# Patient Record
Sex: MALE | Race: WHITE | Employment: OTHER
[De-identification: names, ages, dates, MRNs, and addresses within clinical notes are randomized per-mention and may not be internally consistent; named-entity substitution may affect disease eponyms.]

---

## 2017-03-02 ENCOUNTER — OFFICE VISIT (OUTPATIENT)
Dept: WOUND CARE | Facility: CLINIC | Age: 72
End: 2017-03-02

## 2017-03-02 VITALS
HEIGHT: 66 IN | RESPIRATION RATE: 20 BRPM | BODY MASS INDEX: 50.62 KG/M2 | HEART RATE: 92 BPM | WEIGHT: 315 LBS | DIASTOLIC BLOOD PRESSURE: 79 MMHG | SYSTOLIC BLOOD PRESSURE: 143 MMHG | TEMPERATURE: 98.5 F

## 2017-03-02 DIAGNOSIS — I89.0 LYMPHEDEMA OF BOTH LOWER EXTREMITIES: ICD-10-CM

## 2017-03-02 DIAGNOSIS — L02.419 CELLULITIS AND ABSCESS OF LOWER EXTREMITY: Primary | ICD-10-CM

## 2017-03-02 DIAGNOSIS — L03.119 CELLULITIS AND ABSCESS OF LOWER EXTREMITY: Primary | ICD-10-CM

## 2017-03-02 PROCEDURE — 99203 OFFICE O/P NEW LOW 30 MIN: CPT | Performed by: PODIATRIST

## 2017-03-02 RX ORDER — DOXYCYCLINE HYCLATE 100 MG
100 TABLET ORAL 2 TIMES DAILY
Qty: 60 TABLET | Refills: 0 | Status: SHIPPED | OUTPATIENT
Start: 2017-03-02 | End: 2021-12-27

## 2017-03-09 ENCOUNTER — OFFICE VISIT (OUTPATIENT)
Dept: PODIATRY | Facility: CLINIC | Age: 72
End: 2017-03-09

## 2017-03-09 DIAGNOSIS — I89.0 LYMPHEDEMA OF BOTH LOWER EXTREMITIES: Primary | ICD-10-CM

## 2017-03-09 PROCEDURE — 99213 OFFICE O/P EST LOW 20 MIN: CPT | Performed by: PODIATRIST

## 2017-03-09 PROCEDURE — 4040F PNEUMOC VAC/ADMIN/RCVD: CPT | Performed by: PODIATRIST

## 2017-03-09 PROCEDURE — 3017F COLORECTAL CA SCREEN DOC REV: CPT | Performed by: PODIATRIST

## 2017-03-09 PROCEDURE — G8417 CALC BMI ABV UP PARAM F/U: HCPCS | Performed by: PODIATRIST

## 2017-03-09 PROCEDURE — G8427 DOCREV CUR MEDS BY ELIG CLIN: HCPCS | Performed by: PODIATRIST

## 2017-03-09 PROCEDURE — 1036F TOBACCO NON-USER: CPT | Performed by: PODIATRIST

## 2017-03-09 PROCEDURE — G8484 FLU IMMUNIZE NO ADMIN: HCPCS | Performed by: PODIATRIST

## 2017-03-09 PROCEDURE — 1123F ACP DISCUSS/DSCN MKR DOCD: CPT | Performed by: PODIATRIST

## 2017-03-14 ENCOUNTER — OFFICE VISIT (OUTPATIENT)
Dept: PODIATRY | Age: 72
End: 2017-03-14
Payer: MEDICARE

## 2017-03-14 VITALS
DIASTOLIC BLOOD PRESSURE: 77 MMHG | RESPIRATION RATE: 20 BRPM | HEART RATE: 84 BPM | SYSTOLIC BLOOD PRESSURE: 126 MMHG | TEMPERATURE: 98 F

## 2017-03-14 DIAGNOSIS — I89.0 LYMPHEDEMA OF BOTH LOWER EXTREMITIES: Primary | ICD-10-CM

## 2017-03-14 PROCEDURE — 4040F PNEUMOC VAC/ADMIN/RCVD: CPT | Performed by: PODIATRIST

## 2017-03-14 PROCEDURE — 99212 OFFICE O/P EST SF 10 MIN: CPT | Performed by: PODIATRIST

## 2017-03-14 PROCEDURE — 1036F TOBACCO NON-USER: CPT | Performed by: PODIATRIST

## 2017-03-14 PROCEDURE — 1123F ACP DISCUSS/DSCN MKR DOCD: CPT | Performed by: PODIATRIST

## 2017-03-14 PROCEDURE — 3017F COLORECTAL CA SCREEN DOC REV: CPT | Performed by: PODIATRIST

## 2017-03-14 PROCEDURE — G8484 FLU IMMUNIZE NO ADMIN: HCPCS | Performed by: PODIATRIST

## 2017-03-14 PROCEDURE — G8417 CALC BMI ABV UP PARAM F/U: HCPCS | Performed by: PODIATRIST

## 2017-03-14 PROCEDURE — G8427 DOCREV CUR MEDS BY ELIG CLIN: HCPCS | Performed by: PODIATRIST

## 2017-03-16 ENCOUNTER — OFFICE VISIT (OUTPATIENT)
Dept: PODIATRY | Age: 72
End: 2017-03-16
Payer: MEDICARE

## 2017-03-16 VITALS
TEMPERATURE: 97.9 F | SYSTOLIC BLOOD PRESSURE: 148 MMHG | DIASTOLIC BLOOD PRESSURE: 86 MMHG | RESPIRATION RATE: 20 BRPM | HEART RATE: 84 BPM

## 2017-03-16 DIAGNOSIS — I89.0 LYMPHEDEMA OF BOTH LOWER EXTREMITIES: Primary | ICD-10-CM

## 2017-03-16 PROCEDURE — 4040F PNEUMOC VAC/ADMIN/RCVD: CPT | Performed by: PODIATRIST

## 2017-03-16 PROCEDURE — 3017F COLORECTAL CA SCREEN DOC REV: CPT | Performed by: PODIATRIST

## 2017-03-16 PROCEDURE — 1036F TOBACCO NON-USER: CPT | Performed by: PODIATRIST

## 2017-03-16 PROCEDURE — 99212 OFFICE O/P EST SF 10 MIN: CPT | Performed by: PODIATRIST

## 2017-03-16 PROCEDURE — G8417 CALC BMI ABV UP PARAM F/U: HCPCS | Performed by: PODIATRIST

## 2017-03-16 PROCEDURE — G8484 FLU IMMUNIZE NO ADMIN: HCPCS | Performed by: PODIATRIST

## 2017-03-16 PROCEDURE — G8427 DOCREV CUR MEDS BY ELIG CLIN: HCPCS | Performed by: PODIATRIST

## 2017-03-16 PROCEDURE — 1123F ACP DISCUSS/DSCN MKR DOCD: CPT | Performed by: PODIATRIST

## 2017-03-21 ENCOUNTER — OFFICE VISIT (OUTPATIENT)
Dept: PODIATRY | Age: 72
End: 2017-03-21
Payer: MEDICARE

## 2017-03-21 VITALS
TEMPERATURE: 98.2 F | HEART RATE: 84 BPM | SYSTOLIC BLOOD PRESSURE: 131 MMHG | RESPIRATION RATE: 20 BRPM | DIASTOLIC BLOOD PRESSURE: 76 MMHG

## 2017-03-21 DIAGNOSIS — I89.0 LYMPHEDEMA OF BOTH LOWER EXTREMITIES: Primary | ICD-10-CM

## 2017-03-21 PROCEDURE — G8484 FLU IMMUNIZE NO ADMIN: HCPCS | Performed by: PODIATRIST

## 2017-03-21 PROCEDURE — G8427 DOCREV CUR MEDS BY ELIG CLIN: HCPCS | Performed by: PODIATRIST

## 2017-03-21 PROCEDURE — 1123F ACP DISCUSS/DSCN MKR DOCD: CPT | Performed by: PODIATRIST

## 2017-03-21 PROCEDURE — 4040F PNEUMOC VAC/ADMIN/RCVD: CPT | Performed by: PODIATRIST

## 2017-03-21 PROCEDURE — 1036F TOBACCO NON-USER: CPT | Performed by: PODIATRIST

## 2017-03-21 PROCEDURE — 3017F COLORECTAL CA SCREEN DOC REV: CPT | Performed by: PODIATRIST

## 2017-03-21 PROCEDURE — 99212 OFFICE O/P EST SF 10 MIN: CPT | Performed by: PODIATRIST

## 2017-03-21 PROCEDURE — G8417 CALC BMI ABV UP PARAM F/U: HCPCS | Performed by: PODIATRIST

## 2017-03-23 ENCOUNTER — OFFICE VISIT (OUTPATIENT)
Dept: PODIATRY | Age: 72
End: 2017-03-23
Payer: MEDICARE

## 2017-03-23 DIAGNOSIS — I89.0 LYMPHEDEMA OF BOTH LOWER EXTREMITIES: Primary | ICD-10-CM

## 2017-03-23 PROCEDURE — 1036F TOBACCO NON-USER: CPT | Performed by: PODIATRIST

## 2017-03-23 PROCEDURE — 1123F ACP DISCUSS/DSCN MKR DOCD: CPT | Performed by: PODIATRIST

## 2017-03-23 PROCEDURE — 3017F COLORECTAL CA SCREEN DOC REV: CPT | Performed by: PODIATRIST

## 2017-03-23 PROCEDURE — G8484 FLU IMMUNIZE NO ADMIN: HCPCS | Performed by: PODIATRIST

## 2017-03-23 PROCEDURE — G8427 DOCREV CUR MEDS BY ELIG CLIN: HCPCS | Performed by: PODIATRIST

## 2017-03-23 PROCEDURE — 99212 OFFICE O/P EST SF 10 MIN: CPT | Performed by: PODIATRIST

## 2017-03-23 PROCEDURE — G8417 CALC BMI ABV UP PARAM F/U: HCPCS | Performed by: PODIATRIST

## 2017-03-23 PROCEDURE — 4040F PNEUMOC VAC/ADMIN/RCVD: CPT | Performed by: PODIATRIST

## 2017-03-28 ENCOUNTER — OFFICE VISIT (OUTPATIENT)
Dept: PODIATRY | Age: 72
End: 2017-03-28
Payer: MEDICARE

## 2017-03-28 VITALS
DIASTOLIC BLOOD PRESSURE: 80 MMHG | RESPIRATION RATE: 20 BRPM | HEART RATE: 82 BPM | SYSTOLIC BLOOD PRESSURE: 139 MMHG | TEMPERATURE: 97.1 F

## 2017-03-28 DIAGNOSIS — I89.0 LYMPHEDEMA OF BOTH LOWER EXTREMITIES: Primary | ICD-10-CM

## 2017-03-28 PROCEDURE — 99212 OFFICE O/P EST SF 10 MIN: CPT | Performed by: PODIATRIST

## 2017-03-28 PROCEDURE — 3017F COLORECTAL CA SCREEN DOC REV: CPT | Performed by: PODIATRIST

## 2017-03-28 PROCEDURE — G8427 DOCREV CUR MEDS BY ELIG CLIN: HCPCS | Performed by: PODIATRIST

## 2017-03-28 PROCEDURE — G8484 FLU IMMUNIZE NO ADMIN: HCPCS | Performed by: PODIATRIST

## 2017-03-28 PROCEDURE — 4040F PNEUMOC VAC/ADMIN/RCVD: CPT | Performed by: PODIATRIST

## 2017-03-28 PROCEDURE — 1036F TOBACCO NON-USER: CPT | Performed by: PODIATRIST

## 2017-03-28 PROCEDURE — 1123F ACP DISCUSS/DSCN MKR DOCD: CPT | Performed by: PODIATRIST

## 2017-03-28 PROCEDURE — G8417 CALC BMI ABV UP PARAM F/U: HCPCS | Performed by: PODIATRIST

## 2017-04-05 ENCOUNTER — OFFICE VISIT (OUTPATIENT)
Dept: PODIATRY | Age: 72
End: 2017-04-05
Payer: MEDICARE

## 2017-04-05 VITALS
DIASTOLIC BLOOD PRESSURE: 72 MMHG | HEART RATE: 87 BPM | RESPIRATION RATE: 20 BRPM | TEMPERATURE: 97.8 F | SYSTOLIC BLOOD PRESSURE: 122 MMHG

## 2017-04-05 DIAGNOSIS — I89.0 LYMPHEDEMA OF BOTH LOWER EXTREMITIES: Primary | ICD-10-CM

## 2017-04-05 PROCEDURE — 4040F PNEUMOC VAC/ADMIN/RCVD: CPT | Performed by: PODIATRIST

## 2017-04-05 PROCEDURE — 1036F TOBACCO NON-USER: CPT | Performed by: PODIATRIST

## 2017-04-05 PROCEDURE — G8427 DOCREV CUR MEDS BY ELIG CLIN: HCPCS | Performed by: PODIATRIST

## 2017-04-05 PROCEDURE — 3017F COLORECTAL CA SCREEN DOC REV: CPT | Performed by: PODIATRIST

## 2017-04-05 PROCEDURE — G8417 CALC BMI ABV UP PARAM F/U: HCPCS | Performed by: PODIATRIST

## 2017-04-05 PROCEDURE — 99213 OFFICE O/P EST LOW 20 MIN: CPT | Performed by: PODIATRIST

## 2017-04-05 PROCEDURE — 1123F ACP DISCUSS/DSCN MKR DOCD: CPT | Performed by: PODIATRIST

## 2017-04-06 ENCOUNTER — OFFICE VISIT (OUTPATIENT)
Dept: PODIATRY | Age: 72
End: 2017-04-06
Payer: MEDICARE

## 2017-04-06 DIAGNOSIS — I89.0 LYMPHEDEMA OF BOTH LOWER EXTREMITIES: Primary | ICD-10-CM

## 2017-04-06 PROCEDURE — 99213 OFFICE O/P EST LOW 20 MIN: CPT | Performed by: PODIATRIST

## 2017-04-06 PROCEDURE — G8427 DOCREV CUR MEDS BY ELIG CLIN: HCPCS | Performed by: PODIATRIST

## 2017-04-06 PROCEDURE — 4040F PNEUMOC VAC/ADMIN/RCVD: CPT | Performed by: PODIATRIST

## 2017-04-06 PROCEDURE — 1123F ACP DISCUSS/DSCN MKR DOCD: CPT | Performed by: PODIATRIST

## 2017-04-06 PROCEDURE — G8417 CALC BMI ABV UP PARAM F/U: HCPCS | Performed by: PODIATRIST

## 2017-04-06 PROCEDURE — 1036F TOBACCO NON-USER: CPT | Performed by: PODIATRIST

## 2017-04-06 PROCEDURE — 3017F COLORECTAL CA SCREEN DOC REV: CPT | Performed by: PODIATRIST

## 2017-04-11 ENCOUNTER — OFFICE VISIT (OUTPATIENT)
Dept: PODIATRY | Age: 72
End: 2017-04-11
Payer: MEDICARE

## 2017-04-11 VITALS
SYSTOLIC BLOOD PRESSURE: 120 MMHG | RESPIRATION RATE: 20 BRPM | HEART RATE: 84 BPM | TEMPERATURE: 98.6 F | DIASTOLIC BLOOD PRESSURE: 67 MMHG

## 2017-04-11 DIAGNOSIS — I89.0 LYMPHEDEMA OF BOTH LOWER EXTREMITIES: Primary | ICD-10-CM

## 2017-04-11 PROCEDURE — 1036F TOBACCO NON-USER: CPT | Performed by: PODIATRIST

## 2017-04-11 PROCEDURE — G8417 CALC BMI ABV UP PARAM F/U: HCPCS | Performed by: PODIATRIST

## 2017-04-11 PROCEDURE — 4040F PNEUMOC VAC/ADMIN/RCVD: CPT | Performed by: PODIATRIST

## 2017-04-11 PROCEDURE — 3017F COLORECTAL CA SCREEN DOC REV: CPT | Performed by: PODIATRIST

## 2017-04-11 PROCEDURE — G8427 DOCREV CUR MEDS BY ELIG CLIN: HCPCS | Performed by: PODIATRIST

## 2017-04-11 PROCEDURE — 99212 OFFICE O/P EST SF 10 MIN: CPT | Performed by: PODIATRIST

## 2017-04-11 PROCEDURE — 1123F ACP DISCUSS/DSCN MKR DOCD: CPT | Performed by: PODIATRIST

## 2017-04-13 ENCOUNTER — OFFICE VISIT (OUTPATIENT)
Dept: PODIATRY | Age: 72
End: 2017-04-13
Payer: MEDICARE

## 2017-04-13 VITALS
SYSTOLIC BLOOD PRESSURE: 135 MMHG | TEMPERATURE: 98.1 F | DIASTOLIC BLOOD PRESSURE: 66 MMHG | RESPIRATION RATE: 16 BRPM | HEART RATE: 87 BPM

## 2017-04-13 DIAGNOSIS — I89.0 LYMPHEDEMA OF BOTH LOWER EXTREMITIES: Primary | ICD-10-CM

## 2017-04-13 PROCEDURE — 99212 OFFICE O/P EST SF 10 MIN: CPT | Performed by: PODIATRIST

## 2017-04-13 PROCEDURE — G8427 DOCREV CUR MEDS BY ELIG CLIN: HCPCS | Performed by: PODIATRIST

## 2017-04-13 PROCEDURE — 1036F TOBACCO NON-USER: CPT | Performed by: PODIATRIST

## 2017-04-13 PROCEDURE — 3017F COLORECTAL CA SCREEN DOC REV: CPT | Performed by: PODIATRIST

## 2017-04-13 PROCEDURE — 4040F PNEUMOC VAC/ADMIN/RCVD: CPT | Performed by: PODIATRIST

## 2017-04-13 PROCEDURE — G8417 CALC BMI ABV UP PARAM F/U: HCPCS | Performed by: PODIATRIST

## 2017-04-13 PROCEDURE — 1123F ACP DISCUSS/DSCN MKR DOCD: CPT | Performed by: PODIATRIST

## 2017-04-18 ENCOUNTER — OFFICE VISIT (OUTPATIENT)
Dept: PODIATRY | Age: 72
End: 2017-04-18
Payer: MEDICARE

## 2017-04-18 VITALS
RESPIRATION RATE: 20 BRPM | HEART RATE: 93 BPM | SYSTOLIC BLOOD PRESSURE: 128 MMHG | DIASTOLIC BLOOD PRESSURE: 81 MMHG | TEMPERATURE: 98.7 F

## 2017-04-18 DIAGNOSIS — I89.0 LYMPHEDEMA OF BOTH LOWER EXTREMITIES: Primary | ICD-10-CM

## 2017-04-18 PROCEDURE — G8417 CALC BMI ABV UP PARAM F/U: HCPCS | Performed by: PODIATRIST

## 2017-04-18 PROCEDURE — 4040F PNEUMOC VAC/ADMIN/RCVD: CPT | Performed by: PODIATRIST

## 2017-04-18 PROCEDURE — 1036F TOBACCO NON-USER: CPT | Performed by: PODIATRIST

## 2017-04-18 PROCEDURE — 3017F COLORECTAL CA SCREEN DOC REV: CPT | Performed by: PODIATRIST

## 2017-04-18 PROCEDURE — 99212 OFFICE O/P EST SF 10 MIN: CPT | Performed by: PODIATRIST

## 2017-04-18 PROCEDURE — G8427 DOCREV CUR MEDS BY ELIG CLIN: HCPCS | Performed by: PODIATRIST

## 2017-04-18 PROCEDURE — 1123F ACP DISCUSS/DSCN MKR DOCD: CPT | Performed by: PODIATRIST

## 2017-04-20 ENCOUNTER — OFFICE VISIT (OUTPATIENT)
Dept: PODIATRY | Age: 72
End: 2017-04-20
Payer: MEDICARE

## 2017-04-20 VITALS
DIASTOLIC BLOOD PRESSURE: 67 MMHG | HEART RATE: 96 BPM | RESPIRATION RATE: 20 BRPM | SYSTOLIC BLOOD PRESSURE: 139 MMHG | TEMPERATURE: 98.5 F

## 2017-04-20 DIAGNOSIS — I89.0 LYMPHEDEMA OF BOTH LOWER EXTREMITIES: Primary | ICD-10-CM

## 2017-04-20 PROCEDURE — 99212 OFFICE O/P EST SF 10 MIN: CPT | Performed by: PODIATRIST

## 2017-04-20 PROCEDURE — 1036F TOBACCO NON-USER: CPT | Performed by: PODIATRIST

## 2017-04-20 PROCEDURE — 1123F ACP DISCUSS/DSCN MKR DOCD: CPT | Performed by: PODIATRIST

## 2017-04-20 PROCEDURE — G8427 DOCREV CUR MEDS BY ELIG CLIN: HCPCS | Performed by: PODIATRIST

## 2017-04-20 PROCEDURE — 3017F COLORECTAL CA SCREEN DOC REV: CPT | Performed by: PODIATRIST

## 2017-04-20 PROCEDURE — 4040F PNEUMOC VAC/ADMIN/RCVD: CPT | Performed by: PODIATRIST

## 2017-04-20 PROCEDURE — G8417 CALC BMI ABV UP PARAM F/U: HCPCS | Performed by: PODIATRIST

## 2017-05-10 ENCOUNTER — OFFICE VISIT (OUTPATIENT)
Dept: PODIATRY | Age: 72
End: 2017-05-10
Payer: MEDICARE

## 2017-05-10 VITALS
TEMPERATURE: 98.9 F | HEART RATE: 94 BPM | RESPIRATION RATE: 16 BRPM | DIASTOLIC BLOOD PRESSURE: 72 MMHG | SYSTOLIC BLOOD PRESSURE: 125 MMHG

## 2017-05-10 DIAGNOSIS — I89.0 LYMPHEDEMA OF BOTH LOWER EXTREMITIES: Primary | ICD-10-CM

## 2017-05-10 PROCEDURE — 4040F PNEUMOC VAC/ADMIN/RCVD: CPT | Performed by: PODIATRIST

## 2017-05-10 PROCEDURE — 99212 OFFICE O/P EST SF 10 MIN: CPT | Performed by: PODIATRIST

## 2017-05-10 PROCEDURE — 1036F TOBACCO NON-USER: CPT | Performed by: PODIATRIST

## 2017-05-10 PROCEDURE — G8417 CALC BMI ABV UP PARAM F/U: HCPCS | Performed by: PODIATRIST

## 2017-05-10 PROCEDURE — 3017F COLORECTAL CA SCREEN DOC REV: CPT | Performed by: PODIATRIST

## 2017-05-10 PROCEDURE — 1123F ACP DISCUSS/DSCN MKR DOCD: CPT | Performed by: PODIATRIST

## 2017-05-10 PROCEDURE — G8427 DOCREV CUR MEDS BY ELIG CLIN: HCPCS | Performed by: PODIATRIST

## 2017-06-13 PROBLEM — M17.0 OSTEOARTHRITIS OF BOTH KNEES: Status: ACTIVE | Noted: 2017-06-13

## 2019-01-08 PROBLEM — G56.02 CARPAL TUNNEL SYNDROME ON LEFT: Status: ACTIVE | Noted: 2019-01-08

## 2019-01-11 ENCOUNTER — HOSPITAL ENCOUNTER (OUTPATIENT)
Age: 74
Discharge: HOME | End: 2019-01-11
Payer: MEDICARE

## 2019-01-11 DIAGNOSIS — Z12.5 SCREENING FOR PROSTATE CANCER: ICD-10-CM

## 2019-01-11 DIAGNOSIS — E78.2 MIXED HYPERLIPIDEMIA: ICD-10-CM

## 2019-01-11 DIAGNOSIS — I10 ESSENTIAL HYPERTENSION: ICD-10-CM

## 2019-01-11 LAB
ANION GAP SERPL CALCULATED.3IONS-SCNC: 12 MMOL/L (ref 9–17)
BUN BLDV-MCNC: 16 MG/DL (ref 8–23)
BUN/CREAT BLD: 23 (ref 9–20)
CALCIUM SERPL-MCNC: 8.8 MG/DL (ref 8.6–10.4)
CHLORIDE BLD-SCNC: 98 MMOL/L (ref 98–107)
CHOLESTEROL, FASTING: 132 MG/DL
CHOLESTEROL/HDL RATIO: 4.4
CO2: 27 MMOL/L (ref 20–31)
CREAT SERPL-MCNC: 0.7 MG/DL (ref 0.7–1.2)
GFR AFRICAN AMERICAN: >60 ML/MIN
GFR NON-AFRICAN AMERICAN: >60 ML/MIN
GFR SERPL CREATININE-BSD FRML MDRD: ABNORMAL ML/MIN/{1.73_M2}
GFR SERPL CREATININE-BSD FRML MDRD: ABNORMAL ML/MIN/{1.73_M2}
GLUCOSE FASTING: 103 MG/DL (ref 70–99)
HDLC SERPL-MCNC: 30 MG/DL
LDL CHOLESTEROL: 65 MG/DL (ref 0–130)
POTASSIUM SERPL-SCNC: 4.3 MMOL/L (ref 3.7–5.3)
PROSTATE SPECIFIC ANTIGEN: 1.49 UG/L
SODIUM BLD-SCNC: 137 MMOL/L (ref 135–144)
TRIGLYCERIDE, FASTING: 186 MG/DL
VLDLC SERPL CALC-MCNC: ABNORMAL MG/DL (ref 1–30)

## 2019-01-11 PROCEDURE — 36415 COLL VENOUS BLD VENIPUNCTURE: CPT

## 2019-01-11 PROCEDURE — G0103 PSA SCREENING: HCPCS

## 2019-01-11 PROCEDURE — 80061 LIPID PANEL: CPT

## 2019-01-11 PROCEDURE — 80048 BASIC METABOLIC PNL TOTAL CA: CPT

## 2019-11-18 ENCOUNTER — HOSPITAL ENCOUNTER (OUTPATIENT)
Dept: LAB | Age: 74
Setting detail: SPECIMEN
Discharge: HOME | End: 2019-11-18
Payer: MEDICARE

## 2019-11-18 DIAGNOSIS — R35.0 URINARY FREQUENCY: ICD-10-CM

## 2019-11-18 LAB
-: ABNORMAL
AMORPHOUS: ABNORMAL
BACTERIA: ABNORMAL
BILIRUBIN URINE: NEGATIVE
CASTS UA: ABNORMAL /LPF
COLOR: YELLOW
COMMENT UA: ABNORMAL
CRYSTALS, UA: ABNORMAL /HPF
EPITHELIAL CELLS UA: ABNORMAL /HPF (ref 0–5)
GLUCOSE URINE: NEGATIVE
KETONES, URINE: NEGATIVE
LEUKOCYTE ESTERASE, URINE: ABNORMAL
MUCUS: ABNORMAL
NITRITE, URINE: NEGATIVE
OTHER OBSERVATIONS UA: ABNORMAL
PH UA: 7 (ref 5–9)
PROTEIN UA: ABNORMAL
RBC UA: ABNORMAL /HPF (ref 0–2)
RENAL EPITHELIAL, UA: ABNORMAL /HPF
SPECIFIC GRAVITY UA: 1.01 (ref 1.01–1.02)
TRICHOMONAS: ABNORMAL
TURBIDITY: CLEAR
URINE HGB: ABNORMAL
UROBILINOGEN, URINE: NORMAL
WBC UA: ABNORMAL /HPF (ref 0–5)
YEAST: ABNORMAL

## 2019-11-18 PROCEDURE — 87077 CULTURE AEROBIC IDENTIFY: CPT

## 2019-11-18 PROCEDURE — 87086 URINE CULTURE/COLONY COUNT: CPT

## 2019-11-18 PROCEDURE — 81001 URINALYSIS AUTO W/SCOPE: CPT

## 2019-11-18 PROCEDURE — 87186 SC STD MICRODIL/AGAR DIL: CPT

## 2019-11-20 LAB
CULTURE: ABNORMAL
Lab: ABNORMAL
SPECIMEN DESCRIPTION: ABNORMAL

## 2020-01-08 ENCOUNTER — HOSPITAL ENCOUNTER (OUTPATIENT)
Dept: LAB | Age: 75
Discharge: HOME | End: 2020-01-08
Payer: MEDICARE

## 2020-01-08 LAB
-: NORMAL
REASON FOR REJECTION: NORMAL
ZZ NTE CLEAN UP: ORDERED TEST: NORMAL
ZZ NTE WITH NAME CLEAN UP: SPECIMEN SOURCE: NORMAL

## 2020-01-08 PROCEDURE — 87086 URINE CULTURE/COLONY COUNT: CPT

## 2020-01-08 PROCEDURE — 81001 URINALYSIS AUTO W/SCOPE: CPT

## 2020-01-09 ENCOUNTER — HOSPITAL ENCOUNTER (OUTPATIENT)
Dept: LAB | Age: 75
Discharge: HOME | End: 2020-01-09
Payer: MEDICARE

## 2020-01-09 LAB
-: ABNORMAL
AMORPHOUS: ABNORMAL
BACTERIA: ABNORMAL
BILIRUBIN URINE: ABNORMAL
CASTS UA: ABNORMAL /LPF
COLOR: YELLOW
COMMENT UA: ABNORMAL
CRYSTALS, UA: ABNORMAL /HPF
EPITHELIAL CELLS UA: ABNORMAL /HPF (ref 0–5)
GLUCOSE URINE: NEGATIVE
KETONES, URINE: NEGATIVE
LEUKOCYTE ESTERASE, URINE: ABNORMAL
MUCUS: ABNORMAL
NITRITE, URINE: NEGATIVE
OTHER OBSERVATIONS UA: ABNORMAL
PH UA: 6 (ref 5–9)
PROTEIN UA: ABNORMAL
RBC UA: ABNORMAL /HPF (ref 0–2)
RENAL EPITHELIAL, UA: ABNORMAL /HPF
SPECIFIC GRAVITY UA: 1.02 (ref 1.01–1.02)
TRICHOMONAS: ABNORMAL
TURBIDITY: CLEAR
URINE HGB: ABNORMAL
UROBILINOGEN, URINE: NORMAL
WBC UA: ABNORMAL /HPF (ref 0–5)
YEAST: ABNORMAL

## 2020-01-09 PROCEDURE — 87186 SC STD MICRODIL/AGAR DIL: CPT

## 2020-01-09 PROCEDURE — 87077 CULTURE AEROBIC IDENTIFY: CPT

## 2020-01-11 LAB
CULTURE: ABNORMAL
Lab: ABNORMAL
SPECIMEN DESCRIPTION: ABNORMAL

## 2020-03-04 ENCOUNTER — HOSPITAL ENCOUNTER (INPATIENT)
Age: 75
LOS: 5 days | Discharge: HOME | DRG: 291 | End: 2020-03-09
Attending: EMERGENCY MEDICINE | Admitting: INTERNAL MEDICINE
Payer: MEDICARE

## 2020-03-04 ENCOUNTER — APPOINTMENT (OUTPATIENT)
Dept: NON INVASIVE DIAGNOSTICS | Age: 75
DRG: 291 | End: 2020-03-04
Payer: MEDICARE

## 2020-03-04 ENCOUNTER — APPOINTMENT (OUTPATIENT)
Dept: GENERAL RADIOLOGY | Age: 75
DRG: 291 | End: 2020-03-04
Payer: MEDICARE

## 2020-03-04 PROBLEM — E66.01 MORBID OBESITY (HCC): Chronic | Status: ACTIVE | Noted: 2017-12-15

## 2020-03-04 PROBLEM — I50.31 ACUTE DIASTOLIC CHF (CONGESTIVE HEART FAILURE), NYHA CLASS 3 (HCC): Status: ACTIVE | Noted: 2020-03-04

## 2020-03-04 LAB
ABSOLUTE EOS #: 0.29 K/UL (ref 0–0.44)
ABSOLUTE IMMATURE GRANULOCYTE: 0.17 K/UL (ref 0–0.3)
ABSOLUTE LYMPH #: 2.56 K/UL (ref 1.1–3.7)
ABSOLUTE MONO #: 0.87 K/UL (ref 0.1–1.2)
ALBUMIN SERPL-MCNC: 3.9 G/DL (ref 3.5–5.2)
ALBUMIN/GLOBULIN RATIO: 0.9 (ref 1–2.5)
ALP BLD-CCNC: 58 U/L (ref 40–129)
ALT SERPL-CCNC: 27 U/L (ref 5–41)
ANION GAP SERPL CALCULATED.3IONS-SCNC: 14 MMOL/L (ref 9–17)
AST SERPL-CCNC: 32 U/L
BASOPHILS # BLD: 1 % (ref 0–2)
BASOPHILS ABSOLUTE: 0.13 K/UL (ref 0–0.2)
BILIRUB SERPL-MCNC: 0.42 MG/DL (ref 0.3–1.2)
BNP INTERPRETATION: ABNORMAL
BUN BLDV-MCNC: 19 MG/DL (ref 8–23)
BUN/CREAT BLD: 22 (ref 9–20)
CALCIUM SERPL-MCNC: 9.8 MG/DL (ref 8.6–10.4)
CHLORIDE BLD-SCNC: 97 MMOL/L (ref 98–107)
CO2: 24 MMOL/L (ref 20–31)
CREAT SERPL-MCNC: 0.87 MG/DL (ref 0.7–1.2)
DIFFERENTIAL TYPE: ABNORMAL
EKG ATRIAL RATE: 106 BPM
EKG ATRIAL RATE: 138 BPM
EKG P-R INTERVAL: 152 MS
EKG Q-T INTERVAL: 362 MS
EKG Q-T INTERVAL: 416 MS
EKG QRS DURATION: 164 MS
EKG QRS DURATION: 170 MS
EKG QTC CALCULATION (BAZETT): 548 MS
EKG QTC CALCULATION (BAZETT): 552 MS
EKG R AXIS: -50 DEGREES
EKG R AXIS: 45 DEGREES
EKG T AXIS: 47 DEGREES
EKG T AXIS: 61 DEGREES
EKG VENTRICULAR RATE: 106 BPM
EKG VENTRICULAR RATE: 138 BPM
EOSINOPHILS RELATIVE PERCENT: 2 % (ref 1–4)
GFR AFRICAN AMERICAN: >60 ML/MIN
GFR NON-AFRICAN AMERICAN: >60 ML/MIN
GFR SERPL CREATININE-BSD FRML MDRD: ABNORMAL ML/MIN/{1.73_M2}
GFR SERPL CREATININE-BSD FRML MDRD: ABNORMAL ML/MIN/{1.73_M2}
GLUCOSE BLD-MCNC: 208 MG/DL (ref 70–99)
HCT VFR BLD CALC: 43.2 % (ref 40.7–50.3)
HEMOGLOBIN: 13.2 G/DL (ref 13–17)
IMMATURE GRANULOCYTES: 1 %
INR BLD: 1.2 (ref 0.9–1.2)
LACTIC ACID, WHOLE BLOOD: NORMAL MMOL/L (ref 0.7–2.1)
LACTIC ACID: 2.1 MMOL/L (ref 0.5–2.2)
LV EF: 35 %
LVEF MODALITY: NORMAL
LYMPHOCYTES # BLD: 17 % (ref 24–43)
MAGNESIUM: 1.9 MG/DL (ref 1.6–2.6)
MCH RBC QN AUTO: 28.9 PG (ref 25.2–33.5)
MCHC RBC AUTO-ENTMCNC: 30.6 G/DL (ref 28.4–34.8)
MCV RBC AUTO: 94.7 FL (ref 82.6–102.9)
MONOCYTES # BLD: 6 % (ref 3–12)
NRBC AUTOMATED: 0 PER 100 WBC
PARTIAL THROMBOPLASTIN TIME: 24.6 SEC (ref 23.2–34.4)
PDW BLD-RTO: 15.3 % (ref 11.8–14.4)
PLATELET # BLD: 362 K/UL (ref 138–453)
PLATELET ESTIMATE: ABNORMAL
PMV BLD AUTO: 11.2 FL (ref 8.1–13.5)
POTASSIUM SERPL-SCNC: 4.4 MMOL/L (ref 3.7–5.3)
PRO-BNP: 2447 PG/ML
PROTHROMBIN TIME: 11.8 SEC (ref 9.7–12.2)
RBC # BLD: 4.56 M/UL (ref 4.21–5.77)
RBC # BLD: ABNORMAL 10*6/UL
SEG NEUTROPHILS: 73 % (ref 36–65)
SEGMENTED NEUTROPHILS ABSOLUTE COUNT: 10.82 K/UL (ref 1.5–8.1)
SODIUM BLD-SCNC: 135 MMOL/L (ref 135–144)
THYROXINE, FREE: 1.28 NG/DL (ref 0.93–1.7)
TOTAL PROTEIN: 8.1 G/DL (ref 6.4–8.3)
TROPONIN INTERP: ABNORMAL
TROPONIN INTERP: ABNORMAL
TROPONIN T: ABNORMAL NG/ML
TROPONIN T: ABNORMAL NG/ML
TROPONIN, HIGH SENSITIVITY: 48 NG/L (ref 0–22)
TROPONIN, HIGH SENSITIVITY: 49 NG/L (ref 0–22)
TSH SERPL DL<=0.05 MIU/L-ACNC: 3.43 MIU/L (ref 0.3–5)
WBC # BLD: 14.8 K/UL (ref 3.5–11.3)
WBC # BLD: ABNORMAL 10*3/UL

## 2020-03-04 PROCEDURE — 99222 1ST HOSP IP/OBS MODERATE 55: CPT | Performed by: INTERNAL MEDICINE

## 2020-03-04 PROCEDURE — 83880 ASSAY OF NATRIURETIC PEPTIDE: CPT

## 2020-03-04 PROCEDURE — 94660 CPAP INITIATION&MGMT: CPT

## 2020-03-04 PROCEDURE — 80053 COMPREHEN METABOLIC PANEL: CPT

## 2020-03-04 PROCEDURE — 81003 URINALYSIS AUTO W/O SCOPE: CPT

## 2020-03-04 PROCEDURE — 93005 ELECTROCARDIOGRAM TRACING: CPT | Performed by: EMERGENCY MEDICINE

## 2020-03-04 PROCEDURE — 6370000000 HC RX 637 (ALT 250 FOR IP): Performed by: INTERNAL MEDICINE

## 2020-03-04 PROCEDURE — 2580000003 HC RX 258: Performed by: INTERNAL MEDICINE

## 2020-03-04 PROCEDURE — 99285 EMERGENCY DEPT VISIT HI MDM: CPT

## 2020-03-04 PROCEDURE — 36415 COLL VENOUS BLD VENIPUNCTURE: CPT

## 2020-03-04 PROCEDURE — 6360000002 HC RX W HCPCS: Performed by: INTERNAL MEDICINE

## 2020-03-04 PROCEDURE — 71045 X-RAY EXAM CHEST 1 VIEW: CPT

## 2020-03-04 PROCEDURE — 85025 COMPLETE CBC W/AUTO DIFF WBC: CPT

## 2020-03-04 PROCEDURE — 6360000002 HC RX W HCPCS: Performed by: EMERGENCY MEDICINE

## 2020-03-04 PROCEDURE — 84443 ASSAY THYROID STIM HORMONE: CPT

## 2020-03-04 PROCEDURE — 1200000000 HC SEMI PRIVATE

## 2020-03-04 PROCEDURE — 84439 ASSAY OF FREE THYROXINE: CPT

## 2020-03-04 PROCEDURE — 85610 PROTHROMBIN TIME: CPT

## 2020-03-04 PROCEDURE — 83605 ASSAY OF LACTIC ACID: CPT

## 2020-03-04 PROCEDURE — 84484 ASSAY OF TROPONIN QUANT: CPT

## 2020-03-04 PROCEDURE — 93306 TTE W/DOPPLER COMPLETE: CPT

## 2020-03-04 PROCEDURE — 93010 ELECTROCARDIOGRAM REPORT: CPT | Performed by: INTERNAL MEDICINE

## 2020-03-04 PROCEDURE — 85730 THROMBOPLASTIN TIME PARTIAL: CPT

## 2020-03-04 PROCEDURE — 2580000003 HC RX 258: Performed by: EMERGENCY MEDICINE

## 2020-03-04 PROCEDURE — 83735 ASSAY OF MAGNESIUM: CPT

## 2020-03-04 RX ORDER — AMIODARONE HYDROCHLORIDE 50 MG/ML
INJECTION, SOLUTION INTRAVENOUS
Status: DISPENSED
Start: 2020-03-04 | End: 2020-03-04

## 2020-03-04 RX ORDER — SODIUM CHLORIDE 0.9 % (FLUSH) 0.9 %
10 SYRINGE (ML) INJECTION EVERY 12 HOURS SCHEDULED
Status: DISCONTINUED | OUTPATIENT
Start: 2020-03-04 | End: 2020-03-09 | Stop reason: HOSPADM

## 2020-03-04 RX ORDER — LOSARTAN POTASSIUM 25 MG/1
25 TABLET ORAL DAILY
Status: DISCONTINUED | OUTPATIENT
Start: 2020-03-04 | End: 2020-03-09 | Stop reason: HOSPADM

## 2020-03-04 RX ORDER — ACETAMINOPHEN 325 MG/1
650 TABLET ORAL EVERY 6 HOURS PRN
Status: DISCONTINUED | OUTPATIENT
Start: 2020-03-04 | End: 2020-03-09 | Stop reason: HOSPADM

## 2020-03-04 RX ORDER — FAMOTIDINE 20 MG/1
20 TABLET, FILM COATED ORAL 2 TIMES DAILY
Status: DISCONTINUED | OUTPATIENT
Start: 2020-03-04 | End: 2020-03-09 | Stop reason: HOSPADM

## 2020-03-04 RX ORDER — ONDANSETRON 2 MG/ML
4 INJECTION INTRAMUSCULAR; INTRAVENOUS EVERY 6 HOURS PRN
Status: DISCONTINUED | OUTPATIENT
Start: 2020-03-04 | End: 2020-03-04

## 2020-03-04 RX ORDER — ATORVASTATIN CALCIUM 40 MG/1
40 TABLET, FILM COATED ORAL DAILY
Status: DISCONTINUED | OUTPATIENT
Start: 2020-03-04 | End: 2020-03-09 | Stop reason: HOSPADM

## 2020-03-04 RX ORDER — POLYETHYLENE GLYCOL 3350 17 G/17G
17 POWDER, FOR SOLUTION ORAL DAILY PRN
Status: DISCONTINUED | OUTPATIENT
Start: 2020-03-04 | End: 2020-03-09 | Stop reason: HOSPADM

## 2020-03-04 RX ORDER — SODIUM CHLORIDE 0.9 % (FLUSH) 0.9 %
10 SYRINGE (ML) INJECTION PRN
Status: DISCONTINUED | OUTPATIENT
Start: 2020-03-04 | End: 2020-03-09 | Stop reason: HOSPADM

## 2020-03-04 RX ORDER — ACETAMINOPHEN 650 MG/1
650 SUPPOSITORY RECTAL EVERY 6 HOURS PRN
Status: DISCONTINUED | OUTPATIENT
Start: 2020-03-04 | End: 2020-03-09 | Stop reason: HOSPADM

## 2020-03-04 RX ORDER — FUROSEMIDE 10 MG/ML
40 INJECTION INTRAMUSCULAR; INTRAVENOUS 2 TIMES DAILY
Status: DISCONTINUED | OUTPATIENT
Start: 2020-03-04 | End: 2020-03-09 | Stop reason: HOSPADM

## 2020-03-04 RX ORDER — CARVEDILOL 6.25 MG/1
6.25 TABLET ORAL 2 TIMES DAILY WITH MEALS
Status: DISCONTINUED | OUTPATIENT
Start: 2020-03-05 | End: 2020-03-07

## 2020-03-04 RX ORDER — ASPIRIN 81 MG/1
81 TABLET ORAL DAILY
Status: DISCONTINUED | OUTPATIENT
Start: 2020-03-05 | End: 2020-03-09 | Stop reason: HOSPADM

## 2020-03-04 RX ORDER — PROMETHAZINE HYDROCHLORIDE 25 MG/1
12.5 TABLET ORAL EVERY 6 HOURS PRN
Status: DISCONTINUED | OUTPATIENT
Start: 2020-03-04 | End: 2020-03-09 | Stop reason: HOSPADM

## 2020-03-04 RX ORDER — TAMSULOSIN HYDROCHLORIDE 0.4 MG/1
0.4 CAPSULE ORAL DAILY
Status: DISCONTINUED | OUTPATIENT
Start: 2020-03-04 | End: 2020-03-09 | Stop reason: HOSPADM

## 2020-03-04 RX ADMIN — LOSARTAN POTASSIUM 25 MG: 25 TABLET, FILM COATED ORAL at 08:34

## 2020-03-04 RX ADMIN — ENOXAPARIN SODIUM 40 MG: 40 INJECTION, SOLUTION INTRAVENOUS; SUBCUTANEOUS at 08:34

## 2020-03-04 RX ADMIN — FAMOTIDINE 20 MG: 10 TABLET ORAL at 20:23

## 2020-03-04 RX ADMIN — FAMOTIDINE 20 MG: 10 TABLET ORAL at 08:34

## 2020-03-04 RX ADMIN — Medication 10 ML: at 20:23

## 2020-03-04 RX ADMIN — ATORVASTATIN CALCIUM 40 MG: 40 TABLET, FILM COATED ORAL at 08:34

## 2020-03-04 RX ADMIN — FUROSEMIDE 40 MG: 10 INJECTION, SOLUTION INTRAMUSCULAR; INTRAVENOUS at 08:33

## 2020-03-04 RX ADMIN — FUROSEMIDE 40 MG: 10 INJECTION, SOLUTION INTRAMUSCULAR; INTRAVENOUS at 17:01

## 2020-03-04 RX ADMIN — Medication 10 ML: at 08:36

## 2020-03-04 RX ADMIN — PAROXETINE 30 MG: 10 TABLET, FILM COATED ORAL at 08:34

## 2020-03-04 RX ADMIN — AMIODARONE HYDROCHLORIDE 150 MG: 50 INJECTION, SOLUTION INTRAVENOUS at 03:21

## 2020-03-04 RX ADMIN — TAMSULOSIN HYDROCHLORIDE 0.4 MG: 0.4 CAPSULE ORAL at 08:34

## 2020-03-04 ASSESSMENT — PAIN SCALES - GENERAL
PAINLEVEL_OUTOF10: 0

## 2020-03-04 NOTE — PROGRESS NOTES
Social Work intial Assessment/Discharge Plan      Diagnosis:  Acute diastolic CHF    Met with:  Patient      PCP:  Megan Santillan MD    Payment Source:  Medicare and [facility] [address] Directives:  None    Code Status:  Full    Mental Status:  Alert and oriented    Living Arrangement:  Home in [address]:  Wife Shelbi Angeles and son Stephanie Cleveland who is helpful and lives in the local area. Patient able to perform ADL's:  Independant    Current Services  None    Current DME Equipment:  Cha Sergey and a power wheelchair    Able to get medication fill & pharmacy:  Patient is able to obtain and pay for his medications. Transportation provider:  [facility]    Collaborative List of SNF/HH were provided:  Patient was given a home health list.    Any concerns or Barriers to discharge:  None        Anticipated Needs/Discharge Plan:  Patient may need Home Health and he was given a current list.  As a CHF patient, he is in need of a heavyweight scale that is capable weighing over 500 pounds.

## 2020-03-04 NOTE — PROGRESS NOTES
[id number]  · Estimated Daily Protein (g): 67-80(1.1-1.3)  · Estimated Daily Total Fluid (ml/day): 1800 ml+    Nutrition Diagnosis:   · Problem: Altered nutrition-related lab values  · Etiology: related to Endocrine dysfunction     Signs and symptoms:  as evidenced by (Triglycerides 174, blood sugar 208)    Objective Information:  · Nutrition-Focused Physical Findings: obese, edema: BUE +1, BLE: +4  · Wound Type: None  · Current Nutrition Therapies:  · Oral Diet Orders: 2gm Sodium, Carb Control 4 Carbs/Meal   · Oral Diet intake: Unable to assess  · Oral Nutrition Supplement (ONS) Orders:    · ONS intake:    · Anthropometric Measures:  · Ht: 5' 5\" (165.1 cm)   · Current Body Wt: 387 lb 9.1 oz (175.8 kg)  · Admission Body Wt: 390 lb (176.9 kg)  · Usual Body Wt: 400 lb (181.4 kg)(on 8/5/19)  · % Weight Change:  ,  3.2% wt loss x 6 months  · Ideal Body Wt: 136 lb (61.7 kg), % Ideal Body 285%  · BMI Classification: BMI > or equal to 40.0 Obese Class III    Nutrition Interventions:   Continue current diet  Continued Inpatient Monitoring, Education Completed, Coordination of Care    Nutrition Evaluation:   · Evaluation: Goals set   · Goals: PO intake > 75% with improved blood sugar    · Monitoring: Meal Intake, Pertinent Labs, Patient/Family Education, Constipation, Diet Tolerance      Electronically signed by Larry Mcpherson RD, LD on 3/4/20 at 1:20 PM    Contact Number: [phone]

## 2020-03-04 NOTE — ED PROVIDER NOTES
[facility]  EMER[facility]ENCY DEPARTMENT ENCOUNTER   CHIEF COMPLAINT   Chief Complaint   Patient presents with    Shortness of Breath     increased SOB 2 hours PTA, arrives by squad with NRB, no O2 at home      LANCE Larkin is a 66 y.o. male who presents with shortness of breath. The onset was about two hours before arrival.. The duration has been constant since the onset. The shortness of breath worsens with exertion. The quality of shortness of breath as a dyspnea. He has history of htn and high cholesterol, but not a fib or chf.       REVIEW OF SYSTEMS   Cardiac: Denies palpitations, Denies syncope  Respiratory: +SOB and cough as above  Neurologic: Denies syncope or LOC  GI: Denies Vomiting, Denies Diarrhea  General: Denies measured Fever  All other review of systems otherwise negative.      PAST MEDICAL & SURGICAL HISTORY   Past Medical History:   Diagnosis Date    Anxiety     BPH (benign prostatic hyperplasia)     Hyperlipidemia     Hypertension     Osteoarthritis      Past Surgical History:   Procedure Laterality Date    VASECTOMY  1971      CURRENT MEDICATIONS   Current Outpatient Rx   Medication Sig Dispense Refill    simvastatin (ZOCOR) 40 MG tablet Take 1 tablet by mouth nightly 90 tablet 3    lisinopril-hydrochlorothiazide (PRINZIDE;ZESTORETIC) 20-25 MG per tablet Take 1 tablet by mouth daily 90 tablet 1    tamsulosin (FLOMAX) 0.4 MG capsule Take 1 capsule by mouth daily 30 capsule 1    bisacodyl (BISACODYL) 5 MG EC tablet Take 2 tablets by mouth daily as needed for Constipation 10 tablet 0    ibuprofen (ADVIL;MOTRIN) 800 MG tablet Take 1 tablet by mouth 3 times daily 270 tablet 0    PARoxetine (PAXIL) 30 MG tablet Take 1 tablet by mouth every morning 90 tablet 3    furosemide (LASIX) 40 MG tablet Take 1 tablet by mouth daily as needed (EDEMA LEGS) 30 tablet 1      ALLERGIES   No Known Allergies   SOCIAL & FAMILY HISTORY   Social History     Socioeconomic History    Marital status:  appear anxious     EKG (Interpreted by me)  Initial questionable wide tachycardia, regular rate of about 140    MDM: I treated this questionable rhythm initially with amiodarone until it resolved into sinus tach and then sinus. I stopped the amio and admitted the patient for chf. The bipap seemed to help. RADIOLOGY/PROCEDURES   XR CHEST PORTABLE   Final Result   Diffuse bilateral heterogeneous opacities are most suggestive of pulmonary   edema. Superimposed infection is difficult to entirely exclude. Cardiomegaly. Small bilateral pleural effusions. Overall, findings may relate to congestive heart failure. ED COURSE & MEDICAL DECISION MAKING   Pertinent Labs & Imaging studies reviewed and interpreted. (See chart for details)     Vitals:    03/04/20 0521   BP: 103/80   Pulse: 87   Resp: 20   Temp:    SpO2: 91%     Consultation: Dr. Melissa Bellamy  was consulted for admission (paged at 430am  Differential Diagnosis: COPD exacerbation, foreign body aspiration, Congestive Heart Failure, Myocardial Infarction, Pulmonary Embolus, Pneumonia, Pneumothorax, other     CRITICAL CARE NOTE:  There was a high probability of clinically significant life-threatening deterioration of the patient's condition requiring my urgent intervention. Total critical care time is 30 minutes. This includes vital sign monitoring, pulse oximetry monitoring, telemetry monitoring, clinical response to the IV medications, interpretation of labs, reviewing the nursing notes, consultation time, dictation/documetation time. FINAL IMPRESSION   1.  Congestive heart failure, unspecified HF chronicity, unspecified heart failure type [facility])      PLAN  Admit  Electronically signed by: Iesha Frank MD, 3/4/2020 5:28 AM  (This note was completed [facility] a voice recognition program)            Iesha Frakn MD  03/04/20 0151

## 2020-03-04 NOTE — PROGRESS NOTES
PALLIATIVE CARE     Attempted to speak with patient. Currently sleeping in bed. Visitor at bedside. Will attempt again this afternoon.      [address] Nurse Coordinator  3/4/2020 11:30 AM

## 2020-03-04 NOTE — CARE COORDINATION
Explained patients right to have family, representative or physician notified of their admission. Patient has Declined for physician to be notified. Patient has Declined for family/representative to be notified.     Son at bedside

## 2020-03-04 NOTE — CONSULTS
conduction delay. Chest x-ray suggestive of acute pulmonary edema. Past Medical History:   Diagnosis Date    Anxiety     BPH (benign prostatic hyperplasia)     Hyperlipidemia     Hypertension     Osteoarthritis        CURRENT ALLERGIES: Patient has no known allergies. REVIEW OF SYSTEMS: 14 systems were reviewed. Pertinent positives and negatives as above, all else negative.      Past Surgical History:   Procedure Laterality Date    VASECTOMY  24    Social History:  Social History     Tobacco Use    Smoking status: Never Smoker    Smokeless tobacco: Never Used   Substance Use Topics    Alcohol use: No    Drug use: No        CURRENT MEDICATIONS:  Outpatient Medications Marked as Taking for the 3/4/20 encounter [facility] Encounter)   Medication Sig Dispense Refill    simvastatin (ZOCOR) 40 MG tablet Take 1 tablet by mouth nightly 90 tablet 3    lisinopril-hydrochlorothiazide (PRINZIDE;ZESTORETIC) 20-25 MG per tablet Take 1 tablet by mouth daily 90 tablet 1    tamsulosin (FLOMAX) 0.4 MG capsule Take 1 capsule by mouth daily 30 capsule 1    ibuprofen (ADVIL;MOTRIN) 800 MG tablet Take 1 tablet by mouth 3 times daily 270 tablet 0    PARoxetine (PAXIL) 30 MG tablet Take 1 tablet by mouth every morning 90 tablet 3       PARoxetine (PAXIL) tablet 30 mg, QAM  atorvastatin (LIPITOR) tablet 40 mg, Daily  tamsulosin (FLOMAX) capsule 0.4 mg, Daily  bisacodyl (DULCOLAX) EC tablet 10 mg, Daily PRN  sodium chloride flush 0.9 % injection 10 mL, 2 times per day  sodium chloride flush 0.9 % injection 10 mL, PRN  acetaminophen (TYLENOL) tablet 650 mg, Q6H PRN    Or  acetaminophen (TYLENOL) suppository 650 mg, Q6H PRN  polyethylene glycol (GLYCOLAX) packet 17 g, Daily PRN  promethazine (PHENERGAN) tablet 12.5 mg, Q6H PRN  famotidine (PEPCID) tablet 20 mg, BID  enoxaparin (LOVENOX) injection 40 mg, Daily  losartan (COZAAR) tablet 25 mg, Daily  furosemide (LASIX) injection 40 mg, BID         FAMILY HISTORY: family K 4.4 03/04/2020    CL 97 (L) 03/04/2020    CREATININE 0.87 03/04/2020    BUN 19 03/04/2020    CO2 24 03/04/2020    TSH 3.43 03/04/2020    PSA 1.49 01/11/2019    INR 1.2 03/04/2020    GLUF 103 (H) 01/11/2019    LABA1C 5.9 03/02/2017        ASSESSMENT:  Patient Active Problem List    Diagnosis Date Noted    Acute diastolic CHF (congestive heart failure), NYHA class 3 ([facility] [address] 03/04/2020    Carpal tunnel syndrome on left 01/08/2019    BMI 60.0-69.9, adult ([facility] [address] 12/15/2017    Osteoarthritis of both knees 06/13/2017    Essential hypertension 12/13/2016    Mixed hyperlipidemia 12/13/2016        Acute on chronic combined heart failure: New York Heart Association Class: IV (Severe limitations, symptoms even at rest)   I had a very long discussion with the patient regarding management of his heart failure.  He has severely reduced left ventricular systolic function and moderate to severe aortic stenosis.  Start Coreg 6.25 mg twice daily.  ACE Inibitor/ARB: Continue losartan (Cozaar) 25 mg daily.  Diuretics: Continue furosemide (Lasix) 40 mg 2 times daily.  Heart failure counseling: I advised them to try and keep their legs up whenever possible and to limit salt in their diet.  Will monitor kidney function and electrolytes.  Daily weight, fluid restriction and low-salt diet.  Patient will need cardiac catheterization and invasive evaluation of his aortic valve stenosis. Very likely he will end up with coronary intervention plus or minus aortic valve replacement.  His biggest problem will be his morbid obesity.  We will continue diuresis, optimizing his heart failure medications and we will do cardiac catheterization as an outpatient. Once again, thank you for allowing me to participate in this patients care. Please do not hesitate to contact me could I be of further assistance. Sincerely,  Caty Nichols MD, F.A.C.C.   [address] [facility] [address] Special[address]     [address]  [place], [place] [address]  Phone: [phone], Fax: [phone]     I believe that the risk of significant morbidity and mortality related to the patient's current medical conditions are: intermediate-high. The documentation recorded by the scribe, accurately and completely reflects the services I personally performed and the decisions made by me. Dickson Espino MD, F.A.C.C.  March 4, 2020

## 2020-03-04 NOTE — PROGRESS NOTES
emergency department with SOB. He has a history of anxiety and HTN. Coleen Forrester lives at home with his wife. 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 He is independent with his ADL's, manages his own medications, and has been using [facility] for transport. His wife requires assistance with all of her ADL's. Coleen Forrester states that she has passport services 5 days per week but he is very concerned about her being home alone in the evenings while he is in the hospital. Support provided. CHF education folder provided. States that he is familiar with CHF as he has taken care of his wife for years and she has CHF. Discussed CHF zones. Sates that he does not have a scale at home and inquires about where to find one that would accommodate his weight. As we are talking one of Marti's friends was looking online to see what they could find. Sates that he would be open to [address] for CHF education at discharge.  made aware. We discuss advanced directives. States that his wife has them in place but he does not. Provided with a blank copy so that he can review the document at his rony. Instructed to notify staff should he wish to complete them while he is admitted. Declines further needs or concerns at this time. Will continue to follow and support.      Palliative Care Plan:  Education/support to patient  Caregiver support/education  Palliative Care Goals:  live longer, improve or maintain function/quality of life, remain at home, strengthening relationships and preserve independence/autonomy/control  Visit focus:  Routine meeting  Discuss goals of care  Listen to patient/family concerns  Assess family understanding, concerns, and coping  Discuss chronic critical illness  Build trust  Elicit patient's goals and values, and use these to establish or modify goals of care     [address] Lizama Nurse Coordinator  3/4/2020 2:12 PM

## 2020-03-04 NOTE — H&P
Patient:  Melinda Ojeda  MRN: [id number]    Chief Complaint:    Chief Complaint   Patient presents with    Shortness of Breath     increased SOB 2 hours PTA, arrives by squad with NRB, no O2 at home       History Obtained From:  patient, electronic medical record    PCP: Retha Bernheim, MD    History of Present Illness: The patient is a 66 y.o. male who presents with shortness of breath. The onset was about two hours before arrival.. The duration has been constant since the onset. The shortness of breath worsens with exertion. The quality of shortness of breath as a dyspnea. He has history of htn and high cholesterol, but not a fib or chf.     Past Medical History:        Diagnosis Date    Anxiety     BPH (benign prostatic hyperplasia)     Hyperlipidemia     Hypertension     Osteoarthritis        Past Surgical History:        Procedure Laterality Date    VASECTOMY  24       Family History:       Problem Relation Age of Onset    Arthritis Mother     Heart Disease Mother     High Cholesterol Mother     High Blood Pressure Mother     Stroke Mother     High Blood Pressure Brother        Social History:   TOBACCO:   reports that he has never smoked. He has never used smokeless tobacco.  ETOH:   reports no history of alcohol use. Allergies:  Patient has no known allergies. Medications Prior to Admission:    Prior to Admission medications    Medication Sig Start Date End Date Taking?  Authorizing Provider   simvastatin (ZOCOR) 40 MG tablet Take 1 tablet by mouth nightly 2/5/20  Yes Retha Bernheim, MD   lisinopril-hydrochlorothiazide [facility]) 20-25 MG per tablet Take 1 tablet by mouth daily 12/9/19  Yes Retha Bernheim, MD   tamsulosin [facility]) 0.4 MG capsule Take 1 capsule by mouth daily 12/6/19  Yes Retha Bernheim, MD   ibuprofen (ADVIL;MOTRIN) 800 MG tablet Take 1 tablet by mouth 3 times daily 7/31/19  Yes Retha Bernheim, MD   PARoxetine (PAXIL) 30 MG tablet Take 1 tablet by mouth every morning 6/24/19  Yes Brenna Mi MD   furosemide (LASIX) 40 MG tablet Take 1 tablet by mouth daily as needed (EDEMA LEGS) 2/5/20   Brenna Mi MD   bisacodyl (BISACODYL) 5 MG EC tablet Take 2 tablets by mouth daily as needed for Constipation 11/27/19   Brenna Mi MD       Review of Systems:  Constitutional:negative  for fevers, and negative for chills. Eyes: negative for visual disturbance   ENT: negative for sore throat, negative nasal congestion, and negative for earache  Respiratory: positive for shortness of breath, negative for cough, and negative for wheezing  Cardiovascular: negative for chest pain, negative for palpitations, and negative for syncope  Gastrointestinal: negative for abdominal pain, negative for nausea,negative for vomiting, negative for diarrhea, negative for constipation, and negative for hematochezia or melena  Genitourinary: negative for dysuria, negative for urinary urgency, negative for urinary frequency, and negative for hematuria  Skin: negative for skin rash, and negative for skin lesions  Neurological: negative for unilateral weakness, numbness or tingling.     Physical Exam:    Vitals:   Temp: 97.7 °F (36.5 °C)  BP: (!) 137/99  Resp: 20  Pulse: 96  SpO2: 98 %  24HR INTAKE/OUTPUT:  No intake or output data in the 24 hours ending 03/04/20 0746    Exam:  GEN:  alert and oriented to person, place and time  EYES: PERRL and Sclera nonicteric  NECK: supple, no lymphadenopathy,    PULM: decreased breath sounds noted- bilateral   COR: regular rate & rhythm  ABD:  soft, non-tender and non-distended  EXT:   Edema 2 plus BLE  NEURO: follows commands, BEST, no deficits  SKIN:  No rash  -----------------------------------------------------------------  Diagnostic Data:   Lab Results   Component Value Date    WBC 14.8 (H) 03/04/2020    HGB 13.2 03/04/2020     03/04/2020       Lab Results   Component Value Date    BUN 19 03/04/2020    CREATININE 0.87 03/04/2020     03/04/2020    K 4.4 03/04/2020    CALCIUM 9.8 03/04/2020    CL 97 (L) 03/04/2020    CO2 24 03/04/2020    LABGLOM >60 03/04/2020       Lab Results   Component Value Date    WBCUA 50  01/08/2020    RBCUA 2 TO 5 01/08/2020    EPITHUA 2 TO 5 01/08/2020    LEUKOCYTESUR MODERATE (A) 01/08/2020    SPECGRAV 1.020 01/08/2020    GLUCOSEU NEGATIVE 01/08/2020    KETUA NEGATIVE 01/08/2020    PROTEINU TRACE (A) 01/08/2020    HGBUR 1+ (A) 01/08/2020    CASTUA NOT REPORTED 01/08/2020    CRYSTUA NOT REPORTED 01/08/2020    BACTERIA 1+ (A) 01/08/2020    YEAST NOT REPORTED 01/08/2020       Lab Results   Component Value Date    TROPONINT NOT REPORTED 03/04/2020    PROBNP 2,447 (H) 03/04/2020       Xr Chest Portable    Result Date: 3/4/2020  EXAMINATION: ONE XRAY VIEW OF THE CHEST 3/4/2020 2:14 am COMPARISON: None. HISTORY: ORDERING SYSTEM PROVIDED HISTORY: sob TECHNOLOGIST PROVIDED HISTORY: sob FINDINGS: Frontal portable view of the chest.  Normal lung volume. Diffuse bilateral heterogeneous opacities. Interstitial edema. Small bilateral pleural effusions. No pneumothorax. Cardiomegaly. Atherosclerotic and tortuous/ectatic thoracic aorta. No acute osseous abnormality. Diffuse bilateral heterogeneous opacities are most suggestive of pulmonary edema. Superimposed infection is difficult to entirely exclude. Cardiomegaly. Small bilateral pleural effusions. Overall, findings may relate to congestive heart failure. Assessment:    Principal Problem:    Acute diastolic CHF (congestive heart failure), NYHA class 3 ([facility])  Active Problems:    Essential hypertension  Resolved Problems:    * No resolved hospital problems.  *      Patient Active Problem List    Diagnosis Date Noted    Acute diastolic CHF (congestive heart failure), NYHA class 3 ([facility] [address] 03/04/2020    Carpal tunnel syndrome on left 01/08/2019    BMI 60.0-69.9, adult ([facility] [address] 12/15/2017    Osteoarthritis of both knees 06/13/2017    Essential

## 2020-03-05 PROBLEM — J96.01 ACUTE RESPIRATORY FAILURE WITH HYPOXIA (HCC): Status: ACTIVE | Noted: 2020-03-05

## 2020-03-05 LAB
ANION GAP SERPL CALCULATED.3IONS-SCNC: 10 MMOL/L (ref 9–17)
BILIRUBIN URINE: NEGATIVE
BUN BLDV-MCNC: 17 MG/DL (ref 8–23)
BUN/CREAT BLD: 22 (ref 9–20)
CALCIUM SERPL-MCNC: 8.8 MG/DL (ref 8.6–10.4)
CHLORIDE BLD-SCNC: 93 MMOL/L (ref 98–107)
CHOLESTEROL/HDL RATIO: 3.9
CHOLESTEROL: 101 MG/DL
CO2: 30 MMOL/L (ref 20–31)
COLOR: YELLOW
COMMENT UA: NORMAL
CREAT SERPL-MCNC: 0.77 MG/DL (ref 0.7–1.2)
GFR AFRICAN AMERICAN: >60 ML/MIN
GFR NON-AFRICAN AMERICAN: >60 ML/MIN
GFR SERPL CREATININE-BSD FRML MDRD: ABNORMAL ML/MIN/{1.73_M2}
GFR SERPL CREATININE-BSD FRML MDRD: ABNORMAL ML/MIN/{1.73_M2}
GLUCOSE BLD-MCNC: 98 MG/DL (ref 70–99)
GLUCOSE URINE: NEGATIVE
HDLC SERPL-MCNC: 26 MG/DL
KETONES, URINE: NEGATIVE
LDL CHOLESTEROL: 45 MG/DL (ref 0–130)
LEUKOCYTE ESTERASE, URINE: NEGATIVE
MAGNESIUM: 1.6 MG/DL (ref 1.6–2.6)
NITRITE, URINE: NEGATIVE
PH UA: 5.5 (ref 5–9)
POTASSIUM SERPL-SCNC: 4 MMOL/L (ref 3.7–5.3)
PROTEIN UA: NEGATIVE
SODIUM BLD-SCNC: 133 MMOL/L (ref 135–144)
SPECIFIC GRAVITY UA: 1.02 (ref 1.01–1.02)
TRIGL SERPL-MCNC: 148 MG/DL
TURBIDITY: CLEAR
URINE HGB: NEGATIVE
UROBILINOGEN, URINE: NORMAL
VLDLC SERPL CALC-MCNC: ABNORMAL MG/DL (ref 1–30)

## 2020-03-05 PROCEDURE — 6370000000 HC RX 637 (ALT 250 FOR IP): Performed by: INTERNAL MEDICINE

## 2020-03-05 PROCEDURE — 80061 LIPID PANEL: CPT

## 2020-03-05 PROCEDURE — 2580000003 HC RX 258: Performed by: INTERNAL MEDICINE

## 2020-03-05 PROCEDURE — 80048 BASIC METABOLIC PNL TOTAL CA: CPT

## 2020-03-05 PROCEDURE — 6360000002 HC RX W HCPCS: Performed by: INTERNAL MEDICINE

## 2020-03-05 PROCEDURE — 97162 PT EVAL MOD COMPLEX 30 MIN: CPT

## 2020-03-05 PROCEDURE — 93005 ELECTROCARDIOGRAM TRACING: CPT | Performed by: INTERNAL MEDICINE

## 2020-03-05 PROCEDURE — 36415 COLL VENOUS BLD VENIPUNCTURE: CPT

## 2020-03-05 PROCEDURE — 1200000000 HC SEMI PRIVATE

## 2020-03-05 PROCEDURE — 97166 OT EVAL MOD COMPLEX 45 MIN: CPT

## 2020-03-05 PROCEDURE — 83735 ASSAY OF MAGNESIUM: CPT

## 2020-03-05 RX ADMIN — ATORVASTATIN CALCIUM 40 MG: 40 TABLET, FILM COATED ORAL at 08:13

## 2020-03-05 RX ADMIN — Medication 10 ML: at 20:10

## 2020-03-05 RX ADMIN — FAMOTIDINE 20 MG: 10 TABLET ORAL at 20:09

## 2020-03-05 RX ADMIN — LOSARTAN POTASSIUM 25 MG: 25 TABLET, FILM COATED ORAL at 08:13

## 2020-03-05 RX ADMIN — PAROXETINE 30 MG: 10 TABLET, FILM COATED ORAL at 08:13

## 2020-03-05 RX ADMIN — CARVEDILOL 6.25 MG: 6.25 TABLET, FILM COATED ORAL at 17:17

## 2020-03-05 RX ADMIN — TAMSULOSIN HYDROCHLORIDE 0.4 MG: 0.4 CAPSULE ORAL at 08:13

## 2020-03-05 RX ADMIN — FAMOTIDINE 20 MG: 10 TABLET ORAL at 08:13

## 2020-03-05 RX ADMIN — ENOXAPARIN SODIUM 40 MG: 40 INJECTION, SOLUTION INTRAVENOUS; SUBCUTANEOUS at 08:13

## 2020-03-05 RX ADMIN — Medication 10 ML: at 08:15

## 2020-03-05 RX ADMIN — FUROSEMIDE 40 MG: 10 INJECTION, SOLUTION INTRAMUSCULAR; INTRAVENOUS at 08:15

## 2020-03-05 RX ADMIN — FUROSEMIDE 40 MG: 10 INJECTION, SOLUTION INTRAMUSCULAR; INTRAVENOUS at 17:18

## 2020-03-05 RX ADMIN — CARVEDILOL 6.25 MG: 6.25 TABLET, FILM COATED ORAL at 08:13

## 2020-03-05 RX ADMIN — ASPIRIN 81 MG: 81 TABLET, COATED ORAL at 08:13

## 2020-03-05 ASSESSMENT — PAIN DESCRIPTION - PAIN TYPE: TYPE: ACUTE PAIN

## 2020-03-05 ASSESSMENT — PAIN DESCRIPTION - LOCATION: LOCATION: RIB CAGE

## 2020-03-05 ASSESSMENT — PAIN SCALES - GENERAL
PAINLEVEL_OUTOF10: 0
PAINLEVEL_OUTOF10: 4
PAINLEVEL_OUTOF10: 0

## 2020-03-05 ASSESSMENT — PAIN DESCRIPTION - DESCRIPTORS: DESCRIPTORS: ACHING

## 2020-03-05 ASSESSMENT — PAIN DESCRIPTION - ORIENTATION: ORIENTATION: LEFT

## 2020-03-05 NOTE — PROGRESS NOTES
Progress Note    SUBJECTIVE:  F/U sob    OBJECTIVE:  Sitting up in bed eating breakfast. States his breathing is better. States he has not been out of bed. Denies CP or palpitations at this time. Denies dizziness or syncope. Continues to have oxygen on.   Weight is decreased  Vitals:   Vitals:    03/05/20 0400   BP:    Pulse:    Resp: 20   Temp:    SpO2: 92%     Weight: (!) 379 lb 8 oz (172.1 kg)   Height: 5' 5\" (165.1 cm)   -----------------------------------------------------------------  Exam:    CONSTITUTIONAL:  awake, alert, cooperative, no apparent distress, and appears stated age  EYES:  Lids and lashes normal, pupils equal, round and reactive to light, extra ocular muscles intact, sclera clear, conjunctiva normal  ENT:  normocepalic, without obvious abnormality  NECK:  supple, symmetrical, trachea midline, skin normal and no stridor  HEMATOLOGIC/LYMPHATICS:  no cervical lymphadenopathy and no supraclavicular lymphadenopathy  LUNGS:  no increased work of breathing, good air exchange and crackles right base and left base  CARDIOVASCULAR:  normal apical pulses, normal S1 and S2 and murmurs include systolic murmur II/VI located at  without radiation  ABDOMEN:  No scars, normal bowel sounds, soft, non-distended, non-tender, no masses palpated, no hepatosplenomegally  MUSCULOSKELETAL:  there is no redness, warmth, or swelling of the joints  full range of motion noted  tone is normal  NEUROLOGIC:  Mental Status Exam:  Level of Alertness:   awake  Orientation:   person, place, time  Memory:   normal  SKIN:  no bruising or bleeding, normal skin color, texture, turgor, no redness, warmth, or swelling, no rashes and Left great toe black dry area, bilateral shins red with blisters intact  EXT:     edema: 2+ affecting bilateral foot, bilateral ankle, bilateral calf  -----------------------------------------------------------------  Diagnostic Data: Reviewed    ECHOCARDIOGRAM 3/4/2020        ASSESSMENT:   Principal Problem:    Acute diastolic CHF (congestive heart failure), NYHA class 3 ([facility])  Active Problems:    Severe aortic stenosis    Dyslipidemia    Essential hypertension    Morbid obesity ([address]  Resolved Problems:    * No resolved hospital problems.  *        PLAN:  · Out of Bed today  · Ambulate  · Continue with lasix  · Continue with Coreg       SYLVIA Smith, NP-C

## 2020-03-05 NOTE — PROGRESS NOTES
Yes  Patient assessed for rehabilitation services?: Yes  Family / Caregiver Present: Yes  Referring Practitioner: Chanel Lagunas MD  Referral Date : 03/05/20  Diagnosis: Acute diastolic CHF, NYHA class 3, I50.31  Follows Commands: Within Functional Limits  Subjective  Subjective: Pt denies pain, but reports he is short of breath. Pain Screening  Patient Currently in Pain: Denies          Orientation  Orientation  Overall Orientation Status: Within Functional Limits  Social/Functional History  Social/Functional History  Lives With: Spouse  Type of Home: Apartment  Home Layout: One level  Home Access: Level entry  Bathroom Shower/Tub: Walk-in shower  Bathroom Toilet: Handicap height  Bathroom Equipment: Grab bars in shower, Built-in shower seat, Grab bars around toilet  Bathroom Accessibility: Wheelchair accessible  Home Equipment: Grab bars, Reacher, Sock aid, Lift chair, Wheelchair-electric  Receives Help From: Family  ADL Assistance: Independent  Homemaking Assistance: Independent  Homemaking Responsibilities: Yes  Ambulation Assistance: Independent  Transfer Assistance: Independent  Active : Yes  Mode of Transportation: [employer]  Occupation: Retired  Additional Comments: Patient is responsible for taking care of wife.   Cognition   Cognition  Overall Cognitive Status: WFL    Objective          AROM RLE (degrees)  RLE AROM: WFL  AROM LLE (degrees)  LLE AROM : WFL  Strength RLE  Comment: Grossly 4/5  Strength LLE  Comment: Grossly 4/5     Sensation  Overall Sensation Status: WFL  Bed mobility  Supine to Sit: Maximum assistance  Scooting: Maximal assistance  Transfers  Sit to Stand: Contact guard assistance  Stand to sit: Contact guard assistance  Ambulation  Ambulation?: Yes  WB Status: FWB  Ambulation 1  Surface: level tile  Device: Rolling Walker  Assistance: Contact guard assistance  Quality of Gait: Pt amb with forward flexed posture, decreased step length  Distance: 2 steps forward/back  Stairs/Curb  Stairs?: No     Balance  Posture: Fair  Sitting - Static: Good  Sitting - Dynamic: Good  Standing - Static: Fair  Standing - Dynamic: [address]  Times per week: 7  Times per day: Twice a day  Plan Comment: 1x/day on weekends  Safety Devices  Type of devices: All fall risk precautions in place    AM-PAC Score     AM-PAC Inpatient Mobility without Stair Climbing Raw Score : 12 (03/05/20 1402)  AM-PAC Inpatient without Stair Climbing T-Scale Score : 37.26 (03/05/20 1402)  Mobility Inpatient CMS 0-100% Score: 63.03 (03/05/20 1402)  Mobility Inpatient without Stair CMS G-Code Modifier : CL (03/05/20 1402)    Goals  Short term goals  Time Frame for Short term goals: 10 days  Short term goal 1: Patient will perform bed mobility with Min A.   Short term goal 2: Patient will perform transfers with MI  Short term goal 3: Patient will tolerate 20-30 minutes of therex/act to improve endurance for ADLs  Patient Goals   Patient goals : Be able to return home       Therapy Time   Individual Concurrent Group Co-treatment   Time In 1320         Time Out 1340         Minutes 20         Timed Code Treatment Minutes: 20 Minutes       Vlad Peña, PT, DPT

## 2020-03-05 NOTE — PROGRESS NOTES
Patient called out and states that his oxygen is not on. Patient was at 0.5 L/nc, bumped back up to 1l/nc and patient feels better at this setting. Will continue to monitor patient.  He denies pain or any further needs

## 2020-03-05 NOTE — PLAN OF CARE
Problem: Falls - Risk of:  Goal: Will remain free from falls  Description  Will remain free from falls  3/4/2020 2200 by Elmo Uribe RN  Outcome: Ongoing  Note:   Patient fall free, bed alarm active, side rails up x2, patient using call light to call out, and assistance up x 1 to 2   3/4/2020 0948 by Mary Peerz RN  Outcome: Ongoing  Goal: Absence of physical injury  Description  Absence of physical injury  3/4/2020 2200 by Elmo Uribe RN  Outcome: Ongoing  Note:   No physical injury   3/4/2020 0948 by Mary Perez RN  Outcome: Ongoing     Problem: Risk for Impaired Skin Integrity  Goal: Tissue integrity - skin and mucous membranes  Description  Structural intactness and normal physiological function of skin and  mucous membranes.   3/4/2020 2200 by Elmo Uribe RN  Outcome: Ongoing  Note:   Nurses monitoring skin  3/4/2020 0948 by Mary Perez RN  Outcome: Ongoing     Problem: Infection:  Goal: Will remain free from infection  Description  Will remain free from infection  3/4/2020 2200 by Elmo Uribe RN  Outcome: Ongoing  Note:   Monitoring for signs of infection   3/4/2020 0948 by Mary Perez RN  Outcome: Ongoing     Problem: Safety:  Goal: Free from accidental physical injury  Description  Free from accidental physical injury  3/4/2020 2200 by Elmo Uribe RN  Outcome: Ongoing  3/4/2020 0948 by Mary Perez RN  Outcome: Ongoing     Problem: Daily Care:  Goal: Daily care needs are met  Description  Daily care needs are met  3/4/2020 2200 by Elmo Uribe RN  Outcome: Ongoing  3/4/2020 0948 by Mary Perez RN  Outcome: Ongoing     Problem: Discharge Planning:  Goal: Patients continuum of care needs are met  Description  Patients continuum of care needs are met  3/4/2020 2200 by Elmo Uribe RN  Outcome: Ongoing  3/4/2020 0948 by Mary Perez RN  Outcome: Ongoing     Problem: Fluid Volume - Imbalance:  Goal: Absence of imbalanced fluid volume signs and

## 2020-03-05 NOTE — PROGRESS NOTES
Comment  Comments: Patient laying in bed upon OT arrival, agreeable to OT evaluation       Social/Functional History  Social/Functional History  Lives With: Spouse  Type of Home: Apartment  Home Layout: One level  Home Access: Level entry  Bathroom Shower/Tub: Walk-in shower  Bathroom Toilet: Handicap height  Bathroom Equipment: Grab bars in shower, Built-in shower seat  Bathroom Accessibility: Wheelchair accessible  Home Equipment: Grab bars, Wheelchair-manual, Reacher, Sock aid, Lift chair  Receives Help From: Family  ADL Assistance: Independent  Homemaking Assistance: Independent  Homemaking Responsibilities: Yes  Ambulation Assistance: Independent  Transfer Assistance: Independent  Active : Yes  Mode of Transportation: [employer]  Occupation: Retired  Additional Comments: Patient is responsible for taking care of wife.        Objective   Vision: Impaired  Vision Exceptions: Wears glasses for reading  Hearing: Within functional limits    Orientation  Overall Orientation Status: Within Functional Limits     Balance  Sitting Balance: Supervision  Standing Balance: DNT (Patient with bed rest orders with bedside commode privileges only)  ADL  Feeding: Independent  Grooming: Setup (while laying in bed)  UE Dressing: Stand by assistance (to juany/doff gown)  Toileting: Stand by assistance (using urinal)  Tone RUE  RUE Tone: Normotonic  Tone LUE  LUE Tone: Normotonic  Coordination  Movements Are Fluid And Coordinated: Yes     Bed mobility  Scooting: Maximal assistance;2 Person assistance  Transfers  Sit to stand: Unable to assess  Stand to sit: Unable to assess  Transfer Comments: Patient with bed rest orders with bedside commode privileges only     Cognition  Overall Cognitive Status: WFL  Perception  Overall Perceptual Status: WFL     Sensation  Overall Sensation Status: WFL  LUE AROM (degrees)  LUE AROM : WFL  RUE AROM (degrees)  RUE AROM : WFL  LUE Strength  Gross LUE Strength: WFL  RUE Strength  Gross RUE Strength:

## 2020-03-06 ENCOUNTER — APPOINTMENT (OUTPATIENT)
Dept: GENERAL RADIOLOGY | Age: 75
DRG: 291 | End: 2020-03-06
Payer: MEDICARE

## 2020-03-06 LAB
ABSOLUTE EOS #: 0.23 K/UL (ref 0–0.44)
ABSOLUTE IMMATURE GRANULOCYTE: 0.06 K/UL (ref 0–0.3)
ABSOLUTE LYMPH #: 1.97 K/UL (ref 1.1–3.7)
ABSOLUTE MONO #: 0.96 K/UL (ref 0.1–1.2)
ANION GAP SERPL CALCULATED.3IONS-SCNC: 13 MMOL/L (ref 9–17)
BASOPHILS # BLD: 1 % (ref 0–2)
BASOPHILS ABSOLUTE: 0.07 K/UL (ref 0–0.2)
BUN BLDV-MCNC: 24 MG/DL (ref 8–23)
BUN/CREAT BLD: 25 (ref 9–20)
CALCIUM SERPL-MCNC: 8.5 MG/DL (ref 8.6–10.4)
CHLORIDE BLD-SCNC: 96 MMOL/L (ref 98–107)
CO2: 28 MMOL/L (ref 20–31)
CREAT SERPL-MCNC: 0.96 MG/DL (ref 0.7–1.2)
DIFFERENTIAL TYPE: ABNORMAL
DIRECT EXAM: NORMAL
EKG ATRIAL RATE: 119 BPM
EKG Q-T INTERVAL: 400 MS
EKG QRS DURATION: 162 MS
EKG QTC CALCULATION (BAZETT): 526 MS
EKG R AXIS: -21 DEGREES
EKG T AXIS: 154 DEGREES
EKG VENTRICULAR RATE: 104 BPM
EOSINOPHILS RELATIVE PERCENT: 2 % (ref 1–4)
GFR AFRICAN AMERICAN: >60 ML/MIN
GFR NON-AFRICAN AMERICAN: >60 ML/MIN
GFR SERPL CREATININE-BSD FRML MDRD: ABNORMAL ML/MIN/{1.73_M2}
GFR SERPL CREATININE-BSD FRML MDRD: ABNORMAL ML/MIN/{1.73_M2}
GLUCOSE BLD-MCNC: 109 MG/DL (ref 70–99)
GLUCOSE BLD-MCNC: 138 MG/DL (ref 74–100)
HCT VFR BLD CALC: 38 % (ref 40.7–50.3)
HEMOGLOBIN: 11.9 G/DL (ref 13–17)
IMMATURE GRANULOCYTES: 1 %
LACTIC ACID, WHOLE BLOOD: NORMAL MMOL/L (ref 0.7–2.1)
LACTIC ACID: 1.3 MMOL/L (ref 0.5–2.2)
LYMPHOCYTES # BLD: 20 % (ref 24–43)
Lab: NORMAL
MAGNESIUM: 1.6 MG/DL (ref 1.6–2.6)
MCH RBC QN AUTO: 29.2 PG (ref 25.2–33.5)
MCHC RBC AUTO-ENTMCNC: 31.3 G/DL (ref 28.4–34.8)
MCV RBC AUTO: 93.4 FL (ref 82.6–102.9)
MONOCYTES # BLD: 10 % (ref 3–12)
NRBC AUTOMATED: 0 PER 100 WBC
PDW BLD-RTO: 15.2 % (ref 11.8–14.4)
PLATELET # BLD: 270 K/UL (ref 138–453)
PLATELET ESTIMATE: ABNORMAL
PMV BLD AUTO: 12.1 FL (ref 8.1–13.5)
POTASSIUM SERPL-SCNC: 3.8 MMOL/L (ref 3.7–5.3)
PROCALCITONIN: 0.12 NG/ML
RBC # BLD: 4.07 M/UL (ref 4.21–5.77)
RBC # BLD: ABNORMAL 10*6/UL
SEG NEUTROPHILS: 66 % (ref 36–65)
SEGMENTED NEUTROPHILS ABSOLUTE COUNT: 6.45 K/UL (ref 1.5–8.1)
SODIUM BLD-SCNC: 137 MMOL/L (ref 135–144)
SPECIMEN DESCRIPTION: NORMAL
WBC # BLD: 9.7 K/UL (ref 3.5–11.3)
WBC # BLD: ABNORMAL 10*3/UL

## 2020-03-06 PROCEDURE — 99233 SBSQ HOSP IP/OBS HIGH 50: CPT | Performed by: INTERNAL MEDICINE

## 2020-03-06 PROCEDURE — 80048 BASIC METABOLIC PNL TOTAL CA: CPT

## 2020-03-06 PROCEDURE — 84145 PROCALCITONIN (PCT): CPT

## 2020-03-06 PROCEDURE — 71045 X-RAY EXAM CHEST 1 VIEW: CPT

## 2020-03-06 PROCEDURE — 93005 ELECTROCARDIOGRAM TRACING: CPT | Performed by: INTERNAL MEDICINE

## 2020-03-06 PROCEDURE — 2580000003 HC RX 258: Performed by: INTERNAL MEDICINE

## 2020-03-06 PROCEDURE — 6360000002 HC RX W HCPCS: Performed by: INTERNAL MEDICINE

## 2020-03-06 PROCEDURE — 97168 OT RE-EVAL EST PLAN CARE: CPT

## 2020-03-06 PROCEDURE — 83605 ASSAY OF LACTIC ACID: CPT

## 2020-03-06 PROCEDURE — 36415 COLL VENOUS BLD VENIPUNCTURE: CPT

## 2020-03-06 PROCEDURE — 87804 INFLUENZA ASSAY W/OPTIC: CPT

## 2020-03-06 PROCEDURE — 83735 ASSAY OF MAGNESIUM: CPT

## 2020-03-06 PROCEDURE — 82947 ASSAY GLUCOSE BLOOD QUANT: CPT

## 2020-03-06 PROCEDURE — 6370000000 HC RX 637 (ALT 250 FOR IP): Performed by: INTERNAL MEDICINE

## 2020-03-06 PROCEDURE — 93010 ELECTROCARDIOGRAM REPORT: CPT | Performed by: INTERNAL MEDICINE

## 2020-03-06 PROCEDURE — 85025 COMPLETE CBC W/AUTO DIFF WBC: CPT

## 2020-03-06 PROCEDURE — 2000000000 HC ICU R&B

## 2020-03-06 PROCEDURE — 97535 SELF CARE MNGMENT TRAINING: CPT

## 2020-03-06 PROCEDURE — 97530 THERAPEUTIC ACTIVITIES: CPT

## 2020-03-06 RX ADMIN — Medication 10 ML: at 20:52

## 2020-03-06 RX ADMIN — APIXABAN 5 MG: 5 TABLET, FILM COATED ORAL at 20:51

## 2020-03-06 RX ADMIN — LOSARTAN POTASSIUM 25 MG: 25 TABLET, FILM COATED ORAL at 08:53

## 2020-03-06 RX ADMIN — ATORVASTATIN CALCIUM 40 MG: 40 TABLET, FILM COATED ORAL at 08:52

## 2020-03-06 RX ADMIN — FUROSEMIDE 40 MG: 10 INJECTION, SOLUTION INTRAMUSCULAR; INTRAVENOUS at 08:53

## 2020-03-06 RX ADMIN — AMIODARONE HYDROCHLORIDE 1 MG/MIN: 50 INJECTION, SOLUTION INTRAVENOUS at 08:14

## 2020-03-06 RX ADMIN — AMIODARONE HYDROCHLORIDE 1 MG/MIN: 50 INJECTION, SOLUTION INTRAVENOUS at 00:05

## 2020-03-06 RX ADMIN — ASPIRIN 81 MG: 81 TABLET, COATED ORAL at 08:53

## 2020-03-06 RX ADMIN — TAMSULOSIN HYDROCHLORIDE 0.4 MG: 0.4 CAPSULE ORAL at 08:52

## 2020-03-06 RX ADMIN — APIXABAN 5 MG: 5 TABLET, FILM COATED ORAL at 08:53

## 2020-03-06 RX ADMIN — FAMOTIDINE 20 MG: 10 TABLET ORAL at 20:51

## 2020-03-06 RX ADMIN — FAMOTIDINE 20 MG: 10 TABLET ORAL at 08:52

## 2020-03-06 RX ADMIN — APIXABAN 5 MG: 5 TABLET, FILM COATED ORAL at 00:07

## 2020-03-06 RX ADMIN — PAROXETINE 30 MG: 10 TABLET, FILM COATED ORAL at 08:53

## 2020-03-06 RX ADMIN — CARVEDILOL 6.25 MG: 6.25 TABLET, FILM COATED ORAL at 08:52

## 2020-03-06 RX ADMIN — Medication 10 ML: at 08:53

## 2020-03-06 ASSESSMENT — PAIN SCALES - GENERAL
PAINLEVEL_OUTOF10: 0

## 2020-03-06 NOTE — PROGRESS NOTES
[facility]  Inpatient/Observation/Outpatient Rehabilitation    Date: 3/6/2020  Patient Name: Marky Dover       [x] Inpatient Acute/Observation       []  Outpatient  : 1945       [] Pt no showed for scheduled appointment    [] Pt refused/declined therapy at this time due to:           [x] Pt cancelled due to:  [] No Reason Given   [] Sick/ill   [x] Other:  Pt transferred to ICU and will need new orders/reassessment once medically appropriate for physical therapy.        [place] Date: 3/6/2020

## 2020-03-06 NOTE — PROGRESS NOTES
Writer attempted to call patients son per his request. Son didn't answer, ICU staff notified and patient made aware. Patient stated it was ok to try and call again later in the morning.

## 2020-03-06 NOTE — PROGRESS NOTES
Occupational Therapy   Occupational Therapy Re-Assessment  Date: 3/6/2020   Patient Name: Ying Arnold  MRN: [id number]     : 1945    Date of Service: 3/6/2020    Discharge Recommendations:  Continue to assess pending progress, Patient would benefit from continued therapy after discharge, Home with Home health OT       Assessment   Performance deficits / Impairments: Decreased functional mobility ; Decreased ADL status; Decreased high-level IADLs;Decreased endurance;Decreased strength;Decreased balance  Assessment: Pt is a 66year old male being seen for CHF, demonstrating decreased independence with ADL, strength and endurance, as well as overall weakness. Pt would benefit from continue therapy services to address these deficits. Prognosis: Good  Decision Making: Medium Complexity  OT Education: OT Role;Plan of Care  REQUIRES OT FOLLOW UP: Yes  Activity Tolerance  Activity Tolerance: Patient Tolerated treatment well  Safety Devices  Type of devices: Left in chair;Call light within reach(left in power chair, nurse (Trinh Rojo) present)           Patient Diagnosis(es): The encounter diagnosis was Congestive heart failure, unspecified HF chronicity, unspecified heart failure type ([address] has a past medical history of Anxiety, Atrial fibrillation, new onset ([address] BPH (benign prostatic hyperplasia), CHF (congestive heart failure) ([address] Hyperlipidemia, Hypertension, and Osteoarthritis. has a past surgical history that includes Vasectomy (). Restrictions  Restrictions/Precautions  Restrictions/Precautions: Contact Precautions, General Precautions, Fall Risk    Subjective   General  Chart Reviewed: Yes  Patient assessed for rehabilitation services?: Yes  Family / Caregiver Present: Yes  Referring Practitioner: DIMA Smith  Diagnosis: Acute diastolic CHF  Subjective  Subjective: pt denies pain at time of re-evaluation.   General Comment  Comments: pt laying in bed on therpaist arrival, Grossly 4-/5  RUE Strength  Gross RUE Strength: WFL  RUE Strength Comment: Grossly 4-/5                   Plan   Plan  Times per week: 7x/week  Times per day: Daily  Current Treatment Recommendations: Strengthening, Safety Education & Training, Balance Training, Self-Care / ADL, Functional Mobility Training, Endurance Training  Plan Comment: ther ex, ther act, self care    G-Code     OutComes Score                                                  AM-PAC Score             Goals  Short term goals  Time Frame for Short term goals: 10 days  Short term goal 1: Pt will complete total body ADL CGA with use of AE PRN to increase safety and independence with ADLs. Short term goal 2: Pt will engage in 15 minutes BUE therex/theract with minimal rest breaks to increase strength and endurance for increased ease and independence with ADL and functional transfers. Short term goal 3: Pt will tolerate greater than 1 minute of standing without LOB while engage in task to increase safety and independence with ADL tasks and functional mobility/transfers.    Short term goal 4: Patient will perform 5 min of standing sinkside ADLs w/o LOB or safety concerns in order to increase participation in ADLs  Patient Goals   Patient goals : to go home       Therapy Time   Individual Concurrent Group Co-treatment   Time In 1420         Time Out 1455         Minutes [address]

## 2020-03-06 NOTE — PROGRESS NOTES
reviewed, sinus tachycardia with first-degree AV block and nonspecific intraventricular conduction delay. Chest x-ray suggestive of acute pulmonary edema. Patient treated for acute on chronic combined CHF and aortic stenosis, later developed atrial fibrillation with rapid ventricular response. Moved to the ICU and started on amiodarone drip. He converted to show normal sinus rhythm yesterday night. He is down 11 pounds so far. We have to hold his carvedilol last night because of blood pressure less than 019 mmHg systolic. Past Medical History:   Diagnosis Date    Anxiety     Atrial fibrillation, new onset ([address] 03/05/2020    BPH (benign prostatic hyperplasia)     CHF (congestive heart failure) (HCC)     Hyperlipidemia     Hypertension     Osteoarthritis        CURRENT ALLERGIES: Patient has no known allergies. REVIEW OF SYSTEMS: 14 systems were reviewed. Pertinent positives and negatives as above, all else negative.      Past Surgical History:   Procedure Laterality Date    VASECTOMY  24    Social History:  Social History     Tobacco Use    Smoking status: Never Smoker    Smokeless tobacco: Never Used   Substance Use Topics    Alcohol use: No    Drug use: No        CURRENT MEDICATIONS:  Outpatient Medications Marked as Taking for the 3/4/20 encounter [facility] Encounter)   Medication Sig Dispense Refill    simvastatin (ZOCOR) 40 MG tablet Take 1 tablet by mouth nightly 90 tablet 3    lisinopril-hydrochlorothiazide (PRINZIDE;ZESTORETIC) 20-25 MG per tablet Take 1 tablet by mouth daily 90 tablet 1    tamsulosin (FLOMAX) 0.4 MG capsule Take 1 capsule by mouth daily 30 capsule 1    ibuprofen (ADVIL;MOTRIN) 800 MG tablet Take 1 tablet by mouth 3 times daily 270 tablet 0    PARoxetine (PAXIL) 30 MG tablet Take 1 tablet by mouth every morning 90 tablet 3       apixaban (ELIQUIS) tablet 5 mg, BID  amiodarone (CORDARONE) 450 mg in dextrose 5 % 250 mL infusion, Continuous  PARoxetine (PAXIL) ventricular systolic function. Ideally should, this patient should get a dobutamine stress echo to reassess the severity of aortic stenosis and to decide the need regarding aortic valve intervention. We decided to optimize his heart failure management for rest.  Get a cardiac catheterization was possible invasive assessment of aortic stenosis for rest and then we will take it from there. Patient understand that this new diagnosis of heart failure, severely reduced left ventricular systolic function and moderate aortic stenosis is complicated and require further work-up after improvement of his heart failure symptoms. Once again, thank you for allowing me to participate in this patients care. Please do not hesitate to contact me could I be of further assistance. Sincerely,  Charline Garza MD, F.A.C.C. [facility] Cardiology Specialist    [address] [place] [address]  Phone: [phone], Fax: [phone]     I believe that the risk of significant morbidity and mortality related to the patient's current medical conditions are: high. The documentation recorded by the scribe, accurately and completely reflects the services I personally performed and the decisions made by me. Charline Garza MD, F.A.C.C.  March 6, 2020

## 2020-03-06 NOTE — PROGRESS NOTES
Physical Therapy    Facility/Department: [facility] ICU  Re-Assessment    NAME: Dave Galvez  : 1945  MRN: [id number]    Date of Service: 3/6/2020    Discharge Recommendations:  Continue to assess pending progress, Home with assist PRN, Home with Home health PT        Assessment   Assessment: PT re-eval completed; patient able to complete transfers with CGA but bed mobility requires maxA. Uses a power chair at home. Would benefit from continued PT to improve mobility and decrease fall risk. Treatment Diagnosis: General weakness, decreased activity endurance  Prognosis: Fair  Decision Making: Medium Complexity  REQUIRES PT FOLLOW UP: Yes  Activity Tolerance  Activity Tolerance: Patient limited by fatigue;Patient Tolerated treatment well       Patient Diagnosis(es): The encounter diagnosis was Congestive heart failure, unspecified HF chronicity, unspecified heart failure type ([facility] [address] has a past medical history of Anxiety, Atrial fibrillation, new onset ([facility] [address] [facility] BPH (benign prostatic hyperplasia), CHF (congestive heart failure) ([facility] [address] Hyperlipidemia, Hypertension, and Osteoarthritis. has a past surgical history that includes Vasectomy ().     Restrictions  Restrictions/Precautions  Restrictions/Precautions: Contact Precautions, General Precautions, Fall Risk  Vision/Hearing        Subjective  General  Chart Reviewed: Yes  Patient assessed for rehabilitation services?: Yes  Family / Caregiver Present: No  Referring Practitioner: Kendal Bustos MD  Subjective  Subjective: Patient reports he is tired but agreeable to physical therapy eval.  Pain Screening  Patient Currently in Pain: Denies          Orientation  Orientation  Overall Orientation Status: Within Functional Limits  Social/Functional History  Social/Functional History  Lives With: Spouse  Type of Home: Apartment  Home Layout: One level  Home Access: Level entry  Bathroom Shower/Tub: Walk-in shower  Bathroom Toilet: Handicap height  Bathroom Equipment: Grab bars in shower, Built-in shower seat, Grab bars around toilet  Bathroom Accessibility: Wheelchair accessible  Home Equipment: Grab bars, Reacher, Sock aid, Lift chair, Wheelchair-electric  Receives Help From: Family  ADL Assistance: [address]: Independent  Homemaking Responsibilities: Yes  Ambulation Assistance: Independent  Transfer Assistance: Independent  Active : Yes  Mode of Transportation: [employer]  Occupation: Retired  Additional Comments: Pt reports that he does the best he can with bathing tasks, but he just cant always get everything that he needs to. Cognition        Objective             Strength RLE  Comment: Grossly 4/5  Strength LLE  Comment: Grossly 4/5        Bed mobility  Comment: Up in power chair upon arrival  Transfers  Sit to Stand: Contact guard assistance  Stand to sit: Contact guard assistance  Ambulation  Ambulation?: No     Balance  Sitting - Static: Good  Sitting - Dynamic: Good  Standing - Static: Fair  Standing - Dynamic: [address]  Times per week: 7  Times per day: Twice a day  Plan Comment: 1x/day on weekends  Safety Devices  Type of devices: All fall risk precautions in place, Left in chair, Call light within reach, Patient at risk for falls    AM-PAC Score     AM-PAC Inpatient Mobility without Stair Climbing Raw Score : 12 (03/06/20 1550)  AM-PAC Inpatient without Stair Climbing T-Scale Score : 37.26 (03/06/20 1550)  Mobility Inpatient CMS 0-100% Score: 63.03 (03/06/20 1550)  Mobility Inpatient without Stair CMS G-Code Modifier : CL (03/06/20 1550)       Goals  Short term goals  Time Frame for Short term goals: 10 days  Short term goal 1: Patient will perform bed mobility with Min A.   Short term goal 2: Patient will perform sit/stand and stand pivot transfers with MI  Short term goal 3: Patient will tolerate 20-30 minutes of therex/act to improve endurance for ADLs  Patient Goals   Patient goals : Be able to return home Therapy Time   Individual Concurrent Group Co-treatment   Time In 7769         Time Out 1526         Minutes 9         Timed Code Treatment Minutes: 8 Minutes       Clif Lizama, PT, DPT

## 2020-03-07 LAB
ANION GAP SERPL CALCULATED.3IONS-SCNC: 11 MMOL/L (ref 9–17)
BUN BLDV-MCNC: 30 MG/DL (ref 8–23)
BUN/CREAT BLD: 33 (ref 9–20)
CALCIUM SERPL-MCNC: 8.3 MG/DL (ref 8.6–10.4)
CHLORIDE BLD-SCNC: 93 MMOL/L (ref 98–107)
CO2: 30 MMOL/L (ref 20–31)
CREAT SERPL-MCNC: 0.91 MG/DL (ref 0.7–1.2)
EKG ATRIAL RATE: 74 BPM
EKG P AXIS: 63 DEGREES
EKG P-R INTERVAL: 242 MS
EKG Q-T INTERVAL: 470 MS
EKG QRS DURATION: 170 MS
EKG QTC CALCULATION (BAZETT): 521 MS
EKG R AXIS: -12 DEGREES
EKG T AXIS: 152 DEGREES
EKG VENTRICULAR RATE: 74 BPM
GFR AFRICAN AMERICAN: >60 ML/MIN
GFR NON-AFRICAN AMERICAN: >60 ML/MIN
GFR SERPL CREATININE-BSD FRML MDRD: ABNORMAL ML/MIN/{1.73_M2}
GFR SERPL CREATININE-BSD FRML MDRD: ABNORMAL ML/MIN/{1.73_M2}
GLUCOSE BLD-MCNC: 106 MG/DL (ref 70–99)
MAGNESIUM: 1.8 MG/DL (ref 1.6–2.6)
POTASSIUM SERPL-SCNC: 3.5 MMOL/L (ref 3.7–5.3)
SODIUM BLD-SCNC: 134 MMOL/L (ref 135–144)

## 2020-03-07 PROCEDURE — 97110 THERAPEUTIC EXERCISES: CPT

## 2020-03-07 PROCEDURE — 6370000000 HC RX 637 (ALT 250 FOR IP): Performed by: FAMILY MEDICINE

## 2020-03-07 PROCEDURE — 6370000000 HC RX 637 (ALT 250 FOR IP): Performed by: INTERNAL MEDICINE

## 2020-03-07 PROCEDURE — 2580000003 HC RX 258: Performed by: INTERNAL MEDICINE

## 2020-03-07 PROCEDURE — 1200000000 HC SEMI PRIVATE

## 2020-03-07 PROCEDURE — 80048 BASIC METABOLIC PNL TOTAL CA: CPT

## 2020-03-07 PROCEDURE — 83735 ASSAY OF MAGNESIUM: CPT

## 2020-03-07 PROCEDURE — 6360000002 HC RX W HCPCS: Performed by: INTERNAL MEDICINE

## 2020-03-07 PROCEDURE — 36415 COLL VENOUS BLD VENIPUNCTURE: CPT

## 2020-03-07 PROCEDURE — 93010 ELECTROCARDIOGRAM REPORT: CPT | Performed by: INTERNAL MEDICINE

## 2020-03-07 RX ORDER — POTASSIUM CHLORIDE 20 MEQ/1
40 TABLET, EXTENDED RELEASE ORAL PRN
Status: DISCONTINUED | OUTPATIENT
Start: 2020-03-07 | End: 2020-03-09 | Stop reason: HOSPADM

## 2020-03-07 RX ORDER — AMIODARONE HYDROCHLORIDE 200 MG/1
200 TABLET ORAL 2 TIMES DAILY
Status: DISCONTINUED | OUTPATIENT
Start: 2020-03-07 | End: 2020-03-09 | Stop reason: HOSPADM

## 2020-03-07 RX ORDER — POTASSIUM CHLORIDE 7.45 MG/ML
10 INJECTION INTRAVENOUS PRN
Status: DISCONTINUED | OUTPATIENT
Start: 2020-03-07 | End: 2020-03-09 | Stop reason: HOSPADM

## 2020-03-07 RX ORDER — CARVEDILOL 3.12 MG/1
3.12 TABLET ORAL 2 TIMES DAILY WITH MEALS
Status: DISCONTINUED | OUTPATIENT
Start: 2020-03-07 | End: 2020-03-09 | Stop reason: HOSPADM

## 2020-03-07 RX ORDER — AMIODARONE HYDROCHLORIDE 200 MG/1
200 TABLET ORAL DAILY
Status: DISCONTINUED | OUTPATIENT
Start: 2020-03-14 | End: 2020-03-09 | Stop reason: HOSPADM

## 2020-03-07 RX ADMIN — PAROXETINE 30 MG: 10 TABLET, FILM COATED ORAL at 08:29

## 2020-03-07 RX ADMIN — FAMOTIDINE 20 MG: 10 TABLET ORAL at 20:20

## 2020-03-07 RX ADMIN — AMIODARONE HYDROCHLORIDE 200 MG: 200 TABLET ORAL at 10:01

## 2020-03-07 RX ADMIN — Medication 10 ML: at 08:30

## 2020-03-07 RX ADMIN — Medication 10 ML: at 20:20

## 2020-03-07 RX ADMIN — AMIODARONE HYDROCHLORIDE 200 MG: 200 TABLET ORAL at 20:20

## 2020-03-07 RX ADMIN — Medication 10 ML: at 17:59

## 2020-03-07 RX ADMIN — APIXABAN 5 MG: 5 TABLET, FILM COATED ORAL at 08:29

## 2020-03-07 RX ADMIN — ASPIRIN 81 MG: 81 TABLET, COATED ORAL at 08:29

## 2020-03-07 RX ADMIN — LOSARTAN POTASSIUM 25 MG: 25 TABLET, FILM COATED ORAL at 08:29

## 2020-03-07 RX ADMIN — ATORVASTATIN CALCIUM 40 MG: 40 TABLET, FILM COATED ORAL at 08:29

## 2020-03-07 RX ADMIN — CARVEDILOL 6.25 MG: 6.25 TABLET, FILM COATED ORAL at 08:29

## 2020-03-07 RX ADMIN — FAMOTIDINE 20 MG: 10 TABLET ORAL at 08:29

## 2020-03-07 RX ADMIN — TAMSULOSIN HYDROCHLORIDE 0.4 MG: 0.4 CAPSULE ORAL at 08:29

## 2020-03-07 RX ADMIN — FUROSEMIDE 40 MG: 10 INJECTION, SOLUTION INTRAMUSCULAR; INTRAVENOUS at 08:28

## 2020-03-07 RX ADMIN — POTASSIUM CHLORIDE 40 MEQ: 20 TABLET, EXTENDED RELEASE ORAL at 08:29

## 2020-03-07 RX ADMIN — APIXABAN 5 MG: 5 TABLET, FILM COATED ORAL at 20:20

## 2020-03-07 RX ADMIN — FUROSEMIDE 40 MG: 10 INJECTION, SOLUTION INTRAMUSCULAR; INTRAVENOUS at 17:59

## 2020-03-07 ASSESSMENT — PAIN DESCRIPTION - LOCATION: LOCATION: HAND

## 2020-03-07 ASSESSMENT — PAIN DESCRIPTION - PAIN TYPE: TYPE: ACUTE PAIN

## 2020-03-07 ASSESSMENT — PAIN DESCRIPTION - PROGRESSION: CLINICAL_PROGRESSION: OTHER (COMMENT)

## 2020-03-07 ASSESSMENT — PAIN DESCRIPTION - DESCRIPTORS: DESCRIPTORS: ACHING

## 2020-03-07 ASSESSMENT — PAIN SCALES - GENERAL
PAINLEVEL_OUTOF10: 9
PAINLEVEL_OUTOF10: 0

## 2020-03-07 ASSESSMENT — PAIN DESCRIPTION - ORIENTATION: ORIENTATION: LEFT

## 2020-03-07 ASSESSMENT — PAIN DESCRIPTION - ONSET: ONSET: SUDDEN

## 2020-03-07 ASSESSMENT — PAIN DESCRIPTION - FREQUENCY: FREQUENCY: INTERMITTENT

## 2020-03-07 NOTE — PROGRESS NOTES
PT is alert and oriented. Skin color, turgor and temp are normal. Respirations are unlabored, except with exertion, PT then becomes SOB. PT heart rhythm has converted from A-Fib to SR 1st degree AVB with a right bundle branch block. Abdomin is obese, non tender. PT's lower legs are edematous and discolored. Dr. Greg Mitchell was contacted and up dated on PT's conversion to SR. Orders to discontinue Amiodarone received.  Also instructed to hold coreg and lasix if SBP is less than 100/mgh Statement Selected

## 2020-03-07 NOTE — PROGRESS NOTES
Motor is 5 out of 5 bilaterally. Cerebellar finger to nose, heel to shin intact. Sensory is intact. Babinski down going, Romberg negative, and gait is normal.  SKIN:  no bruising or bleeding  EXT:     no cyanosis, clubbing , has bilat edema   -----------------------------------------------------------------  Diagnostic Data: Reviewed    More Recent Labs:    Results for orders placed or performed during the hospital encounter of 03/04/20   Rapid influenza A/B antigens   Result Value Ref Range    Specimen Description . NASOPHARYNGEAL SWAB     Special Requests NOT REPORTED     Direct Exam       NEGATIVE for Influenza A + B antigens. PCR testing to confirm this result is available upon request.  Specimen will be saved in the laboratory for 7 days. Please call [phone] if PCR testing is indicated.    CBC Auto Differential   Result Value Ref Range    WBC 14.8 (H) 3.5 - 11.3 k/uL    RBC 4.56 4.21 - 5.77 m/uL    Hemoglobin 13.2 13.0 - 17.0 g/dL    Hematocrit 43.2 40.7 - 50.3 %    MCV 94.7 82.6 - 102.9 fL    MCH 28.9 25.2 - 33.5 pg    MCHC 30.6 28.4 - 34.8 g/dL    RDW 15.3 (H) 11.8 - 14.4 %    Platelets 385 855 - 321 k/uL    MPV 11.2 8.1 - 13.5 fL    NRBC Automated 0.0 0.0 per 100 WBC    Differential Type NOT REPORTED     Seg Neutrophils 73 (H) 36 - 65 %    Lymphocytes 17 (L) 24 - 43 %    Monocytes 6 3 - 12 %    Eosinophils % 2 1 - 4 %    Basophils 1 0 - 2 %    Immature Granulocytes 1 (H) 0 %    Segs Absolute 10.82 (H) 1.50 - 8.10 k/uL    Absolute Lymph # 2.56 1.10 - 3.70 k/uL    Absolute Mono # 0.87 0.10 - 1.20 k/uL    Absolute Eos # 0.29 0.00 - 0.44 k/uL    Basophils Absolute 0.13 0.00 - 0.20 k/uL    Absolute Immature Granulocyte 0.17 0.00 - 0.30 k/uL    WBC Morphology NOT REPORTED     RBC Morphology NOT REPORTED     Platelet Estimate NOT REPORTED    Comprehensive Metabolic Panel w/ Reflex to MG   Result Value Ref Range    Glucose 208 (H) 70 - 99 mg/dL    BUN 19 8 - 23 mg/dL CREATININE 0.87 0.70 - 1.20 mg/dL    Bun/Cre Ratio 22 (H) 9 - 20    Calcium 9.8 8.6 - 10.4 mg/dL    Sodium 135 135 - 144 mmol/L    Potassium 4.4 3.7 - 5.3 mmol/L    Chloride 97 (L) 98 - 107 mmol/L    CO2 24 20 - 31 mmol/L    Anion Gap 14 9 - 17 mmol/L    Alkaline Phosphatase 58 40 - 129 U/L    ALT 27 5 - 41 U/L    AST 32 <40 U/L    Total Bilirubin 0.42 0.3 - 1.2 mg/dL    Total Protein 8.1 6.4 - 8.3 g/dL    Alb 3.9 3.5 - 5.2 g/dL    Albumin/Globulin Ratio 0.9 (L) 1.0 - 2.5    GFR Non-African American >60 >60 mL/min    GFR African American >60 >60 mL/min    GFR Comment          GFR Staging         Troponin   Result Value Ref Range    Troponin, High Sensitivity 49 (H) 0 - 22 ng/L    Troponin T NOT REPORTED <0.03 ng/mL    Troponin Interp NOT REPORTED    Brain Natriuretic Peptide   Result Value Ref Range    Pro-BNP 2,447 (H) <300 pg/mL    BNP Interpretation Pro-BNP Reference Range:    Protime-INR   Result Value Ref Range    Protime 11.8 9.7 - 12.2 sec    INR 1.2 0.9 - 1.2   APTT   Result Value Ref Range    PTT 24.6 23.2 - 34.4 sec   Urinalysis, reflex to microscopic   Result Value Ref Range    Color, UA YELLOW YELLOW    Turbidity UA CLEAR CLEAR    Glucose, Ur NEGATIVE NEGATIVE    Bilirubin Urine NEGATIVE NEGATIVE    Ketones, Urine NEGATIVE NEGATIVE    Specific Gravity, UA 1.020 1.010 - 1.020    Urine Hgb NEGATIVE NEGATIVE    pH, UA 5.5 5.0 - 9.0    Protein, UA NEGATIVE NEGATIVE    Urobilinogen, Urine Normal Normal    Nitrite, Urine NEGATIVE NEGATIVE    Leukocyte Esterase, Urine NEGATIVE NEGATIVE    Urinalysis Comments NOT REPORTED    Magnesium   Result Value Ref Range    Magnesium 1.9 1.6 - 2.6 mg/dL   Lactic acid, plasma   Result Value Ref Range    Lactic Acid 2.1 0.5 - 2.2 mmol/L    Lactic Acid, Whole Blood NOT REPORTED 0.7 - 2.1 mmol/L   Troponin   Result Value Ref Range    Troponin, High Sensitivity 48 (H) 0 - 22 ng/L    Troponin T NOT REPORTED <0.03 ng/mL    Troponin Interp NOT REPORTED    TSH   Result Value Ref Range    TSH 3.43 0.30 - 5.00 mIU/L   T4, free   Result Value Ref Range    Thyroxine, Free 1.28 0.93 - 1.70 ng/dL   Basic Metabolic Panel   Result Value Ref Range    Glucose 98 70 - 99 mg/dL    BUN 17 8 - 23 mg/dL    CREATININE 0.77 0.70 - 1.20 mg/dL    Bun/Cre Ratio 22 (H) 9 - 20    Calcium 8.8 8.6 - 10.4 mg/dL    Sodium 133 (L) 135 - 144 mmol/L    Potassium 4.0 3.7 - 5.3 mmol/L    Chloride 93 (L) 98 - 107 mmol/L    CO2 30 20 - 31 mmol/L    Anion Gap 10 9 - 17 mmol/L    GFR Non-African American >60 >60 mL/min    GFR African American >60 >60 mL/min    GFR Comment          GFR Staging         Magnesium   Result Value Ref Range    Magnesium 1.6 1.6 - 2.6 mg/dL   Lipid panel - fasting   Result Value Ref Range    Cholesterol 101 <200 mg/dL    HDL 26 (L) >40 mg/dL    LDL Cholesterol 45 0 - 130 mg/dL    Chol/HDL Ratio 3.9 <5    Triglycerides 148 <150 mg/dL    VLDL NOT REPORTED 1 - 30 mg/dL   Basic Metabolic Panel   Result Value Ref Range    Glucose 109 (H) 70 - 99 mg/dL    BUN 24 (H) 8 - 23 mg/dL    CREATININE 0.96 0.70 - 1.20 mg/dL    Bun/Cre Ratio 25 (H) 9 - 20    Calcium 8.5 (L) 8.6 - 10.4 mg/dL    Sodium 137 135 - 144 mmol/L    Potassium 3.8 3.7 - 5.3 mmol/L    Chloride 96 (L) 98 - 107 mmol/L    CO2 28 20 - 31 mmol/L    Anion Gap 13 9 - 17 mmol/L    GFR Non-African American >60 >60 mL/min    GFR African American >60 >60 mL/min    GFR Comment          GFR Staging         Magnesium   Result Value Ref Range    Magnesium 1.6 1.6 - 2.6 mg/dL   CBC Auto Differential   Result Value Ref Range    WBC 9.7 3.5 - 11.3 k/uL    RBC 4.07 (L) 4.21 - 5.77 m/uL    Hemoglobin 11.9 (L) 13.0 - 17.0 g/dL    Hematocrit 38.0 (L) 40.7 - 50.3 %    MCV 93.4 82.6 - 102.9 fL    MCH 29.2 25.2 - 33.5 pg    MCHC 31.3 28.4 - 34.8 g/dL    RDW 15.2 (H) 11.8 - 14.4 %    Platelets 585 012 - 315 k/uL    MPV 12.1 8.1 - 13.5 fL    NRBC Automated 0.0 0.0 per 100 WBC    Differential Type NOT REPORTED     WBC Morphology NOT REPORTED     RBC Morphology NOT REPORTED     Platelet Estimate NOT REPORTED     Seg Neutrophils 66 (H) 36 - 65 %    Lymphocytes 20 (L) 24 - 43 %    Monocytes 10 3 - 12 %    Eosinophils % 2 1 - 4 %    Basophils 1 0 - 2 %    Immature Granulocytes 1 (H) 0 %    Segs Absolute 6.45 1.50 - 8.10 k/uL    Absolute Lymph # 1.97 1.10 - 3.70 k/uL    Absolute Mono # 0.96 0.10 - 1.20 k/uL    Absolute Eos # 0.23 0.00 - 0.44 k/uL    Basophils Absolute 0.07 0.00 - 0.20 k/uL    Absolute Immature Granulocyte 0.06 0.00 - 0.30 k/uL   Procalcitonin   Result Value Ref Range    Procalcitonin 0.12 (H) <0.09 ng/mL   Lactic acid, plasma   Result Value Ref Range    Lactic Acid 1.3 0.5 - 2.2 mmol/L    Lactic Acid, Whole Blood NOT REPORTED 0.7 - 2.1 mmol/L   Basic Metabolic Panel   Result Value Ref Range    Glucose 106 (H) 70 - 99 mg/dL    BUN 30 (H) 8 - 23 mg/dL    CREATININE 0.91 0.70 - 1.20 mg/dL    Bun/Cre Ratio 33 (H) 9 - 20    Calcium 8.3 (L) 8.6 - 10.4 mg/dL    Sodium 134 (L) 135 - 144 mmol/L    Potassium 3.5 (L) 3.7 - 5.3 mmol/L    Chloride 93 (L) 98 - 107 mmol/L    CO2 30 20 - 31 mmol/L    Anion Gap 11 9 - 17 mmol/L    GFR Non-African American >60 >60 mL/min    GFR African American >60 >60 mL/min    GFR Comment          GFR Staging         Magnesium   Result Value Ref Range    Magnesium 1.8 1.6 - 2.6 mg/dL   Glucose, Whole Blood   Result Value Ref Range    POC Glucose 138 (H) 74 - 100 mg/dL   EKG 12 Lead   Result Value Ref Range    Ventricular Rate 138 BPM    Atrial Rate 138 BPM    QRS Duration 164 ms    Q-T Interval 362 ms    QTc Calculation (Bazett) 548 ms    R Axis -50 degrees    T Axis 47 degrees   EKG 12 Lead   Result Value Ref Range    Ventricular Rate 106 BPM    Atrial Rate 106 BPM    P-R Interval 152 ms    QRS Duration 170 ms    Q-T Interval 416 ms    QTc Calculation (Bazett) 552 ms    R Axis 45 degrees    T Axis 61 degrees   EKG 12 Lead   Result Value Ref Range    Ventricular Rate 104 BPM    Atrial Rate 119 BPM    QRS Duration 162 ms    Q-T Interval 400 ms QTc Calculation (Bazett) 526 ms    R Axis -21 degrees    T Axis 154 degrees   EKG 12 Lead   Result Value Ref Range    Ventricular Rate 74 BPM    Atrial Rate 74 BPM    P-R Interval 242 ms    QRS Duration 170 ms    Q-T Interval 470 ms    QTc Calculation (Bazett) 521 ms    P Axis 63 degrees    R Axis -12 degrees    T Axis 152 degrees       ASSESSMENT:   Patient Active Problem List    Diagnosis Date Noted    Severe aortic stenosis      Priority: High    Dyslipidemia      Priority: High    Acute respiratory failure with hypoxia (HCC) Due to CHF & Obesity 03/05/2020    Acute diastolic CHF (congestive heart failure), NYHA class 3 (HCC) 03/04/2020    Carpal tunnel syndrome on left 01/08/2019    Obesity, morbid (more than 100 lbs over ideal weight or BMI > 40) ([facility] [address] 12/15/2017    Osteoarthritis of both knees 06/13/2017    Essential hypertension 12/13/2016    Mixed hyperlipidemia 12/13/2016         PLAN:  · [facility]  · Continue diuresis and monitor electrolytes      Mauri Huffman M.D.

## 2020-03-07 NOTE — PROGRESS NOTES
Systolic blood pressures 46'L to 80's. Lasix and Coreg held to continue monitoring blood pressures. Informed night shift.

## 2020-03-07 NOTE — PLAN OF CARE
Patient taking diet well. Problem: Falls - Risk of:  Goal: Absence of physical injury  Description  Absence of physical injury  Note:   Boudreaux fall score calculated on admission and daily at midnight and shows pt at 70 risk for fall. Bed in lowest position at all times with wheels locked. Bed alarm active. Falling star posted on door frame and yellow sticker visible on patient bracelet. Call light and bedside table with in reach. ID information verified as correct and visible. Will continue to monitor and intervene as necessary.  Gerardo Arana, RN

## 2020-03-07 NOTE — PROGRESS NOTES
Physical Therapy  Facility/Department: [facility] AT THE [facility] MED SURG  Daily Treatment Note  NAME: Marium Boggs  : 1945  MRN: [id number]    Date of Service: 3/7/2020    Discharge Recommendations:  Continue to assess pending progress, Home with assist PRN, Home with Home health PT        Assessment      Activity Tolerance  Activity Tolerance: Patient limited by fatigue     Patient Diagnosis(es): The encounter diagnosis was Congestive heart failure, unspecified HF chronicity, unspecified heart failure type ([address] has a past medical history of Anxiety, Atrial fibrillation, new onset ([facility] [address] BPH (benign prostatic hyperplasia), CHF (congestive heart failure) ([facility] [address] Hyperlipidemia, Hypertension, and Osteoarthritis. has a past surgical history that includes Vasectomy (). Restrictions  Restrictions/Precautions  Restrictions/Precautions: Contact Precautions, General Precautions, Fall Risk  Subjective   General  Chart Reviewed: Yes  Family / Caregiver Present: No  Referring Practitioner: Dawit Corona MD  Subjective  Subjective: Patient agreeable to physical therapy at this time. Is very tired. Orientation  Orientation  Overall Orientation Status: Within Functional Limits  Cognition      Objective   Bed mobility  Comment: Pt refuses bed mobility at this time due to fatigue from moving to a different room. Transfers  Comment: Patient refuses transfers at this time due to fatigue.    Ambulation  Ambulation?: No     Balance  Sitting - Static: Good  Exercises  Straight Leg Raise: x20  Quad Sets: x20  Heelslides: x15  Gluteal Sets: x15  Hip Flexion: x20  Hip Abduction: x15  Knee Short Arc Quad: x20  Ankle Pumps: x20  Comments: B LE ther ex completed supine/reclined in bed with vc's for technique                       AM-PAC Score     AM-PAC Inpatient Mobility without Stair Climbing Raw Score : 12 (20 1550)  AM-PAC Inpatient without Stair Climbing T-Scale Score : 37.26 (20 1550)  Mobility Inpatient CMS 0-100% Score: 63.03 (03/06/20 1550)  Mobility Inpatient without Stair CMS G-Code Modifier : CL (03/06/20 1550)       Goals  Short term goals  Time Frame for Short term goals: 10 days  Short term goal 1: Patient will perform bed mobility with Min A. Short term goal 2: Patient will perform sit/stand and stand pivot transfers with MI  Short term goal 3: Patient will tolerate 20-30 minutes of therex/act to improve endurance for ADLs  Patient Goals   Patient goals : Be able to return home    Plan    Plan  Times per week: 7  Times per day: Twice a day  Plan Comment: 1x/day on weekends  Safety Devices  Type of devices:  All fall risk precautions in place, Bed alarm in place, Patient at risk for falls, Left in bed, Call light within reach     Therapy Time   Individual Concurrent Group Co-treatment   Time In 1040         Time Out 1052         Minutes 12         Timed Code Treatment Minutes: 73 Carina Barakat, PT, DPT

## 2020-03-08 LAB
ANION GAP SERPL CALCULATED.3IONS-SCNC: 9 MMOL/L (ref 9–17)
BUN BLDV-MCNC: 31 MG/DL (ref 8–23)
BUN/CREAT BLD: 32 (ref 9–20)
CALCIUM SERPL-MCNC: 8.3 MG/DL (ref 8.6–10.4)
CHLORIDE BLD-SCNC: 97 MMOL/L (ref 98–107)
CO2: 30 MMOL/L (ref 20–31)
CREAT SERPL-MCNC: 0.98 MG/DL (ref 0.7–1.2)
GFR AFRICAN AMERICAN: >60 ML/MIN
GFR NON-AFRICAN AMERICAN: >60 ML/MIN
GFR SERPL CREATININE-BSD FRML MDRD: ABNORMAL ML/MIN/{1.73_M2}
GFR SERPL CREATININE-BSD FRML MDRD: ABNORMAL ML/MIN/{1.73_M2}
GLUCOSE BLD-MCNC: 109 MG/DL (ref 70–99)
MAGNESIUM: 1.8 MG/DL (ref 1.6–2.6)
POTASSIUM SERPL-SCNC: 3.7 MMOL/L (ref 3.7–5.3)
SODIUM BLD-SCNC: 136 MMOL/L (ref 135–144)

## 2020-03-08 PROCEDURE — 97110 THERAPEUTIC EXERCISES: CPT

## 2020-03-08 PROCEDURE — 2580000003 HC RX 258: Performed by: INTERNAL MEDICINE

## 2020-03-08 PROCEDURE — 83735 ASSAY OF MAGNESIUM: CPT

## 2020-03-08 PROCEDURE — 6370000000 HC RX 637 (ALT 250 FOR IP): Performed by: INTERNAL MEDICINE

## 2020-03-08 PROCEDURE — 36415 COLL VENOUS BLD VENIPUNCTURE: CPT

## 2020-03-08 PROCEDURE — 6370000000 HC RX 637 (ALT 250 FOR IP): Performed by: FAMILY MEDICINE

## 2020-03-08 PROCEDURE — 1200000000 HC SEMI PRIVATE

## 2020-03-08 PROCEDURE — 97535 SELF CARE MNGMENT TRAINING: CPT

## 2020-03-08 PROCEDURE — 80048 BASIC METABOLIC PNL TOTAL CA: CPT

## 2020-03-08 PROCEDURE — 6360000002 HC RX W HCPCS: Performed by: INTERNAL MEDICINE

## 2020-03-08 RX ADMIN — Medication 10 ML: at 22:05

## 2020-03-08 RX ADMIN — AMIODARONE HYDROCHLORIDE 200 MG: 200 TABLET ORAL at 08:34

## 2020-03-08 RX ADMIN — LOSARTAN POTASSIUM 25 MG: 25 TABLET, FILM COATED ORAL at 08:33

## 2020-03-08 RX ADMIN — TAMSULOSIN HYDROCHLORIDE 0.4 MG: 0.4 CAPSULE ORAL at 08:33

## 2020-03-08 RX ADMIN — FUROSEMIDE 40 MG: 10 INJECTION, SOLUTION INTRAMUSCULAR; INTRAVENOUS at 08:34

## 2020-03-08 RX ADMIN — APIXABAN 5 MG: 5 TABLET, FILM COATED ORAL at 08:34

## 2020-03-08 RX ADMIN — Medication 10 ML: at 08:34

## 2020-03-08 RX ADMIN — FAMOTIDINE 20 MG: 10 TABLET ORAL at 22:05

## 2020-03-08 RX ADMIN — FAMOTIDINE 20 MG: 10 TABLET ORAL at 08:33

## 2020-03-08 RX ADMIN — FUROSEMIDE 40 MG: 10 INJECTION, SOLUTION INTRAMUSCULAR; INTRAVENOUS at 17:41

## 2020-03-08 RX ADMIN — PAROXETINE 30 MG: 10 TABLET, FILM COATED ORAL at 08:33

## 2020-03-08 RX ADMIN — ASPIRIN 81 MG: 81 TABLET, COATED ORAL at 08:34

## 2020-03-08 RX ADMIN — Medication 10 ML: at 17:41

## 2020-03-08 RX ADMIN — APIXABAN 5 MG: 5 TABLET, FILM COATED ORAL at 22:05

## 2020-03-08 RX ADMIN — CARVEDILOL 3.12 MG: 3.12 TABLET, FILM COATED ORAL at 08:33

## 2020-03-08 RX ADMIN — AMIODARONE HYDROCHLORIDE 200 MG: 200 TABLET ORAL at 22:05

## 2020-03-08 RX ADMIN — ATORVASTATIN CALCIUM 40 MG: 40 TABLET, FILM COATED ORAL at 08:33

## 2020-03-08 ASSESSMENT — PAIN DESCRIPTION - ONSET: ONSET: SUDDEN

## 2020-03-08 ASSESSMENT — PAIN DESCRIPTION - PROGRESSION
CLINICAL_PROGRESSION: GRADUALLY WORSENING
CLINICAL_PROGRESSION: OTHER (COMMENT)

## 2020-03-08 ASSESSMENT — PAIN DESCRIPTION - ORIENTATION
ORIENTATION: LEFT

## 2020-03-08 ASSESSMENT — PAIN DESCRIPTION - DESCRIPTORS
DESCRIPTORS: ACHING

## 2020-03-08 ASSESSMENT — PAIN SCALES - GENERAL
PAINLEVEL_OUTOF10: 6
PAINLEVEL_OUTOF10: 8
PAINLEVEL_OUTOF10: 8
PAINLEVEL_OUTOF10: 9

## 2020-03-08 ASSESSMENT — PAIN DESCRIPTION - LOCATION
LOCATION: HAND

## 2020-03-08 ASSESSMENT — PAIN - FUNCTIONAL ASSESSMENT
PAIN_FUNCTIONAL_ASSESSMENT: PREVENTS OR INTERFERES SOME ACTIVE ACTIVITIES AND ADLS

## 2020-03-08 ASSESSMENT — PAIN DESCRIPTION - PAIN TYPE
TYPE: ACUTE PAIN

## 2020-03-08 ASSESSMENT — PAIN DESCRIPTION - FREQUENCY
FREQUENCY: INTERMITTENT

## 2020-03-08 NOTE — PROGRESS NOTES
days  Short term goal 1: Patient will perform bed mobility with Min A. Short term goal 2: Patient will perform sit/stand and stand pivot transfers with MI  Short term goal 3: Patient will tolerate 20-30 minutes of therex/act to improve endurance for ADLs  Patient Goals   Patient goals : Be able to return home    Plan    Plan  Times per week: 7  Times per day: Twice a day  Plan Comment: 1x/day on weekends  Safety Devices  Type of devices:  All fall risk precautions in place, Bed alarm in place, Patient at risk for falls, Left in bed, Call light within reach, Nurse notified     Therapy Time   Individual Concurrent Group Co-treatment   Time In 1108         Time Out 1445 [place], [place]

## 2020-03-08 NOTE — PROGRESS NOTES
Currently in Pain: Yes   Orientation  Orientation  Overall Orientation Status: Within Functional Limits  Objective    ADL  Additional Comments: Patient agreed to washing face only. Pt completed MI after set up. Type of ROM/Therapeutic Exercise  Comment: Pt completed BUE ther ex with 2# dumbbell x 5 planes x 15 reps x 1 with R arm only due to increased pain & edema in L hand. BUE will increase strenth & endurance needed for I/ADL tasks. 2 RB need secondary to fatigue. Plan   Plan  Times per week: 7x/week  Times per day: Daily  Current Treatment Recommendations: Strengthening, Safety Education & Training, Balance Training, Self-Care / ADL, Functional Mobility Training, Endurance Training  Plan Comment: ther ex, ther act, self care  G-Code     OutComes Score                                                  AM-PAC Score             Goals  Short term goals  Time Frame for Short term goals: 10 days  Short term goal 1: Pt will complete total body ADL CGA with use of AE PRN to increase safety and independence with ADLs. Short term goal 2: Pt will engage in 15 minutes BUE therex/theract with minimal rest breaks to increase strength and endurance for increased ease and independence with ADL and functional transfers. Short term goal 3: Pt will tolerate greater than 1 minute of standing without LOB while engage in task to increase safety and independence with ADL tasks and functional mobility/transfers.    Short term goal 4: Patient will perform 5 min of standing sinkside ADLs w/o LOB or safety concerns in order to increase participation in ADLs  Patient Goals   Patient goals : to go home       Therapy Time   Individual Concurrent Group Co-treatment   Time In  955         Time Out  1025         Minutes  [address], RATLIFF/L

## 2020-03-08 NOTE — PLAN OF CARE
Problem: Falls - Risk of:  Goal: Will remain free from falls  Description: Will remain free from falls  Outcome: Met This Shift  Note: Patient remains free from falls this shift. Call light within reach at all times. Goal: Absence of physical injury  Description: Absence of physical injury  Outcome: Met This Shift  Note: Patient remains without physical injury. Bed in low and locked position at all times. Problem: Safety:  Goal: Free from accidental physical injury  Description: Free from accidental physical injury  Outcome: Met This Shift  Note: Remains free from accidental physical injury. Patient demonstrates proper use of call light. Problem: Nutrition  Goal: Optimal nutrition therapy  Description: Nutrition Problem: Altered nutrition-related lab values  Intervention: Food and/or Nutrient Delivery: Continue current diet  Nutritional Goals: PO intake > 75% with improved blood sugar     Outcome: Met This Shift  Note: Patient eating >75% of meals. Continues with fluid restriction. Problem: Pain:  Description: Pain management should include both nonpharmacologic and pharmacologic interventions. Goal: Control of acute pain  Description: Control of acute pain  Outcome: Met This Shift  Goal: Control of chronic pain  Description: Control of chronic pain  Outcome: Met This Shift     Problem: Cardiac:  Goal: Ability to maintain an adequate cardiac output will improve  Description: Ability to maintain an adequate cardiac output will improve  Outcome: Met This Shift  Note: Patient remains in normal sinus rhythm. Goal: Hemodynamic stability will improve  Description: Hemodynamic stability will improve  Outcome: Met This Shift  Note: Vitals stable. Heart tones strong and regular. Problem: Risk for Impaired Skin Integrity  Goal: Tissue integrity - skin and mucous membranes  Description: Structural intactness and normal physiological function of skin and  mucous membranes.   Outcome: Ongoing  Note: Respiratory status will improve  Description: Respiratory status will improve  Outcome: Ongoing  Note: Respirations unlabored at rest.  Patient becomes dyspneic on exertion.      Problem: OXYGENATION/RESPIRATORY FUNCTION  Goal: Patient will maintain patent airway  Outcome: Completed

## 2020-03-08 NOTE — PROGRESS NOTES
Monocytes 10 3 - 12 %    Eosinophils % 2 1 - 4 %    Basophils 1 0 - 2 %    Immature Granulocytes 1 (H) 0 %    Segs Absolute 6.45 1.50 - 8.10 k/uL    Absolute Lymph # 1.97 1.10 - 3.70 k/uL    Absolute Mono # 0.96 0.10 - 1.20 k/uL    Absolute Eos # 0.23 0.00 - 0.44 k/uL    Basophils Absolute 0.07 0.00 - 0.20 k/uL    Absolute Immature Granulocyte 0.06 0.00 - 0.30 k/uL   Procalcitonin   Result Value Ref Range    Procalcitonin 0.12 (H) <0.09 ng/mL   Lactic acid, plasma   Result Value Ref Range    Lactic Acid 1.3 0.5 - 2.2 mmol/L    Lactic Acid, Whole Blood NOT REPORTED 0.7 - 2.1 mmol/L   Basic Metabolic Panel   Result Value Ref Range    Glucose 106 (H) 70 - 99 mg/dL    BUN 30 (H) 8 - 23 mg/dL    CREATININE 0.91 0.70 - 1.20 mg/dL    Bun/Cre Ratio 33 (H) 9 - 20    Calcium 8.3 (L) 8.6 - 10.4 mg/dL    Sodium 134 (L) 135 - 144 mmol/L    Potassium 3.5 (L) 3.7 - 5.3 mmol/L    Chloride 93 (L) 98 - 107 mmol/L    CO2 30 20 - 31 mmol/L    Anion Gap 11 9 - 17 mmol/L    GFR Non-African American >60 >60 mL/min    GFR African American >60 >60 mL/min    GFR Comment          GFR Staging         Magnesium   Result Value Ref Range    Magnesium 1.8 1.6 - 2.6 mg/dL   Glucose, Whole Blood   Result Value Ref Range    POC Glucose 138 (H) 74 - 100 mg/dL   Basic Metabolic Panel   Result Value Ref Range    Glucose 109 (H) 70 - 99 mg/dL    BUN 31 (H) 8 - 23 mg/dL    CREATININE 0.98 0.70 - 1.20 mg/dL    Bun/Cre Ratio 32 (H) 9 - 20    Calcium 8.3 (L) 8.6 - 10.4 mg/dL    Sodium 136 135 - 144 mmol/L    Potassium 3.7 3.7 - 5.3 mmol/L    Chloride 97 (L) 98 - 107 mmol/L    CO2 30 20 - 31 mmol/L    Anion Gap 9 9 - 17 mmol/L    GFR Non-African American >60 >60 mL/min    GFR African American >60 >60 mL/min    GFR Comment          GFR Staging         Magnesium   Result Value Ref Range    Magnesium 1.8 1.6 - 2.6 mg/dL   EKG 12 Lead   Result Value Ref Range    Ventricular Rate 138 BPM    Atrial Rate 138 BPM    QRS Duration 164 ms    Q-T Interval 362 ms    QTc

## 2020-03-08 NOTE — PLAN OF CARE
Problem: Falls - Risk of:  Goal: Will remain free from falls  Description: Will remain free from falls  3/7/2020 2306 by Oj Mei RN  Outcome: Ongoing  Note: Fall risk assessment done and patient is a high risk for falls. Alarms on as needed for patient safety. Patient being monitored on a regular basis. No falls noted at this time. Problem: Risk for Impaired Skin Integrity  Goal: Tissue integrity - skin and mucous membranes  Description: Structural intactness and normal physiological function of skin and  mucous membranes. 3/7/2020 2306 by Oj Mei RN  Outcome: Ongoing  Note: Patient is able to turn self. No new open areas or redness noted. Will continue to assess        Problem: Safety:  Goal: Free from accidental physical injury  Description: Free from accidental physical injury  3/7/2020 2306 by Oj Mei RN  Outcome: Ongoing  Note: Bed in lowest position, wheels are locked, call light within reach, ID band on. Fall risk is assessed. Will continue to monitor. Problem: Daily Care:  Goal: Daily care needs are met  Description: Daily care needs are met  3/7/2020 2306 by Oj Mei RN  Outcome: Ongoing  Note: Daily care needs are met with assistance with maximum encouragement being given        Problem: Fluid Volume - Imbalance:  Goal: Absence of imbalanced fluid volume signs and symptoms  Description: Absence of imbalanced fluid volume signs and symptoms  3/7/2020 2306 by Oj Mei RN  Outcome: Ongoing  Note: Pt is on a fluid restriction.      Problem: Nutrition  Goal: Optimal nutrition therapy  Description: Nutrition Problem: Altered nutrition-related lab values  Intervention: Food and/or Nutrient Delivery: Continue current diet  Nutritional Goals: PO intake > 75% with improved blood sugar     3/7/2020 2306 by Oj Mei RN  Outcome: Ongoing     Problem: Pain:  Goal: Pain level will decrease  Description: Pain level will decrease  3/7/2020 2306 by Jewell Vu BALA Stewart  Outcome: Ongoing  Note: Pain assessed routinely and as needed with a 0-10 scale. PRN pain medication given as appropriate per orders. Pain reassessed within an hour, will continue to monitor       Problem: OXYGENATION/RESPIRATORY FUNCTION  Goal: Patient will maintain patent airway  3/7/2020 2306 by Yoana Mckeon RN  Outcome: Ongoing  Note: Pt is on room air with diminished lung sounds. Problem: OXYGENATION/RESPIRATORY FUNCTION  Goal: Patient will achieve/maintain normal respiratory rate/effort  Description: Respiratory rate and effort will be within normal limits for the patient  3/7/2020 2306 by Yoana Mckeon RN  Outcome: Ongoing  Note: Pt respiratory rate within normal limits. Problem: HEMODYNAMIC STATUS  Goal: Patient has stable vital signs and fluid balance  3/7/2020 2306 by Yoana Mckeon RN  Outcome: Ongoing  Note: Pt vitals are stable. Problem: ACTIVITY INTOLERANCE/IMPAIRED MOBILITY  Goal: Mobility/activity is maintained at optimum level for patient  3/7/2020 2306 by Yoana Mckeon RN  Outcome: Ongoing     Problem: Cardiac:  Goal: Ability to maintain an adequate cardiac output will improve  Description: Ability to maintain an adequate cardiac output will improve  3/7/2020 2306 by Yoana Mckeon RN  Outcome: Ongoing  Note: Pt is normal sinus rhythm. Problem: Respiratory:  Goal: Respiratory status will improve  Description: Respiratory status will improve  3/7/2020 2306 by Yoana Mckeon RN  Outcome: Ongoing  Note: Pt SpO2 in mid 90s on room air.

## 2020-03-09 VITALS
DIASTOLIC BLOOD PRESSURE: 74 MMHG | TEMPERATURE: 97 F | WEIGHT: 315 LBS | HEART RATE: 68 BPM | RESPIRATION RATE: 16 BRPM | SYSTOLIC BLOOD PRESSURE: 125 MMHG | BODY MASS INDEX: 52.48 KG/M2 | HEIGHT: 65 IN | OXYGEN SATURATION: 96 %

## 2020-03-09 LAB
ANION GAP SERPL CALCULATED.3IONS-SCNC: 11 MMOL/L (ref 9–17)
BUN BLDV-MCNC: 28 MG/DL (ref 8–23)
BUN/CREAT BLD: 35 (ref 9–20)
CALCIUM SERPL-MCNC: 8.4 MG/DL (ref 8.6–10.4)
CHLORIDE BLD-SCNC: 99 MMOL/L (ref 98–107)
CO2: 31 MMOL/L (ref 20–31)
CREAT SERPL-MCNC: 0.79 MG/DL (ref 0.7–1.2)
GFR AFRICAN AMERICAN: >60 ML/MIN
GFR NON-AFRICAN AMERICAN: >60 ML/MIN
GFR SERPL CREATININE-BSD FRML MDRD: ABNORMAL ML/MIN/{1.73_M2}
GFR SERPL CREATININE-BSD FRML MDRD: ABNORMAL ML/MIN/{1.73_M2}
GLUCOSE BLD-MCNC: 102 MG/DL (ref 70–99)
HCT VFR BLD CALC: 35.9 % (ref 40.7–50.3)
HEMOGLOBIN: 11.2 G/DL (ref 13–17)
MAGNESIUM: 1.8 MG/DL (ref 1.6–2.6)
MCH RBC QN AUTO: 29.2 PG (ref 25.2–33.5)
MCHC RBC AUTO-ENTMCNC: 31.2 G/DL (ref 28.4–34.8)
MCV RBC AUTO: 93.7 FL (ref 82.6–102.9)
NRBC AUTOMATED: 0 PER 100 WBC
PDW BLD-RTO: 15.2 % (ref 11.8–14.4)
PLATELET # BLD: 229 K/UL (ref 138–453)
PMV BLD AUTO: 11.9 FL (ref 8.1–13.5)
POTASSIUM SERPL-SCNC: 3.6 MMOL/L (ref 3.7–5.3)
RBC # BLD: 3.83 M/UL (ref 4.21–5.77)
SODIUM BLD-SCNC: 141 MMOL/L (ref 135–144)
WBC # BLD: 9.9 K/UL (ref 3.5–11.3)

## 2020-03-09 PROCEDURE — 6370000000 HC RX 637 (ALT 250 FOR IP): Performed by: INTERNAL MEDICINE

## 2020-03-09 PROCEDURE — 97110 THERAPEUTIC EXERCISES: CPT

## 2020-03-09 PROCEDURE — 85027 COMPLETE CBC AUTOMATED: CPT

## 2020-03-09 PROCEDURE — 6360000002 HC RX W HCPCS: Performed by: INTERNAL MEDICINE

## 2020-03-09 PROCEDURE — 80048 BASIC METABOLIC PNL TOTAL CA: CPT

## 2020-03-09 PROCEDURE — 83735 ASSAY OF MAGNESIUM: CPT

## 2020-03-09 PROCEDURE — 94618 PULMONARY STRESS TESTING: CPT

## 2020-03-09 PROCEDURE — 99233 SBSQ HOSP IP/OBS HIGH 50: CPT | Performed by: INTERNAL MEDICINE

## 2020-03-09 PROCEDURE — 36415 COLL VENOUS BLD VENIPUNCTURE: CPT

## 2020-03-09 PROCEDURE — 2580000003 HC RX 258: Performed by: INTERNAL MEDICINE

## 2020-03-09 PROCEDURE — 6370000000 HC RX 637 (ALT 250 FOR IP): Performed by: FAMILY MEDICINE

## 2020-03-09 RX ORDER — AMIODARONE HYDROCHLORIDE 200 MG/1
TABLET ORAL
Qty: 45 TABLET | Refills: 3 | Status: ON HOLD | OUTPATIENT
Start: 2020-03-09 | End: 2020-05-18 | Stop reason: HOSPADM

## 2020-03-09 RX ORDER — ATORVASTATIN CALCIUM 40 MG/1
40 TABLET, FILM COATED ORAL DAILY
Qty: 30 TABLET | Refills: 3 | Status: SHIPPED | OUTPATIENT
Start: 2020-03-10 | End: 2020-07-31 | Stop reason: SDUPTHER

## 2020-03-09 RX ORDER — ASPIRIN 81 MG/1
81 TABLET ORAL DAILY
Qty: 30 TABLET | Refills: 3 | Status: SHIPPED | OUTPATIENT
Start: 2020-03-10 | End: 2020-04-13 | Stop reason: ALTCHOICE

## 2020-03-09 RX ORDER — FUROSEMIDE 20 MG/1
20 TABLET ORAL DAILY
Qty: 60 TABLET | Refills: 3 | Status: SHIPPED | OUTPATIENT
Start: 2020-03-09 | End: 2020-07-21 | Stop reason: SDUPTHER

## 2020-03-09 RX ORDER — CARVEDILOL 3.12 MG/1
3.12 TABLET ORAL 2 TIMES DAILY WITH MEALS
Qty: 60 TABLET | Refills: 3 | Status: SHIPPED | OUTPATIENT
Start: 2020-03-09 | End: 2020-03-13 | Stop reason: DRUGHIGH

## 2020-03-09 RX ORDER — LOSARTAN POTASSIUM 25 MG/1
25 TABLET ORAL DAILY
Qty: 30 TABLET | Refills: 3 | Status: ON HOLD | OUTPATIENT
Start: 2020-03-10 | End: 2020-05-18 | Stop reason: HOSPADM

## 2020-03-09 RX ADMIN — PAROXETINE 30 MG: 10 TABLET, FILM COATED ORAL at 08:43

## 2020-03-09 RX ADMIN — APIXABAN 5 MG: 5 TABLET, FILM COATED ORAL at 08:43

## 2020-03-09 RX ADMIN — FAMOTIDINE 20 MG: 10 TABLET ORAL at 08:43

## 2020-03-09 RX ADMIN — FUROSEMIDE 40 MG: 10 INJECTION, SOLUTION INTRAMUSCULAR; INTRAVENOUS at 08:43

## 2020-03-09 RX ADMIN — LOSARTAN POTASSIUM 25 MG: 25 TABLET, FILM COATED ORAL at 08:43

## 2020-03-09 RX ADMIN — ASPIRIN 81 MG: 81 TABLET, COATED ORAL at 08:43

## 2020-03-09 RX ADMIN — TAMSULOSIN HYDROCHLORIDE 0.4 MG: 0.4 CAPSULE ORAL at 08:43

## 2020-03-09 RX ADMIN — Medication 10 ML: at 08:53

## 2020-03-09 RX ADMIN — ATORVASTATIN CALCIUM 40 MG: 40 TABLET, FILM COATED ORAL at 08:43

## 2020-03-09 RX ADMIN — AMIODARONE HYDROCHLORIDE 200 MG: 200 TABLET ORAL at 08:43

## 2020-03-09 RX ADMIN — CARVEDILOL 3.12 MG: 3.12 TABLET, FILM COATED ORAL at 06:51

## 2020-03-09 ASSESSMENT — PAIN SCALES - GENERAL
PAINLEVEL_OUTOF10: 0

## 2020-03-09 NOTE — PROGRESS NOTES
Short term goals: 10 days  Short term goal 1: Patient will perform bed mobility with Min A. Short term goal 2: Patient will perform sit/stand and stand pivot transfers with MI  Short term goal 3: Patient will tolerate 20-30 minutes of therex/act to improve endurance for ADLs  Patient Goals   Patient goals : Be able to return home    Plan    Plan  Times per week: 7  Times per day: Twice a day  Plan Comment: 1x/day on weekends  Safety Devices  Type of devices:  All fall risk precautions in place, Bed alarm in place, Patient at risk for falls, Left in bed, Call light within reach, Nurse notified     Therapy Time   Individual Concurrent Group Co-treatment   Time In 1335         Time Out [address]

## 2020-03-09 NOTE — PROGRESS NOTES
Physical Therapy  Facility/Department: [facility] AT THE [facility] MED SURG  Daily Treatment Note  NAME: Mary Moulton  : 1945  MRN: [id number]    Date of Service: 3/9/2020    Discharge Recommendations:  Continue to assess pending progress, Home with assist PRN, Home with Home health PT        Assessment   Treatment Diagnosis: General weakness, decreased activity endurance  Prognosis: Fair  Activity Tolerance  Activity Tolerance: Patient limited by fatigue     Patient Diagnosis(es): The encounter diagnosis was Congestive heart failure, unspecified HF chronicity, unspecified heart failure type ([facility] has a past medical history of Anxiety, Atrial fibrillation, new onset ([facility] [address] BPH (benign prostatic hyperplasia), CHF (congestive heart failure) ([facility] [address] Hyperlipidemia, Hypertension, and Osteoarthritis. has a past surgical history that includes Vasectomy (). Restrictions  Restrictions/Precautions  Restrictions/Precautions: General Precautions, Contact Precautions, Fall Risk  Subjective   General  Chart Reviewed: Yes  Family / Caregiver Present: No  Referring Practitioner: Fer Carson MD  Subjective  Subjective: Pt reports possibly going home today. No c/o pain. General Comment  Comments: Issued and reviewed HEP folder with pt verablizing understanding. Orientation  Orientation  Overall Orientation Status: Within Functional Limits  Cognition      Objective   Bed mobility  Supine to Sit: Unable to assess  Scooting: Maximal assistance  Comment: Pt refused despite education. Transfers  Sit to Stand: Unable to assess  Stand to sit: Unable to assess  Comment: Pt refused d/t fatigue. Ambulation  Ambulation?: No(Refused d/t fatigue.)        Exercises  Straight Leg Raise: x15  Quad Sets: x15  Heelslides: x15  Gluteal Sets: x15  Hip Abduction: x15  Knee Short Arc Quad: x15  Ankle Pumps: 20x  Comments: Above exercises completed in supine. MIGUEL ANGEL STEPHEN SLRs.      Goals  Short term goals  Time Frame for Short term goals: 10 days  Short term goal 1: Patient will perform bed mobility with Min A. Short term goal 2: Patient will perform sit/stand and stand pivot transfers with MI  Short term goal 3: Patient will tolerate 20-30 minutes of therex/act to improve endurance for ADLs  Patient Goals   Patient goals : Be able to return home    Plan    Plan  Times per week: 7  Times per day: Twice a day  Plan Comment: 1x/day on weekends  Safety Devices  Type of devices:  All fall risk precautions in place, Bed alarm in place, Patient at risk for falls, Left in bed, Call light within reach, Nurse notified     Therapy Time   Individual Concurrent Group Co-treatment   Time In [address] [place] [address]

## 2020-03-09 NOTE — PROGRESS NOTES
Physical Therapy  Facility/Department: [facility] AT THE [facility] MED SURG  Daily Treatment Note  NAME: Angelito Sosa  : 1945  MRN: [id number]    Date of Service: 3/9/2020    Discharge Recommendations:  Continue to assess pending progress, Home with assist PRN, Home with Home health PT        Assessment   Treatment Diagnosis: General weakness, decreased activity endurance  Prognosis: Fair  Activity Tolerance  Activity Tolerance: Patient limited by fatigue     Patient Diagnosis(es): The encounter diagnosis was Congestive heart failure, unspecified HF chronicity, unspecified heart failure type ([facility] [address] has a past medical history of Anxiety, Atrial fibrillation, new onset ([facility] [address] BPH (benign prostatic hyperplasia), CHF (congestive heart failure) ([facility] [address] Hyperlipidemia, Hypertension, and Osteoarthritis. has a past surgical history that includes Vasectomy (). Restrictions  Restrictions/Precautions  Restrictions/Precautions: General Precautions, Contact Precautions, Fall Risk  Subjective   General  Chart Reviewed: Yes  Family / Caregiver Present: No  Referring Practitioner: Nalini Wheat MD  Subjective  Subjective: Pt reports possibly going home today. No c/o pain. General Comment  Comments: Issued and reviewed HEP folder with pt verablizing understanding. Orientation  Orientation  Overall Orientation Status: Within Functional Limits  Cognition      Objective   Bed mobility  Supine to Sit: Unable to assess  Scooting: Maximal assistance  Comment: Pt refused despite education. Transfers  Sit to Stand: Unable to assess  Stand to sit: Unable to assess  Comment: Pt refused d/t fatigue and recently returning to bed. Ambulation  Ambulation?: No(Refused d/t fatigue.)        Exercises  Straight Leg Raise: x15  Quad Sets: x15  Heelslides: x15  Gluteal Sets: x15  Hip Abduction: x15  Knee Short Arc Quad: x15  Ankle Pumps: 20x  Comments: Above exercises completed in supine. MIGUEL ANGEL PEARSONRs.       Goals  Short term goals  Time Frame for Short term goals: 10 days  Short term goal 1: Patient will perform bed mobility with Min A. Short term goal 2: Patient will perform sit/stand and stand pivot transfers with MI  Short term goal 3: Patient will tolerate 20-30 minutes of therex/act to improve endurance for ADLs  Patient Goals   Patient goals : Be able to return home    Plan    Plan  Times per week: 7  Times per day: Twice a day  Plan Comment: 1x/day on weekends  Safety Devices  Type of devices:  All fall risk precautions in place, Bed alarm in place, Patient at risk for falls, Left in bed, Call light within reach, Nurse notified     Therapy Time   Individual Concurrent Group Co-treatment   Time In [address] [place] [address]

## 2020-03-09 NOTE — PLAN OF CARE
also able to rate the pain on a scale of 0-10. Pain is assessed with hourly rounding while patient is awake. Patient hs not asked for any pain intervention as of now. Will continue to assess. Problem: OXYGENATION/RESPIRATORY FUNCTION  Goal: Patient will achieve/maintain normal respiratory rate/effort  Description: Respiratory rate and effort will be within normal limits for the patient  3/9/2020 0134 by Alexis Cai RN  Outcome: Ongoing  Note: Patient respirations are normal and  unlabored but gets dyspneic with exertion. Patient O2 saturation is within the normal limit on room air as of now. Will continue to monitor. Problem: HEMODYNAMIC STATUS  Goal: Patient has stable vital signs and fluid balance  3/9/2020 0134 by Alexis Cai RN  Outcome: Ongoing  Note: Patient's vitals have been within the normal limit as of now. Fluid intakes and output are closely monitored. Problem: ACTIVITY INTOLERANCE/IMPAIRED MOBILITY  Goal: Mobility/activity is maintained at optimum level for patient  3/9/2020 0134 by Alexis Cai RN  Outcome: Ongoing  Note: Patient has been using motorized wheelchair inside the room and is able to stand and pivot to the bed from the wheelchair. Patient is encouraged to move the body around while in the bed. Will continue to assess. Problem: Cardiac:  Goal: Ability to maintain an adequate cardiac output will improve  Description: Ability to maintain an adequate cardiac output will improve  3/9/2020 0134 by Alexis Cai RN  Outcome: Ongoing  Note: Patient has been maintaining a regular rhythm as of now. Will continue to monitor the telemetry.

## 2020-03-09 NOTE — PROGRESS NOTES
Patient is given a warm compress to his swollen hand that is painful per his request. Will continue to monitor.

## 2020-03-09 NOTE — PROGRESS NOTES
Patient discharged with discharge instructions given to patient with all belongings. All questions and concerns were answered at this time. Departed via wheelchair in private vehicle.

## 2020-03-09 NOTE — PROGRESS NOTES
Nutrition Assessment    Type and Reason for Visit: Reassess    Nutrition Recommendations:   1. Continue current diet. 2. Pt was provided diet education on low sodium, CC, and mediterranean diets. Nutrition Assessment: improving altered nutrition related labs aeb glucose 102. Pt receiving care at this time. Malnutrition Assessment:  · Malnutrition Status: At risk for malnutrition  · Context: Acute illness or injury  · Findings of the 6 clinical characteristics of malnutrition (Minimum of 2 out of 6 clinical characteristics is required to make the diagnosis of moderate or severe Protein Calorie Malnutrition based on AND/ASPEN Guidelines):  1. Energy Intake-Less than or equal to 50% of estimated energy requirement, Unable to assess    2. Weight Loss-2% loss or greater, in 6 months  3. Fat Loss-No significant subcutaneous fat loss,    4. Muscle Loss-No significant muscle mass loss,    5. Fluid Accumulation-Moderate to severe fluid accumulation, Extremities  6.  Strength-Not measured    Nutrition Risk Level:  Moderate    Nutrient Needs:  · Estimated Daily Total Kcal: 8255-4350  · Estimated Daily Protein (g): 67-80(1.1-1.3)  · Estimated Daily Total Fluid (ml/day): 1800 ml+    Nutrition Diagnosis:   · Problem: Altered nutrition-related lab values  · Etiology: related to Endocrine dysfunction     Signs and symptoms:  as evidenced by (Triglycerides 174, blood sugar 208)    Objective Information:  · Nutrition-Focused Physical Findings: + 2 non pitting R/L LE, + 1 non pitting hand, edema has improved  · Wound Type: None  · Current Nutrition Therapies:  · Oral Diet Orders: 2gm Sodium, Carb Control 4 Carbs/Meal   · Oral Diet intake: %  · Oral Nutrition Supplement (ONS) Orders: None  · Anthropometric Measures:  · Ht: 5' 5\" (165.1 cm)   · Current Body Wt: 365 lb 11.2 oz (165.9 kg)  · Admission Body Wt: 390 lb (176.9 kg)  · Usual Body Wt: 400 lb (181.4 kg)(on 8/5/19)  · % Weight Change:  ,  6.2% weight loss since admission with diureisis  · Ideal Body Wt: 136 lb (61.7 kg), % Ideal Body 269%  · BMI Classification: BMI > or equal to 40.0 Obese Class III  Recent Labs     03/07/20  0530 03/08/20  0655 03/09/20  0554   * 136 141   K 3.5* 3.7 3.6*   CL 93* 97* 99   CO2 30 30 31   BUN 30* 31* 28*   CREATININE 0.91 0.98 0.79   GLUCOSE 106* 109* 102*   GFR                                       Lab Results   Component Value Date    LABALBU 3.9 03/04/2020      Recent Labs     03/06/20  2041   POCGLU 138*     Nutrition Interventions:   Continue current diet  Continued Inpatient Monitoring, Education Completed, Coordination of Care    Nutrition Evaluation:   · Evaluation: Progressing toward goals   · Goals: PO intake > 75% with improved blood sugar    · Monitoring: Meal Intake, Pertinent Labs, Patient/Family Education, Constipation, Diet Tolerance      Electronically signed by David Thakur RD, LD on 3/9/20 at 11:33 AM EDT    Contact Number: [phone]

## 2020-03-09 NOTE — PROGRESS NOTES
A home oxygen evaluation has been completed. [x]Patient is an inpatient. It is expected that the patient will be discharged within the next 48 hours. Qualified provider to write order for home prescription if patient qualifies. If patient is active, arrange for Home Medical supplier to assess for Oxygen Conserving Device per pulse oximetry. []Patient is an outpatient. Results will be faxed to the ordering provider. Qualified provider to write order for home prescription if patient qualifies and arranges for home oxygen. Patient was placed on room air for 10 minutes. SpO2 was 90-93 % on room air at rest. Patients SpO2 was NOT below 88% and qualified for home oxygen. Patient cannot ambulate for exercise flow rate.     Electronically signed by Farhan Burks RCP on 3/9/2020 at 9:23 AM

## 2020-03-09 NOTE — DISCHARGE SUMMARY
time, well-developed and well-nourished, in no acute distress  EYES: No gross abnormalities. , PERRL and EOMI  NECK: normal, supple, no lymphadenopathy,  no carotid bruits  PULM: clear to auscultation bilaterally- no wheezes, rales or rhonchi, normal air movement, no respiratory distress  COR: regular rate & rhythm, no gallops, S1 normal, S2 normal and systolic murmur  ABD:  soft, non-tender, non-distended, normal bowel sounds, no masses or organomegaly  EXT:   no cyanosis, clubbing or edema present    NEURO: follows commands, BEST, no deficits  SKIN:  no rashes or significant lesions    Significant Diagnostic Studies:   Lab Results   Component Value Date    WBC 9.9 03/09/2020    HGB 11.2 (L) 03/09/2020     03/09/2020       Lab Results   Component Value Date    BUN 28 (H) 03/09/2020    CREATININE 0.79 03/09/2020     03/09/2020    K 3.6 (L) 03/09/2020    CALCIUM 8.4 (L) 03/09/2020    CL 99 03/09/2020    CO2 31 03/09/2020    LABGLOM >60 03/09/2020       Lab Results   Component Value Date    WBCUA 50  01/08/2020    RBCUA 2 TO 5 01/08/2020    EPITHUA 2 TO 5 01/08/2020    LEUKOCYTESUR NEGATIVE 03/04/2020    SPECGRAV 1.020 03/04/2020    GLUCOSEU NEGATIVE 03/04/2020    KETUA NEGATIVE 03/04/2020    PROTEINU NEGATIVE 03/04/2020    HGBUR NEGATIVE 03/04/2020    CASTUA NOT REPORTED 01/08/2020    CRYSTUA NOT REPORTED 01/08/2020    BACTERIA 1+ (A) 01/08/2020    YEAST NOT REPORTED 01/08/2020       Xr Chest Portable    Result Date: 3/4/2020  EXAMINATION: ONE XRAY VIEW OF THE CHEST 3/4/2020 2:14 am COMPARISON: None. HISTORY: ORDERING SYSTEM PROVIDED HISTORY: sob TECHNOLOGIST PROVIDED HISTORY: sob FINDINGS: Frontal portable view of the chest.  Normal lung volume. Diffuse bilateral heterogeneous opacities. Interstitial edema. Small bilateral pleural effusions. No pneumothorax. Cardiomegaly. Atherosclerotic and tortuous/ectatic thoracic aorta. No acute osseous abnormality.      Diffuse bilateral heterogeneous opacities are most suggestive of pulmonary edema. Superimposed infection is difficult to entirely exclude. Cardiomegaly. Small bilateral pleural effusions. Overall, findings may relate to congestive heart failure. Discharge Diagnoses:    Principal Problem:    Acute diastolic CHF (congestive heart failure), NYHA class 3 (HCC)  Active Problems:    Severe aortic stenosis    Dyslipidemia    Essential hypertension    Obesity, morbid (more than 100 lbs over ideal weight or BMI > 40) (HCC)    Acute respiratory failure with hypoxia (HCC) Due to CHF & Obesity  Resolved Problems:    * No resolved hospital problems. *      Active Hospital Problems    Diagnosis Date Noted    Severe aortic stenosis [I35.0]      Priority: High    Dyslipidemia [E78.5]      Priority: High    Acute respiratory failure with hypoxia (HCC) Due to CHF & Obesity [J96.01] 03/05/2020    Acute diastolic CHF (congestive heart failure), NYHA class 3 (HCC) [I50.31] 03/04/2020    Obesity, morbid (more than 100 lbs over ideal weight or BMI > 40) ([facility] [E66.01] 12/15/2017    Essential hypertension [I10] 12/13/2016       Discharge Medications:       Shania Van   Home Medication Instructions FWT:[id number]    Printed on:03/09/20 1513   Medication Information                      amiodarone (CORDARONE) 200 MG tablet  Take 200 mg twice a day thru 3/14/2020.   Starting 3/15/2020 take 200 mg dailly             apixaban (ELIQUIS) 5 MG TABS tablet  Take 1 tablet by mouth 2 times daily             aspirin 81 MG EC tablet  Take 1 tablet by mouth daily             atorvastatin (LIPITOR) 40 MG tablet  Take 1 tablet by mouth daily             bisacodyl (BISACODYL) 5 MG EC tablet  Take 2 tablets by mouth daily as needed for Constipation             carvedilol (COREG) 3.125 MG tablet  Take 1 tablet by mouth 2 times daily (with meals)             furosemide (LASIX) 20 MG tablet  Take 1 tablet by mouth daily             ibuprofen (ADVIL;MOTRIN) 800 MG

## 2020-03-09 NOTE — PROGRESS NOTES
[facility] SPECIALTY [facility] - [facility]  OCCUPATIONAL THERAPY  No Visit Note    [] ICU    [x] Acute   Patient: Eric Laser  Room: 1830/0814-83      Eric Laser not seen on 3/9/2020 at 3:21 PM due to patient initially refuses OT stating, \"I've already had therapy 3 times today. No more! Discharge order now in place.         Signature: RAINE Gotti

## 2020-03-10 ENCOUNTER — TELEPHONE (OUTPATIENT)
Dept: CARDIOLOGY | Age: 75
End: 2020-03-10

## 2020-03-10 ENCOUNTER — TELEPHONE (OUTPATIENT)
Dept: PHARMACY | Facility: CLINIC | Age: 75
End: 2020-03-10

## 2020-03-10 PROCEDURE — 1111F DSCHRG MED/CURRENT MED MERGE: CPT | Performed by: PHARMACIST

## 2020-03-10 RX ORDER — ACETAMINOPHEN 500 MG
500 TABLET ORAL EVERY 6 HOURS PRN
COMMUNITY

## 2020-03-10 NOTE — PROGRESS NOTES
Tesfaye Abarca am scribing for and in the presence of Sudheer Granado MD, F.A.C.C..    Patient: Mariam Vera  : 1945  Date of Admission: 3/4/2020  Primary Care Physician: Vicenta Pope  Today's Date: 3/9/2020    REASON FOR CONSULTATION: Shortness of Breath (increased SOB 2 hours PTA, arrives by squad with NRB, no O2 at home)      HPI: Mr. Darrius Arteaga is a 66 y.o. male who was admitted to the hospital with shortness of breath leading to this consultation. Mr. Darrius Arteaga was admitted due to heart failure and worsening breathing problems. He reports noticing worsening shortness of breath for several months just getting in [place]. He also states the last couple of weeks when getting out of chair and transferring to wheelchair has been getting more difficult for him. He also noticed mid chest discomfort after eating too much he thought it was his hernia or just eating too much. He tried and had no relief with Dion's. Last night he tried to get to kitchen and once he got in wheelchair he could not catch his breath,. He sleeps in recliner denies gasping for air and has been told he probaly has sleep apnea no testing. He takes cholesterol med's for years, high blood pressure for years and borderline diabetes. Unsure of any change in weight. He uses leg compression once or twice daily. He has chronic bilateral lower extremity cellulitis/edema and treated by Dr. Drea Soler  He is able to only take a few steps and that is with the use of walker. During his hospitalization, he developed atrial fibrillation with rapid ventricular response. Moved to the ICU and started on amiodarone drip later converted to normal sinus rhythm. He was given oral amiodarone 200 mg daily this morning. Because of his severely reduced left ventricular systolic function, patient started on low-dose carvedilol and losartan in addition to his diuretics. He had negative 11 L over his hospital admission.     Mr. Darrius Arteaga had an echo done which showed severely reduced left ventricular systolic function with ejection fraction of 35% and moderate to severe aortic stenosis. This can be an underestimation because of reduced left ventricular systolic function. Mild to moderate aortic regurgitation. Moderate mitral regurgitation and moderate diastolic dysfunction. Past Medical History:   Diagnosis Date    Anxiety     Atrial fibrillation, new onset ([address] 03/05/2020    BPH (benign prostatic hyperplasia)     CHF (congestive heart failure) (HCC)     Hyperlipidemia     Hypertension     Osteoarthritis        CURRENT ALLERGIES: Patient has no known allergies. REVIEW OF SYSTEMS: 14 systems were reviewed. Pertinent positives and negatives as above, all else negative. Past Surgical History:   Procedure Laterality Date    VASECTOMY  24    Social History:  Social History     Tobacco Use    Smoking status: Never Smoker    Smokeless tobacco: Never Used   Substance Use Topics    Alcohol use: No    Drug use: No        CURRENT MEDICATIONS:  Outpatient Medications Marked as Taking for the 3/4/20 encounter [facility] Encounter)   Medication Sig Dispense Refill    [START ON 3/10/2020] aspirin 81 MG EC tablet Take 1 tablet by mouth daily 30 tablet 3    amiodarone (CORDARONE) 200 MG tablet Take 200 mg twice a day thru 3/14/2020.   Starting 3/15/2020 take 200 mg dailly 45 tablet 3    apixaban (ELIQUIS) 5 MG TABS tablet Take 1 tablet by mouth 2 times daily 60 tablet 1    [START ON 3/10/2020] losartan (COZAAR) 25 MG tablet Take 1 tablet by mouth daily 30 tablet 3    carvedilol (COREG) 3.125 MG tablet Take 1 tablet by mouth 2 times daily (with meals) 60 tablet 3    furosemide (LASIX) 20 MG tablet Take 1 tablet by mouth daily 60 tablet 3    [START ON 3/10/2020] atorvastatin (LIPITOR) 40 MG tablet Take 1 tablet by mouth daily 30 tablet 3    tamsulosin (FLOMAX) 0.4 MG capsule Take 1 capsule by mouth daily 30 capsule 1    ibuprofen (ADVIL;MOTRIN) 800 MG tablet Take 1 tablet by mouth 3 times daily 270 tablet 0    PARoxetine (PAXIL) 30 MG tablet Take 1 tablet by mouth every morning 90 tablet 3       No current facility-administered medications for this encounter. FAMILY HISTORY: family history includes Arthritis in his mother; Heart Disease in his mother; High Blood Pressure in his brother and mother; High Cholesterol in his mother; Stroke in his mother. PHYSICAL EXAM:   /74   Pulse 68   Temp 97 °F (36.1 °C) (Temporal)   Resp 16   Ht 5' 5\" (1.651 m)   Wt (!) 365 lb 11.2 oz (165.9 kg)   SpO2 96%   BMI 60.86 kg/m²  Body mass index is 60.86 kg/m². Constitutional: He is oriented to person, place, and time. He appears well-developed and well-nourished. In no acute distress. HEENT: Normocephalic and atraumatic. No JVD present. Carotid bruit is not present. No mass and no thyromegaly present. No lymphadenopathy present. Cardiovascular: Normal rate, regular rhythm, normal heart sounds. Exam reveals no gallop and no friction rubs. A II/VI systolic murmur was heard at the base of the heart without significant radiation. Pulmonary/Chest: Effort normal and breath sounds normal. No respiratory distress. He has no wheezes, rhonchi or rales. Abdominal: Soft, non-tender. Bowel sounds and aorta are normal. He exhibits no organomegaly, mass or bruit. Extremities: 1+ lower extremity pitting pedal edema. 1/2 way up to the knees bilaterally No cyanosis and no clubbing. Pulses are 2+ radial and carotid pulses. trace dorsalis pedis and posterior tibial pulses bilaterally. Neurological: He is alert and oriented to person, place, and time. No evidence of gross cranial nerve deficit. Coordination appeared normal.   Skin: Skin is warm and dry. There is no rash or diaphoresis. Psychiatric: He has a normal mood and affect.  His speech is normal and behavior is normal.      MOST RECENT LABS ON RECORD:   Lab Results   Component Value Date    WBC 9.9 the cardiac catheterization. Paroxysmal Atrial Fibrillation: Rhythm Control  Beta Blocker: Decrease carvedilol to 3.15 mg twice daily. Patient converted to sinus rhythm with amiodarone infusion. Rhythm Control Agent: Start start oral amiodarone 200 mg twice daily for 1 week followed by 200 mg daily. Monitoring: Amiodarone Since he is being maintained on Amiodarone, I told him that we will need to closely monitor him for potential side effects. These include monitoring LFTs and TSH at least every 6 months as well as chest x-rays, pulmonary function tests, and eye exams at least on a yearly basis. Stroke Risk: His CHADS2-VASc score is 4/9 (4% stroke risk)  Anticoagulation: Continue Apixaban (Eliquis) 5 mg every 12 hours. I also reminded him to watch for signs of blood in his stool or black tarry stools and stop the medication immediately if this develops as this could be life threatening. · Moderate Aortic Stenosis: possibly symptomatic  · Beta Blocker: Continue carvedilol 3.125 mg twice daily. · ACE Inibitor/ARB: Continue losartan. · Diuretics: Continue current diuretic regimen. · I advised them to try and keep their legs up whenever possible and to limit salt in their diet. · Daily weight, fluid restriction and low-salt diet. · His aortic stenosis is moderate but this can be an underestimation giving severely reduced left ventricular systolic function. · We will start low-dose coronary angiography to rule out ischemic etiology of his severely reduced left ventricular systolic function. · If no significant coronary artery disease identified then we have to proceed with dobutamine stress echo to differentiate between pseudo and true aortic stenosis and to decide if patient will benefit from aortic valve surgery or not. Once again, thank you for allowing me to participate in this patients care. Please do not hesitate to contact me could I be of further assistance.     Sincerely,  Timoteo Rich MD,

## 2020-03-12 ENCOUNTER — HOSPITAL ENCOUNTER (OUTPATIENT)
Dept: CARDIAC CATH/INVASIVE PROCEDURES | Age: 75
Discharge: HOME | End: 2020-03-12
Attending: FAMILY MEDICINE | Admitting: FAMILY MEDICINE
Payer: MEDICARE

## 2020-03-12 VITALS
HEART RATE: 76 BPM | BODY MASS INDEX: 52.48 KG/M2 | WEIGHT: 315 LBS | OXYGEN SATURATION: 94 % | TEMPERATURE: 98.5 F | SYSTOLIC BLOOD PRESSURE: 112 MMHG | RESPIRATION RATE: 20 BRPM | HEIGHT: 65 IN | DIASTOLIC BLOOD PRESSURE: 68 MMHG

## 2020-03-12 PROBLEM — I50.41 ACUTE COMBINED SYSTOLIC AND DIASTOLIC HF (HEART FAILURE), NYHA CLASS 3 (HCC): Status: ACTIVE | Noted: 2020-03-04

## 2020-03-12 PROCEDURE — 93458 L HRT ARTERY/VENTRICLE ANGIO: CPT | Performed by: FAMILY MEDICINE

## 2020-03-12 PROCEDURE — 2580000003 HC RX 258: Performed by: FAMILY MEDICINE

## 2020-03-12 PROCEDURE — 2500000003 HC RX 250 WO HCPCS

## 2020-03-12 PROCEDURE — C1894 INTRO/SHEATH, NON-LASER: HCPCS

## 2020-03-12 PROCEDURE — C1769 GUIDE WIRE: HCPCS

## 2020-03-12 PROCEDURE — 6360000002 HC RX W HCPCS

## 2020-03-12 PROCEDURE — C1887 CATHETER, GUIDING: HCPCS

## 2020-03-12 PROCEDURE — 2709999900 HC NON-CHARGEABLE SUPPLY

## 2020-03-12 PROCEDURE — 6360000004 HC RX CONTRAST MEDICATION: Performed by: FAMILY MEDICINE

## 2020-03-12 RX ORDER — SODIUM CHLORIDE 0.9 % (FLUSH) 0.9 %
10 SYRINGE (ML) INJECTION EVERY 12 HOURS SCHEDULED
Status: DISCONTINUED | OUTPATIENT
Start: 2020-03-12 | End: 2020-03-12 | Stop reason: HOSPADM

## 2020-03-12 RX ORDER — ACETAMINOPHEN 325 MG/1
650 TABLET ORAL EVERY 4 HOURS PRN
Status: DISCONTINUED | OUTPATIENT
Start: 2020-03-12 | End: 2020-03-12 | Stop reason: HOSPADM

## 2020-03-12 RX ORDER — SODIUM CHLORIDE 9 MG/ML
INJECTION, SOLUTION INTRAVENOUS CONTINUOUS
Status: DISCONTINUED | OUTPATIENT
Start: 2020-03-12 | End: 2020-03-12 | Stop reason: HOSPADM

## 2020-03-12 RX ORDER — DIPHENHYDRAMINE HCL 25 MG
50 CAPSULE ORAL ONCE
Status: DISCONTINUED | OUTPATIENT
Start: 2020-03-12 | End: 2020-03-12 | Stop reason: HOSPADM

## 2020-03-12 RX ORDER — SODIUM CHLORIDE 0.9 % (FLUSH) 0.9 %
10 SYRINGE (ML) INJECTION PRN
Status: DISCONTINUED | OUTPATIENT
Start: 2020-03-12 | End: 2020-03-12 | Stop reason: HOSPADM

## 2020-03-12 RX ORDER — NITROGLYCERIN 0.4 MG/1
0.4 TABLET SUBLINGUAL EVERY 5 MIN PRN
Status: DISCONTINUED | OUTPATIENT
Start: 2020-03-12 | End: 2020-03-12 | Stop reason: HOSPADM

## 2020-03-12 RX ADMIN — IOPAMIDOL 50 ML: 755 INJECTION, SOLUTION INTRAVENOUS at 10:30

## 2020-03-12 RX ADMIN — SODIUM CHLORIDE: 9 INJECTION, SOLUTION INTRAVENOUS at 09:44

## 2020-03-12 ASSESSMENT — PAIN SCALES - GENERAL: PAINLEVEL_OUTOF10: 0

## 2020-03-12 NOTE — OP NOTE
-  Coronary Angiography Brief Post Operative Note:    Moderate single vessel coronary artery disease involving a ostial 50% stenosis in the OM1 branch of the circumflex coronary artery. Normal left ventricular end diastolic pressure. Crossing of the aortic valve was fairly difficult but ultimately was accomplished and showed a peak to peak gradient of 67 with a mean gradient of 48 mmHg on pullback across the valve. Proceed with additional testing and/or treatment for possible severe valvular heart disease as clinically indicated.

## 2020-03-12 NOTE — PROGRESS NOTES
Call placed to Cardiology office regarding 30 day coupon for Eliquis. Will deliver information to patient while in cath lab.

## 2020-03-16 NOTE — TELEPHONE ENCOUNTER
Patient to receive samples from cardiology office.     Caio Gray, PharmD, [address] [facility]  Phone: [phone], or toll free [phone], option 7

## 2020-03-20 ENCOUNTER — OFFICE VISIT (OUTPATIENT)
Dept: CARDIOLOGY | Age: 75
End: 2020-03-20
Payer: MEDICARE

## 2020-03-20 ENCOUNTER — HOSPITAL ENCOUNTER (OUTPATIENT)
Age: 75
Discharge: HOME | End: 2020-03-20
Payer: MEDICARE

## 2020-03-20 VITALS
OXYGEN SATURATION: 93 % | RESPIRATION RATE: 18 BRPM | WEIGHT: 315 LBS | HEART RATE: 81 BPM | BODY MASS INDEX: 52.48 KG/M2 | DIASTOLIC BLOOD PRESSURE: 72 MMHG | SYSTOLIC BLOOD PRESSURE: 114 MMHG | HEIGHT: 65 IN

## 2020-03-20 LAB
ANION GAP SERPL CALCULATED.3IONS-SCNC: 11 MMOL/L (ref 9–17)
BUN BLDV-MCNC: 15 MG/DL (ref 8–23)
BUN/CREAT BLD: 23 (ref 9–20)
CALCIUM SERPL-MCNC: 8.9 MG/DL (ref 8.6–10.4)
CHLORIDE BLD-SCNC: 96 MMOL/L (ref 98–107)
CO2: 26 MMOL/L (ref 20–31)
CREAT SERPL-MCNC: 0.65 MG/DL (ref 0.7–1.2)
GFR AFRICAN AMERICAN: >60 ML/MIN
GFR NON-AFRICAN AMERICAN: >60 ML/MIN
GFR SERPL CREATININE-BSD FRML MDRD: ABNORMAL ML/MIN/{1.73_M2}
GFR SERPL CREATININE-BSD FRML MDRD: ABNORMAL ML/MIN/{1.73_M2}
GLUCOSE BLD-MCNC: 99 MG/DL (ref 70–99)
HCT VFR BLD CALC: 40.8 % (ref 40.7–50.3)
HEMOGLOBIN: 12.6 G/DL (ref 13–17)
MCH RBC QN AUTO: 29.6 PG (ref 25.2–33.5)
MCHC RBC AUTO-ENTMCNC: 30.9 G/DL (ref 28.4–34.8)
MCV RBC AUTO: 95.8 FL (ref 82.6–102.9)
NRBC AUTOMATED: 0 PER 100 WBC
PDW BLD-RTO: 15.9 % (ref 11.8–14.4)
PLATELET # BLD: 209 K/UL (ref 138–453)
PMV BLD AUTO: 12.4 FL (ref 8.1–13.5)
POTASSIUM SERPL-SCNC: 4.2 MMOL/L (ref 3.7–5.3)
RBC # BLD: 4.26 M/UL (ref 4.21–5.77)
SODIUM BLD-SCNC: 133 MMOL/L (ref 135–144)
WBC # BLD: 8.8 K/UL (ref 3.5–11.3)

## 2020-03-20 PROCEDURE — 36415 COLL VENOUS BLD VENIPUNCTURE: CPT

## 2020-03-20 PROCEDURE — 99214 OFFICE O/P EST MOD 30 MIN: CPT | Performed by: INTERNAL MEDICINE

## 2020-03-20 PROCEDURE — 85027 COMPLETE CBC AUTOMATED: CPT

## 2020-03-20 PROCEDURE — 99495 TRANSJ CARE MGMT MOD F2F 14D: CPT | Performed by: INTERNAL MEDICINE

## 2020-03-20 PROCEDURE — 80048 BASIC METABOLIC PNL TOTAL CA: CPT

## 2020-03-20 NOTE — PATIENT INSTRUCTIONS
SURVEY:    You may be receiving a survey from [employer] regarding your visit today. Please complete the survey to enable us to provide the highest quality of care to you and your family. If you cannot score us a very good on any question, please call the office to discuss how we could have made your experience a very good one. Thank you.

## 2020-03-20 NOTE — PROGRESS NOTES
(CORDARONE) 200 MG tablet Take 200 mg twice a day thru 3/14/2020. Starting 3/15/2020 take 200 mg dailly 45 tablet 3    apixaban (ELIQUIS) 5 MG TABS tablet Take 1 tablet by mouth 2 times daily 60 tablet 1    losartan (COZAAR) 25 MG tablet Take 1 tablet by mouth daily 30 tablet 3    furosemide (LASIX) 20 MG tablet Take 1 tablet by mouth daily 60 tablet 3    atorvastatin (LIPITOR) 40 MG tablet Take 1 tablet by mouth daily 30 tablet 3    tamsulosin (FLOMAX) 0.4 MG capsule Take 1 capsule by mouth daily 30 capsule 1    PARoxetine (PAXIL) 30 MG tablet Take 1 tablet by mouth every morning 90 tablet 3       FAMILY HISTORY: family history includes Arthritis in his mother; Heart Disease in his mother; High Blood Pressure in his brother and mother; High Cholesterol in his mother; Stroke in his mother. PHYSICAL EXAMINATION:     /72 (Site: Left Upper Arm, Position: Sitting, Cuff Size: Large Adult)   Pulse 81   Resp 18   Ht 5' 5\" (1.651 m)   Wt (!) 374 lb (169.6 kg)   SpO2 93%   BMI 62.24 kg/m²  Body mass index is 62.24 kg/m². Constitutional: He is oriented to person, place, and time. He appears well-developed and well-nourished. In no acute distress. HEENT: Normocephalic and atraumatic. No JVD present. Carotid bruit is not present. No mass and no thyromegaly present. No lymphadenopathy present. Cardiovascular: Normal rate, regular rhythm, normal heart sounds. Exam reveals no gallop and no friction rubs. 3/6 systolic murmur, 2nd intercostal space on the LEFT just lateral to the sternum. Pulmonary/Chest: Effort normal and breath sounds normal. No respiratory distress. He has no wheezes, rhonchi or rales. Abdominal: Soft, non-tender. Bowel sounds and aorta are normal. He exhibits no organomegaly, mass or bruit. Extremities: 1+ 1/2 up to the knees bilaterally. No cyanosis or clubbing. 2+ radial and carotid pulses. Distal extremity pulses: 2+ bilaterally.  Lymphedema Present  Neurological: He is alert and oriented to person, place, and time. No evidence of gross cranial nerve deficit. Coordination appeared normal.   Skin: Skin is warm and dry. There is no rash or diaphoresis. Psychiatric: He has a normal mood and affect. His speech is normal and behavior is normal.      MOST RECENT LABS ON RECORD:   Lab Results   Component Value Date    WBC 9.9 03/09/2020    HGB 11.2 (L) 03/09/2020    HCT 35.9 (L) 03/09/2020     03/09/2020    CHOL 101 03/05/2020    TRIG 148 03/05/2020    HDL 26 (L) 03/05/2020    ALT 27 03/04/2020    AST 32 03/04/2020     03/09/2020    K 3.6 (L) 03/09/2020    CL 99 03/09/2020    CREATININE 0.79 03/09/2020    BUN 28 (H) 03/09/2020    CO2 31 03/09/2020    TSH 3.43 03/04/2020    PSA 1.49 01/11/2019    INR 1.2 03/04/2020    GLUF 103 (H) 01/11/2019    LABA1C 5.9 03/02/2017       ASSESSMENT:     Patient Active Problem List    Diagnosis Date Noted    Severe aortic stenosis      Priority: High    Dyslipidemia      Priority: High    Acute respiratory failure with hypoxia (HCC) Due to CHF & Obesity 03/05/2020    Acute combined systolic and diastolic HF (heart failure), NYHA class 3 ([address] 03/04/2020    Carpal tunnel syndrome on left 01/08/2019    Obesity, morbid (more than 100 lbs over ideal weight or BMI > 40) (HCC) 12/15/2017    Osteoarthritis of both knees 06/13/2017    Essential hypertension 12/13/2016    Mixed hyperlipidemia 12/13/2016      Diagnosis Orders   1. Acute on chronic combined systolic and diastolic CHF, NYHA class 3 (HCC)  Basic Metabolic Panel    CBC   2. Essential hypertension  Basic Metabolic Panel    CBC   3. Mixed hyperlipidemia  Basic Metabolic Panel    CBC   4. Dilated cardiomyopathy ([address]     5. Severe aortic stenosis     6. PAF (paroxysmal atrial fibrillation) ([address]     7.  Chronic anticoagulation           PLAN:         Acute on chronic combined heart failure: New York Heart Association Class: IIb (Marked symptoms during daily activities)   Beta Blocker: Controlled  · Beta Blocker: Continue Carvedilol (Coreg) 3.125 mg 0.5 tablet twice daily. · ACE Inibitor/ARB: Continue losartan (Cozaar) 25 mg daily. · Diuretics: Continue furosemide (Lasix) 20 mg every morning. · Calcium Channel Blocker: Not indicated at this time. · Hyperlipidemia: Mixed  · Cholesterol Reduction Therapy: Continue Atorvastatin (Lipitor) 40 mg daily. Finally, I recommended that he continue his other medications and follow up with you as previously scheduled. FOLLOW UP:   I told Mr. Manzo to call my office if he had any problems, but otherwise I asked him to Return in about 6 weeks (around 5/1/2020). However, I would be happy to see him sooner should the need arise. Sincerely,  Bree Jensen MD, F.A.C.C. [facility] Cardiology Specialist    [address]  Phone: [phone], Fax: [phone]     I believe that the risk of significant morbidity and mortality related to the patient's current medical conditions are: high    The documentation recorded by the scribe, accurately and completely reflects the services I personally performed and the decisions made by me. Bree Jensen MD, F.A.C.C.  March 20, 2020

## 2020-03-23 ENCOUNTER — TELEPHONE (OUTPATIENT)
Dept: CARDIOLOGY | Age: 75
End: 2020-03-23

## 2020-04-12 ENCOUNTER — HOSPITAL ENCOUNTER (EMERGENCY)
Age: 75
Discharge: HOME | End: 2020-04-12
Attending: EMERGENCY MEDICINE
Payer: MEDICARE

## 2020-04-12 VITALS
WEIGHT: 315 LBS | HEART RATE: 109 BPM | DIASTOLIC BLOOD PRESSURE: 72 MMHG | OXYGEN SATURATION: 94 % | RESPIRATION RATE: 18 BRPM | BODY MASS INDEX: 60.91 KG/M2 | TEMPERATURE: 98.4 F | SYSTOLIC BLOOD PRESSURE: 137 MMHG

## 2020-04-12 PROCEDURE — 94664 DEMO&/EVAL PT USE INHALER: CPT

## 2020-04-12 PROCEDURE — 99284 EMERGENCY DEPT VISIT MOD MDM: CPT

## 2020-04-12 PROCEDURE — 30901 CONTROL OF NOSEBLEED: CPT

## 2020-04-12 PROCEDURE — 2500000003 HC RX 250 WO HCPCS: Performed by: EMERGENCY MEDICINE

## 2020-04-12 RX ORDER — METOPROLOL TARTRATE 5 MG/5ML
5 INJECTION INTRAVENOUS ONCE
Status: DISCONTINUED | OUTPATIENT
Start: 2020-04-12 | End: 2020-04-12

## 2020-04-12 RX ORDER — TRANEXAMIC ACID 100 MG/ML
1000 INJECTION, SOLUTION INTRAVENOUS ONCE
Status: COMPLETED | OUTPATIENT
Start: 2020-04-12 | End: 2020-04-12

## 2020-04-12 RX ORDER — AMOXICILLIN AND CLAVULANATE POTASSIUM 875; 125 MG/1; MG/1
1 TABLET, FILM COATED ORAL 2 TIMES DAILY
Qty: 20 TABLET | Refills: 0 | Status: SHIPPED | OUTPATIENT
Start: 2020-04-12 | End: 2020-04-13 | Stop reason: SDUPTHER

## 2020-04-12 RX ADMIN — TRANEXAMIC ACID 1000 MG: 1 INJECTION, SOLUTION INTRAVENOUS at 16:36

## 2020-04-12 ASSESSMENT — ENCOUNTER SYMPTOMS
COLOR CHANGE: 0
EYE DISCHARGE: 0
WHEEZING: 0
SORE THROAT: 0
VOMITING: 0
BLOOD IN STOOL: 0
ABDOMINAL PAIN: 0
BACK PAIN: 0
COUGH: 0
SHORTNESS OF BREATH: 0
CONSTIPATION: 0
NAUSEA: 0
DIARRHEA: 0
CHEST TIGHTNESS: 0
EYE PAIN: 0
RHINORRHEA: 0

## 2020-04-12 NOTE — ED NOTES
Pt demanded to have his b/p rechecked because he does not want an IV. Bp is rechecked and Dr. Rayna Boast is aware of blood pressure.  Order for the IV is discontinued       Maia Shin RN  04/12/20 2447

## 2020-04-12 NOTE — ED PROVIDER NOTES
onset ([facility] 03/05/2020    BPH (benign prostatic hyperplasia)     CHF (congestive heart failure) ([facility]     Hyperlipidemia     Hypertension     Osteoarthritis          SURGICALHISTORY       Past Surgical History:   Procedure Laterality Date    VASECTOMY  1971         CURRENT MEDICATIONS       Previous Medications    ACETAMINOPHEN (TYLENOL) 500 MG TABLET    Take 500 mg by mouth every 6 hours as needed for Pain    AMIODARONE (CORDARONE) 200 MG TABLET    Take 200 mg twice a day thru 3/14/2020. Starting 3/15/2020 take 200 mg dailly    APIXABAN (ELIQUIS) 5 MG TABS TABLET    Take 1 tablet by mouth 2 times daily    ASPIRIN 81 MG EC TABLET    Take 1 tablet by mouth daily    ATORVASTATIN (LIPITOR) 40 MG TABLET    Take 1 tablet by mouth daily    CARVEDILOL (COREG) 3.125 MG TABLET    Take 0.5 tablets by mouth 2 times daily (with meals)    FUROSEMIDE (LASIX) 20 MG TABLET    Take 1 tablet by mouth daily    LOSARTAN (COZAAR) 25 MG TABLET    Take 1 tablet by mouth daily    PAROXETINE (PAXIL) 30 MG TABLET    Take 1 tablet by mouth every morning    TAMSULOSIN (FLOMAX) 0.4 MG CAPSULE    Take 1 capsule by mouth daily       ALLERGIES     Patient has no known allergies.     FAMILY HISTORY       Family History   Problem Relation Age of Onset    Arthritis Mother     Heart Disease Mother     High Cholesterol Mother     High Blood Pressure Mother     Stroke Mother     High Blood Pressure Brother           SOCIAL HISTORY       Social History     Socioeconomic History    Marital status:      Spouse name: None    Number of children: None    Years of education: None    Highest education level: None   Occupational History    None   Social Needs    Financial resource strain: Not hard at all   Bessie-Alejandrina insecurity     Worry: Never true     Inability: Never true    Transportation needs     Medical: No     Non-medical: No   Tobacco Use    Smoking status: Former Smoker     Packs/day: 1.00     Years: 10.00     Pack years: 10.00 hemostasis. There is no site visualized for cautery. Therefore anterior packing was placed in the left naris. Packing did yield bleeding to subside. Due to patient's comorbidities and foreign body, the packing, the patient was prescribed antibiotics and provide information to follow-up with ENT specialist.  At the time of discharge patient was not hypotensive or tachycardic was tolerating p.o., no active bleeding per nose maintains airway and otherwise clinically stable. CRITICAL CARE TIME   Total Critical Caretime was  minutes, excluding separately reportable procedures. There was a high probability of clinically significant/life threatening deterioration in the patient's condition which required my urgent intervention. CONSULTS:  None    PROCEDURES:  Unlessotherwise noted below, none      Epistaxis Mgmt  Date/Time: 4/12/2020 5:18 PM  Performed by: Mahendra Schilling MD  Authorized by: Mahendra Schilling MD     Consent:     Consent obtained:  Verbal    Consent given by:  Patient    Risks discussed:  Bleeding, infection, nasal injury and pain    Alternatives discussed:  No treatment  Anesthesia (see MAR for exact dosages): Anesthesia method:  None  Procedure details:     Treatment site:  L anterior    Treatment complexity:  Limited  Post-procedure details:     Assessment:  Bleeding stopped    Patient tolerance of procedure: Tolerated well, no immediate complications  Comments:      5.5 cm rocket inserted in left naris. Patient tolerated procedure well.   Status post procedure left naris hemostatic        FINAL IMPRESSION      1. Epistaxis          DISPOSITION/PLAN   DISPOSITION Decision To Discharge 04/12/2020 05:14:04 PM      PATIENT REFERRED TO:  Zully Jacome MD  [address] [id number]    Call in 1 day        DISCHARGE MEDICATIONS:  [unfilled]       Problem List:  Patient Active Problem List   Diagnosis Code    Essential hypertension I10    Mixed hyperlipidemia E78.2    Osteoarthritis of both knees M17.0    Obesity, morbid (more than 100 lbs over ideal weight or BMI > 40) ([facility] E66.01    Carpal tunnel syndrome on left G56.02    Acute combined systolic and diastolic HF (heart failure), NYHA class 3 ([facility]) I50.41    Severe aortic stenosis I35.0    Dyslipidemia E78.5    Acute respiratory failure with hypoxia ([facility]) Due to CHF & Obesity J96.01           Summation      Patient Course:      ED Medicationsadministered this visit:    Medications   tranexamic acid (CYKLOKAPRON) injection 1,000 mg (1,000 mg Nebulization Given by Other 4/12/20 1636)       New Prescriptions from this visit:    New Prescriptions    AMOXICILLIN-CLAVULANATE (AUGMENTIN) 875-125 MG PER TABLET    Take 1 tablet by mouth 2 times daily for 10 days       Follow-up:  Enio Gifford MD  [address] [address]    Call in 1 day          Final Impression:   1.  Epistaxis               (Please note that portions of this note were completed with a voice recognitionprogram.  Efforts were made to edit the dictations but occasionally words are mis-transcribed.)    Den Reddy MD (electronically signed)  Attending Emergency Physician           Den Reddy MD  04/12/20 1673

## 2020-04-13 ENCOUNTER — CARE COORDINATION (OUTPATIENT)
Dept: CARE COORDINATION | Age: 75
End: 2020-04-13

## 2020-04-13 PROBLEM — R04.0 EPISTAXIS: Status: ACTIVE | Noted: 2020-04-13

## 2020-04-15 ENCOUNTER — HOSPITAL ENCOUNTER (EMERGENCY)
Age: 75
Discharge: HOME | End: 2020-04-15
Attending: EMERGENCY MEDICINE
Payer: MEDICARE

## 2020-04-15 VITALS
HEART RATE: 84 BPM | OXYGEN SATURATION: 94 % | HEIGHT: 70 IN | BODY MASS INDEX: 45.1 KG/M2 | RESPIRATION RATE: 18 BRPM | DIASTOLIC BLOOD PRESSURE: 92 MMHG | TEMPERATURE: 98.1 F | SYSTOLIC BLOOD PRESSURE: 142 MMHG | WEIGHT: 315 LBS

## 2020-04-15 PROBLEM — Z48.00 ENCOUNTER FOR REMOVAL OF NASAL PACKING: Status: ACTIVE | Noted: 2020-04-15

## 2020-04-15 PROCEDURE — 99281 EMR DPT VST MAYX REQ PHY/QHP: CPT

## 2020-04-15 ASSESSMENT — ENCOUNTER SYMPTOMS
RHINORRHEA: 0
SINUS PRESSURE: 0
SHORTNESS OF BREATH: 0
COLOR CHANGE: 0
TROUBLE SWALLOWING: 0
SORE THROAT: 0

## 2020-04-16 ENCOUNTER — CARE COORDINATION (OUTPATIENT)
Dept: CARE COORDINATION | Age: 75
End: 2020-04-16

## 2020-04-16 NOTE — CARE COORDINATION
Patient contacted regarding recent discharge and COVID-19 risk   Care Transition Nurse/ Ambulatory Care Manager contacted the patient by telephone to perform post discharge assessment. Verified name and  with patient as identifiers. Patient has following risk factors of: CHF, atrial fib. CTN/ACM reviewed discharge instructions, medical action plan and red flags related to discharge diagnosis. Reviewed and educated them on any new and changed medications related to discharge diagnosis. Advised obtaining a 90-day supply of all daily and as-needed medications. Education provided regarding infection prevention, and signs and symptoms of COVID-19 and when to seek medical attention with patient who verbalized understanding. Discussed exposure protocols and quarantine from [address] you at higher risk for severe illness  and given an opportunity for questions and concerns. The patient agrees to contact the COVID-19 hotline [phone] or PCP office for questions related to their healthcare. CTN/ACM provided contact information for future reference. From CDC: Are you at higher risk for severe illness?  Wash your hands often.  Avoid close contact (6 feet, which is about two arm lengths) with people who are sick.  Put distance between yourself and other people if COVID-19 is spreading in your community.  Clean and disinfect frequently touched surfaces.  Avoid all cruise travel and non-essential air travel.  Call your healthcare professional if you have concerns about COVID-19 and your underlying condition or if you are sick. For more information on steps you can take to protect yourself, see CDC's How to Protect Yourself  Patient was sent back to the ED to have nasal packing removed which was from , ENT could not fit him in.  Dr. Ailyn Madrid office is supposed to be making an appointment with ENT to have patient seen, patient is waiting for a call

## 2020-04-27 ENCOUNTER — CARE COORDINATION (OUTPATIENT)
Dept: CARE COORDINATION | Age: 75
End: 2020-04-27

## 2020-04-27 NOTE — CARE COORDINATION
You Patient resolved from the Care Transitions episode on 4/27/2020  Patient/family has been provided the following resources and education related to COVID-19:                         Signs, symptoms and red flags related to COVID-19            CDC exposure and quarantine guidelines            [employer] exposure contact - [phone]            Contact for their local Department of Health                 Patient currently reports that the following symptoms have improved:  no new/worsening symptoms     No further outreach scheduled with this CTN/ACM. Episode of Care resolved. Patient has this CTN/ACM contact information if future needs arise.

## 2020-05-04 ENCOUNTER — OFFICE VISIT (OUTPATIENT)
Dept: CARDIOLOGY | Age: 75
End: 2020-05-04
Payer: MEDICARE

## 2020-05-04 ENCOUNTER — HOSPITAL ENCOUNTER (OUTPATIENT)
Age: 75
Discharge: HOME | End: 2020-05-04
Payer: MEDICARE

## 2020-05-04 VITALS
DIASTOLIC BLOOD PRESSURE: 86 MMHG | WEIGHT: 315 LBS | BODY MASS INDEX: 45.1 KG/M2 | OXYGEN SATURATION: 97 % | SYSTOLIC BLOOD PRESSURE: 136 MMHG | RESPIRATION RATE: 18 BRPM | HEIGHT: 70 IN | HEART RATE: 68 BPM

## 2020-05-04 LAB
ANION GAP SERPL CALCULATED.3IONS-SCNC: 12 MMOL/L (ref 9–17)
BNP INTERPRETATION: ABNORMAL
BUN BLDV-MCNC: 12 MG/DL (ref 8–23)
BUN/CREAT BLD: 20 (ref 9–20)
CALCIUM SERPL-MCNC: 9.1 MG/DL (ref 8.6–10.4)
CHLORIDE BLD-SCNC: 99 MMOL/L (ref 98–107)
CO2: 27 MMOL/L (ref 20–31)
CREAT SERPL-MCNC: 0.59 MG/DL (ref 0.7–1.2)
GFR AFRICAN AMERICAN: >60 ML/MIN
GFR NON-AFRICAN AMERICAN: >60 ML/MIN
GFR SERPL CREATININE-BSD FRML MDRD: ABNORMAL ML/MIN/{1.73_M2}
GFR SERPL CREATININE-BSD FRML MDRD: ABNORMAL ML/MIN/{1.73_M2}
GLUCOSE BLD-MCNC: 99 MG/DL (ref 70–99)
HCT VFR BLD CALC: 40.6 % (ref 40.7–50.3)
HEMOGLOBIN: 12.1 G/DL (ref 13–17)
MCH RBC QN AUTO: 28.4 PG (ref 25.2–33.5)
MCHC RBC AUTO-ENTMCNC: 29.8 G/DL (ref 28.4–34.8)
MCV RBC AUTO: 95.3 FL (ref 82.6–102.9)
NRBC AUTOMATED: 0 PER 100 WBC
PDW BLD-RTO: 15.9 % (ref 11.8–14.4)
PLATELET # BLD: 171 K/UL (ref 138–453)
PMV BLD AUTO: 12.3 FL (ref 8.1–13.5)
POTASSIUM SERPL-SCNC: 4.3 MMOL/L (ref 3.7–5.3)
PRO-BNP: 599 PG/ML
RBC # BLD: 4.26 M/UL (ref 4.21–5.77)
SODIUM BLD-SCNC: 138 MMOL/L (ref 135–144)
WBC # BLD: 6.1 K/UL (ref 3.5–11.3)

## 2020-05-04 PROCEDURE — 1123F ACP DISCUSS/DSCN MKR DOCD: CPT | Performed by: INTERNAL MEDICINE

## 2020-05-04 PROCEDURE — 4040F PNEUMOC VAC/ADMIN/RCVD: CPT | Performed by: INTERNAL MEDICINE

## 2020-05-04 PROCEDURE — 80048 BASIC METABOLIC PNL TOTAL CA: CPT

## 2020-05-04 PROCEDURE — 1036F TOBACCO NON-USER: CPT | Performed by: INTERNAL MEDICINE

## 2020-05-04 PROCEDURE — 36415 COLL VENOUS BLD VENIPUNCTURE: CPT

## 2020-05-04 PROCEDURE — 99211 OFF/OP EST MAY X REQ PHY/QHP: CPT | Performed by: INTERNAL MEDICINE

## 2020-05-04 PROCEDURE — 83880 ASSAY OF NATRIURETIC PEPTIDE: CPT

## 2020-05-04 PROCEDURE — G8427 DOCREV CUR MEDS BY ELIG CLIN: HCPCS | Performed by: INTERNAL MEDICINE

## 2020-05-04 PROCEDURE — G8417 CALC BMI ABV UP PARAM F/U: HCPCS | Performed by: INTERNAL MEDICINE

## 2020-05-04 PROCEDURE — 99215 OFFICE O/P EST HI 40 MIN: CPT | Performed by: INTERNAL MEDICINE

## 2020-05-04 PROCEDURE — 85027 COMPLETE CBC AUTOMATED: CPT

## 2020-05-04 NOTE — PATIENT INSTRUCTIONS
SURVEY:    You may be receiving a survey from [employer] regarding your visit today. Please complete the survey to enable us to provide the highest quality of care to you and your family. If you cannot score us a very good on any question, please call the office to discuss how we could have made your experience a very good one. Thank you.

## 2020-05-04 NOTE — PROGRESS NOTES
after 3 days. Mr. Xavier Brito is here today for a follow up. He says he is feeling well with no new problems since last visit. He was in the ED in April for a bloody nose that would not stop. He denied any lightheaded or dizziness. He also denied any current or recent chest pain, shortness of breath, abdominal pain, bleeding problems, problems with his medications or any other concerns at this time. Wt Readings from Last 3 Encounters:   20 (!) 364 lb (165.1 kg)   04/15/20 (!) 366 lb (166 kg)   20 (!) 366 lb (166 kg)       PAST MEDICAL HISTORY:        Past Medical History:   Diagnosis Date    Anxiety     Atrial fibrillation, new onset ([address] 2020    BPH (benign prostatic hyperplasia)     CHF (congestive heart failure) (HCC)     Hyperlipidemia     Hypertension     Osteoarthritis    Nonischemic cardiomyopathy. Severe aortic stenosis. CURRENT ALLERGIES: Patient has no known allergies. REVIEW OF SYSTEMS: 14 systems were reviewed. Pertinent positives and negatives as above, all else negative.      Past Surgical History:   Procedure Laterality Date    VASECTOMY      Social History:  Social History     Tobacco Use    Smoking status: Former Smoker     Packs/day: 1.00     Years: 10.00     Pack years: 10.00     Types: Cigarettes     Last attempt to quit: 1973     Years since quittin.3    Smokeless tobacco: Never Used   Substance Use Topics    Alcohol use: No    Drug use: No        CURRENT MEDICATIONS:        Outpatient Medications Marked as Taking for the 20 encounter (Office Visit) with Coretta Martinez MD   Medication Sig Dispense Refill    apixaban (ELIQUIS) 5 MG TABS tablet Take 1 tablet by mouth 2 times daily 60 tablet 3    carvedilol (COREG) 3.125 MG tablet Take 0.5 tablets by mouth 2 times daily (with meals) 90 tablet 3    tamsulosin (FLOMAX) 0.4 MG capsule Take 1 capsule by mouth daily 30 capsule 1    amiodarone (CORDARONE) 200 MG tablet Take 200 mg twice a day thru 3/14/2020. Starting 3/15/2020 take 200 mg dailly 45 tablet 3    losartan (COZAAR) 25 MG tablet Take 1 tablet by mouth daily 30 tablet 3    furosemide (LASIX) 20 MG tablet Take 1 tablet by mouth daily 60 tablet 3    atorvastatin (LIPITOR) 40 MG tablet Take 1 tablet by mouth daily 30 tablet 3    PARoxetine (PAXIL) 30 MG tablet Take 1 tablet by mouth every morning 90 tablet 3       FAMILY HISTORY: family history includes Arthritis in his mother; Heart Disease in his mother; High Blood Pressure in his brother and mother; High Cholesterol in his mother; Stroke in his mother. PHYSICAL EXAMINATION:     /86 (Site: Left Upper Arm, Position: Sitting, Cuff Size: Large Adult)   Pulse 68   Resp 18   Ht 5' 10\" (1.778 m)   Wt (!) 364 lb (165.1 kg)   SpO2 97%   BMI 52.23 kg/m²  Body mass index is 52.23 kg/m². Constitutional: He is oriented to person, place, and time. He appears well-developed and well-nourished. In no acute distress. HEENT: Normocephalic and atraumatic. No JVD present. Carotid bruit is not present. No mass and no thyromegaly present. No lymphadenopathy present. Cardiovascular: Normal rate, regular rhythm, normal heart sounds. Exam reveals no gallop and no friction rubs. 3/6 systolic murmur, 2nd intercostal space on the LEFT just lateral to the sternum. Pulmonary/Chest: Effort normal and breath sounds normal. No respiratory distress. He has no wheezes, rhonchi or rales. Abdominal: Soft, non-tender. Bowel sounds and aorta are normal. He exhibits no organomegaly, mass or bruit. Extremities: Chronic bilateral lower extremity edema with chronic bilateral erythema of the legs. Is actually better than prior. No cyanosis or clubbing. 2+ radial and carotid pulses. Cannot palpate distal lower extremity pulses   neurological: He is alert and oriented to person, place, and time. No evidence of gross cranial nerve deficit. Coordination appeared normal.   Skin: Skin is warm and dry.  There is no

## 2020-05-05 ENCOUNTER — TELEPHONE (OUTPATIENT)
Dept: CARDIOLOGY | Age: 75
End: 2020-05-05

## 2020-05-05 NOTE — TELEPHONE ENCOUNTER
Spoke with 's cath lab and and they stated that patient should call them at [phone] to schedule procedure when they are ready.

## 2020-05-06 ENCOUNTER — TELEPHONE (OUTPATIENT)
Dept: CARDIOLOGY | Age: 75
End: 2020-05-06

## 2020-05-12 ENCOUNTER — HOSPITAL ENCOUNTER (OUTPATIENT)
Dept: LAB | Age: 75
Discharge: HOME | DRG: 871 | End: 2020-05-12
Payer: MEDICARE

## 2020-05-12 LAB
-: ABNORMAL
AMORPHOUS: ABNORMAL
BACTERIA: ABNORMAL
BILIRUBIN URINE: NEGATIVE
CASTS UA: ABNORMAL /LPF
COLOR: YELLOW
COMMENT UA: ABNORMAL
CRYSTALS, UA: ABNORMAL /HPF
CRYSTALS, UA: ABNORMAL /HPF
EPITHELIAL CELLS UA: ABNORMAL /HPF (ref 0–5)
GLUCOSE URINE: NEGATIVE
KETONES, URINE: NEGATIVE
LEUKOCYTE ESTERASE, URINE: ABNORMAL
MUCUS: ABNORMAL
NITRITE, URINE: POSITIVE
OTHER OBSERVATIONS UA: ABNORMAL
PH UA: 6 (ref 5–9)
PROTEIN UA: NEGATIVE
RBC UA: ABNORMAL /HPF (ref 0–2)
RENAL EPITHELIAL, UA: ABNORMAL /HPF
SPECIFIC GRAVITY UA: 1.02 (ref 1.01–1.02)
TRICHOMONAS: ABNORMAL
TURBIDITY: CLEAR
URINE HGB: ABNORMAL
UROBILINOGEN, URINE: NORMAL
WBC UA: ABNORMAL /HPF (ref 0–5)
YEAST: ABNORMAL

## 2020-05-12 PROCEDURE — 87186 SC STD MICRODIL/AGAR DIL: CPT

## 2020-05-12 PROCEDURE — 81001 URINALYSIS AUTO W/SCOPE: CPT

## 2020-05-12 PROCEDURE — 87077 CULTURE AEROBIC IDENTIFY: CPT

## 2020-05-12 PROCEDURE — 87086 URINE CULTURE/COLONY COUNT: CPT

## 2020-05-13 ENCOUNTER — HOSPITAL ENCOUNTER (INPATIENT)
Age: 75
LOS: 5 days | Discharge: HOME | DRG: 871 | End: 2020-05-18
Attending: EMERGENCY MEDICINE | Admitting: INTERNAL MEDICINE
Payer: MEDICARE

## 2020-05-13 ENCOUNTER — APPOINTMENT (OUTPATIENT)
Dept: GENERAL RADIOLOGY | Age: 75
DRG: 871 | End: 2020-05-13
Payer: MEDICARE

## 2020-05-13 PROBLEM — I50.43 CHF (CONGESTIVE HEART FAILURE), NYHA CLASS I, ACUTE ON CHRONIC, COMBINED (HCC): Status: ACTIVE | Noted: 2020-05-13

## 2020-05-13 LAB
-: ABNORMAL
ABSOLUTE EOS #: 0.1 K/UL (ref 0–0.44)
ABSOLUTE IMMATURE GRANULOCYTE: 0.04 K/UL (ref 0–0.3)
ABSOLUTE LYMPH #: 0.76 K/UL (ref 1.1–3.7)
ABSOLUTE MONO #: 0.15 K/UL (ref 0.1–1.2)
ALBUMIN SERPL-MCNC: 3.9 G/DL (ref 3.5–5.2)
ALBUMIN/GLOBULIN RATIO: 0.9 (ref 1–2.5)
ALLEN TEST: ABNORMAL
ALP BLD-CCNC: 74 U/L (ref 40–129)
ALT SERPL-CCNC: 13 U/L (ref 5–41)
AMORPHOUS: ABNORMAL
ANION GAP SERPL CALCULATED.3IONS-SCNC: 15 MMOL/L (ref 9–17)
AST SERPL-CCNC: 18 U/L
BACTERIA: ABNORMAL
BASOPHILS # BLD: 1 % (ref 0–2)
BASOPHILS ABSOLUTE: 0.04 K/UL (ref 0–0.2)
BILIRUB SERPL-MCNC: 0.81 MG/DL (ref 0.3–1.2)
BILIRUBIN URINE: NEGATIVE
BNP INTERPRETATION: ABNORMAL
BUN BLDV-MCNC: 16 MG/DL (ref 8–23)
BUN/CREAT BLD: 17 (ref 9–20)
CALCIUM SERPL-MCNC: 9.2 MG/DL (ref 8.6–10.4)
CARBOXYHEMOGLOBIN: ABNORMAL % (ref 0–5)
CASTS UA: ABNORMAL /LPF
CHLORIDE BLD-SCNC: 95 MMOL/L (ref 98–107)
CO2: 27 MMOL/L (ref 20–31)
COLOR: YELLOW
COMMENT UA: ABNORMAL
CREAT SERPL-MCNC: 0.96 MG/DL (ref 0.7–1.2)
CRYSTALS, UA: ABNORMAL /HPF
DIFFERENTIAL TYPE: ABNORMAL
EKG ATRIAL RATE: 120 BPM
EKG P-R INTERVAL: 216 MS
EKG Q-T INTERVAL: 320 MS
EKG QRS DURATION: 168 MS
EKG QTC CALCULATION (BAZETT): 452 MS
EKG R AXIS: -24 DEGREES
EKG T AXIS: 101 DEGREES
EKG VENTRICULAR RATE: 120 BPM
EOSINOPHILS RELATIVE PERCENT: 1 % (ref 1–4)
EPITHELIAL CELLS UA: ABNORMAL /HPF (ref 0–5)
FIO2: ABNORMAL
GFR AFRICAN AMERICAN: >60 ML/MIN
GFR NON-AFRICAN AMERICAN: >60 ML/MIN
GFR SERPL CREATININE-BSD FRML MDRD: ABNORMAL ML/MIN/{1.73_M2}
GFR SERPL CREATININE-BSD FRML MDRD: ABNORMAL ML/MIN/{1.73_M2}
GLUCOSE BLD-MCNC: 197 MG/DL (ref 70–99)
GLUCOSE URINE: NEGATIVE
HCO3 ARTERIAL: 24.8 MMOL/L (ref 22–26)
HCT VFR BLD CALC: 46 % (ref 40.7–50.3)
HEMOGLOBIN: 14.1 G/DL (ref 13–17)
IMMATURE GRANULOCYTES: 1 %
KETONES, URINE: NEGATIVE
LACTIC ACID, SEPSIS WHOLE BLOOD: ABNORMAL MMOL/L (ref 0.5–1.9)
LACTIC ACID, SEPSIS WHOLE BLOOD: ABNORMAL MMOL/L (ref 0.5–1.9)
LACTIC ACID, SEPSIS: 3.2 MMOL/L (ref 0.5–1.9)
LACTIC ACID, SEPSIS: 3.4 MMOL/L (ref 0.5–1.9)
LACTIC ACID, WHOLE BLOOD: ABNORMAL MMOL/L (ref 0.7–2.1)
LACTIC ACID: 2.7 MMOL/L (ref 0.5–2.2)
LEUKOCYTE ESTERASE, URINE: NEGATIVE
LYMPHOCYTES # BLD: 9 % (ref 24–43)
MCH RBC QN AUTO: 29 PG (ref 25.2–33.5)
MCHC RBC AUTO-ENTMCNC: 30.7 G/DL (ref 28.4–34.8)
MCV RBC AUTO: 94.7 FL (ref 82.6–102.9)
METHEMOGLOBIN: ABNORMAL % (ref 0–1.9)
MODE: ABNORMAL
MONOCYTES # BLD: 2 % (ref 3–12)
MUCUS: ABNORMAL
NEGATIVE BASE EXCESS, ART: 2.4 MMOL/L (ref 0–2)
NITRITE, URINE: NEGATIVE
NOTIFICATION TIME: ABNORMAL
NOTIFICATION: ABNORMAL
NRBC AUTOMATED: 0 PER 100 WBC
O2 DEVICE/FLOW/%: ABNORMAL
O2 SAT, ARTERIAL: 99.3 % (ref 95–98)
OTHER OBSERVATIONS UA: ABNORMAL
OXYHEMOGLOBIN: ABNORMAL % (ref 95–98)
PATIENT TEMP: 37
PCO2 ARTERIAL: 52.3 MMHG (ref 35–45)
PCO2, ART, TEMP ADJ: ABNORMAL (ref 35–45)
PDW BLD-RTO: 15.6 % (ref 11.8–14.4)
PEEP/CPAP: ABNORMAL
PH ARTERIAL: 7.29 (ref 7.35–7.45)
PH UA: 6 (ref 5–9)
PH, ART, TEMP ADJ: ABNORMAL (ref 7.35–7.45)
PLATELET # BLD: 218 K/UL (ref 138–453)
PLATELET ESTIMATE: ABNORMAL
PMV BLD AUTO: 11.6 FL (ref 8.1–13.5)
PO2 ARTERIAL: 207.7 MMHG (ref 80–100)
PO2, ART, TEMP ADJ: ABNORMAL MMHG (ref 80–100)
POSITIVE BASE EXCESS, ART: ABNORMAL MMOL/L (ref 0–2)
POTASSIUM SERPL-SCNC: 4.4 MMOL/L (ref 3.7–5.3)
PRO-BNP: 829 PG/ML
PROTEIN UA: ABNORMAL
PSV: ABNORMAL
PT. POSITION: ABNORMAL
RBC # BLD: 4.86 M/UL (ref 4.21–5.77)
RBC # BLD: ABNORMAL 10*6/UL
RBC UA: ABNORMAL /HPF (ref 0–2)
RENAL EPITHELIAL, UA: ABNORMAL /HPF
RESPIRATORY RATE: ABNORMAL
SAMPLE SITE: ABNORMAL
SEG NEUTROPHILS: 86 % (ref 36–65)
SEGMENTED NEUTROPHILS ABSOLUTE COUNT: 7.79 K/UL (ref 1.5–8.1)
SET RATE: ABNORMAL
SODIUM BLD-SCNC: 137 MMOL/L (ref 135–144)
SPECIFIC GRAVITY UA: 1.02 (ref 1.01–1.02)
TEXT FOR RESPIRATORY: ABNORMAL
TOTAL HB: ABNORMAL G/DL (ref 12–16)
TOTAL PROTEIN: 8.1 G/DL (ref 6.4–8.3)
TOTAL RATE: ABNORMAL
TRICHOMONAS: ABNORMAL
TROPONIN INTERP: ABNORMAL
TROPONIN T: ABNORMAL NG/ML
TROPONIN, HIGH SENSITIVITY: 31 NG/L (ref 0–22)
TURBIDITY: CLEAR
URINE HGB: ABNORMAL
UROBILINOGEN, URINE: NORMAL
VT: ABNORMAL
WBC # BLD: 8.9 K/UL (ref 3.5–11.3)
WBC # BLD: ABNORMAL 10*3/UL
WBC UA: ABNORMAL /HPF (ref 0–5)
YEAST: ABNORMAL

## 2020-05-13 PROCEDURE — 99222 1ST HOSP IP/OBS MODERATE 55: CPT | Performed by: INTERNAL MEDICINE

## 2020-05-13 PROCEDURE — 94660 CPAP INITIATION&MGMT: CPT

## 2020-05-13 PROCEDURE — 93010 ELECTROCARDIOGRAM REPORT: CPT | Performed by: FAMILY MEDICINE

## 2020-05-13 PROCEDURE — 99285 EMERGENCY DEPT VISIT HI MDM: CPT

## 2020-05-13 PROCEDURE — 6370000000 HC RX 637 (ALT 250 FOR IP): Performed by: EMERGENCY MEDICINE

## 2020-05-13 PROCEDURE — 96374 THER/PROPH/DIAG INJ IV PUSH: CPT

## 2020-05-13 PROCEDURE — 36415 COLL VENOUS BLD VENIPUNCTURE: CPT

## 2020-05-13 PROCEDURE — 80053 COMPREHEN METABOLIC PANEL: CPT

## 2020-05-13 PROCEDURE — 84484 ASSAY OF TROPONIN QUANT: CPT

## 2020-05-13 PROCEDURE — 2580000003 HC RX 258: Performed by: INTERNAL MEDICINE

## 2020-05-13 PROCEDURE — 82805 BLOOD GASES W/O2 SATURATION: CPT

## 2020-05-13 PROCEDURE — 83880 ASSAY OF NATRIURETIC PEPTIDE: CPT

## 2020-05-13 PROCEDURE — 6360000002 HC RX W HCPCS: Performed by: EMERGENCY MEDICINE

## 2020-05-13 PROCEDURE — 87040 BLOOD CULTURE FOR BACTERIA: CPT

## 2020-05-13 PROCEDURE — 6370000000 HC RX 637 (ALT 250 FOR IP): Performed by: INTERNAL MEDICINE

## 2020-05-13 PROCEDURE — 93005 ELECTROCARDIOGRAM TRACING: CPT | Performed by: EMERGENCY MEDICINE

## 2020-05-13 PROCEDURE — U0003 INFECTIOUS AGENT DETECTION BY NUCLEIC ACID (DNA OR RNA); SEVERE ACUTE RESPIRATORY SYNDROME CORONAVIRUS 2 (SARS-COV-2) (CORONAVIRUS DISEASE [COVID-19]), AMPLIFIED PROBE TECHNIQUE, MAKING USE OF HIGH THROUGHPUT TECHNOLOGIES AS DESCRIBED BY CMS-2020-01-R: HCPCS

## 2020-05-13 PROCEDURE — 2000000000 HC ICU R&B

## 2020-05-13 PROCEDURE — 83605 ASSAY OF LACTIC ACID: CPT

## 2020-05-13 PROCEDURE — 71045 X-RAY EXAM CHEST 1 VIEW: CPT

## 2020-05-13 PROCEDURE — 85025 COMPLETE CBC W/AUTO DIFF WBC: CPT

## 2020-05-13 PROCEDURE — 2580000003 HC RX 258: Performed by: EMERGENCY MEDICINE

## 2020-05-13 PROCEDURE — 81001 URINALYSIS AUTO W/SCOPE: CPT

## 2020-05-13 PROCEDURE — 96375 TX/PRO/DX INJ NEW DRUG ADDON: CPT

## 2020-05-13 PROCEDURE — 2500000003 HC RX 250 WO HCPCS: Performed by: INTERNAL MEDICINE

## 2020-05-13 RX ORDER — LEVOFLOXACIN 5 MG/ML
750 INJECTION, SOLUTION INTRAVENOUS EVERY 24 HOURS
Status: DISCONTINUED | OUTPATIENT
Start: 2020-05-14 | End: 2020-05-15 | Stop reason: ALTCHOICE

## 2020-05-13 RX ORDER — ACETAMINOPHEN 500 MG
1000 TABLET ORAL ONCE
Status: COMPLETED | OUTPATIENT
Start: 2020-05-13 | End: 2020-05-13

## 2020-05-13 RX ORDER — ONDANSETRON 2 MG/ML
4 INJECTION INTRAMUSCULAR; INTRAVENOUS EVERY 6 HOURS PRN
Status: DISCONTINUED | OUTPATIENT
Start: 2020-05-13 | End: 2020-05-18 | Stop reason: HOSPADM

## 2020-05-13 RX ORDER — ACETAMINOPHEN 325 MG/1
650 TABLET ORAL EVERY 6 HOURS PRN
Status: DISCONTINUED | OUTPATIENT
Start: 2020-05-13 | End: 2020-05-18 | Stop reason: HOSPADM

## 2020-05-13 RX ORDER — SODIUM CHLORIDE 0.9 % (FLUSH) 0.9 %
10 SYRINGE (ML) INJECTION PRN
Status: DISCONTINUED | OUTPATIENT
Start: 2020-05-13 | End: 2020-05-18 | Stop reason: HOSPADM

## 2020-05-13 RX ORDER — FUROSEMIDE 10 MG/ML
20 INJECTION INTRAMUSCULAR; INTRAVENOUS DAILY
Status: DISCONTINUED | OUTPATIENT
Start: 2020-05-14 | End: 2020-05-15

## 2020-05-13 RX ORDER — LEVOFLOXACIN 5 MG/ML
750 INJECTION, SOLUTION INTRAVENOUS ONCE
Status: COMPLETED | OUTPATIENT
Start: 2020-05-13 | End: 2020-05-13

## 2020-05-13 RX ORDER — ATORVASTATIN CALCIUM 40 MG/1
40 TABLET, FILM COATED ORAL DAILY
Status: DISCONTINUED | OUTPATIENT
Start: 2020-05-13 | End: 2020-05-18 | Stop reason: HOSPADM

## 2020-05-13 RX ORDER — LOSARTAN POTASSIUM 25 MG/1
25 TABLET ORAL DAILY
Status: DISCONTINUED | OUTPATIENT
Start: 2020-05-13 | End: 2020-05-13

## 2020-05-13 RX ORDER — 0.9 % SODIUM CHLORIDE 0.9 %
500 INTRAVENOUS SOLUTION INTRAVENOUS ONCE
Status: COMPLETED | OUTPATIENT
Start: 2020-05-13 | End: 2020-05-13

## 2020-05-13 RX ORDER — PROMETHAZINE HYDROCHLORIDE 25 MG/1
12.5 TABLET ORAL EVERY 6 HOURS PRN
Status: DISCONTINUED | OUTPATIENT
Start: 2020-05-13 | End: 2020-05-18 | Stop reason: HOSPADM

## 2020-05-13 RX ORDER — FUROSEMIDE 20 MG/1
20 TABLET ORAL DAILY
Status: DISCONTINUED | OUTPATIENT
Start: 2020-05-13 | End: 2020-05-13

## 2020-05-13 RX ORDER — ACETAMINOPHEN 650 MG/1
650 SUPPOSITORY RECTAL EVERY 6 HOURS PRN
Status: DISCONTINUED | OUTPATIENT
Start: 2020-05-13 | End: 2020-05-18 | Stop reason: HOSPADM

## 2020-05-13 RX ORDER — SODIUM CHLORIDE 0.9 % (FLUSH) 0.9 %
10 SYRINGE (ML) INJECTION EVERY 12 HOURS SCHEDULED
Status: DISCONTINUED | OUTPATIENT
Start: 2020-05-13 | End: 2020-05-18 | Stop reason: HOSPADM

## 2020-05-13 RX ORDER — AMIODARONE HYDROCHLORIDE 200 MG/1
200 TABLET ORAL DAILY
Status: DISCONTINUED | OUTPATIENT
Start: 2020-05-13 | End: 2020-05-18 | Stop reason: HOSPADM

## 2020-05-13 RX ORDER — CARVEDILOL 3.12 MG/1
1.56 TABLET ORAL 2 TIMES DAILY WITH MEALS
Status: DISCONTINUED | OUTPATIENT
Start: 2020-05-13 | End: 2020-05-13

## 2020-05-13 RX ORDER — FUROSEMIDE 10 MG/ML
40 INJECTION INTRAMUSCULAR; INTRAVENOUS ONCE
Status: DISCONTINUED | OUTPATIENT
Start: 2020-05-13 | End: 2020-05-13

## 2020-05-13 RX ORDER — FUROSEMIDE 10 MG/ML
20 INJECTION INTRAMUSCULAR; INTRAVENOUS ONCE
Status: COMPLETED | OUTPATIENT
Start: 2020-05-13 | End: 2020-05-13

## 2020-05-13 RX ORDER — TAMSULOSIN HYDROCHLORIDE 0.4 MG/1
0.4 CAPSULE ORAL DAILY
Status: DISCONTINUED | OUTPATIENT
Start: 2020-05-13 | End: 2020-05-18 | Stop reason: HOSPADM

## 2020-05-13 RX ADMIN — APIXABAN 5 MG: 5 TABLET, FILM COATED ORAL at 20:46

## 2020-05-13 RX ADMIN — LEVOFLOXACIN 750 MG: 5 INJECTION, SOLUTION INTRAVENOUS at 15:25

## 2020-05-13 RX ADMIN — ACETAMINOPHEN 1000 MG: 500 TABLET, FILM COATED ORAL at 14:52

## 2020-05-13 RX ADMIN — FUROSEMIDE 20 MG: 10 INJECTION, SOLUTION INTRAMUSCULAR; INTRAVENOUS at 16:00

## 2020-05-13 RX ADMIN — NOREPINEPHRINE BITARTRATE 2 MCG/MIN: 1 INJECTION, SOLUTION, CONCENTRATE INTRAVENOUS at 20:46

## 2020-05-13 RX ADMIN — CARVEDILOL 1.56 MG: 3.12 TABLET, FILM COATED ORAL at 17:59

## 2020-05-13 RX ADMIN — SODIUM CHLORIDE 500 ML: 9 INJECTION, SOLUTION INTRAVENOUS at 19:23

## 2020-05-13 RX ADMIN — NITROGLYCERIN 1 INCH: 20 OINTMENT TOPICAL at 14:53

## 2020-05-13 RX ADMIN — CEFTRIAXONE 1 G: 1 INJECTION, POWDER, FOR SOLUTION INTRAMUSCULAR; INTRAVENOUS at 15:17

## 2020-05-13 ASSESSMENT — PAIN SCALES - GENERAL
PAINLEVEL_OUTOF10: 0

## 2020-05-13 NOTE — ED PROVIDER NOTES
[facility]   CHIEF COMPLAINT   Chief Complaint   Patient presents with    Shortness of Breath     onset 1 hour prior to arrival denies any illness prior to onset      HPI   Adilson Hooper is a 66 y.o. male who presents with shortness of breath. The onset was a half hour before he got here. The duration has been constant since the onset. He thinks he needs some lasix. He complained of some chills. He neglected to mention that he was recently being treated for UTI. No chest pain. He has stayed inside mostly. He has had this before. REVIEW OF SYSTEMS   Cardiac: Denies palpitations, Denies syncope  Respiratory: +SOB as above  Neurologic: Denies syncope or LOC  GI: Denies Vomiting, Denies Diarrhea  General: Denies measured Fever, but actually had one  All other review of systems otherwise negative. PAST MEDICAL & SURGICAL HISTORY   Past Medical History:   Diagnosis Date    Anxiety     Atrial fibrillation, [facility] onset ([facility] 03/05/2020    BPH (benign prostatic hyperplasia)     CHF (congestive heart failure) (HCC)     Hyperlipidemia     Hypertension     Osteoarthritis      Past Surgical History:   Procedure Laterality Date    VASECTOMY  1971      CURRENT MEDICATIONS   Current Outpatient Rx   Medication Sig Dispense Refill    nitrofurantoin, macrocrystal-monohydrate, (MACROBID) 100 MG capsule Take 1 capsule by mouth 2 times daily for 10 days 20 capsule 0    apixaban (ELIQUIS) 5 MG TABS tablet Take 1 tablet by mouth 2 times daily 60 tablet 3    carvedilol (COREG) 3.125 MG tablet Take 0.5 tablets by mouth 2 times daily (with meals) 90 tablet 3    tamsulosin (FLOMAX) 0.4 MG capsule Take 1 capsule by mouth daily 30 capsule 1    amiodarone (CORDARONE) 200 MG tablet Take 200 mg twice a day thru 3/14/2020.   Starting 3/15/2020 take 200 mg dailly 45 tablet 3    losartan (COZAAR) 25 MG tablet Take 1 tablet by mouth daily 30 tablet 3    furosemide (LASIX) 20 MG tablet Take 1

## 2020-05-13 NOTE — CONSULTS
removal of nasal packing 04/15/2020     Priority: Low    Epistaxis from Eliquis and Aspirin 04/13/2020     Priority: Low    Acute respiratory failure with hypoxia (HCC) Due to CHF & Obesity 03/05/2020     Priority: Low    Acute combined systolic and diastolic HF (heart failure), NYHA class 3 [facility] 03/04/2020     Priority: Low    Carpal tunnel syndrome on left 01/08/2019     Priority: Low    Obesity, morbid (more than 100 lbs over ideal weight or BMI > 40) ([facility] 12/15/2017     Priority: Low    Osteoarthritis of both knees 06/13/2017     Priority: Low    Essential hypertension 12/13/2016     Priority: Low    Mixed hyperlipidemia 12/13/2016     Priority: Low     Problem list:  Acute on chronic combined CHF. Septicemia secondary to urinary tract infection. Severe aortic stenosis. Morbid obesity. Obstructive sleep apnea syndrome. Hypotension probably secondary to sepsis. PLAN:        Very complicated medical history as outlined in HPI. Morbid obesity, obstructive sleep apnea, obesity hypoventilation syndrome, nonischemic cardiomyopathy with severe left ventricular systolic dysfunction and severe aortic stenosis. Patient currently treated for urinary tract infection, his lactate is elevated. He is up 5 pounds since last visit back in May 4, 2020. He was given only 20 mg of Lasix in the emergency room. No fluid resuscitation. ABG reviewed, respiratory acidosis with CO2 retention. Once he came to the ICU his blood pressure dropped to the 80s millimeters mercury systolic. I think his shortness of breath is multifactorial from CHF, obstructive sleep apnea, obesity hypoventilation syndrome and possible sepsis. His volume overload is mainly an extracerebral fluid but I think it is effective blood volume is low so I decided to challenge him with 500 cc of fluids over 1 hour and reassess his blood pressure. If his blood pressure remained low we may need to start norepinephrine.     Please hold antihypertensive therapy including and losartan. We will also hold off diuresis for now. Continue supplemental oxygen. His O2 sat is maintained. He has been tested for COVID-19, pending result. Stated in HPI, patient has severe aortic stenosis and no significant coronary artery disease. He was supposed to be seen by the structural heart disease team at [facility]. If his breathing got worse then we will use BiPAP to washout CO2 but he is currently doing very well on oxygen via nasal cannula. Continue monitoring blood pressure and heart rate. Protocol. Please call me with the blood pressure if systolic blood pressure dropped below 90 mmHg. Continue antibiotic therapy as per Dr. Amanda Saezn MD    I will see him first thing in the morning. Paroxysmal Atrial Fibrillation: Rhythm Control  Beta Blocker: Hold Carvedilol for now because of low blood pressure    Rhythm Control Agent: Continue Amiodarone (Cordarone) 200 mg once daily   Monitoring: Amiodarone Since he is being maintained on Amiodarone, I told him that we will need to closely monitor him for potential side effects. These include monitoring LFTs and TSH at least every 6 months as well as chest x-rays, pulmonary function tests, and eye exams at least on a yearly basis. Stroke Risk: His CHADS2-VASc score is greater than 2 (2.2% stroke risk)  Anticoagulation: Continue Apixaban (Eliquis) 5 mg every 12 hours. I also reminded him to watch for signs of blood in his stool or black tarry stools and stop the medication immediately if this develops as this could be life threatening.  Hypertension: Probably secondary to sepsis, other contributing factors are severely reduced left ventricular systolic function and severe aortic stenosis. · Hold off antihypertensive therapy as above including carvedilol and losartan. · Will reassess the need for diuresis tomorrow morning.   · We will challenge him with 500 cc of normal

## 2020-05-13 NOTE — ED NOTES
Per Dr. Valeri Mcduffie is to be held until he sees x-ray results     Abdi Hernandez RN  05/13/20 3810

## 2020-05-13 NOTE — ED NOTES
Per Dr Tavo Marie no fluid was given due to CHF/fluid overload. Repeat lactic to be completed before going upstairs.       Joseph Mehta RN  05/13/20 2185

## 2020-05-14 LAB
ABSOLUTE EOS #: <0.03 K/UL (ref 0–0.44)
ABSOLUTE IMMATURE GRANULOCYTE: 0.08 K/UL (ref 0–0.3)
ABSOLUTE LYMPH #: 1.06 K/UL (ref 1.1–3.7)
ABSOLUTE MONO #: 1.14 K/UL (ref 0.1–1.2)
ALBUMIN SERPL-MCNC: 3.4 G/DL (ref 3.5–5.2)
ALBUMIN/GLOBULIN RATIO: 1 (ref 1–2.5)
ALP BLD-CCNC: 53 U/L (ref 40–129)
ALT SERPL-CCNC: 10 U/L (ref 5–41)
ANION GAP SERPL CALCULATED.3IONS-SCNC: 12 MMOL/L (ref 9–17)
AST SERPL-CCNC: 12 U/L
BASOPHILS # BLD: 0 % (ref 0–2)
BASOPHILS ABSOLUTE: 0.06 K/UL (ref 0–0.2)
BILIRUB SERPL-MCNC: 0.88 MG/DL (ref 0.3–1.2)
BUN BLDV-MCNC: 17 MG/DL (ref 8–23)
BUN/CREAT BLD: 21 (ref 9–20)
CALCIUM SERPL-MCNC: 8.6 MG/DL (ref 8.6–10.4)
CHLORIDE BLD-SCNC: 97 MMOL/L (ref 98–107)
CO2: 24 MMOL/L (ref 20–31)
CREAT SERPL-MCNC: 0.8 MG/DL (ref 0.7–1.2)
CULTURE: ABNORMAL
DIFFERENTIAL TYPE: ABNORMAL
EKG ATRIAL RATE: 75 BPM
EKG P-R INTERVAL: 220 MS
EKG Q-T INTERVAL: 478 MS
EKG QRS DURATION: 174 MS
EKG QTC CALCULATION (BAZETT): 530 MS
EKG R AXIS: -151 DEGREES
EKG T AXIS: 57 DEGREES
EKG VENTRICULAR RATE: 74 BPM
EOSINOPHILS RELATIVE PERCENT: 0 % (ref 1–4)
GFR AFRICAN AMERICAN: >60 ML/MIN
GFR NON-AFRICAN AMERICAN: >60 ML/MIN
GFR SERPL CREATININE-BSD FRML MDRD: ABNORMAL ML/MIN/{1.73_M2}
GFR SERPL CREATININE-BSD FRML MDRD: ABNORMAL ML/MIN/{1.73_M2}
GLUCOSE BLD-MCNC: 116 MG/DL (ref 70–99)
HCT VFR BLD CALC: 36.9 % (ref 40.7–50.3)
HEMOGLOBIN: 11.5 G/DL (ref 13–17)
IMMATURE GRANULOCYTES: 0 %
LYMPHOCYTES # BLD: 6 % (ref 24–43)
Lab: ABNORMAL
MCH RBC QN AUTO: 28.7 PG (ref 25.2–33.5)
MCHC RBC AUTO-ENTMCNC: 31.2 G/DL (ref 28.4–34.8)
MCV RBC AUTO: 92 FL (ref 82.6–102.9)
MONOCYTES # BLD: 6 % (ref 3–12)
NRBC AUTOMATED: 0 PER 100 WBC
PDW BLD-RTO: 15.9 % (ref 11.8–14.4)
PLATELET # BLD: 184 K/UL (ref 138–453)
PLATELET ESTIMATE: ABNORMAL
PMV BLD AUTO: 11.8 FL (ref 8.1–13.5)
POTASSIUM SERPL-SCNC: 3.7 MMOL/L (ref 3.7–5.3)
RBC # BLD: 4.01 M/UL (ref 4.21–5.77)
RBC # BLD: ABNORMAL 10*6/UL
SARS-COV-2, PCR: NORMAL
SARS-COV-2, RAPID: NORMAL
SARS-COV-2: NOT DETECTED
SEG NEUTROPHILS: 87 % (ref 36–65)
SEGMENTED NEUTROPHILS ABSOLUTE COUNT: 15.55 K/UL (ref 1.5–8.1)
SODIUM BLD-SCNC: 133 MMOL/L (ref 135–144)
SOURCE: NORMAL
SPECIMEN DESCRIPTION: ABNORMAL
TOTAL PROTEIN: 6.8 G/DL (ref 6.4–8.3)
WBC # BLD: 17.8 K/UL (ref 3.5–11.3)
WBC # BLD: ABNORMAL 10*3/UL

## 2020-05-14 PROCEDURE — 99223 1ST HOSP IP/OBS HIGH 75: CPT | Performed by: INTERNAL MEDICINE

## 2020-05-14 PROCEDURE — 6360000002 HC RX W HCPCS: Performed by: INTERNAL MEDICINE

## 2020-05-14 PROCEDURE — 93005 ELECTROCARDIOGRAM TRACING: CPT | Performed by: INTERNAL MEDICINE

## 2020-05-14 PROCEDURE — 2000000000 HC ICU R&B

## 2020-05-14 PROCEDURE — 85027 COMPLETE CBC AUTOMATED: CPT

## 2020-05-14 PROCEDURE — 93010 ELECTROCARDIOGRAM REPORT: CPT | Performed by: FAMILY MEDICINE

## 2020-05-14 PROCEDURE — 6370000000 HC RX 637 (ALT 250 FOR IP): Performed by: INTERNAL MEDICINE

## 2020-05-14 PROCEDURE — 36415 COLL VENOUS BLD VENIPUNCTURE: CPT

## 2020-05-14 PROCEDURE — 85025 COMPLETE CBC W/AUTO DIFF WBC: CPT

## 2020-05-14 PROCEDURE — 2580000003 HC RX 258: Performed by: INTERNAL MEDICINE

## 2020-05-14 PROCEDURE — 80053 COMPREHEN METABOLIC PANEL: CPT

## 2020-05-14 RX ADMIN — FUROSEMIDE 20 MG: 10 INJECTION, SOLUTION INTRAMUSCULAR; INTRAVENOUS at 08:15

## 2020-05-14 RX ADMIN — PHENYLEPHRINE HYDROCHLORIDE 1 SPRAY: 1 SPRAY NASAL at 11:28

## 2020-05-14 RX ADMIN — CEFTRIAXONE 1 G: 1 INJECTION, POWDER, FOR SOLUTION INTRAMUSCULAR; INTRAVENOUS at 03:11

## 2020-05-14 RX ADMIN — Medication 1 SPRAY: at 16:31

## 2020-05-14 RX ADMIN — ATORVASTATIN CALCIUM 40 MG: 40 TABLET, FILM COATED ORAL at 08:15

## 2020-05-14 RX ADMIN — Medication 1 SPRAY: at 08:15

## 2020-05-14 RX ADMIN — PHENYLEPHRINE HYDROCHLORIDE 1 SPRAY: 1 SPRAY NASAL at 23:49

## 2020-05-14 RX ADMIN — LEVOFLOXACIN 750 MG: 5 INJECTION, SOLUTION INTRAVENOUS at 14:57

## 2020-05-14 RX ADMIN — TAMSULOSIN HYDROCHLORIDE 0.4 MG: 0.4 CAPSULE ORAL at 08:15

## 2020-05-14 RX ADMIN — SODIUM CHLORIDE, PRESERVATIVE FREE 10 ML: 5 INJECTION INTRAVENOUS at 21:57

## 2020-05-14 RX ADMIN — AMIODARONE HYDROCHLORIDE 200 MG: 200 TABLET ORAL at 08:15

## 2020-05-14 RX ADMIN — APIXABAN 5 MG: 5 TABLET, FILM COATED ORAL at 08:15

## 2020-05-14 RX ADMIN — ACETAMINOPHEN 650 MG: 325 TABLET, FILM COATED ORAL at 03:18

## 2020-05-14 RX ADMIN — PAROXETINE 30 MG: 10 TABLET, FILM COATED ORAL at 08:15

## 2020-05-14 RX ADMIN — APIXABAN 5 MG: 5 TABLET, FILM COATED ORAL at 20:38

## 2020-05-14 RX ADMIN — PHENYLEPHRINE HYDROCHLORIDE 1 SPRAY: 1 SPRAY NASAL at 17:20

## 2020-05-14 ASSESSMENT — PAIN SCALES - GENERAL: PAINLEVEL_OUTOF10: 0

## 2020-05-14 NOTE — PROGRESS NOTES
Patient's son Augustina Raphael updated on patient and plan of care via telephone. Also aware of negative Covid-19 results.

## 2020-05-14 NOTE — H&P
Patient:  Yas Olmstead  MRN: [id number]    Chief Complaint:    Chief Complaint   Patient presents with    Shortness of Breath     onset 1 hour prior to arrival denies any illness prior to onset       History Obtained From:  patient, electronic medical record    PCP: Jose Jensen MD    History of Present Illness: The patient is a 66 y.o. male who presents with shortness of breath. The onset was a half hour before he got here. The duration has been constant since the onset. He thinks he needs some lasix. He complained of some chills. He neglected to mention that he was recently being treated for UTI. No chest pain. He has stayed inside mostly. He has had this before.        REVIEW OF SYSTEMS   Cardiac: Denies palpitations, Denies syncope  Respiratory: +SOB as above  Neurologic: Denies syncope or LOC  GI: Denies Vomiting, Denies Diarrhea  General: Denies measured Fever, but actually had one  All other review of systems otherwise negative. Past Medical History:        Diagnosis Date    Anxiety     [facility]    BPH (benign prostatic hyperplasia)     CHF (congestive heart failure) (HCC)     Hyperlipidemia     Hypertension     Osteoarthritis        Past Surgical History:        Procedure Laterality Date    VASECTOMY  24       Family History:       Problem Relation Age of Onset    Arthritis Mother     Heart Disease Mother     High Cholesterol Mother     High Blood Pressure Mother     Stroke Mother     High Blood Pressure Brother        Social History:   TOBACCO:   reports that he quit smoking about 47 years ago. His smoking use included cigarettes. He has a 10.00 pack-year smoking history. He has never used smokeless tobacco.  ETOH:   reports no history of alcohol use. Allergies:  Patient has no known allergies. Medications Prior to Admission:    Prior to Admission medications    Medication Sig Start Date End Date Taking?  Authorizing Provider nitrofurantoin, macrocrystal-monohydrate, (MACROBID) 100 MG capsule Take 1 capsule by mouth 2 times daily for 10 days 5/11/20 5/21/20 Yes Farrukh Florez MD   apixaban [facility]) 5 MG TABS tablet Take 1 tablet by mouth 2 times daily 4/17/20  Yes Farrukh Florez MD   carvedilol (COREG) 3.125 MG tablet Take 0.5 tablets by mouth 2 times daily (with meals) 4/13/20  Yes Farrukh Florez MD   tamsulosin [facility]) 0.4 MG capsule Take 1 capsule by mouth daily 3/23/20  Yes Farrukh Florez MD   amiodarone (CORDARONE) 200 MG tablet Take 200 mg twice a day thru 3/14/2020. Starting 3/15/2020 take 200 mg dailly 3/9/20  Yes Gavino Purpura, APRN - CNP   losartan (COZAAR) 25 MG tablet Take 1 tablet by mouth daily 3/10/20  Yes Gavino Purpura, APRN - CNP   furosemide (LASIX) 20 MG tablet Take 1 tablet by mouth daily 3/9/20  Yes Gavino Purpura, APRN - CNP   atorvastatin (LIPITOR) 40 MG tablet Take 1 tablet by mouth daily 3/10/20  Yes Gavino Purpura, APRN - CNP   PARoxetine (PAXIL) 30 MG tablet Take 1 tablet by mouth every morning 6/24/19  Yes Farrukh Florez MD   acetaminophen (TYLENOL) 500 MG tablet Take 500 mg by mouth every 6 hours as needed for Pain    Historical Provider, MD       Review of Systems:  Constitutional:negative  for fevers, and negative for chills.   Eyes: negative for visual disturbance   ENT: negative for sore throat, negative nasal congestion, and negative for earache  Respiratory: positive for shortness of breath, negative for cough, and positive for wheezing  Cardiovascular: negative for chest pain, negative for palpitations, and negative for syncope  Gastrointestinal: negative for abdominal pain, negative for nausea,negative for vomiting, negative for diarrhea, negative for constipation, and negative for hematochezia or melena  Genitourinary: positive for dysuria, positive for urinary urgency, positive for urinary frequency, and negative for hematuria  Skin: negative for skin rash, and

## 2020-05-14 NOTE — PLAN OF CARE
Problem: Falls - Risk of:  Goal: Will remain free from falls  Description: Will remain free from falls  Outcome: Ongoing  Note: Bed in low position. Wheels locked. Bed alarm on. 2/4 side rails are up. Fall band on. Call light within reach. Problem: OXYGENATION/RESPIRATORY FUNCTION  Goal: Patient will maintain patent airway  Outcome: Ongoing  Note: Lungs are clear and diminished, SpO2 was 98% on 4L NC, will continue to monitor. Problem: HEMODYNAMIC STATUS  Goal: Patient has stable vital signs and fluid balance  Outcome: Ongoing  Note: Blood pressure remains low, pt started on a Levophed drip, will continue to monitor,      Problem: FLUID AND ELECTROLYTE IMBALANCE  Goal: Fluid and electrolyte balance are achieved/maintained  Outcome: Ongoing  Note: Monitoring I/O.

## 2020-05-14 NOTE — PLAN OF CARE
Problem: Falls - Risk of:  Goal: Will remain free from falls  Description: Will remain free from falls  5/14/2020 1013 by Mindi Sloan RN  Outcome: Ongoing  Note: Patient at high risk for falls. Fall precautions in place. Non skid slipper socks on, bed in lowest position with wheels locked and siderails up x2, bed alarm on. Patient is being monitored on a regular basis . Call light within reach    5/13/2020 2120 by Binh Hsieh RN  Outcome: Ongoing  Note: Bed in low position. Wheels locked. Bed alarm on. 2/4 side rails are up. Fall band on. Call light within reach. Problem: OXYGENATION/RESPIRATORY FUNCTION  Goal: Patient will maintain patent airway  5/14/2020 1013 by Mindi Sloan RN  Outcome: Ongoing  Note: Airway is patent. 5/13/2020 2120 by Binh Hsieh RN  Outcome: Ongoing  Note: Lungs are clear and diminished, SpO2 was 98% on 4L NC, will continue to monitor. Goal: Patient will achieve/maintain normal respiratory rate/effort  Description: Respiratory rate and effort will be within normal limits for the patient  Outcome: Ongoing  Note: Respirations are even and unlabored. Spo2 >92%, oxygen in place at 2L. Problem: HEMODYNAMIC STATUS  Goal: Patient has stable vital signs and fluid balance  5/14/2020 1013 by Mnidi Sloan RN  Outcome: Ongoing  Note: VS being monitored routinely. Levophed gtt continues. Lasix given this am as ordered. I&O monitored q shift. 5/13/2020 2120 by Binh Hsieh RN  Outcome: Ongoing  Note: Blood pressure remains low, pt started on a Levophed drip, will continue to monitor,      Problem: FLUID AND ELECTROLYTE IMBALANCE  Goal: Fluid and electrolyte balance are achieved/maintained  5/14/2020 1013 by Mindi Sloan RN  Outcome: Ongoing  Note: Labs, I&O monitored routinely. 5/13/2020 2120 by Binh Hsieh RN  Outcome: Ongoing  Note: Monitoring I/O.       Problem: ACTIVITY INTOLERANCE/IMPAIRED MOBILITY  Goal: Mobility/activity is maintained at

## 2020-05-14 NOTE — CONSULTS
Used   Substance Use Topics    Alcohol use: No    Drug use: No        CURRENT MEDICATIONS:        Outpatient Medications Marked as Taking for the 5/13/20 encounter [facility] Encounter)   Medication Sig Dispense Refill    nitrofurantoin, macrocrystal-monohydrate, (MACROBID) 100 MG capsule Take 1 capsule by mouth 2 times daily for 10 days 20 capsule 0    apixaban (ELIQUIS) 5 MG TABS tablet Take 1 tablet by mouth 2 times daily 60 tablet 3    carvedilol (COREG) 3.125 MG tablet Take 0.5 tablets by mouth 2 times daily (with meals) 90 tablet 3    tamsulosin (FLOMAX) 0.4 MG capsule Take 1 capsule by mouth daily 30 capsule 1    amiodarone (CORDARONE) 200 MG tablet Take 200 mg twice a day thru 3/14/2020. Starting 3/15/2020 take 200 mg dailly 45 tablet 3    losartan (COZAAR) 25 MG tablet Take 1 tablet by mouth daily 30 tablet 3    furosemide (LASIX) 20 MG tablet Take 1 tablet by mouth daily 60 tablet 3    atorvastatin (LIPITOR) 40 MG tablet Take 1 tablet by mouth daily 30 tablet 3    PARoxetine (PAXIL) 30 MG tablet Take 1 tablet by mouth every morning 90 tablet 3       FAMILY HISTORY: family history includes Arthritis in his mother; Heart Disease in his mother; High Blood Pressure in his brother and mother; High Cholesterol in his mother; Stroke in his mother. PHYSICAL EXAMINATION:     /65   Pulse 76   Temp 99.6 °F (37.6 °C) (Temporal)   Resp 17   Ht 5' 10\" (1.778 m)   Wt (!) 364 lb 13.8 oz (165.5 kg)   SpO2 98%   BMI 52.35 kg/m²  Body mass index is 52.35 kg/m². Constitutional: He is oriented to person, place, and time. He appears well-developed and well-nourished. In no acute distress. HEENT: Normocephalic and atraumatic. No JVD present. Carotid bruit is not present. No mass and no thyromegaly present. No lymphadenopathy present. Cardiovascular: Normal rate, regular rhythm, normal heart sounds. Exam reveals no gallop and no friction rubs.  3/6 systolic murmur, 2nd intercostal space on the LEFT

## 2020-05-14 NOTE — PROGRESS NOTES
SW called to pt in his room due to covid restrictions. Pt is alert and oriented and pleasant. Pt is a 66year old  male admitted for shortness of breath and acute combined systolic and diastolic HF. Pt reports that he lives with his spouse at home. Pt's son is local and is also supportive to him. Pt reports that he does not have services for himself but there is a nurse that comes to their home to care for his wife. Pt reports that he has a power wheelchair that he gets around in and he has a shower chair at home for use also. Pt reports that he needs to know early enough int he day if he gets to go home because his son will bring his chair over for him to get home. Pt reports that he uses [employer] or the [employer] to get where he needs to go. Pt is a full code and follows with Dr David Schrader as his PCP. Pt reports that he does not have advance directives and is not currently interested in these. Pt reports that his medications are affordable except for Eliquis and the  office is working on a prescription assistance application for him regarding this. Pt plans to return home at discharge. Pt identifies no discharge needs or concerns at this time but he is anxious about getting to go home. SW provided support and encouragement. SW will continue to follow and remain available.  Wendy Padilla 5/14/2020

## 2020-05-14 NOTE — ACP (ADVANCE CARE PLANNING)
event, like a heart attack, in someone who is otherwise healthy. Unfortunately, CPR does not typically restart the heart for people who have serious health conditions or who are very sick. In the event your heart stopped, would you want attempts to restart your heart (answer \"yes\") or would you prefer a natural death (answer \"no\")? yes    If your health were to worsen and it became clear that your chance of recovery was unlikely, would that change your answer? No    [] Yes  [x] No   Educated Patient / Remus Graver regarding differences between Advance Directives and portable DNR orders. Pt declined information about advanced directives and DNR.     Length of ACP Conversation in minutes:  10 minutes    Conversation Outcomes:  [x] ACP discussion completed  [] Existing advance directive reviewed with patient; no changes to patient's previously recorded wishes   [] New Advance Directive completed   [] Portable Do Not Rescitate prepared for Provider review and signature  [] POLST/POST/MOLST/MOST prepared for Provider review and signature      Follow-up plan:    [] Schedule follow-up conversation to continue planning  [] Referred individual to Provider for additional questions/concerns   [] Advised patient/agent/surrogate to review completed ACP document and update if needed with changes in condition, patient preferences or care setting     [x] This note routed to one or more involved healthcare providers

## 2020-05-15 ENCOUNTER — APPOINTMENT (OUTPATIENT)
Dept: GENERAL RADIOLOGY | Age: 75
DRG: 871 | End: 2020-05-15
Payer: MEDICARE

## 2020-05-15 PROBLEM — A41.9 SEPSIS SECONDARY TO UTI (HCC): Status: ACTIVE | Noted: 2020-05-15

## 2020-05-15 PROBLEM — N39.0 SEPSIS SECONDARY TO UTI (HCC): Status: ACTIVE | Noted: 2020-05-15

## 2020-05-15 LAB
ABSOLUTE EOS #: 0.11 K/UL (ref 0–0.44)
ABSOLUTE IMMATURE GRANULOCYTE: 0.05 K/UL (ref 0–0.3)
ABSOLUTE LYMPH #: 1.12 K/UL (ref 1.1–3.7)
ABSOLUTE MONO #: 0.93 K/UL (ref 0.1–1.2)
ALBUMIN SERPL-MCNC: 3.2 G/DL (ref 3.5–5.2)
ALBUMIN/GLOBULIN RATIO: 0.9 (ref 1–2.5)
ALP BLD-CCNC: 55 U/L (ref 40–129)
ALT SERPL-CCNC: 10 U/L (ref 5–41)
ANION GAP SERPL CALCULATED.3IONS-SCNC: 11 MMOL/L (ref 9–17)
AST SERPL-CCNC: 12 U/L
BASOPHILS # BLD: 0 % (ref 0–2)
BASOPHILS ABSOLUTE: 0.04 K/UL (ref 0–0.2)
BILIRUB SERPL-MCNC: 0.9 MG/DL (ref 0.3–1.2)
BILIRUBIN DIRECT: 0.24 MG/DL
BILIRUBIN, INDIRECT: 0.66 MG/DL (ref 0–1)
BUN BLDV-MCNC: 14 MG/DL (ref 8–23)
BUN/CREAT BLD: 22 (ref 9–20)
CALCIUM SERPL-MCNC: 8.8 MG/DL (ref 8.6–10.4)
CHLORIDE BLD-SCNC: 96 MMOL/L (ref 98–107)
CO2: 27 MMOL/L (ref 20–31)
CREAT SERPL-MCNC: 0.65 MG/DL (ref 0.7–1.2)
DIFFERENTIAL TYPE: ABNORMAL
EOSINOPHILS RELATIVE PERCENT: 1 % (ref 1–4)
GFR AFRICAN AMERICAN: >60 ML/MIN
GFR NON-AFRICAN AMERICAN: >60 ML/MIN
GFR SERPL CREATININE-BSD FRML MDRD: ABNORMAL ML/MIN/{1.73_M2}
GFR SERPL CREATININE-BSD FRML MDRD: ABNORMAL ML/MIN/{1.73_M2}
GLOBULIN: ABNORMAL G/DL (ref 1.5–3.8)
GLUCOSE BLD-MCNC: 116 MG/DL (ref 70–99)
HCT VFR BLD CALC: 35.3 % (ref 40.7–50.3)
HEMOGLOBIN: 11.2 G/DL (ref 13–17)
IMMATURE GRANULOCYTES: 1 %
LACTIC ACID, SEPSIS WHOLE BLOOD: NORMAL MMOL/L (ref 0.5–1.9)
LACTIC ACID, SEPSIS: 0.9 MMOL/L (ref 0.5–1.9)
LYMPHOCYTES # BLD: 11 % (ref 24–43)
MCH RBC QN AUTO: 29 PG (ref 25.2–33.5)
MCHC RBC AUTO-ENTMCNC: 31.7 G/DL (ref 28.4–34.8)
MCV RBC AUTO: 91.5 FL (ref 82.6–102.9)
MONOCYTES # BLD: 9 % (ref 3–12)
NRBC AUTOMATED: 0 PER 100 WBC
PDW BLD-RTO: 15.9 % (ref 11.8–14.4)
PLATELET # BLD: 170 K/UL (ref 138–453)
PLATELET ESTIMATE: ABNORMAL
PMV BLD AUTO: 11.7 FL (ref 8.1–13.5)
POTASSIUM SERPL-SCNC: 3.7 MMOL/L (ref 3.7–5.3)
RBC # BLD: 3.86 M/UL (ref 4.21–5.77)
RBC # BLD: ABNORMAL 10*6/UL
SEG NEUTROPHILS: 78 % (ref 36–65)
SEGMENTED NEUTROPHILS ABSOLUTE COUNT: 7.93 K/UL (ref 1.5–8.1)
SODIUM BLD-SCNC: 134 MMOL/L (ref 135–144)
TOTAL PROTEIN: 6.9 G/DL (ref 6.4–8.3)
TSH SERPL DL<=0.05 MIU/L-ACNC: 2.83 MIU/L (ref 0.3–5)
WBC # BLD: 10.2 K/UL (ref 3.5–11.3)
WBC # BLD: ABNORMAL 10*3/UL

## 2020-05-15 PROCEDURE — 85025 COMPLETE CBC W/AUTO DIFF WBC: CPT

## 2020-05-15 PROCEDURE — 83605 ASSAY OF LACTIC ACID: CPT

## 2020-05-15 PROCEDURE — 80076 HEPATIC FUNCTION PANEL: CPT

## 2020-05-15 PROCEDURE — 6360000002 HC RX W HCPCS: Performed by: INTERNAL MEDICINE

## 2020-05-15 PROCEDURE — 71046 X-RAY EXAM CHEST 2 VIEWS: CPT

## 2020-05-15 PROCEDURE — 36415 COLL VENOUS BLD VENIPUNCTURE: CPT

## 2020-05-15 PROCEDURE — 80048 BASIC METABOLIC PNL TOTAL CA: CPT

## 2020-05-15 PROCEDURE — 2580000003 HC RX 258: Performed by: INTERNAL MEDICINE

## 2020-05-15 PROCEDURE — 6370000000 HC RX 637 (ALT 250 FOR IP): Performed by: INTERNAL MEDICINE

## 2020-05-15 PROCEDURE — 99233 SBSQ HOSP IP/OBS HIGH 50: CPT | Performed by: INTERNAL MEDICINE

## 2020-05-15 PROCEDURE — 2580000003 HC RX 258: Performed by: EMERGENCY MEDICINE

## 2020-05-15 PROCEDURE — 84443 ASSAY THYROID STIM HORMONE: CPT

## 2020-05-15 PROCEDURE — 2500000003 HC RX 250 WO HCPCS: Performed by: INTERNAL MEDICINE

## 2020-05-15 PROCEDURE — 2000000000 HC ICU R&B

## 2020-05-15 RX ORDER — CARVEDILOL 3.12 MG/1
3.12 TABLET ORAL 2 TIMES DAILY WITH MEALS
Status: DISCONTINUED | OUTPATIENT
Start: 2020-05-15 | End: 2020-05-18 | Stop reason: HOSPADM

## 2020-05-15 RX ORDER — FUROSEMIDE 10 MG/ML
40 INJECTION INTRAMUSCULAR; INTRAVENOUS DAILY
Status: DISCONTINUED | OUTPATIENT
Start: 2020-05-15 | End: 2020-05-16

## 2020-05-15 RX ADMIN — CEFTRIAXONE 1 G: 1 INJECTION, POWDER, FOR SOLUTION INTRAMUSCULAR; INTRAVENOUS at 16:08

## 2020-05-15 RX ADMIN — Medication 10 ML: at 08:20

## 2020-05-15 RX ADMIN — PAROXETINE 30 MG: 10 TABLET, FILM COATED ORAL at 08:19

## 2020-05-15 RX ADMIN — SODIUM CHLORIDE, PRESERVATIVE FREE 10 ML: 5 INJECTION INTRAVENOUS at 08:20

## 2020-05-15 RX ADMIN — DOXYCYCLINE 100 MG: 100 INJECTION, POWDER, LYOPHILIZED, FOR SOLUTION INTRAVENOUS at 16:17

## 2020-05-15 RX ADMIN — TAMSULOSIN HYDROCHLORIDE 0.4 MG: 0.4 CAPSULE ORAL at 08:20

## 2020-05-15 RX ADMIN — SODIUM CHLORIDE, PRESERVATIVE FREE 10 ML: 5 INJECTION INTRAVENOUS at 21:09

## 2020-05-15 RX ADMIN — APIXABAN 5 MG: 5 TABLET, FILM COATED ORAL at 21:09

## 2020-05-15 RX ADMIN — PHENYLEPHRINE HYDROCHLORIDE 1 SPRAY: 1 SPRAY NASAL at 05:55

## 2020-05-15 RX ADMIN — ATORVASTATIN CALCIUM 40 MG: 40 TABLET, FILM COATED ORAL at 08:20

## 2020-05-15 RX ADMIN — FUROSEMIDE 40 MG: 10 INJECTION, SOLUTION INTRAMUSCULAR; INTRAVENOUS at 08:20

## 2020-05-15 RX ADMIN — APIXABAN 5 MG: 5 TABLET, FILM COATED ORAL at 08:20

## 2020-05-15 RX ADMIN — AMIODARONE HYDROCHLORIDE 200 MG: 200 TABLET ORAL at 08:19

## 2020-05-15 ASSESSMENT — PAIN SCALES - GENERAL
PAINLEVEL_OUTOF10: 0
PAINLEVEL_OUTOF10: 0

## 2020-05-15 NOTE — PROGRESS NOTES
PT is alert and oriented. PT states that he feels much less SOB since time of admission. Denies any chest pain. Skin color, turgor and temp are normal. Respirations are unlabored, breath sounds are clear. PT's rhythm is a 1st degree AV block with some PJC's. Denies any chest pain. ABD is soft, no guarding. Lower extremities below the knees have venous stasis discoloration with some scaling and pitting edema. PT states that the swelling in his legs is improved. PT denies any needs or wants at this time.

## 2020-05-15 NOTE — PROGRESS NOTES
mg dailly 45 tablet 3    losartan (COZAAR) 25 MG tablet Take 1 tablet by mouth daily 30 tablet 3    furosemide (LASIX) 20 MG tablet Take 1 tablet by mouth daily 60 tablet 3    atorvastatin (LIPITOR) 40 MG tablet Take 1 tablet by mouth daily 30 tablet 3    PARoxetine (PAXIL) 30 MG tablet Take 1 tablet by mouth every morning 90 tablet 3       FAMILY HISTORY: family history includes Arthritis in his mother; Heart Disease in his mother; High Blood Pressure in his brother and mother; High Cholesterol in his mother; Stroke in his mother. PHYSICAL EXAMINATION:     /75   Pulse 86   Temp 97.3 °F (36.3 °C) (Temporal)   Resp 22   Ht 5' 10\" (1.778 m)   Wt (!) 369 lb 7.9 oz (167.6 kg)   SpO2 93%   BMI 53.02 kg/m²  Body mass index is 53.02 kg/m². Constitutional: He is oriented to person, place, and time. He appears well-developed and well-nourished. In no acute distress. HEENT: Normocephalic and atraumatic. No JVD present. Carotid bruit is not present. No mass and no thyromegaly present. No lymphadenopathy present. Cardiovascular: Normal rate, regular rhythm, normal heart sounds. Exam reveals no gallop and no friction rubs. 3/6 systolic murmur, 2nd intercostal space on the LEFT just lateral to the sternum. Pulmonary/Chest: Effort normal and breath sounds normal. No respiratory distress. He has no wheezes, rhonchi or rales. Abdominal: Soft, non-tender. Bowel sounds and aorta are normal. He exhibits no organomegaly, mass or bruit. Extremities: Chronic bilateral lower extremity edema with chronic bilateral erythema of the legs. Is actually better than prior. No cyanosis or clubbing. 2+ radial and carotid pulses. Cannot palpate distal lower extremity pulses   neurological: He is alert and oriented to person, place, and time. No evidence of gross cranial nerve deficit. Coordination appeared normal.   Skin: Skin is warm and dry. There is no rash or diaphoresis.    Psychiatric: He has a normal mood and

## 2020-05-15 NOTE — PROGRESS NOTES
Patient transported down to xray department via personal motorized scooter accompanied by RN. Patient returned to room without any complaints.

## 2020-05-15 NOTE — PLAN OF CARE
Problem: Falls - Risk of:  Goal: Will remain free from falls  Description: Will remain free from falls  5/15/2020 1921 by Patricia Naik RN  Note: Has not fallen thus far this hospital stay. PT is alert and oriented and has good safety awareness. Problem: OXYGENATION/RESPIRATORY FUNCTION  Goal: Patient will maintain patent airway  5/15/2020 1921 by Patricia Naik RN  Note: O2 sat trending in the mid to high 90's while on o2 at 2lpnc. Problem: HEMODYNAMIC STATUS  Goal: Patient has stable vital signs and fluid balance  5/15/2020 1921 by Patricia Naik RN  Note: At this time B/P is marginally low 98/55. PT is sitting up, was diuresed today. HR 88. PT denies feeling dizzy. Denies chest pain. Problem: ACTIVITY INTOLERANCE/IMPAIRED MOBILITY  Goal: Mobility/activity is maintained at optimum level for patient  5/15/2020 1921 by Patricia Naik RN  Note: Feels well while at rest. Mildly SOB with exertion.

## 2020-05-16 LAB
ABSOLUTE EOS #: 0.25 K/UL (ref 0–0.44)
ABSOLUTE IMMATURE GRANULOCYTE: 0.05 K/UL (ref 0–0.3)
ABSOLUTE LYMPH #: 1.39 K/UL (ref 1.1–3.7)
ABSOLUTE MONO #: 0.7 K/UL (ref 0.1–1.2)
ANION GAP SERPL CALCULATED.3IONS-SCNC: 13 MMOL/L (ref 9–17)
BASOPHILS # BLD: 1 % (ref 0–2)
BASOPHILS ABSOLUTE: 0.06 K/UL (ref 0–0.2)
BUN BLDV-MCNC: 15 MG/DL (ref 8–23)
BUN/CREAT BLD: 24 (ref 9–20)
CALCIUM SERPL-MCNC: 9 MG/DL (ref 8.6–10.4)
CHLORIDE BLD-SCNC: 93 MMOL/L (ref 98–107)
CO2: 30 MMOL/L (ref 20–31)
CREAT SERPL-MCNC: 0.63 MG/DL (ref 0.7–1.2)
DIFFERENTIAL TYPE: ABNORMAL
EOSINOPHILS RELATIVE PERCENT: 4 % (ref 1–4)
GFR AFRICAN AMERICAN: >60 ML/MIN
GFR NON-AFRICAN AMERICAN: >60 ML/MIN
GFR SERPL CREATININE-BSD FRML MDRD: ABNORMAL ML/MIN/{1.73_M2}
GFR SERPL CREATININE-BSD FRML MDRD: ABNORMAL ML/MIN/{1.73_M2}
GLUCOSE BLD-MCNC: 122 MG/DL (ref 70–99)
HCT VFR BLD CALC: 39.6 % (ref 40.7–50.3)
HEMOGLOBIN: 12.6 G/DL (ref 13–17)
IMMATURE GRANULOCYTES: 1 %
LYMPHOCYTES # BLD: 19 % (ref 24–43)
MCH RBC QN AUTO: 29.2 PG (ref 25.2–33.5)
MCHC RBC AUTO-ENTMCNC: 31.8 G/DL (ref 28.4–34.8)
MCV RBC AUTO: 91.9 FL (ref 82.6–102.9)
MONOCYTES # BLD: 10 % (ref 3–12)
NRBC AUTOMATED: 0 PER 100 WBC
PDW BLD-RTO: 15.8 % (ref 11.8–14.4)
PLATELET # BLD: 188 K/UL (ref 138–453)
PLATELET ESTIMATE: ABNORMAL
PMV BLD AUTO: 11.2 FL (ref 8.1–13.5)
POTASSIUM SERPL-SCNC: 3.4 MMOL/L (ref 3.7–5.3)
RBC # BLD: 4.31 M/UL (ref 4.21–5.77)
RBC # BLD: ABNORMAL 10*6/UL
SEG NEUTROPHILS: 65 % (ref 36–65)
SEGMENTED NEUTROPHILS ABSOLUTE COUNT: 4.71 K/UL (ref 1.5–8.1)
SODIUM BLD-SCNC: 136 MMOL/L (ref 135–144)
WBC # BLD: 7.2 K/UL (ref 3.5–11.3)
WBC # BLD: ABNORMAL 10*3/UL

## 2020-05-16 PROCEDURE — 80048 BASIC METABOLIC PNL TOTAL CA: CPT

## 2020-05-16 PROCEDURE — 2580000003 HC RX 258: Performed by: INTERNAL MEDICINE

## 2020-05-16 PROCEDURE — 6370000000 HC RX 637 (ALT 250 FOR IP): Performed by: INTERNAL MEDICINE

## 2020-05-16 PROCEDURE — 36415 COLL VENOUS BLD VENIPUNCTURE: CPT

## 2020-05-16 PROCEDURE — 1200000000 HC SEMI PRIVATE

## 2020-05-16 PROCEDURE — 99232 SBSQ HOSP IP/OBS MODERATE 35: CPT | Performed by: INTERNAL MEDICINE

## 2020-05-16 PROCEDURE — 85025 COMPLETE CBC W/AUTO DIFF WBC: CPT

## 2020-05-16 PROCEDURE — 2500000003 HC RX 250 WO HCPCS: Performed by: INTERNAL MEDICINE

## 2020-05-16 PROCEDURE — 6360000002 HC RX W HCPCS: Performed by: INTERNAL MEDICINE

## 2020-05-16 RX ORDER — FUROSEMIDE 10 MG/ML
40 INJECTION INTRAMUSCULAR; INTRAVENOUS 2 TIMES DAILY
Status: DISCONTINUED | OUTPATIENT
Start: 2020-05-16 | End: 2020-05-18 | Stop reason: HOSPADM

## 2020-05-16 RX ORDER — DOXYCYCLINE HYCLATE 100 MG/1
100 CAPSULE ORAL EVERY 12 HOURS SCHEDULED
Status: DISCONTINUED | OUTPATIENT
Start: 2020-05-16 | End: 2020-05-18 | Stop reason: HOSPADM

## 2020-05-16 RX ORDER — LOSARTAN POTASSIUM 25 MG/1
12.5 TABLET ORAL DAILY
Status: DISCONTINUED | OUTPATIENT
Start: 2020-05-17 | End: 2020-05-18 | Stop reason: HOSPADM

## 2020-05-16 RX ADMIN — DOXYCYCLINE HYCLATE 100 MG: 100 CAPSULE ORAL at 21:05

## 2020-05-16 RX ADMIN — TAMSULOSIN HYDROCHLORIDE 0.4 MG: 0.4 CAPSULE ORAL at 08:18

## 2020-05-16 RX ADMIN — DOXYCYCLINE 100 MG: 100 INJECTION, POWDER, LYOPHILIZED, FOR SOLUTION INTRAVENOUS at 04:06

## 2020-05-16 RX ADMIN — FUROSEMIDE 40 MG: 10 INJECTION, SOLUTION INTRAMUSCULAR; INTRAVENOUS at 15:41

## 2020-05-16 RX ADMIN — ATORVASTATIN CALCIUM 40 MG: 40 TABLET, FILM COATED ORAL at 08:18

## 2020-05-16 RX ADMIN — APIXABAN 5 MG: 5 TABLET, FILM COATED ORAL at 08:18

## 2020-05-16 RX ADMIN — AMIODARONE HYDROCHLORIDE 200 MG: 200 TABLET ORAL at 08:18

## 2020-05-16 RX ADMIN — SODIUM CHLORIDE, PRESERVATIVE FREE 10 ML: 5 INJECTION INTRAVENOUS at 21:05

## 2020-05-16 RX ADMIN — Medication 10 ML: at 15:41

## 2020-05-16 RX ADMIN — APIXABAN 5 MG: 5 TABLET, FILM COATED ORAL at 21:05

## 2020-05-16 RX ADMIN — SODIUM CHLORIDE, PRESERVATIVE FREE 10 ML: 5 INJECTION INTRAVENOUS at 08:18

## 2020-05-16 RX ADMIN — CEFTRIAXONE 1 G: 1 INJECTION, POWDER, FOR SOLUTION INTRAMUSCULAR; INTRAVENOUS at 15:40

## 2020-05-16 RX ADMIN — PAROXETINE 30 MG: 10 TABLET, FILM COATED ORAL at 08:18

## 2020-05-16 RX ADMIN — FUROSEMIDE 40 MG: 10 INJECTION, SOLUTION INTRAMUSCULAR; INTRAVENOUS at 08:18

## 2020-05-16 ASSESSMENT — PAIN SCALES - GENERAL
PAINLEVEL_OUTOF10: 0
PAINLEVEL_OUTOF10: 0

## 2020-05-16 NOTE — PLAN OF CARE
Problem: Falls - Risk of:  Goal: Will remain free from falls  Description: Will remain free from falls  5/16/2020 0809 by Anthony Thomas RN  Outcome: Ongoing  Note: Call light in reach at all times, frequent checks, bed in lowest position, wheels of bed and chair locked, non skid footwear on, appropriate transfer techniques, personal items within reach, walkways free of obstructions, fall risk armband and sign displayed, Boudreaux fall risk score per protocol. No falls this shift, will continue to monitor. Problem: OXYGENATION/RESPIRATORY FUNCTION  Goal: Patient will maintain patent airway  5/16/2020 0809 by Anthony Thomas RN  Outcome: Ongoing  Note: Pt alert and oriented able to cough to clear secretions if present. SpO2 within normal limits while on 2 liters of oxygen per nasal cannula. Problem: HEMODYNAMIC STATUS  Goal: Patient has stable vital signs and fluid balance  5/16/2020 0809 by Anthony Thomas RN  Outcome: Ongoing  Note: Vital signs per routine. Pt started on 1800 ml fluid restriction due to lack of weight loss. Monitor intake and output. Problem: FLUID AND ELECTROLYTE IMBALANCE  Goal: Fluid and electrolyte balance are achieved/maintained  Outcome: Ongoing  Note: Monitor intake and output, monitor labs. Problem: ACTIVITY INTOLERANCE/IMPAIRED MOBILITY  Goal: Mobility/activity is maintained at optimum level for patient  5/16/2020 0809 by Anthony Thomas RN  Outcome: Ongoing  Note: Assist pt with ambulation.

## 2020-05-16 NOTE — PROGRESS NOTES
Patient informed of recent lab results and recommendations as per Dr. Poornima Davis. norepinephrine. Wt Readings from Last 3 Encounters:   20 (!) 368 lb 12.8 oz (167.3 kg)   20 (!) 364 lb (165.1 kg)   04/15/20 (!) 366 lb (166 kg)     He is doing much better today. He is off norepinephrine. His blood pressure is maintained. He is tolerating Lasix 40 mg BID and coreg 3.125 bid. His breathing is much better and he is 5 L negative fluid balance. PAST MEDICAL HISTORY:        Past Medical History:   Diagnosis Date    Anxiety     Atrial fibrillation, new onset ([address] 2020    BPH (benign prostatic hyperplasia)     CHF (congestive heart failure) (HCC)     Hyperlipidemia     Hypertension     Osteoarthritis    Nonischemic cardiomyopathy. Severe aortic stenosis. CURRENT ALLERGIES: Patient has no known allergies. REVIEW OF SYSTEMS: 14 systems were reviewed. Pertinent positives and negatives as above, all else negative. Past Surgical History:   Procedure Laterality Date    VASECTOMY  24    Social History:  Social History     Tobacco Use    Smoking status: Former Smoker     Packs/day: 1.00     Years: 10.00     Pack years: 10.00     Types: Cigarettes     Last attempt to quit: 1973     Years since quittin.4    Smokeless tobacco: Never Used   Substance Use Topics    Alcohol use: No    Drug use: No        CURRENT MEDICATIONS:        Outpatient Medications Marked as Taking for the 20 encounter [facility] Encounter)   Medication Sig Dispense Refill    nitrofurantoin, macrocrystal-monohydrate, (MACROBID) 100 MG capsule Take 1 capsule by mouth 2 times daily for 10 days 20 capsule 0    apixaban (ELIQUIS) 5 MG TABS tablet Take 1 tablet by mouth 2 times daily 60 tablet 3    carvedilol (COREG) 3.125 MG tablet Take 0.5 tablets by mouth 2 times daily (with meals) 90 tablet 3    tamsulosin (FLOMAX) 0.4 MG capsule Take 1 capsule by mouth daily 30 capsule 1    amiodarone (CORDARONE) 200 MG tablet Take 200 mg twice a day thru 3/14/2020.   Starting 3/15/2020 take 200 mg limited: Vitals/Constitutional/EENT/Resp/CV/GI//MS/Neuro/Skin/Heme-Lymph-Imm. Pursuant to the emergency declaration under the [facility] [address] and the [employer] Act, this Virtual Visit was conducted with patient's (and/or legal guardian's) consent, to reduce the patient's risk of exposure to COVID-19 and provide necessary medical care. The patient (and/or legal guardian) has also been advised to contact this office for worsening conditions or problems, and seek emergency medical treatment and/or call 911 if deemed necessary. Services were provided through a video synchronous discussion virtually to substitute for in-person clinic visit. Patient and provider were located at their individual homes. --Soni Gasca MD on 5/16/2020 at 6:16 PM    An electronic signature was used to authenticate this note. Sincerely,  Marko Deshpande MD, F.A.C.C.   [facility] Cardiology Specialist     [facility]  [facility], [address] [place] [address]  Phone: [phone], Fax: [phone]     I believe that the risk of significant morbidity and mortality related to the patient's current medical conditions are: high

## 2020-05-16 NOTE — PROGRESS NOTES
Progress Note    SUBJECTIVE:  FU related to  Denies SOB. Limited activity. No fevers    OBJECTIVE:    Vitals:   TEMPERATURE:  Current - Temp: 97.5 °F (36.4 °C); Max - Temp  Av °F (36.7 °C)  Min: 97.5 °F (36.4 °C)  Max: 98.4 °F (36.9 °C)  RESPIRATIONS RANGE: Resp  Av.6  Min: 16  Max: 20  PULSE RANGE: Pulse  Av  Min: 70  Max: 97  BLOOD PRESSURE RANGE:  Systolic (69HYO), QTM:199 , Min:95 , YFH:389   ; Diastolic (45ELC), HHL:44, Min:55, Max:85    PULSE OXIMETRY RANGE: SpO2  Av %  Min: 93 %  Max: 98 %  24HR INTAKE/OUTPUT:      Intake/Output Summary (Last 24 hours) at 2020 0746  Last data filed at 2020 0736  Gross per 24 hour   Intake 1480 ml   Output 4225 ml   Net -2745 ml     -----------------------------------------------------------------  Exam:  General: A & O x3  HEENT: Supple neck & negative  Heart: Regular  Lungs: clear to auscultation bilaterally & no retractions  Abdomen: Normal & soft, No tenderness and BS normal  Extremities:  2+ and 3+   Neuro: Gait normal. Reflexes normal and symmetric.  Sensation grossly normal, NonFocal     -----------------------------------------------------------------  Diagnostic Data:  Lab Results   Component Value Date    WBC 10.2 05/15/2020    HGB 11.2 (L) 05/15/2020     05/15/2020       Lab Results   Component Value Date    BUN 14 05/15/2020    CREATININE 0.65 (L) 05/15/2020     (L) 05/15/2020    K 3.7 05/15/2020    CALCIUM 8.8 05/15/2020    CL 96 (L) 05/15/2020    CO2 27 05/15/2020    LABGLOM >60 05/15/2020       Lab Results   Component Value Date    WBCUA 10 TO 20 2020    RBCUA 0 TO 2 2020    EPITHUA 0 TO 2 2020    LEUKOCYTESUR NEGATIVE 2020    SPECGRAV 1.025 (H) 2020    GLUCOSEU NEGATIVE 2020    KETUA NEGATIVE 2020    PROTEINU 4+ (A) 2020    HGBUR 1+ (A) 2020    CASTUA NOT REPORTED 2020    CRYSTUA NOT REPORTED 2020    BACTERIA 1+ (A) 2020    YEAST NOT REPORTED 05/13/2020       Lab Results   Component Value Date    TROPONINT NOT REPORTED 05/13/2020    PROBNP 829 (H) 05/13/2020       Xr Chest Portable    Result Date: 5/13/2020  EXAMINATION: ONE XRAY VIEW OF THE CHEST 5/13/2020 3:08 pm COMPARISON: 03/06/2020 HISTORY: ORDERING SYSTEM PROVIDED HISTORY: sob, fever, but presented more like acute pulmonary edema TECHNOLOGIST PROVIDED HISTORY: sob, fever, but presented more like acute pulmonary edema FINDINGS: Cardiomegaly. Prominent central pulmonary vessels and indistinct peripheral pulmonary vessels. Interstitial markings radiating peripherally from the hua. Chronic pleural thickening on the left     Cardiomegaly with pulmonary vascular congestion and interstitial pulmonary edema       ASSESSMENT:    Principal Problem:    Acute combined systolic and diastolic HF (heart failure), NYHA class 3 ([facility])  Active Problems:    Severe aortic stenosis    Obesity, morbid (more than 100 lbs over ideal weight or BMI > 40) (HCC)    Acute respiratory failure with hypoxia (HCC) Due to CHF & Obesity    Sepsis secondary to UTI [facility])  Resolved Problems:    * No resolved hospital problems. *      Patient Active Problem List    Diagnosis Date Noted    Severe aortic stenosis      Priority: High    Dyslipidemia      Priority: High    Sepsis secondary to UTI ([facility] 05/15/2020    Encounter for removal of nasal packing 04/15/2020    Epistaxis from Eliquis and Aspirin 04/13/2020    Acute respiratory failure with hypoxia (HCC) Due to CHF & Obesity 03/05/2020    Acute combined systolic and diastolic HF (heart failure), NYHA class 3 ([address] 03/04/2020    Carpal tunnel syndrome on left 01/08/2019    Obesity, morbid (more than 100 lbs over ideal weight or BMI > 40) ([address] 12/15/2017    Osteoarthritis of both knees 06/13/2017    Essential hypertension 12/13/2016    Mixed hyperlipidemia 12/13/2016       PLAN:  · Increase Lasix IV  · Oral doxy  · UTI growing atypical bug.  Awaiting final C and

## 2020-05-16 NOTE — PROGRESS NOTES
Afternoon assessment completed. Slight changes from AM assessment. Pt sitting up in bed, would like to take a nap and then get up to chair for dinner. Pt denies needs from writer. Call light in reach. Will continue to monitor.

## 2020-05-16 NOTE — PROGRESS NOTES
[address] [place] [address] made aware that pt can move out of ICU. Room assignment received for pt to go to room 328. Yaa Little aware of need to start new IV on patient as well.

## 2020-05-16 NOTE — PROGRESS NOTES
C/O burning at IV insertion site after starting ATB'S. There is no redness or swelling at the insertion site or his arm. Also no rash is noted, or increased work of breathing. Turned rate down from 100ml/hr to 65ml /hr. Pt stated that the burning has improved.

## 2020-05-17 PROCEDURE — 6360000002 HC RX W HCPCS: Performed by: INTERNAL MEDICINE

## 2020-05-17 PROCEDURE — 2580000003 HC RX 258: Performed by: INTERNAL MEDICINE

## 2020-05-17 PROCEDURE — 6370000000 HC RX 637 (ALT 250 FOR IP): Performed by: INTERNAL MEDICINE

## 2020-05-17 PROCEDURE — 1200000000 HC SEMI PRIVATE

## 2020-05-17 RX ORDER — POTASSIUM CHLORIDE 20 MEQ/1
20 TABLET, EXTENDED RELEASE ORAL 2 TIMES DAILY WITH MEALS
Status: DISCONTINUED | OUTPATIENT
Start: 2020-05-17 | End: 2020-05-18 | Stop reason: HOSPADM

## 2020-05-17 RX ADMIN — APIXABAN 5 MG: 5 TABLET, FILM COATED ORAL at 20:12

## 2020-05-17 RX ADMIN — CEFTRIAXONE 1 G: 1 INJECTION, POWDER, FOR SOLUTION INTRAMUSCULAR; INTRAVENOUS at 17:12

## 2020-05-17 RX ADMIN — POTASSIUM CHLORIDE 20 MEQ: 1500 TABLET, EXTENDED RELEASE ORAL at 17:11

## 2020-05-17 RX ADMIN — PAROXETINE 30 MG: 10 TABLET, FILM COATED ORAL at 09:15

## 2020-05-17 RX ADMIN — CARVEDILOL 3.12 MG: 3.12 TABLET, FILM COATED ORAL at 17:11

## 2020-05-17 RX ADMIN — SODIUM CHLORIDE, PRESERVATIVE FREE 10 ML: 5 INJECTION INTRAVENOUS at 20:12

## 2020-05-17 RX ADMIN — TAMSULOSIN HYDROCHLORIDE 0.4 MG: 0.4 CAPSULE ORAL at 09:14

## 2020-05-17 RX ADMIN — DOXYCYCLINE HYCLATE 100 MG: 100 CAPSULE ORAL at 09:15

## 2020-05-17 RX ADMIN — ACETAMINOPHEN 650 MG: 325 TABLET, FILM COATED ORAL at 09:15

## 2020-05-17 RX ADMIN — POTASSIUM CHLORIDE 20 MEQ: 1500 TABLET, EXTENDED RELEASE ORAL at 09:27

## 2020-05-17 RX ADMIN — APIXABAN 5 MG: 5 TABLET, FILM COATED ORAL at 09:15

## 2020-05-17 RX ADMIN — SODIUM CHLORIDE, PRESERVATIVE FREE 10 ML: 5 INJECTION INTRAVENOUS at 09:20

## 2020-05-17 RX ADMIN — ATORVASTATIN CALCIUM 40 MG: 40 TABLET, FILM COATED ORAL at 09:15

## 2020-05-17 RX ADMIN — LOSARTAN POTASSIUM 12.5 MG: 25 TABLET, FILM COATED ORAL at 09:15

## 2020-05-17 RX ADMIN — FUROSEMIDE 40 MG: 10 INJECTION, SOLUTION INTRAMUSCULAR; INTRAVENOUS at 17:13

## 2020-05-17 RX ADMIN — Medication 10 ML: at 09:21

## 2020-05-17 RX ADMIN — CARVEDILOL 3.12 MG: 3.12 TABLET, FILM COATED ORAL at 09:15

## 2020-05-17 RX ADMIN — AMIODARONE HYDROCHLORIDE 200 MG: 200 TABLET ORAL at 09:14

## 2020-05-17 RX ADMIN — FUROSEMIDE 40 MG: 10 INJECTION, SOLUTION INTRAMUSCULAR; INTRAVENOUS at 09:16

## 2020-05-17 RX ADMIN — DOXYCYCLINE HYCLATE 100 MG: 100 CAPSULE ORAL at 20:12

## 2020-05-17 ASSESSMENT — PAIN DESCRIPTION - LOCATION
LOCATION: LEG

## 2020-05-17 ASSESSMENT — PAIN DESCRIPTION - ORIENTATION
ORIENTATION: RIGHT

## 2020-05-17 ASSESSMENT — PAIN SCALES - GENERAL
PAINLEVEL_OUTOF10: 5
PAINLEVEL_OUTOF10: 3
PAINLEVEL_OUTOF10: 6
PAINLEVEL_OUTOF10: 0

## 2020-05-17 ASSESSMENT — PAIN DESCRIPTION - PAIN TYPE
TYPE: CHRONIC PAIN

## 2020-05-17 NOTE — PLAN OF CARE
Problem: Falls - Risk of:  Goal: Will remain free from falls  Description: Will remain free from falls  5/17/2020 1951 by Nanda Ramirez RN  Outcome: Ongoing  Note: Fall risk assessment done and patient is a high risk for falls. Alarms on as needed for patient safety. Patient being monitored on a regular basis. No falls noted at this time. Problem: OXYGENATION/RESPIRATORY FUNCTION  Goal: Patient will maintain patent airway  5/17/2020 1951 by Nanda Ramirez RN  Outcome: Ongoing  Note: Pt is able to maintain patent airway. Problem: OXYGENATION/RESPIRATORY FUNCTION  Goal: Patient will achieve/maintain normal respiratory rate/effort  Description: Respiratory rate and effort will be within normal limits for the patient  Outcome: Ongoing  Note: Pt has normal respiratory rate. Problem: HEMODYNAMIC STATUS  Goal: Patient has stable vital signs and fluid balance  Outcome: Ongoing  Note: Pt vital signs are stable at this time. Problem: Pain:  Goal: Pain level will decrease  Description: Pain level will decrease  5/17/2020 1951 by Nanda Ramirez RN  Outcome: Ongoing  Note: Pain assessed routinely and as needed with a 0-10 scale. PRN pain medication given as appropriate per orders.  Pain reassessed within an hour, will continue to monitor

## 2020-05-17 NOTE — PLAN OF CARE
Problem: Falls - Risk of:  Goal: Will remain free from falls  Description: Will remain free from falls  Outcome: Ongoing  Note: Pt A&Ox4. Pt uses call light appropriately and call light and bedside table remain in reach. Rails up x2 bed in lowest position. Bed alarm is on. Pt wears nonskid socks when standing or ambulating. Fall sign in place. Room remains free of clutter. Pt knows when and how to use assistive device when ambulating. Goal: Absence of physical injury  Description: Absence of physical injury  Outcome: Ongoing     Problem: OXYGENATION/RESPIRATORY FUNCTION  Goal: Patient will maintain patent airway  Outcome: Ongoing  Note: Patient is reminded to use cough and deep breathing often. Patient is receiving scheduled breathing treatments. O2 therapy in use. Goal: Patient will achieve/maintain normal respiratory rate/effort  Description: Respiratory rate and effort will be within normal limits for the patient  Outcome: Ongoing     Problem: HEMODYNAMIC STATUS  Goal: Patient has stable vital signs and fluid balance  Outcome: Ongoing  Note: Vitals and pulses are checked twice per shift and PRN. Patient remains on Telemetry. Labs being drawn daily. Cardiology on consult. Problem: FLUID AND ELECTROLYTE IMBALANCE  Goal: Fluid and electrolyte balance are achieved/maintained  Outcome: Ongoing  Note: Maintain hydration by drinking small amounts of clear fluids frequently. Vitals and pulses are checked twice per shift and PRN. Skin is being assessed and I&O's recorded. Labs are being drawn daily. Respiratory status being checked with vitals and PRN. Fluid restriction in place. Problem: ACTIVITY INTOLERANCE/IMPAIRED MOBILITY  Goal: Mobility/activity is maintained at optimum level for patient  Outcome: Ongoing  Note: Patient is encouraged to ambulate to and from the chair, bed, and bathroom. Patient will stand pivot to the bedside commode.  Patient is encouraged to do all ADLs within the patient's

## 2020-05-17 NOTE — PLAN OF CARE
Problem: Falls - Risk of:  Goal: Will remain free from falls  Description: Will remain free from falls  5/17/2020 0749 by Haylee Duarte RN  Outcome: Ongoing  Note: Bed alarm on, non-slip socks currently on patient, call light within reach, bedside table within reach along with personal belongings. Room free of clutter. Proper assisted given by writer and other staff when out of bed to chair or bathroom. Problem: OXYGENATION/RESPIRATORY FUNCTION  Goal: Patient will maintain patent airway  5/17/2020 0749 by Haylee Duarte RN  Outcome: Ongoing  Note: Patient is on 2L of O2. Patient's airway is currently patent. Problem: FLUID AND ELECTROLYTE IMBALANCE  Goal: Fluid and electrolyte balance are achieved/maintained  5/17/2020 0749 by Haylee Duarte RN  Outcome: Ongoing  Note: Patient educated on importance of fluid restriction and how lymphedema will have an effect on things. Problem: ACTIVITY INTOLERANCE/IMPAIRED MOBILITY  Goal: Mobility/activity is maintained at optimum level for patient  5/17/2020 0749 by Haylee Duarte RN  Note: Patient is stand and pivot to motorized wheelchair. Patient tolerates pivoting well. Problem: Pain:  Goal: Pain level will decrease  Description: Pain level will decrease  Outcome: Ongoing  Note: Patient's pain level assessed every 4 hours and as needed. Pain medications given as ordered and patient's request. Monitoring effective pain medication 30-60 minutes after pain medication given. Non-pharmalogical pain control measures offered.

## 2020-05-18 VITALS
BODY MASS INDEX: 45.1 KG/M2 | HEART RATE: 81 BPM | DIASTOLIC BLOOD PRESSURE: 75 MMHG | WEIGHT: 315 LBS | RESPIRATION RATE: 20 BRPM | OXYGEN SATURATION: 93 % | HEIGHT: 70 IN | TEMPERATURE: 97 F | SYSTOLIC BLOOD PRESSURE: 115 MMHG

## 2020-05-18 LAB
ANION GAP SERPL CALCULATED.3IONS-SCNC: 13 MMOL/L (ref 9–17)
BNP INTERPRETATION: ABNORMAL
BUN BLDV-MCNC: 17 MG/DL (ref 8–23)
BUN/CREAT BLD: 29 (ref 9–20)
CALCIUM SERPL-MCNC: 9 MG/DL (ref 8.6–10.4)
CHLORIDE BLD-SCNC: 96 MMOL/L (ref 98–107)
CO2: 33 MMOL/L (ref 20–31)
CREAT SERPL-MCNC: 0.59 MG/DL (ref 0.7–1.2)
CULTURE: NORMAL
CULTURE: NORMAL
GFR AFRICAN AMERICAN: >60 ML/MIN
GFR NON-AFRICAN AMERICAN: >60 ML/MIN
GFR SERPL CREATININE-BSD FRML MDRD: ABNORMAL ML/MIN/{1.73_M2}
GFR SERPL CREATININE-BSD FRML MDRD: ABNORMAL ML/MIN/{1.73_M2}
GLUCOSE BLD-MCNC: 150 MG/DL (ref 70–99)
Lab: NORMAL
Lab: NORMAL
POTASSIUM SERPL-SCNC: 3.4 MMOL/L (ref 3.7–5.3)
PRO-BNP: 596 PG/ML
SODIUM BLD-SCNC: 142 MMOL/L (ref 135–144)
SPECIMEN DESCRIPTION: NORMAL
SPECIMEN DESCRIPTION: NORMAL

## 2020-05-18 PROCEDURE — 80048 BASIC METABOLIC PNL TOTAL CA: CPT

## 2020-05-18 PROCEDURE — 6360000002 HC RX W HCPCS: Performed by: INTERNAL MEDICINE

## 2020-05-18 PROCEDURE — 36415 COLL VENOUS BLD VENIPUNCTURE: CPT

## 2020-05-18 PROCEDURE — 2580000003 HC RX 258: Performed by: INTERNAL MEDICINE

## 2020-05-18 PROCEDURE — 6370000000 HC RX 637 (ALT 250 FOR IP): Performed by: INTERNAL MEDICINE

## 2020-05-18 PROCEDURE — 94618 PULMONARY STRESS TESTING: CPT

## 2020-05-18 PROCEDURE — 99233 SBSQ HOSP IP/OBS HIGH 50: CPT | Performed by: INTERNAL MEDICINE

## 2020-05-18 PROCEDURE — 83880 ASSAY OF NATRIURETIC PEPTIDE: CPT

## 2020-05-18 RX ORDER — AMIODARONE HYDROCHLORIDE 200 MG/1
200 TABLET ORAL DAILY
Qty: 30 TABLET | Refills: 3 | Status: SHIPPED | OUTPATIENT
Start: 2020-05-19 | End: 2020-08-12 | Stop reason: SDUPTHER

## 2020-05-18 RX ORDER — CARVEDILOL 3.12 MG/1
3.12 TABLET ORAL 2 TIMES DAILY WITH MEALS
Qty: 60 TABLET | Refills: 3 | Status: ON HOLD | OUTPATIENT
Start: 2020-05-18 | End: 2020-09-15 | Stop reason: HOSPADM

## 2020-05-18 RX ORDER — POTASSIUM CHLORIDE 20 MEQ/1
20 TABLET, EXTENDED RELEASE ORAL DAILY
Qty: 30 TABLET | Refills: 3 | Status: SHIPPED | OUTPATIENT
Start: 2020-05-18 | End: 2020-08-12 | Stop reason: SDUPTHER

## 2020-05-18 RX ORDER — DOXYCYCLINE HYCLATE 100 MG/1
100 CAPSULE ORAL EVERY 12 HOURS SCHEDULED
Qty: 20 CAPSULE | Refills: 0 | Status: SHIPPED | OUTPATIENT
Start: 2020-05-18 | End: 2021-12-27

## 2020-05-18 RX ORDER — LOSARTAN POTASSIUM 25 MG/1
12.5 TABLET ORAL DAILY
Qty: 30 TABLET | Refills: 3 | Status: ON HOLD | OUTPATIENT
Start: 2020-05-19 | End: 2020-09-15 | Stop reason: HOSPADM

## 2020-05-18 RX ADMIN — ATORVASTATIN CALCIUM 40 MG: 40 TABLET, FILM COATED ORAL at 09:34

## 2020-05-18 RX ADMIN — DOXYCYCLINE HYCLATE 100 MG: 100 CAPSULE ORAL at 09:33

## 2020-05-18 RX ADMIN — APIXABAN 5 MG: 5 TABLET, FILM COATED ORAL at 09:34

## 2020-05-18 RX ADMIN — AMIODARONE HYDROCHLORIDE 200 MG: 200 TABLET ORAL at 09:33

## 2020-05-18 RX ADMIN — CARVEDILOL 3.12 MG: 3.12 TABLET, FILM COATED ORAL at 09:33

## 2020-05-18 RX ADMIN — PAROXETINE 30 MG: 10 TABLET, FILM COATED ORAL at 09:33

## 2020-05-18 RX ADMIN — LOSARTAN POTASSIUM 12.5 MG: 25 TABLET, FILM COATED ORAL at 09:34

## 2020-05-18 RX ADMIN — POTASSIUM CHLORIDE 20 MEQ: 1500 TABLET, EXTENDED RELEASE ORAL at 09:34

## 2020-05-18 RX ADMIN — TAMSULOSIN HYDROCHLORIDE 0.4 MG: 0.4 CAPSULE ORAL at 09:34

## 2020-05-18 RX ADMIN — SODIUM CHLORIDE, PRESERVATIVE FREE 10 ML: 5 INJECTION INTRAVENOUS at 09:37

## 2020-05-18 RX ADMIN — FUROSEMIDE 40 MG: 10 INJECTION, SOLUTION INTRAMUSCULAR; INTRAVENOUS at 09:34

## 2020-05-18 NOTE — DISCHARGE SUMMARY
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 *      Hospital Course:   Heri Oliveros is a 66 y.o. male admitted with Acute Combined Systolic and Diastolic HF, NYHA Class 3 and Sepsis secondary to UTI requiring ICCU. He presented to the ED with complaints of SOB with a sudden onset. He recently was being treated for a UTI with Macrobid. He at that time was denying any CP or palpitations. Covid-19 testing was completed and was negative. He did have hypoxia that was due to CHF and Obesity. Cardiology was consulted. Patient required Levophed for hypotension however that was discontinued the next day. He did have some difficulty voiding and lasix was started and he diuresed nicely with improvement. He was started on oral Doxycycline and this will be continued on discharge as well. He did qualify for Home oxygen at 2L per NC continuously with portability. Medications were adjusted per Cardiology and will be continued on discharge. I will discharge him home today. He will have follow up labs in 1 week with follow up appointments with both Dr. Romero Gonzalez and Dr. Kobe Fisher.      Consultants:  Dr. Romero Gonzalez, cardiology    Procedures: none    Complications: none    Discharge Condition: good    Exam:  GEN:  alert and oriented to person, place and time, well-developed and well-nourished, in no acute distress  EYES: No gross abnormalities. , PERRL and EOMI  NECK: normal, supple, no lymphadenopathy,  no carotid bruits  PULM: clear to auscultation bilaterally- no wheezes, rales or rhonchi, normal air movement, no respiratory distress and decreased breath sounds noted- throughout  COR: regular rate & rhythm, no murmurs, no gallops, S1 normal and S2 normal  ABD:  soft, non-tender, non-distended, normal bowel sounds, no masses or organomegaly  EXT:   Non-pitting edema to BLE  NEURO: follows commands, BEST, no deficits  SKIN:  no rashes or significant lesions    Significant Diagnostic Studies:   Lab Results   Component Value Date    WBC 7.2 05/16/2020    HGB 12.6 (L) 05/16/2020     05/16/2020       Lab Results   Component Value Date    BUN 17 05/18/2020    CREATININE 0.59 (L) 05/18/2020     05/18/2020    K 3.4 (L) 05/18/2020    CALCIUM 9.0 05/18/2020    CL 96 (L) 05/18/2020    CO2 33 (H) 05/18/2020    LABGLOM >60 05/18/2020       Lab Results   Component Value Date    WBCUA 10 TO 20 05/13/2020    RBCUA 0 TO 2 05/13/2020    EPITHUA 0 TO 2 05/13/2020    LEUKOCYTESUR NEGATIVE 05/13/2020    SPECGRAV 1.025 (H) 05/13/2020    GLUCOSEU NEGATIVE 05/13/2020    KETUA NEGATIVE 05/13/2020    PROTEINU 4+ (A) 05/13/2020    HGBUR 1+ (A) 05/13/2020    CASTUA NOT REPORTED 05/13/2020    CRYSTUA NOT REPORTED 05/13/2020    BACTERIA 1+ (A) 05/13/2020    YEAST NOT REPORTED 05/13/2020       Xr Chest Portable    Result Date: 5/13/2020  EXAMINATION: ONE XRAY VIEW OF THE CHEST 5/13/2020 3:08 pm COMPARISON: 03/06/2020 HISTORY: ORDERING SYSTEM PROVIDED HISTORY: sob, fever, but presented more like acute pulmonary edema TECHNOLOGIST PROVIDED HISTORY: sob, fever, but presented more like acute pulmonary edema FINDINGS: Cardiomegaly. Prominent central pulmonary vessels and indistinct peripheral pulmonary vessels. Interstitial markings radiating peripherally from the hua.   Chronic pleural thickening on the left     Cardiomegaly with pulmonary vascular congestion

## 2020-05-18 NOTE — PROGRESS NOTES
Patient to be discharged home this p.m.   LSW scheduled SC[facility] to [facility] the [facility], [place]  5/18/2020

## 2020-05-18 NOTE — PROGRESS NOTES
[facility]          [facility]               [address]        Patient Name: Media Krabbe 1945       A home oxygen evaluation has been completed. Patient was placed on room air for 5 minutes. SpO2 was 88 % on room air at rest. Patients SpO2 was below 89% and qualified for home oxygen. Oxygen was applied at 2 lpm via nasal cannula to maintain a SpO2 between 90-92% while at rest. Actual SpO2 was 94 %. Patient cannot ambulate for exercise flow rate.

## 2020-05-18 NOTE — PROGRESS NOTES
Francisco Bishop am scribing for and in the presence of Shireen Mancilla MD, F.A.C.C..    Patient: Sweta Zuleta  : 1945  Date of Visit: May 18, 2020    History of Present Illness:        Dear Neil Ruano MD    I had the pleasure of seeing Sweta Zuleta in my office today. Mr. Giulia Norris is a 66 y.o. male with a history of a history of heart failure. He has a history of hypertension and hyperlipidemia for years, and has borderline diabetes. ECG on 3/20: sinus tachycardia with first-degree AV block and nonspecific intraventricular conduction delay. Echocardiogram done 3/4/20 which showed severely reduced left ventricular systolic function with ejection fraction of 35% and moderate to severe aortic stenosis. This can be an underestimation because of reduced left ventricular systolic function. Mild to moderate aortic regurgitation. Moderate mitral regurgitation and moderate diastolic dysfunction. Heart Catheterization done on 3/12/20: LMCA: Normal 0% stenosis. LAD: Mild irregularities 20-30% LCx: Lesion on 2nd Ob Angie Ostial 50% stenosis. RCA: Mild irregularities 10-20%. EF:35%. Severe aortic stenosis by cardiac catheterization with peak to peak gradient of 67 mmHg and mean gradient of 48 mmHg across the aortic valve. [facility]20 due to acute heart failure: Patient treated for acute on chronic combined CHF and aortic stenosis, later developed atrial fibrillation with rapid ventricular response. Moved to the ICU and started on amiodarone drip and then converted back to normal sinus rhythm. Amiodarone oral tablet was started prior to discharge. On 2020, he presented to the emergency room with epistaxis. Nasal packing done that removed after 3 days. Wt Readings from Last 3 Encounters:   20 (!) 352 lb 3.2 oz (159.8 kg)   20 (!) 364 lb (165.1 kg)   04/15/20 (!) 366 lb (166 kg)     He is doing good today.   He is on 1 L of oxygen via nasal cannula, the plan is to send him with home oxygen therapy if he qualifies for it. His fluid balance is negative 7 L since admission. He is tolerating his cardiac medications and his blood pressure is maintained. PAST MEDICAL HISTORY:        Past Medical History:   Diagnosis Date    Anxiety     Atrial fibrillation, new onset ([address] 2020    BPH (benign prostatic hyperplasia)     CHF (congestive heart failure) (HCC)     Hyperlipidemia     Hypertension     Osteoarthritis    Nonischemic cardiomyopathy. Severe aortic stenosis. CURRENT ALLERGIES: Patient has no known allergies. REVIEW OF SYSTEMS: 14 systems were reviewed. Pertinent positives and negatives as above, all else negative. Past Surgical History:   Procedure Laterality Date    VASECTOMY  24    Social History:  Social History     Tobacco Use    Smoking status: Former Smoker     Packs/day: 1.00     Years: 10.00     Pack years: 10.00     Types: Cigarettes     Last attempt to quit: 1973     Years since quittin.4    Smokeless tobacco: Never Used   Substance Use Topics    Alcohol use: No    Drug use: No        CURRENT MEDICATIONS:        Outpatient Medications Marked as Taking for the 20 encounter [facility] HOSPITAL Encounter)   Medication Sig Dispense Refill    nitrofurantoin, macrocrystal-monohydrate, (MACROBID) 100 MG capsule Take 1 capsule by mouth 2 times daily for 10 days 20 capsule 0    apixaban (ELIQUIS) 5 MG TABS tablet Take 1 tablet by mouth 2 times daily 60 tablet 3    carvedilol (COREG) 3.125 MG tablet Take 0.5 tablets by mouth 2 times daily (with meals) 90 tablet 3    tamsulosin (FLOMAX) 0.4 MG capsule Take 1 capsule by mouth daily 30 capsule 1    amiodarone (CORDARONE) 200 MG tablet Take 200 mg twice a day thru 3/14/2020.   Starting 3/15/2020 take 200 mg dailly 45 tablet 3    losartan (COZAAR) 25 MG tablet Take 1 tablet by mouth daily 30 tablet 3    furosemide (LASIX) 20 MG tablet Take 1 tablet by mouth daily 60 tablet 3    atorvastatin (LIPITOR) 40 MG tablet Take 1 tablet by mouth daily 30 tablet 3    PARoxetine (PAXIL) 30 MG tablet Take 1 tablet by mouth every morning 90 tablet 3       FAMILY HISTORY: family history includes Arthritis in his mother; Heart Disease in his mother; High Blood Pressure in his brother and mother; High Cholesterol in his mother; Stroke in his mother. PHYSICAL EXAMINATION:     /81   Pulse 75   Temp 97 °F (36.1 °C) (Temporal)   Resp 18   Ht 5' 10\" (1.778 m)   Wt (!) 352 lb 3.2 oz (159.8 kg)   SpO2 95%   BMI 50.54 kg/m²  Body mass index is 50.54 kg/m². Constitutional: He is oriented to person, place, and time. He appears well-developed and well-nourished. In no acute distress. HEENT: Normocephalic and atraumatic. No JVD present. Carotid bruit is not present. No mass and no thyromegaly present. No lymphadenopathy present. Cardiovascular: Normal rate, regular rhythm, normal heart sounds. Exam reveals no gallop and no friction rubs. 3/6 systolic murmur, 2nd intercostal space on the LEFT just lateral to the sternum. Pulmonary/Chest: Effort normal and breath sounds normal. No respiratory distress. He has no wheezes, rhonchi or rales. Abdominal: Soft, non-tender. Bowel sounds and aorta are normal. He exhibits no organomegaly, mass or bruit. Extremities: Chronic bilateral lower extremity edema with chronic bilateral erythema of the legs. .  No cyanosis or clubbing. 2+ radial and carotid pulses. Cannot palpate distal lower extremity pulses   neurological: He is alert and oriented to person, place, and time. No evidence of gross cranial nerve deficit. Coordination appeared normal.   Skin: Skin is warm and dry. There is no rash or diaphoresis. Psychiatric: He has a normal mood and affect.  His speech is normal and behavior is normal.      MOST RECENT LABS ON RECORD:   Lab Results   Component Value Date    WBC 7.2 05/16/2020    HGB 12.6 (L) 05/16/2020    HCT 39.6 (L) 05/16/2020     Control  Beta Blocker: Continue low-dose carvedilol, 3.125 mg twice daily. Rhythm Control Agent: Continue Amiodarone (Cordarone) 200 mg once daily   Monitoring: Amiodarone Since he is being maintained on Amiodarone, I told him that we will need to closely monitor him for potential side effects. These include monitoring LFTs and TSH at least every 6 months as well as chest x-rays, pulmonary function tests, and eye exams at least on a yearly basis. Stroke Risk: His CHADS2-VASc score is greater than 2 (2.2% stroke risk)  Anticoagulation: Continue Apixaban (Eliquis) 5 mg every 12 hours. I also reminded him to watch for signs of blood in his stool or black tarry stools and stop the medication immediately if this develops as this could be life threatening.  Hypertension/transient hypotension on admission  · Currently stable. · Off norepinephrine. · Continue carvedilol 2.125 mg twice daily   · Continue losartan 12.5 mg daily. · Can be discharged on oral Lasix 40 mg daily. · I will get a repeat mesenteric panel and BNP on discharge. · Plan to see him back after 1 week. · Hyperlipidemia: Mixed, LDL 45 mg/dL on 3/5/2020  · Cholesterol Reduction Therapy: Continue Atorvastatin (Lipitor) 40 mg daily. Finally, I recommended that he continue his other medications and follow up with you as previously scheduled. Sincerely,  Marko Deshpande MD, F.A.C.C. [facility]    [address]  Phone: [phone], Fax: [phone]     I believe that the risk of significant morbidity and mortality related to the patient's current medical conditions are: high    The documentation recorded by the scribe, accurately and completely reflects the services I personally performed and the decisions made by me. Marko Deshpande MD, F.A.C.C.  May 18, 2020

## 2020-05-19 ENCOUNTER — CARE COORDINATION (OUTPATIENT)
Dept: CASE MANAGEMENT | Age: 75
End: 2020-05-19

## 2020-05-20 ENCOUNTER — CARE COORDINATION (OUTPATIENT)
Dept: CASE MANAGEMENT | Age: 75
End: 2020-05-20

## 2020-05-20 NOTE — CARE COORDINATION
[facility] Transitions Follow Up Call    2020    Patient: Mary Larson  Patient : 1945   MRN: <[id number]>  Reason for Admission:   Discharge Date: 20 RARS: Readmission Risk Score: 16         Spoke with: Patient    Patient states he is doing well at this time. No weight gain, SOB or wheezing, coughing, edema, abdominal edema, adhering to low NA diet and fluid restriction. Patient states he has all medications and is taking medications as directed and has no questions concerning medications at this time. Patient states knows when to contact physician or report to ED with worsening or severe symptoms, changes, or concerns. Will Follow up at later time. Darek Ott LPN     [facility] Transitions Subsequent and Final Call    Subsequent and Final Calls  Do you have any ongoing symptoms?:  No  Have your medications changed?:  No  Do you have any questions related to your medications?:  No  Do you currently have any active services?:  No  Do you have any needs or concerns that I can assist you with?:  No  Care Transitions Interventions  Other Interventions:             Follow Up  Future Appointments   Date Time Provider Ramses Juarez   2020  1:00 PM Jordin Arnett ENT [facility]   2020  8:30 AM Veronda Canavan, MD [facility] Loetta Siskin M   2020 11:40 AM [facility]   2020  2:40 PM MD TONYA Lisa CARD [facility]   2020  1:30 PM Veronda Canavan, MD [facility] Lauree Blacker, LPN

## 2020-05-21 ENCOUNTER — OFFICE VISIT (OUTPATIENT)
Dept: OTOLARYNGOLOGY | Age: 75
End: 2020-05-21
Payer: MEDICARE

## 2020-05-21 VITALS
DIASTOLIC BLOOD PRESSURE: 59 MMHG | HEART RATE: 64 BPM | HEIGHT: 69 IN | TEMPERATURE: 98.3 F | BODY MASS INDEX: 46.65 KG/M2 | RESPIRATION RATE: 24 BRPM | OXYGEN SATURATION: 98 % | WEIGHT: 315 LBS | SYSTOLIC BLOOD PRESSURE: 94 MMHG

## 2020-05-21 PROCEDURE — 4040F PNEUMOC VAC/ADMIN/RCVD: CPT | Performed by: PHYSICIAN ASSISTANT

## 2020-05-21 PROCEDURE — G8427 DOCREV CUR MEDS BY ELIG CLIN: HCPCS | Performed by: PHYSICIAN ASSISTANT

## 2020-05-21 PROCEDURE — 99203 OFFICE O/P NEW LOW 30 MIN: CPT | Performed by: PHYSICIAN ASSISTANT

## 2020-05-21 PROCEDURE — G8417 CALC BMI ABV UP PARAM F/U: HCPCS | Performed by: PHYSICIAN ASSISTANT

## 2020-05-21 PROCEDURE — 99211 OFF/OP EST MAY X REQ PHY/QHP: CPT | Performed by: PHYSICIAN ASSISTANT

## 2020-05-21 PROCEDURE — 1036F TOBACCO NON-USER: CPT | Performed by: PHYSICIAN ASSISTANT

## 2020-05-21 PROCEDURE — 1123F ACP DISCUSS/DSCN MKR DOCD: CPT | Performed by: PHYSICIAN ASSISTANT

## 2020-05-21 PROCEDURE — 1111F DSCHRG MED/CURRENT MED MERGE: CPT | Performed by: PHYSICIAN ASSISTANT

## 2020-05-21 ASSESSMENT — ENCOUNTER SYMPTOMS
WHEEZING: 0
BACK PAIN: 0
COUGH: 0
ABDOMINAL DISTENTION: 0
EYE DISCHARGE: 0
VOICE CHANGE: 0
ABDOMINAL PAIN: 0
VOMITING: 0
COLOR CHANGE: 0
RECTAL PAIN: 0
CHEST TIGHTNESS: 0
ANAL BLEEDING: 0
CONSTIPATION: 0
EYE PAIN: 0
EYE REDNESS: 0
TROUBLE SWALLOWING: 0
SORE THROAT: 0
STRIDOR: 0
PHOTOPHOBIA: 0
RHINORRHEA: 0
SINUS PRESSURE: 1
SINUS PAIN: 0
APNEA: 0
BLOOD IN STOOL: 0
SHORTNESS OF BREATH: 0
DIARRHEA: 0
EYE ITCHING: 0
NAUSEA: 0
FACIAL SWELLING: 0
CHOKING: 0

## 2020-05-21 NOTE — PATIENT INSTRUCTIONS
powder)  Corn syrup (optional, for additional moisturizing effect)  White vinegar (for antiseptic effect, if recommended by your doctor)  An airtight container for mixing saline rinse  Rubber bulb syringe (like those used to clear infant noses and ears)  Household bleach (for cleaning container and bulb syringe)    PREPARATION:  To prepare the rinses, measure out 1 quart (1 liter) of distilled or boiled tap water into the mixing container. Then add 2-3 heaping teaspoons of salt and 1 teaspoon of baking soda, mix to dissolve, and place in refrigerator for 1-2 days of storage. You may add 3-4 tablespoons of corn syrup if you experience nasal dryness. If your doctor recommends it, add 1 teaspoon of white vinegar to this mixture as well. STORAGE & CLEANING:  It is advised that you make up fresh rinse every day or two, and that you keep the unused rinse in the refrigerator in an airtight container to prevent bacterial growth. Set out enough rinse for your next use well in advance to let it come to room temperature before use. Wash the container and bulb syringe at least once a week in a quart of water with 2 tablespoons of bleach, rinse and dry to prevent bacterial growth    USING SALINE RINSES:  To perform nasal saline rinses, plan on using at least 1 pint (2 cups) twice daily. Lean over a sink or other basin (some people prefer to do this in the shower as it can be messy, especially when you first start) to catch the rinse as it drains from your nose and mouth. Fill the bulb syringe with the rinse solution and place it into the nostril on one side. Gently squeeze the bulb to flush the nasal passage until the rinse drains clear. Repeat on the other side. You should plan on using the rinses twice a day, which should take about 10 minutes to perform.     OTHER MEDICATIONS:  If your doctor has prescribed other nasal spray medications, such as a nasal steroid, it is best to apply these after the nasal rinse so

## 2020-05-21 NOTE — PROGRESS NOTES
[facility] [address] [facility]  [address]  [facility] [address]  Dept: [phone]   JORDANA Ortiz MD (supervising physician)    Anshul Robert 66 y.o. male     Patient presents with a chief complaint of Epistaxis (States \"I went to the ER for a bloody nose that wouldn't stop. \"  Nasal packing was removed 3 days later on 4-15. Patient states \"I stuck a Q-tip up my nose a few day prior to the ER visit and it bled then also. \")       BP (!) 94/59 (Site: Left Upper Arm, Position: Sitting, Cuff Size: Medium Adult)   Pulse 64   Temp 98.3 °F (36.8 °C) (Tympanic)   Resp 24   Ht 5' 9\" (1.753 m)   Wt (!) 351 lb (159.2 kg)   SpO2 98% Comment: O2 2lpm  BMI 51.83 kg/m²       History of Presenting Illness: The patient/caregiver reports a history of complaint with the following features: Onset/Quality/Timing:   Had one nose bleed incident at home before the incident that caused him to go to the ED visit. This initial bleeding episode started after he put a cotton swab up his nose because he felt like had something to clean out of it. He says he doesn't normally put cotton swabs up his nose. He was able to get that initial nosebleed to stop. A \"week or so later\" had the 2nd bleeding episode that led him to go to the ED. Pt says the day he want to ED the bleeding started after blowing his nose \"too hard\". Pt indicates at that time he had been swallowing the blood and then was vomiting it back up. Has Hx of nosebleeds prior to this, but says last episode was \"years and years\" prior to this. Timing: An episode started after blowing his nose and another after putting a cotton swab up his nose. Quality:  Nasal bleeding from left side of nose, but Pt indicates this problem has resolved and he thinks that being on both Eliquis and ASA contributed. He is only taking Eliquis now.   Location:  Left side of nose  Severity:   ED visit 4/12/20 for nasal bleeding. Rhino Rocket placed on left side at that time, which resulted in control of the bleeding. Associated symptoms/other symptoms:  Blood in urine? Denies  Blood in stool? Denies  Black stool? Denies    Nasal congestion? Yes, as described below. Risk factors/other information:  Anticoagulant or antiplatelet use? Yes, on Eliquis for atrial fib. On Eliquis since around March 2020. Was on both baby ASA and Eliquis around the time bleeding started and stopped taking the ASA around the time he went to ED for the nasal bleeding. Pt says his PCP took him off of the baby ASA. Hx of nasal injury? Denies  Hx of nasal or sinus surgery? Denies  Known personal Hx of bleeding disorder? Denies (other than anticoagulant use). Family Hx of bleeding disorders? Denies  Intranasal drug use, including nasal steroids? Has used nasal sprays (including 4 Way nasal spray) before but says not using around the time the nasal bleeding started. Says he is using 4 Way nasal spray nightly now before bed because it \"opens\" his nose up. Says he rinses his nose out with saline spray after using the 4 Way nasal spray. Says has tried nasal steroid(s) before with no relief. Pt asked duration of use of nasal steroid in the past and says he used it one day and then tried it another day. Pt advised they are most effective if used daily and don't work instantaneously like the 4 Way nasal spray. Pt says nasal steroid(s) \"locks him up\" (referring to causing congestion in nose). Reports nasal congestion at night but usually ok throughout most of day without nasal congestion. Has used neti pot before, but hasn't been using because needs to get distilled water. Hx of environmental/seasonal allergies? Yes, affected in spring and fall. Pt indicates LE swelling due to lymphedema.   Platelets 922 k/uL and Hgb 12.6 on CBC from 5/16/20  Hospitalized 5/13/20 to 5/18/20 for acute combined systolic and capsule by mouth every 12 hours for 10 days, Disp: 20 capsule, Rfl: 0    acetaminophen (TYLENOL) 500 MG tablet, Take 500 mg by mouth every 6 hours as needed for Pain, Disp: , Rfl:     furosemide (LASIX) 20 MG tablet, Take 1 tablet by mouth daily, Disp: 60 tablet, Rfl: 3    atorvastatin (LIPITOR) 40 MG tablet, Take 1 tablet by mouth daily, Disp: 30 tablet, Rfl: 3    PARoxetine (PAXIL) 30 MG tablet, Take 1 tablet by mouth every morning, Disp: 90 tablet, Rfl: 3   No Known Allergies   Past Surgical History:   Procedure Laterality Date    CARDIAC CATHETERIZATION  2020    VASECTOMY  1971      Social History     Socioeconomic History    Marital status:      Spouse name: Not on file    Number of children: Not on file    Years of education: Not on file    Highest education level: Not on file   Occupational History    Not on file   Social Needs    Financial resource strain: Not hard at all   [place] insecurity     Worry: Never true     Inability: Never true   [employer] needs     Medical: No     Non-medical: No   Tobacco Use    Smoking status: Former Smoker     Packs/day: 1.00     Years: 10.00     Pack years: 10.00     Types: Cigarettes     Last attempt to quit: 1973     Years since quittin.4    Smokeless tobacco: Never Used   Substance and Sexual Activity    Alcohol use: No    Drug use: No    Sexual activity: Not on file   Lifestyle    Physical activity     Days per week: 0 days     Minutes per session: 0 min    Stress: Not at all   Relationships    Social connections     Talks on phone: Not on file     Gets together: Not on file     Attends [employer] service: Not on file     Active member of club or organization: Not on file     Attends meetings of clubs or organizations: Not on file     Relationship status:     Intimate partner violence     Fear of current or ex partner: No     Emotionally abused: No     Physically abused: No     Forced sexual activity: No   Other Topics no hemotympanum    Left Tympanic Membrane: normal landmarks, translucent, mobile to pneumatic otoscopy, no perforation, no effusion, no hemotympanum    Hearing: intact to spoken voice    NOSE:    Nasal Skin: no lesions, no lacerations, no scars    Nasal Dorsum: symmetric with no visible or palpable deformities    Nasal Tip: normal symmetric nasal tip, normal nasal valves    Nasal Mucosa: normal, pink, some portions moist but septal mucosa dry, no drainage, no polyps    Septum: not markedly deformed, midline, no exposed vessels, no active bleeding but dried bloody crusted debris left side and a smaller amount on the right side, no septal granuloma, no septal perforation    Turbinates: normal size and conformation, no swelling    ORAL CAVITY/MOUTH:    Lips, teeth, gums: normal lips, normal gums, dentition intact    Oral Mucosa: normal, moist, no lesions    Palate: normal hard palate, normal soft palate, symmetric palatal elevation    Floor of Mouth: normal floor of mouth    Tongue: normal tongue, no lesions, no edema, no masses, normal mucosa, mobile    Tonsils: no enlargement, normal tonsils, symmetric, no lesions    Posterior pharynx: normally formed, no lesions or masses, no PND, no erythema, no exudate    NECK:    Neck: no masses, trachea midline, functional active range of motion, no cysts or pits, no tenderness to palpation    Thyroid: normal thyroid, no enlargement, no tenderness, no nodules    LYMPH NODES:    Cervical: no palpable lymph node enlargement    RESPIRATORY:    Inspection/Auscultation: good air movement (laterally and posteriorly good air movement; when auscultated anteriorly lung sounds not as loud as laterally and posteriorly and were hard to hear and may be due to body habitus), chest expands symmetrically, normal breath sounds, no wheezing, no stridor, no rhonchi, no crackles    CARDIOVASCULAR SYSTEM:  Heart regular rate and rhythm, 3/6 (loud, but no thrill) systolic murmur loudest at aortic treatment. Pt is aware that he should not use topical nasal decongestants on a long-term basis and that this can contribute to nasal congestion complaints. He reports night time nasal congestion, but typically doesn't have congestion during the day. Diagnosis Orders   1. Epistaxis     2. Anticoagulant long-term use     3. Nasal mucosa dry      particularly bilateral septal mucosa      Return if symptoms worsen or fail to improve.

## 2020-05-27 ENCOUNTER — OFFICE VISIT (OUTPATIENT)
Dept: CARDIOLOGY | Age: 75
End: 2020-05-27
Payer: MEDICARE

## 2020-05-27 ENCOUNTER — CARE COORDINATION (OUTPATIENT)
Dept: CASE MANAGEMENT | Age: 75
End: 2020-05-27

## 2020-05-27 ENCOUNTER — TELEPHONE (OUTPATIENT)
Dept: CARDIOLOGY CLINIC | Age: 75
End: 2020-05-27

## 2020-05-27 ENCOUNTER — HOSPITAL ENCOUNTER (OUTPATIENT)
Dept: VASCULAR LAB | Age: 75
Discharge: HOME | End: 2020-05-29
Payer: MEDICARE

## 2020-05-27 VITALS
RESPIRATION RATE: 18 BRPM | HEIGHT: 69 IN | BODY MASS INDEX: 46.65 KG/M2 | SYSTOLIC BLOOD PRESSURE: 96 MMHG | HEART RATE: 63 BPM | OXYGEN SATURATION: 100 % | DIASTOLIC BLOOD PRESSURE: 59 MMHG | WEIGHT: 315 LBS

## 2020-05-27 PROBLEM — N39.0 SEPTIC SHOCK DUE TO URINARY TRACT INFECTION (HCC): Status: ACTIVE | Noted: 2020-05-27

## 2020-05-27 PROBLEM — A41.9 SEPTIC SHOCK DUE TO URINARY TRACT INFECTION (HCC): Status: ACTIVE | Noted: 2020-05-27

## 2020-05-27 PROBLEM — R65.21 SEPTIC SHOCK DUE TO URINARY TRACT INFECTION (HCC): Status: ACTIVE | Noted: 2020-05-27

## 2020-05-27 PROCEDURE — 99215 OFFICE O/P EST HI 40 MIN: CPT | Performed by: INTERNAL MEDICINE

## 2020-05-27 PROCEDURE — G8417 CALC BMI ABV UP PARAM F/U: HCPCS | Performed by: INTERNAL MEDICINE

## 2020-05-27 PROCEDURE — 1123F ACP DISCUSS/DSCN MKR DOCD: CPT | Performed by: INTERNAL MEDICINE

## 2020-05-27 PROCEDURE — 1036F TOBACCO NON-USER: CPT | Performed by: INTERNAL MEDICINE

## 2020-05-27 PROCEDURE — G8427 DOCREV CUR MEDS BY ELIG CLIN: HCPCS | Performed by: INTERNAL MEDICINE

## 2020-05-27 PROCEDURE — 1111F DSCHRG MED/CURRENT MED MERGE: CPT | Performed by: INTERNAL MEDICINE

## 2020-05-27 PROCEDURE — 4040F PNEUMOC VAC/ADMIN/RCVD: CPT | Performed by: INTERNAL MEDICINE

## 2020-05-27 PROCEDURE — 93880 EXTRACRANIAL BILAT STUDY: CPT

## 2020-05-27 PROCEDURE — 99211 OFF/OP EST MAY X REQ PHY/QHP: CPT | Performed by: INTERNAL MEDICINE

## 2020-05-27 NOTE — PROGRESS NOTES
Cardiology Specialist    [address] [place] [address] Jovany Landaverde Briansivan Gonzalesmarli Tao [address], [address] [facility]  Phone: [phone], Fax: [phone]     I believe that the risk of significant morbidity and mortality related to the patient's current medical conditions are: high    The documentation recorded by the scribe, accurately and completely reflects the services I personally performed and the decisions made by me. Jalil Childers MD, F.A.C.C.  May 27, 2020

## 2020-05-27 NOTE — CARE COORDINATION
[facility] Transitions Follow Up Call    2020    Patient: John Foster  Patient : 1945   MRN: <[id number]>  Reason for Admission:   Discharge Date: 20 RARS: Readmission Risk Score: 12         Spoke with:  [address] Transitions Subsequent and Final Call    Subsequent and Final Calls  Do you have any ongoing symptoms?:  No  Have your medications changed?:  No  Do you have any questions related to your medications?:  No  Do you currently have any active services?:  No  Do you have any needs or concerns that I can assist you with?:  No  Care Transitions Interventions  Other Interventions:        States he's doing fine. Denies SOB, swelling, CP, palpitations, fever or cough. States he had a \"little weight gain,\" but was at his cardiologist today who told him it may be expected after how much fluid they took off in the hospital. Advised pt to call his doctor with new or worsening symptoms. States he's taking medications as prescribed and denies questions or concerns at this time.      Follow Up  Future Appointments   Date Time Provider Ramses Juarez   2020  1:20 PM Lizzie Rivero MD [facility]   2020  2:40 PM Lizzie Rivero MD [facility]   2020  1:30 PM MD EYAL AguiarMelcroftFrederick Dave

## 2020-05-28 ENCOUNTER — TELEPHONE (OUTPATIENT)
Dept: CARDIOLOGY | Age: 75
End: 2020-05-28

## 2020-06-01 ENCOUNTER — VIRTUAL VISIT (OUTPATIENT)
Dept: CARDIOLOGY CLINIC | Age: 75
End: 2020-06-01

## 2020-06-01 NOTE — PROGRESS NOTES
[address] [place] [address],[place] [address] [address] [place] [address]  Dept: [phone]  Dept Fax: [phone]  Loc: [phone]    Visit Date: 6/1/2020    TELEHEALTH EVALUATION -- Audio/Visual (During ALWMY-37 public health emergency)    Adal Lundy is a 66 y.o. male who is seen via Virtual Video Doxy visit. Adal Lundy was at home. Provider was present at Cardiology clinic. Cardiology staff assisted. HPI:   HPI   67 yo M hx of HTN, HLD, DM II, PAF, osteoarthritis, morbid obesity non-obstructive CAD 3/2020 who was admitted early March 2020 with severe congestive CHF who presents for evaluation of severe AS. He is in a wheelchair for most of the day. When he walks he gets sob fairly quickly. He can walk with walker 6-10 feet and then his knees give out and becomes dyspneic. He is on Eliquis. No bleeding. He has his own teeth, and has a dentist.  No syncope or chest pain. Denies lightheadedness. He has significant swelling that is worse over the last couple months. He has had two episodes of CHF and volume overload. ECG with NSR with 1st degree AVB, IVCD. His wife is unable to take care of him due to oxygen-dependency and she requests that we place him in rehab. No malignancies.             Current Outpatient Medications:     tamsulosin (FLOMAX) 0.4 MG capsule, Take 1 capsule by mouth daily, Disp: 30 capsule, Rfl: 3    apixaban (ELIQUIS) 5 MG TABS tablet, Take 1 tablet by mouth 2 times daily, Disp: 60 tablet, Rfl: 3    amiodarone (CORDARONE) 200 MG tablet, Take 1 tablet by mouth daily, Disp: 30 tablet, Rfl: 3    losartan (COZAAR) 25 MG tablet, Take 0.5 tablets by mouth daily, Disp: 30 tablet, Rfl: 3    carvedilol (COREG) 3.125 MG tablet, Take 1 tablet by mouth 2 times daily (with meals), Disp: 60 tablet, Rfl: 3    potassium chloride (KLOR-CON M) 20 MEQ extended release tablet, Take 1 tablet by mouth daily, Disp: 30 tablet, Rfl: 3   acetaminophen (TYLENOL) 500 MG tablet, Take 500 mg by mouth every 6 hours as needed for Pain, Disp: , Rfl:     furosemide (LASIX) 20 MG tablet, Take 1 tablet by mouth daily, Disp: 60 tablet, Rfl: 3    atorvastatin (LIPITOR) 40 MG tablet, Take 1 tablet by mouth daily, Disp: 30 tablet, Rfl: 3    PARoxetine (PAXIL) 30 MG tablet, Take 1 tablet by mouth every morning, Disp: 90 tablet, Rfl: 3    Past Medical History  Ashok Fajardo  has a past medical history of Anxiety, Aortic stenosis, Atrial fibrillation, new onset ([facility] [address] BPH (benign prostatic hyperplasia), CHF (congestive heart failure) ([facility] [address] Hyperlipidemia, Hypertension, and Osteoarthritis. Social History  Ashok Fajardo  reports that he quit smoking about 47 years ago. His smoking use included cigarettes. He has a 10.00 pack-year smoking history. He has never used smokeless tobacco. He reports that he does not drink alcohol or use drugs. Family History  Ashok Fajardo family history includes Arthritis in his mother; Heart Disease in his mother; High Blood Pressure in his brother and mother; High Cholesterol in his mother; Stroke in his mother. There is no family history of bicuspid aortic valve, aneurysms, heart transplant, pacemakers, defibrillators, or sudden cardiac death. Past Surgical History   Past Surgical History:   Procedure Laterality Date    CARDIAC CATHETERIZATION  03/2020    VASECTOMY  1971       Review of Systems   Constitutional: Negative for chills and fever  HENT: Negative for congestion, sinus pressure, sneezing and sore throat. Eyes: Negative for pain, discharge, redness and itching. Respiratory: Negative for apnea, cough  Gastrointestinal: Negative for blood in stool, constipation, diarrhea   Endocrine: Negative for cold intolerance, heat intolerance, polydipsia. Genitourinary: Negative for dysuria, enuresis, flank pain and hematuria. Musculoskeletal: Negative for arthralgias, joint swelling and neck pain.    Neurological: Negative for numbness

## 2020-06-02 ENCOUNTER — CARE COORDINATION (OUTPATIENT)
Dept: CASE MANAGEMENT | Age: 75
End: 2020-06-02

## 2020-06-05 ENCOUNTER — TELEPHONE (OUTPATIENT)
Dept: CARDIOLOGY CLINIC | Age: 75
End: 2020-06-05

## 2020-06-09 ENCOUNTER — VIRTUAL VISIT (OUTPATIENT)
Dept: CARDIOTHORACIC SURGERY | Age: 75
End: 2020-06-09
Payer: MEDICARE

## 2020-06-09 ENCOUNTER — CARE COORDINATION (OUTPATIENT)
Dept: CASE MANAGEMENT | Age: 75
End: 2020-06-09

## 2020-06-09 PROCEDURE — G8428 CUR MEDS NOT DOCUMENT: HCPCS | Performed by: PHYSICIAN ASSISTANT

## 2020-06-09 PROCEDURE — 99203 OFFICE O/P NEW LOW 30 MIN: CPT | Performed by: PHYSICIAN ASSISTANT

## 2020-06-09 PROCEDURE — 4040F PNEUMOC VAC/ADMIN/RCVD: CPT | Performed by: PHYSICIAN ASSISTANT

## 2020-06-09 PROCEDURE — 1111F DSCHRG MED/CURRENT MED MERGE: CPT | Performed by: PHYSICIAN ASSISTANT

## 2020-06-09 PROCEDURE — 1123F ACP DISCUSS/DSCN MKR DOCD: CPT | Performed by: PHYSICIAN ASSISTANT

## 2020-06-09 NOTE — PROGRESS NOTES
regurgitation. Pericardial Effusion  No significant pericardial effusion is seen.     Miscellaneous  IVC Increased diameter, but still has inspiratory variation suggesting upper  normal or mildly elevated RA filling pressure (i.e. CVP) . Moderate diastolic dysfunction.         ROS:   Constitutional: Negative for activity change, chills, fatigue, fever and unexpected weight change. Respiratory: Negative for apnea, shortness of breath, wheezing and stridor. Cardiovascular: Negative for chest pain, palpitations and leg swelling. Gastrointestinal: Negative for hematochezia, melana, constipation, and N/V/D. Musculoskeletal: Negative for myalgias  Skin: Negative for color change, rash and wound. Neurological: Negative for dizziness or syncope. Physical Exam and Vital signs:  [ INSTRUCTIONS:  \"[x]\" Indicates a positive item  \"[]\" Indicates a negative item   Vital Signs: (As obtained by patient/caregiver or practitioner observation)    Blood pressure- 112/66 Heart rate- 67   Respiratory rate- 16 02 sat-95%   Height- 5'11    Constitutional: [x] Appears well-developed and well-nourished [x] No apparent distress      [] Abnormal-   Mental status  [x] Alert and awake  [x] Oriented to person/place/time [x]Able to follow commands      Eyes:  EOM    [x]  Normal  [] Abnormal-  Sclera  [x]  Normal  [] Abnormal -         Discharge [x]  None visible  [] Abnormal -    HENT:   [x] Normocephalic, atraumatic.   [] Abnormal   [] Mouth/Throat: Mucous membranes are moist.     External Ears [x] Normal  [] Abnormal-     Neck: [x] No visualized mass     Pulmonary/Chest: [x] Respiratory effort normal.  [x] No visualized signs of difficulty breathing or respiratory distress        [] Abnormal-     Neurological:        [x] No Facial Asymmetry (Cranial nerve 7 motor function) (limited exam to video visit)          [] No gaze palsy        [] Abnormal-         Skin:        [x] No significant exanthematous lesions or discoloration noted counseling/coordinating patient's care. This patient's record in the EMR has been reviewed and the patient has been discussed with the nursing staff and provider requesting the consultation. As per the [address] guidelines regarding the conservation of personal protective equipment (PPE), the patient has not been seen and examined as doing so will inappropriately use PPE and expose staff to pathogens. CT/ Vascular surgery will continue to follow via the EMR--please contact us when [place] has been ruled out and the patient's isolation precautions have been lifted in order to proceed with any subsequent testing/invasive management. If the patient's clinical status changes and there are concerns for an emergency cardiac issue, we will see the patient at any time and proceed with the necessary management as dictated by the patient's clinical situation.

## 2020-06-11 ENCOUNTER — TELEPHONE (OUTPATIENT)
Dept: CARDIOLOGY CLINIC | Age: 75
End: 2020-06-11

## 2020-06-17 ENCOUNTER — CARE COORDINATION (OUTPATIENT)
Dept: CASE MANAGEMENT | Age: 75
End: 2020-06-17

## 2020-06-17 ENCOUNTER — HOSPITAL ENCOUNTER (OUTPATIENT)
Age: 75
Discharge: HOME | End: 2020-06-17
Payer: MEDICARE

## 2020-06-17 ENCOUNTER — HOSPITAL ENCOUNTER (OUTPATIENT)
Dept: CT IMAGING | Age: 75
Discharge: HOME | End: 2020-06-17
Payer: MEDICARE

## 2020-06-17 LAB
ANION GAP SERPL CALCULATED.3IONS-SCNC: 8 MEQ/L (ref 8–16)
BUN BLDV-MCNC: 18 MG/DL (ref 7–22)
CALCIUM SERPL-MCNC: 9.1 MG/DL (ref 8.5–10.5)
CHLORIDE BLD-SCNC: 100 MEQ/L (ref 98–111)
CO2: 28 MEQ/L (ref 23–33)
CREAT SERPL-MCNC: 0.6 MG/DL (ref 0.4–1.2)
GFR SERPL CREATININE-BSD FRML MDRD: > 90 ML/MIN/1.73M2
GLUCOSE BLD-MCNC: 103 MG/DL (ref 70–108)
POTASSIUM SERPL-SCNC: 4.2 MEQ/L (ref 3.5–5.2)
PRO-BNP: 495.9 PG/ML (ref 0–1800)
SODIUM BLD-SCNC: 136 MEQ/L (ref 135–145)

## 2020-06-17 PROCEDURE — 6360000004 HC RX CONTRAST MEDICATION: Performed by: INTERNAL MEDICINE

## 2020-06-17 PROCEDURE — 71275 CT ANGIOGRAPHY CHEST: CPT

## 2020-06-17 PROCEDURE — 36415 COLL VENOUS BLD VENIPUNCTURE: CPT

## 2020-06-17 PROCEDURE — 80048 BASIC METABOLIC PNL TOTAL CA: CPT

## 2020-06-17 PROCEDURE — 74174 CTA ABD&PLVS W/CONTRAST: CPT

## 2020-06-17 PROCEDURE — 83880 ASSAY OF NATRIURETIC PEPTIDE: CPT

## 2020-06-17 RX ADMIN — IOPAMIDOL 150 ML: 755 INJECTION, SOLUTION INTRAVENOUS at 13:51

## 2020-06-19 ENCOUNTER — CARE COORDINATION (OUTPATIENT)
Dept: CASE MANAGEMENT | Age: 75
End: 2020-06-19

## 2020-07-31 ENCOUNTER — OFFICE VISIT (OUTPATIENT)
Dept: CARDIOLOGY | Age: 75
End: 2020-07-31
Payer: MEDICARE

## 2020-07-31 VITALS
HEART RATE: 60 BPM | HEIGHT: 69 IN | BODY MASS INDEX: 46.65 KG/M2 | WEIGHT: 315 LBS | DIASTOLIC BLOOD PRESSURE: 62 MMHG | RESPIRATION RATE: 16 BRPM | SYSTOLIC BLOOD PRESSURE: 114 MMHG

## 2020-07-31 PROCEDURE — 4040F PNEUMOC VAC/ADMIN/RCVD: CPT | Performed by: INTERNAL MEDICINE

## 2020-07-31 PROCEDURE — 1036F TOBACCO NON-USER: CPT | Performed by: INTERNAL MEDICINE

## 2020-07-31 PROCEDURE — G8427 DOCREV CUR MEDS BY ELIG CLIN: HCPCS | Performed by: INTERNAL MEDICINE

## 2020-07-31 PROCEDURE — 99211 OFF/OP EST MAY X REQ PHY/QHP: CPT | Performed by: INTERNAL MEDICINE

## 2020-07-31 PROCEDURE — 99214 OFFICE O/P EST MOD 30 MIN: CPT | Performed by: INTERNAL MEDICINE

## 2020-07-31 PROCEDURE — G8417 CALC BMI ABV UP PARAM F/U: HCPCS | Performed by: INTERNAL MEDICINE

## 2020-07-31 PROCEDURE — 1123F ACP DISCUSS/DSCN MKR DOCD: CPT | Performed by: INTERNAL MEDICINE

## 2020-07-31 RX ORDER — ATORVASTATIN CALCIUM 40 MG/1
40 TABLET, FILM COATED ORAL DAILY
Qty: 90 TABLET | Refills: 3 | Status: SHIPPED | OUTPATIENT
Start: 2020-07-31 | End: 2021-08-16

## 2020-07-31 NOTE — PROGRESS NOTES
Christiano Guy am scribing for and in the presence of Alec Pérez MD, F.A.C.C..    Patient: Meghan Dejesus  : 1945  Date of Visit: 2020    History of Present Illness:        Dear Jose Manuel Auguste MD    I had the pleasure of seeing Meghan Dejesus in my office today. Mr. Jenniffer Mckinney is a 66 y.o. male with a history of a history of heart failure. He has a history of hypertension and hyperlipidemia for years, and has borderline diabetes. Symptoms of heart failure: Mr. Jenniffer Mckinney reports that he has a fairly poor exercise tolerance. His says that he can really not ambulate at all due to his weight. . Mr. Jenniffer Mckinney also reports moderate chronic lower extremity edema. ECG on 3/20: sinus tachycardia with first-degree AV block and nonspecific intraventricular conduction delay. Echocardiogram done 3/4/20 which showed severely reduced left ventricular systolic function with ejection fraction of 35% and moderate to severe aortic stenosis. This can be an underestimation because of reduced left ventricular systolic function. Mild to moderate aortic regurgitation. Moderate mitral regurgitation and moderate diastolic dysfunction. Heart Catheterization done on 3/12/20: LMCA: Normal 0% stenosis. LAD: Mild irregularities 20-30% LCx: Lesion on 2nd Ob Angie Ostial 50% stenosis. RCA: Mild irregularities 10-20%. EF:35%. Severe aortic stenosis by cardiac catheterization with peak to peak gradient of 67 mmHg and mean gradient of 48 mmHg across the aortic valve. Admission to [facility] on 3/4/20-3/9/20 due to acute heart failure: Patient treated for acute on chronic combined CHF and aortic stenosis, later developed atrial fibrillation with rapid ventricular response. Moved to the ICU and started on amiodarone drip and then later converted back to normal sinus rhythm. Amiodarone oral tablet was started prior to discharge. On 2020, he presented to the emergency room with epistaxis.   Nasal packing done that removed after 3 days. Mr. Jenniffer Mckinney is here today for a 6 week follow up. Work-up for TAVR procedure is in process. He get the CT scan done. His carotid arteries are good. He has been evaluated by the dentist and had a deep cleaning done but he had a bad tooth that needs to be pulled before the procedure. He reported doing okay overall. He has gained few pounds since last visit. He has chronic, stable shortness of breath on minimal exertion that stayed the same since last time. He denies any chest pain, pressure or tightness. No palpitations or dizziness. No fever or cough. No abdominal pain, nausea or vomiting. No problems with his current medications. Wt Readings from Last 3 Encounters:   20 (!) 366 lb (166 kg)   20 (!) 353 lb (160.1 kg)   20 (!) 350 lb (158.8 kg)       PAST MEDICAL HISTORY:        Past Medical History:   Diagnosis Date    Anxiety     Aortic stenosis     Atrial fibrillation, new onset ([facility] [address] 2020    BPH (benign prostatic hyperplasia)     CHF (congestive heart failure) (HCC)     Hyperlipidemia     Hypertension     Osteoarthritis    Nonischemic cardiomyopathy. Severe aortic stenosis. CURRENT ALLERGIES: Patient has no known allergies. REVIEW OF SYSTEMS: 14 systems were reviewed. Pertinent positives and negatives as above, all else negative.      Past Surgical History:   Procedure Laterality Date    CARDIAC CATHETERIZATION  2020    VASECTOMY      Social History:  Social History     Tobacco Use    Smoking status: Former Smoker     Packs/day: 1.00     Years: 10.00     Pack years: 10.00     Types: Cigarettes     Last attempt to quit: 1973     Years since quittin.6    Smokeless tobacco: Never Used   Substance Use Topics    Alcohol use: No    Drug use: No        CURRENT MEDICATIONS:        Outpatient Medications Marked as Taking for the 20 encounter (Office Visit) with Kasia Calix MD   Medication Sig Dispense Refill    furosemide (LASIX) 20 MG tablet Take 1 tablet by mouth daily 60 tablet 3    PARoxetine (PAXIL) 30 MG tablet Take 1 tablet by mouth every morning 90 tablet 3    tamsulosin (FLOMAX) 0.4 MG capsule Take 1 capsule by mouth daily 30 capsule 3    apixaban (ELIQUIS) 5 MG TABS tablet Take 1 tablet by mouth 2 times daily 60 tablet 3    amiodarone (CORDARONE) 200 MG tablet Take 1 tablet by mouth daily 30 tablet 3    losartan (COZAAR) 25 MG tablet Take 0.5 tablets by mouth daily 30 tablet 3    carvedilol (COREG) 3.125 MG tablet Take 1 tablet by mouth 2 times daily (with meals) 60 tablet 3    potassium chloride (KLOR-CON M) 20 MEQ extended release tablet Take 1 tablet by mouth daily 30 tablet 3    acetaminophen (TYLENOL) 500 MG tablet Take 500 mg by mouth every 6 hours as needed for Pain      atorvastatin (LIPITOR) 40 MG tablet Take 1 tablet by mouth daily 30 tablet 3       FAMILY HISTORY: family history includes Arthritis in his mother; Heart Disease in his mother; High Blood Pressure in his brother and mother; High Cholesterol in his mother; Stroke in his mother. PHYSICAL EXAMINATION:     /62 (Site: Left Upper Arm, Position: Sitting, Cuff Size: Large Adult)   Pulse 60   Resp 16   Ht 5' 9\" (1.753 m)   Wt (!) 366 lb (166 kg)   BMI 54.05 kg/m²  Body mass index is 54.05 kg/m². Constitutional: He is oriented to person, place, and time. He appears well-developed and well-nourished. In no acute distress. HEENT: Normocephalic and atraumatic. No JVD present. Carotid bruit is not present. No mass and no thyromegaly present. No lymphadenopathy present. Cardiovascular: Normal rate, regular rhythm, normal heart sounds. Exam reveals no gallop and no friction rubs. 3/6 systolic murmur, 2nd intercostal space on the LEFT just lateral to the sternum. Pulmonary/Chest: Effort normal and breath sounds normal. No respiratory distress. He has no wheezes, rhonchi or rales. Abdominal: Soft, non-tender.  Bowel sounds and aorta are normal. He exhibits no organomegaly, mass or bruit. Extremities: Chronic bilateral lower extremity edema with chronic bilateral erythema of the legs. No cyanosis or clubbing. 2+ radial and carotid pulses. Cannot palpate distal lower extremity pulses . Vascular changes noted. Neurological: He is alert and oriented to person, place, and time. No evidence of gross cranial nerve deficit. Coordination appeared normal.   Skin: Skin is warm and dry. There is no rash or diaphoresis. Psychiatric: He has a normal mood and affect.  His speech is normal and behavior is normal.      MOST RECENT LABS ON RECORD:   Lab Results   Component Value Date    WBC 7.2 05/16/2020    HGB 12.6 (L) 05/16/2020    HCT 39.6 (L) 05/16/2020     05/16/2020    CHOL 101 03/05/2020    TRIG 148 03/05/2020    HDL 26 (L) 03/05/2020    ALT 10 05/15/2020    AST 12 05/15/2020     06/17/2020    K 4.2 06/17/2020     06/17/2020    CREATININE 0.6 06/17/2020    BUN 18 06/17/2020    CO2 28 06/17/2020    TSH 2.83 05/15/2020    PSA 1.49 01/11/2019    INR 1.2 03/04/2020    GLUF 103 (H) 01/11/2019    LABA1C 5.9 03/02/2017       ASSESSMENT:     Patient Active Problem List    Diagnosis Date Noted    Nonrheumatic aortic valve stenosis      Priority: High    Dyslipidemia      Priority: High    Septic shock due to urinary tract infection (HCC) /  Levophed and fluids 05/27/2020    Sepsis secondary to UTI ([address] 05/15/2020    Encounter for removal of nasal packing 04/15/2020    Epistaxis from Eliquis and Aspirin 04/13/2020    Acute respiratory failure with hypoxia (HCC) Due to CHF & Obesity 03/05/2020    Acute combined systolic and diastolic HF (heart failure), NYHA class 3 ([address] 03/04/2020    Carpal tunnel syndrome on left 01/08/2019    Obesity, morbid (more than 100 lbs over ideal weight or BMI > 40) ([address] 12/15/2017    Osteoarthritis of both knees 06/13/2017    Essential hypertension 12/13/2016    Mixed hyperlipidemia 12/13/2016      Diagnosis Orders   1. Chronic combined systolic and diastolic CHF, NYHA class 3 ([facility] [address]     2. Nonrheumatic aortic valve stenosis     3. Severe aortic stenosis     4. PAF (paroxysmal atrial fibrillation) ([facility] [address]     5. Essential hypertension     6. Mixed hyperlipidemia     7. Chronic anticoagulation     8. On amiodarone therapy     9. Mild CAD           PLAN:         Chronic combined heart failure: New York Heart Association Class: IIb (Marked symptoms during daily activities)   Beta Blocker: Continue Carvedilol (Coreg) 3.125 mg tablet twice daily.  ACE Inibitor/ARB: Continue losartan (Cozaar) 25 mg, 1/2 tab daily.  Diuretics: Continue furosemide (Lasix) 20 mg every morning.  Heart failure counseling: I told them to start wearing lower extremity compression stockings and I advised them to try and keep their legs up whenever possible and to limit salt in their diet.  Counseled patient extensively regarding low-salt diet. I told him there is not much for him to increase his diuretics now but we can do this if he continues to gain weight. · Severe Aortic Stenosis: symptomatic  · Beta Blocker: Continue Carvedilol (Coreg) 3.125 mg tablet twice daily. · ACE Inibitor/ARB: Continue losartan (Cozaar) 25 mg, 1/2 tab daily. · Diuretics: Continue furosemide (Lasix) 20 mg every morning. · I advised them to try and keep their legs up whenever possible and to limit salt in their diet. · His aortic stenosis is severe by cardiac catheterization, mean gradient of 48 and peak to peak instantaneous gradient of 67 mmHg. · Scheduled for TAVR. Work-up in progress. The only thing that is pending is clearance by the dentist.     Paroxysmal Atrial Fibrillation: Rhythm Control  Beta Blocker: Continue carvedilol (Coreg) 3.125 mg. twice daily.     Rhythm Control Agent: Continue Amiodarone (Cordarone) 200 mg once daily   Monitoring: Amiodarone Since he is being maintained on Amiodarone, I told him that we will need to closely monitor him for potential side effects. These include monitoring LFTs and TSH at least every 6 months as well as chest x-rays, pulmonary function tests, and eye exams at least on a yearly basis. Stroke Risk: His CHADS2-VASc score is greater than 2 (2.2% stroke risk)  Anticoagulation: Continue Apixaban (Eliquis) 5 mg every 12 hours. I also reminded him to watch for signs of blood in his stool or black tarry stools and stop the medication immediately if this develops as this could be life threatening.  Essential Hypertension: Controlled  · Beta Blocker: Continue Carvedilol (Coreg) 3.125 mg tablet twice daily. · ACE Inibitor/ARB: Continue losartan (Cozaar) 25 mg, 1/2 tab daily. · Diuretics: Continue furosemide (Lasix) 20 mg every morning. · Calcium Channel Blocker: Not indicated at this time. · Hyperlipidemia: Mixed, LDL 45 mg/dL on 3/5/2020  · Cholesterol Reduction Therapy: Continue Atorvastatin (Lipitor) 40 mg daily. Finally, I recommended that he continue his other medications and follow up with you as previously scheduled. FOLLOW UP:   I told Mr. Manzo to call my office if he had any problems, but otherwise I asked him to Return in about 3 months (around 10/31/2020). However, I would be happy to see him sooner should the need arise. Sincerely,  Alexa Castillo MD, F.A.C.C. [facility] Cardiology Specialist    [address]  Phone: [phone], Fax: [phone]     I believe that the risk of significant morbidity and mortality related to the patient's current medical conditions are: high    The documentation recorded by the scribe, accurately and completely reflects the services I personally performed and the decisions made by me. Alexa Castillo MD, F.A.C.C.  July 31, 2020

## 2020-08-26 ENCOUNTER — TELEPHONE (OUTPATIENT)
Dept: CARDIOLOGY CLINIC | Age: 75
End: 2020-08-26

## 2020-09-03 ENCOUNTER — TELEPHONE (OUTPATIENT)
Dept: CARDIOLOGY CLINIC | Age: 75
End: 2020-09-03

## 2020-09-03 NOTE — TELEPHONE ENCOUNTER
Orders received from Dr. Nuñez Payment to schedule the patient for his TAVR procedure, obtain a COVID test prior and hold Eliquis 3 days prior and hold Lasix, Losartan the morning of the TAVR.     COVID: 9/8/2020 at 1300  Eliquis: 9/11/2020  TAVR: 9/14/2020 at 1000 with an arrival of 0800

## 2020-09-08 ENCOUNTER — HOSPITAL ENCOUNTER (OUTPATIENT)
Dept: LAB | Age: 75
Setting detail: SPECIMEN
Discharge: HOME | End: 2020-09-08
Payer: MEDICARE

## 2020-09-08 PROCEDURE — C9803 HOPD COVID-19 SPEC COLLECT: HCPCS

## 2020-09-08 PROCEDURE — U0003 INFECTIOUS AGENT DETECTION BY NUCLEIC ACID (DNA OR RNA); SEVERE ACUTE RESPIRATORY SYNDROME CORONAVIRUS 2 (SARS-COV-2) (CORONAVIRUS DISEASE [COVID-19]), AMPLIFIED PROBE TECHNIQUE, MAKING USE OF HIGH THROUGHPUT TECHNOLOGIES AS DESCRIBED BY CMS-2020-01-R: HCPCS

## 2020-09-10 LAB — SARS-COV-2, NAA: NOT DETECTED

## 2020-09-11 ENCOUNTER — PREP FOR PROCEDURE (OUTPATIENT)
Dept: CARDIOLOGY | Age: 75
End: 2020-09-11

## 2020-09-11 RX ORDER — SODIUM CHLORIDE 0.9 % (FLUSH) 0.9 %
10 SYRINGE (ML) INJECTION EVERY 12 HOURS SCHEDULED
Status: CANCELLED | OUTPATIENT
Start: 2020-09-11

## 2020-09-11 RX ORDER — CHLORHEXIDINE GLUCONATE 4 G/100ML
SOLUTION TOPICAL ONCE
Status: CANCELLED | OUTPATIENT
Start: 2020-09-11 | End: 2020-09-11

## 2020-09-11 RX ORDER — SODIUM CHLORIDE 0.9 % (FLUSH) 0.9 %
10 SYRINGE (ML) INJECTION PRN
Status: CANCELLED | OUTPATIENT
Start: 2020-09-11

## 2020-09-11 RX ORDER — 0.9 % SODIUM CHLORIDE 0.9 %
250 INTRAVENOUS SOLUTION INTRAVENOUS ONCE
Status: CANCELLED | OUTPATIENT
Start: 2020-09-11 | End: 2020-09-11

## 2020-09-11 RX ORDER — SODIUM CHLORIDE 9 MG/ML
INJECTION, SOLUTION INTRAVENOUS CONTINUOUS
Status: CANCELLED | OUTPATIENT
Start: 2020-09-11

## 2020-09-14 ENCOUNTER — APPOINTMENT (OUTPATIENT)
Dept: CARDIAC CATH/INVASIVE PROCEDURES | Age: 75
DRG: 267 | End: 2020-09-14
Attending: INTERNAL MEDICINE
Payer: MEDICARE

## 2020-09-14 ENCOUNTER — HOSPITAL ENCOUNTER (INPATIENT)
Dept: INPATIENT UNIT | Age: 75
LOS: 1 days | Discharge: HOME | DRG: 267 | End: 2020-09-15
Attending: INTERNAL MEDICINE | Admitting: INTERNAL MEDICINE
Payer: MEDICARE

## 2020-09-14 PROBLEM — I35.0 AORTIC STENOSIS, SEVERE: Status: ACTIVE | Noted: 2020-09-14

## 2020-09-14 LAB
ABO: NORMAL
ACTIVATED CLOTTING TIME: 279 SECONDS (ref 1–150)
ALBUMIN SERPL-MCNC: 3.8 G/DL (ref 3.5–5.1)
ALP BLD-CCNC: 51 U/L (ref 38–126)
ALT SERPL-CCNC: 15 U/L (ref 11–66)
ANION GAP SERPL CALCULATED.3IONS-SCNC: 9 MEQ/L (ref 8–16)
ANTIBODY SCREEN: NORMAL
APTT: 28.6 SECONDS (ref 22–38)
AST SERPL-CCNC: 14 U/L (ref 5–40)
BILIRUB SERPL-MCNC: 0.7 MG/DL (ref 0.3–1.2)
BUN BLDV-MCNC: 16 MG/DL (ref 7–22)
CALCIUM SERPL-MCNC: 8.9 MG/DL (ref 8.5–10.5)
CHLORIDE BLD-SCNC: 105 MEQ/L (ref 98–111)
CO2: 26 MEQ/L (ref 23–33)
CREAT SERPL-MCNC: 0.6 MG/DL (ref 0.4–1.2)
EKG ATRIAL RATE: 66 BPM
EKG ATRIAL RATE: 68 BPM
EKG ATRIAL RATE: 68 BPM
EKG P AXIS: 44 DEGREES
EKG P AXIS: 72 DEGREES
EKG P AXIS: 92 DEGREES
EKG P-R INTERVAL: 264 MS
EKG P-R INTERVAL: 298 MS
EKG P-R INTERVAL: 310 MS
EKG Q-T INTERVAL: 492 MS
EKG Q-T INTERVAL: 494 MS
EKG Q-T INTERVAL: 532 MS
EKG QRS DURATION: 164 MS
EKG QRS DURATION: 176 MS
EKG QRS DURATION: 184 MS
EKG QTC CALCULATION (BAZETT): 523 MS
EKG QTC CALCULATION (BAZETT): 525 MS
EKG QTC CALCULATION (BAZETT): 557 MS
EKG R AXIS: -2 DEGREES
EKG R AXIS: -2 DEGREES
EKG R AXIS: 4 DEGREES
EKG T AXIS: 118 DEGREES
EKG T AXIS: 133 DEGREES
EKG T AXIS: 177 DEGREES
EKG VENTRICULAR RATE: 66 BPM
EKG VENTRICULAR RATE: 68 BPM
EKG VENTRICULAR RATE: 68 BPM
ERYTHROCYTE [DISTWIDTH] IN BLOOD BY AUTOMATED COUNT: 15.1 % (ref 11.5–14.5)
ERYTHROCYTE [DISTWIDTH] IN BLOOD BY AUTOMATED COUNT: 53.8 FL (ref 35–45)
GFR SERPL CREATININE-BSD FRML MDRD: > 90 ML/MIN/1.73M2
GLUCOSE BLD-MCNC: 98 MG/DL (ref 70–108)
HCT VFR BLD CALC: 41.8 % (ref 42–52)
HEMOGLOBIN: 13.1 GM/DL (ref 14–18)
INR BLD: 1.1 (ref 0.85–1.13)
MCH RBC QN AUTO: 30.1 PG (ref 26–33)
MCHC RBC AUTO-ENTMCNC: 31.3 GM/DL (ref 32.2–35.5)
MCV RBC AUTO: 96.1 FL (ref 80–94)
PLATELET # BLD: 174 THOU/MM3 (ref 130–400)
PMV BLD AUTO: 12.4 FL (ref 9.4–12.4)
POTASSIUM SERPL-SCNC: 4.3 MEQ/L (ref 3.5–5.2)
PRO-BNP: 748.4 PG/ML (ref 0–1800)
RBC # BLD: 4.35 MILL/MM3 (ref 4.7–6.1)
REASON FOR REJECTION: NORMAL
REJECTED TEST: NORMAL
RH FACTOR: NORMAL
SODIUM BLD-SCNC: 140 MEQ/L (ref 135–145)
TOTAL PROTEIN: 6.7 G/DL (ref 6.1–8)
WBC # BLD: 7 THOU/MM3 (ref 4.8–10.8)

## 2020-09-14 PROCEDURE — 6370000000 HC RX 637 (ALT 250 FOR IP): Performed by: INTERNAL MEDICINE

## 2020-09-14 PROCEDURE — 86850 RBC ANTIBODY SCREEN: CPT

## 2020-09-14 PROCEDURE — 93005 ELECTROCARDIOGRAM TRACING: CPT | Performed by: PHYSICIAN ASSISTANT

## 2020-09-14 PROCEDURE — 36415 COLL VENOUS BLD VENIPUNCTURE: CPT

## 2020-09-14 PROCEDURE — 6360000002 HC RX W HCPCS: Performed by: INTERNAL MEDICINE

## 2020-09-14 PROCEDURE — C1894 INTRO/SHEATH, NON-LASER: HCPCS

## 2020-09-14 PROCEDURE — 33361 REPLACE AORTIC VALVE PERQ: CPT | Performed by: THORACIC SURGERY (CARDIOTHORACIC VASCULAR SURGERY)

## 2020-09-14 PROCEDURE — 2580000003 HC RX 258: Performed by: PHYSICIAN ASSISTANT

## 2020-09-14 PROCEDURE — 83880 ASSAY OF NATRIURETIC PEPTIDE: CPT

## 2020-09-14 PROCEDURE — B4101ZZ FLUOROSCOPY OF ABDOMINAL AORTA USING LOW OSMOLAR CONTRAST: ICD-10-PCS | Performed by: THORACIC SURGERY (CARDIOTHORACIC VASCULAR SURGERY)

## 2020-09-14 PROCEDURE — 93010 ELECTROCARDIOGRAM REPORT: CPT | Performed by: NUCLEAR MEDICINE

## 2020-09-14 PROCEDURE — 80053 COMPREHEN METABOLIC PANEL: CPT

## 2020-09-14 PROCEDURE — 2140000000 HC CCU INTERMEDIATE R&B

## 2020-09-14 PROCEDURE — 6370000000 HC RX 637 (ALT 250 FOR IP)

## 2020-09-14 PROCEDURE — 2720000010 HC SURG SUPPLY STERILE

## 2020-09-14 PROCEDURE — 33361 REPLACE AORTIC VALVE PERQ: CPT | Performed by: INTERNAL MEDICINE

## 2020-09-14 PROCEDURE — 2709999900 HC NON-CHARGEABLE SUPPLY

## 2020-09-14 PROCEDURE — 2780000006 HC MISC HEART VALVE

## 2020-09-14 PROCEDURE — 5A1213Z PERFORMANCE OF CARDIAC PACING, INTERMITTENT: ICD-10-PCS | Performed by: THORACIC SURGERY (CARDIOTHORACIC VASCULAR SURGERY)

## 2020-09-14 PROCEDURE — C1760 CLOSURE DEV, VASC: HCPCS

## 2020-09-14 PROCEDURE — 93005 ELECTROCARDIOGRAM TRACING: CPT | Performed by: INTERNAL MEDICINE

## 2020-09-14 PROCEDURE — C1769 GUIDE WIRE: HCPCS

## 2020-09-14 PROCEDURE — 6360000002 HC RX W HCPCS

## 2020-09-14 PROCEDURE — 86900 BLOOD TYPING SEROLOGIC ABO: CPT

## 2020-09-14 PROCEDURE — C1725 CATH, TRANSLUMIN NON-LASER: HCPCS

## 2020-09-14 PROCEDURE — 86901 BLOOD TYPING SEROLOGIC RH(D): CPT

## 2020-09-14 PROCEDURE — 94760 N-INVAS EAR/PLS OXIMETRY 1: CPT

## 2020-09-14 PROCEDURE — 02RF38Z REPLACEMENT OF AORTIC VALVE WITH ZOOPLASTIC TISSUE, PERCUTANEOUS APPROACH: ICD-10-PCS | Performed by: THORACIC SURGERY (CARDIOTHORACIC VASCULAR SURGERY)

## 2020-09-14 PROCEDURE — 2500000003 HC RX 250 WO HCPCS

## 2020-09-14 PROCEDURE — 86923 COMPATIBILITY TEST ELECTRIC: CPT

## 2020-09-14 PROCEDURE — 2580000003 HC RX 258: Performed by: INTERNAL MEDICINE

## 2020-09-14 PROCEDURE — 6360000004 HC RX CONTRAST MEDICATION: Performed by: INTERNAL MEDICINE

## 2020-09-14 PROCEDURE — 85347 COAGULATION TIME ACTIVATED: CPT

## 2020-09-14 RX ORDER — 0.9 % SODIUM CHLORIDE 0.9 %
250 INTRAVENOUS SOLUTION INTRAVENOUS ONCE
Status: DISCONTINUED | OUTPATIENT
Start: 2020-09-14 | End: 2020-09-15 | Stop reason: HOSPADM

## 2020-09-14 RX ORDER — CHLORHEXIDINE GLUCONATE 4 G/100ML
SOLUTION TOPICAL ONCE
Status: DISCONTINUED | OUTPATIENT
Start: 2020-09-14 | End: 2020-09-15 | Stop reason: HOSPADM

## 2020-09-14 RX ORDER — SODIUM CHLORIDE 0.9 % (FLUSH) 0.9 %
10 SYRINGE (ML) INJECTION PRN
Status: DISCONTINUED | OUTPATIENT
Start: 2020-09-14 | End: 2020-09-14 | Stop reason: SDUPTHER

## 2020-09-14 RX ORDER — CLOPIDOGREL BISULFATE 75 MG/1
75 TABLET ORAL DAILY
Status: DISCONTINUED | OUTPATIENT
Start: 2020-09-15 | End: 2020-09-15 | Stop reason: HOSPADM

## 2020-09-14 RX ORDER — PAROXETINE 30 MG/1
30 TABLET, FILM COATED ORAL EVERY MORNING
Status: DISCONTINUED | OUTPATIENT
Start: 2020-09-15 | End: 2020-09-15 | Stop reason: HOSPADM

## 2020-09-14 RX ORDER — ONDANSETRON 2 MG/ML
4 INJECTION INTRAMUSCULAR; INTRAVENOUS EVERY 6 HOURS PRN
Status: DISCONTINUED | OUTPATIENT
Start: 2020-09-14 | End: 2020-09-15 | Stop reason: HOSPADM

## 2020-09-14 RX ORDER — ACETAMINOPHEN 325 MG/1
650 TABLET ORAL EVERY 4 HOURS PRN
Status: DISCONTINUED | OUTPATIENT
Start: 2020-09-14 | End: 2020-09-15 | Stop reason: HOSPADM

## 2020-09-14 RX ORDER — MIDAZOLAM HYDROCHLORIDE 1 MG/ML
1 INJECTION INTRAMUSCULAR; INTRAVENOUS EVERY 6 HOURS PRN
Status: DISCONTINUED | OUTPATIENT
Start: 2020-09-14 | End: 2020-09-15 | Stop reason: ALTCHOICE

## 2020-09-14 RX ORDER — SODIUM CHLORIDE 0.9 % (FLUSH) 0.9 %
10 SYRINGE (ML) INJECTION EVERY 12 HOURS SCHEDULED
Status: DISCONTINUED | OUTPATIENT
Start: 2020-09-14 | End: 2020-09-15 | Stop reason: HOSPADM

## 2020-09-14 RX ORDER — SODIUM CHLORIDE 0.9 % (FLUSH) 0.9 %
10 SYRINGE (ML) INJECTION PRN
Status: DISCONTINUED | OUTPATIENT
Start: 2020-09-14 | End: 2020-09-15 | Stop reason: HOSPADM

## 2020-09-14 RX ORDER — SODIUM CHLORIDE 0.9 % (FLUSH) 0.9 %
10 SYRINGE (ML) INJECTION EVERY 12 HOURS SCHEDULED
Status: DISCONTINUED | OUTPATIENT
Start: 2020-09-14 | End: 2020-09-14 | Stop reason: SDUPTHER

## 2020-09-14 RX ORDER — SODIUM CHLORIDE 9 MG/ML
INJECTION, SOLUTION INTRAVENOUS CONTINUOUS
Status: DISCONTINUED | OUTPATIENT
Start: 2020-09-14 | End: 2020-09-15 | Stop reason: ALTCHOICE

## 2020-09-14 RX ORDER — ATROPINE SULFATE 0.4 MG/ML
0.5 AMPUL (ML) INJECTION
Status: ACTIVE | OUTPATIENT
Start: 2020-09-14 | End: 2020-09-14

## 2020-09-14 RX ORDER — FENTANYL CITRATE 50 UG/ML
50 INJECTION, SOLUTION INTRAMUSCULAR; INTRAVENOUS
Status: DISCONTINUED | OUTPATIENT
Start: 2020-09-14 | End: 2020-09-15 | Stop reason: ALTCHOICE

## 2020-09-14 RX ORDER — SODIUM CHLORIDE 9 MG/ML
INJECTION, SOLUTION INTRAVENOUS CONTINUOUS
Status: DISCONTINUED | OUTPATIENT
Start: 2020-09-14 | End: 2020-09-14 | Stop reason: SDUPTHER

## 2020-09-14 RX ORDER — TAMSULOSIN HYDROCHLORIDE 0.4 MG/1
0.4 CAPSULE ORAL DAILY
Status: DISCONTINUED | OUTPATIENT
Start: 2020-09-15 | End: 2020-09-15 | Stop reason: HOSPADM

## 2020-09-14 RX ORDER — HYDRALAZINE HYDROCHLORIDE 20 MG/ML
10 INJECTION INTRAMUSCULAR; INTRAVENOUS EVERY 10 MIN PRN
Status: DISCONTINUED | OUTPATIENT
Start: 2020-09-14 | End: 2020-09-15 | Stop reason: HOSPADM

## 2020-09-14 RX ORDER — AMIODARONE HYDROCHLORIDE 200 MG/1
200 TABLET ORAL DAILY
Status: DISCONTINUED | OUTPATIENT
Start: 2020-09-15 | End: 2020-09-15 | Stop reason: HOSPADM

## 2020-09-14 RX ORDER — ASPIRIN 81 MG/1
81 TABLET ORAL DAILY
Status: DISCONTINUED | OUTPATIENT
Start: 2020-09-15 | End: 2020-09-15 | Stop reason: HOSPADM

## 2020-09-14 RX ORDER — ATORVASTATIN CALCIUM 40 MG/1
40 TABLET, FILM COATED ORAL NIGHTLY
Status: DISCONTINUED | OUTPATIENT
Start: 2020-09-14 | End: 2020-09-15 | Stop reason: HOSPADM

## 2020-09-14 RX ADMIN — Medication 3 G: at 22:05

## 2020-09-14 RX ADMIN — IOPAMIDOL 80 ML: 755 INJECTION, SOLUTION INTRAVENOUS at 13:12

## 2020-09-14 RX ADMIN — SODIUM CHLORIDE: 9 INJECTION, SOLUTION INTRAVENOUS at 09:25

## 2020-09-14 RX ADMIN — ATORVASTATIN CALCIUM 40 MG: 40 TABLET, FILM COATED ORAL at 21:00

## 2020-09-14 RX ADMIN — SODIUM CHLORIDE: 9 INJECTION, SOLUTION INTRAVENOUS at 23:43

## 2020-09-14 RX ADMIN — Medication 3 G: at 14:41

## 2020-09-14 RX ADMIN — SODIUM CHLORIDE: 9 INJECTION, SOLUTION INTRAVENOUS at 14:43

## 2020-09-14 RX ADMIN — ACETAMINOPHEN 650 MG: 325 TABLET ORAL at 14:42

## 2020-09-14 ASSESSMENT — PAIN SCALES - GENERAL
PAINLEVEL_OUTOF10: 4
PAINLEVEL_OUTOF10: 4

## 2020-09-14 ASSESSMENT — PAIN - FUNCTIONAL ASSESSMENT: PAIN_FUNCTIONAL_ASSESSMENT: PREVENTS OR INTERFERES WITH MANY ACTIVE NOT PASSIVE ACTIVITIES

## 2020-09-14 ASSESSMENT — PAIN DESCRIPTION - ORIENTATION: ORIENTATION: RIGHT;LEFT

## 2020-09-14 ASSESSMENT — PAIN DESCRIPTION - FREQUENCY: FREQUENCY: INTERMITTENT

## 2020-09-14 ASSESSMENT — PAIN DESCRIPTION - LOCATION: LOCATION: GROIN

## 2020-09-14 ASSESSMENT — PAIN DESCRIPTION - DESCRIPTORS: DESCRIPTORS: ACHING

## 2020-09-14 ASSESSMENT — PAIN DESCRIPTION - ONSET: ONSET: ON-GOING

## 2020-09-14 ASSESSMENT — PAIN DESCRIPTION - PAIN TYPE: TYPE: ACUTE PAIN

## 2020-09-14 ASSESSMENT — PAIN DESCRIPTION - PROGRESSION: CLINICAL_PROGRESSION: GRADUALLY IMPROVING

## 2020-09-14 NOTE — BRIEF OP NOTE
[address] [address]  Sedation/Analgesia Post Sedation Record    Pt Name: Farhat Adams  Account number: [de-identified]  MRN: [id number]  YOB: 1945  Procedure Performed By: Susie Morrison, [address]  Primary Care Physician: Librado Bradley MD  Date: 9/14/2020    POST-PROCEDURE    Physicians/Assistants: MARISOL Laguerre MD    Procedure Performed:TAVR      Sedation/Anesthesia: Versed/ Fentanyl and 2% xylocaine local anesthesia. Estimated Blood Loss: < 50 ml. Specimens Removed: None         Disposition of Specimen: N/A        Complications: No Immediate Complications.        Post-procedure Diagnosis/Findings:     Successful Evolut PRO+ 34 via RFA               MARISOL Laguerre MD  Electronically signed 9/14/2020 at 1:02 PM  Interventional Cardiology

## 2020-09-14 NOTE — PRE SEDATION
further questions and wished to proceed; the consent form was signed. MEDICAL HISTORY  [x]ASHD/ANGINA/MI/CHF   [x]Hypertension  []Diabetes  [x]Hyperlipidemia  []Smoking  []Family Hx of ASHD  [x]Additional information:       has a past medical history of Anxiety, Aortic stenosis, Atrial fibrillation, new onset ([facility] [address] BPH (benign prostatic hyperplasia), CHF (congestive heart failure) ([facility] [address] Hyperlipidemia, Hypertension, and Osteoarthritis. SURGICAL HISTORY   has a past surgical history that includes Vasectomy (1971) and Cardiac catheterization (03/2020). Additional information:       ALLERGIES   Allergies as of 09/14/2020    (No Known Allergies)     Additional information:       MEDICATIONS   Aspirin  [x] 81 mg  [] 325 mg  [] None  Antiplatelet drug therapy use last 5 days  [x]No []Yes  Coumadin Use Last 5 Days [x]No []Yes  Other anticoagulant use last 5 days  [x]No []Yes    Current Facility-Administered Medications:     0.9 % sodium chloride infusion, , Intravenous, Continuous, Myriam Christianson PA-C    ceFAZolin (ANCEF) 3 g in dextrose 5 % 100 mL IVPB, 3 g, Intravenous, On Call to OR, Cuauhtemoc Hobson PA-C    chlorhexidine (HIBICLENS) 4 % liquid, , Topical, Once, Cuauhtemoc Canal, PA-C    sodium chloride flush 0.9 % injection 10 mL, 10 mL, Intravenous, 2 times per day, Tereasa Canal, PA-C    sodium chloride flush 0.9 % injection 10 mL, 10 mL, Intravenous, PRN, Tereasa Canal, PA-C    0.9 % sodium chloride infusion 250 mL, 250 mL, Intravenous, Once, RAMIRO Ortiz-C  Prior to Admission medications    Medication Sig Start Date End Date Taking?  Authorizing Provider   potassium chloride (KLOR-CON M) 20 MEQ extended release tablet Take 1 tablet by mouth daily 8/12/20   Joaquim Soulier, MD   amiodarone (CORDARONE) 200 MG tablet Take 1 tablet by mouth daily 8/12/20   Joaquim Soulier, MD   atorvastatin (LIPITOR) 40 MG tablet Take 1 tablet by mouth daily 7/31/20   Renetta Abbott MD furosemide (LASIX) 20 MG tablet Take 1 tablet by mouth daily 7/21/20   Graham Urena MD   PARoxetine (PAXIL) 30 MG tablet Take 1 tablet by mouth every morning 6/29/20   Graham Urena MD   tamsulosin [facility]) 0.4 MG capsule Take 1 capsule by mouth daily 5/19/20   Graham Urena MD   apixaban [facility]) 5 MG TABS tablet Take 1 tablet by mouth 2 times daily 5/19/20   Graham Urena MD   losartan (COZAAR) 25 MG tablet Take 0.5 tablets by mouth daily 5/19/20   SYLVIA Prakash CNP   carvedilol (COREG) 3.125 MG tablet Take 1 tablet by mouth 2 times daily (with meals) 5/18/20   SYLVIA Prakash CNP   acetaminophen (TYLENOL) 500 MG tablet Take 500 mg by mouth every 6 hours as needed for Pain    Historical Provider, MD     Additional information:       VITAL SIGNS   There were no vitals filed for this visit. PHYSICAL:   General: No acute distress  HEENT:  Unremarkable for age  Neck: without increased JVD, carotid pulses 2+ bilaterally without bruits  Heart: RRR, S1 & S2 WNL, S4 gallop, 3/6 DANIELLA  NYHA: 3  Lungs: Clear to auscultation    Abdomen: BS present, without HSM, masses, or tenderness   Extremities: without C,C,E.  Pulses 2+ bilaterally  Mental Status: Alert & Oriented        PLANNED PROCEDURE   []Cath  []PCI                []Pacemaker/AICD  []LAURA             []Cardioversion []Peripheral angiography/PTA  [x]Other: TAVR     SEDATION  Planned agent:[x]Midazolam []Meperidine [x]Sublimaze []Morphine  []Diazepam  []Other:     ASA Classification:  []1 []2 [x]3 []4 []5  Class 1: A normal healthy patient  Class 2: Pt with mild to moderate systemic disease  Class 3: Severe systemic disease or disturbance  Class 4: Severe systemic disorders that are already life threatening. Class 5: Moribund pt with little chances of survival, for more than 24 hours.   Mallampati I Airway Classification:   []1 []2 [x]3 []4    [x]Pre-procedure diagnostic studies complete and results available. Comment:    [x]Previous sedation/anesthesia experiences assessed. Comment:    [x]The patient is an appropriate candidate to undergo the planned procedure sedation and anesthesia. (Refer to nursing sedation/analgesia documentation record)  [x]Formulation and discussion of sedation/procedure plan, risks, and expectations with patient and/or responsible adult completed. [x]Patient examined immediately prior to the procedure.  (Refer to nursing sedation/analgesia documentation record)    Simran Benton MD   Electronically signed 9/14/2020 at 8:31 AM

## 2020-09-14 NOTE — PROGRESS NOTES
Pt admitted to  2E08 per motorized scooter and portable O2 at 2 L/min. Pt NPO. Patient accompanied by daughter. Vital signs obtained. Assessment and data collection initiated. Oriented to room. Policies and procedures for 2E explained   All questions answered with no further questions at this time. Fall prevention and safety precautions discussed with patient.

## 2020-09-15 ENCOUNTER — TELEPHONE (OUTPATIENT)
Dept: CARDIOLOGY CLINIC | Age: 75
End: 2020-09-15

## 2020-09-15 VITALS
DIASTOLIC BLOOD PRESSURE: 63 MMHG | RESPIRATION RATE: 20 BRPM | OXYGEN SATURATION: 94 % | TEMPERATURE: 98.7 F | BODY MASS INDEX: 47.74 KG/M2 | WEIGHT: 315 LBS | SYSTOLIC BLOOD PRESSURE: 128 MMHG | HEIGHT: 68 IN | HEART RATE: 82 BPM

## 2020-09-15 LAB
ANION GAP SERPL CALCULATED.3IONS-SCNC: 9 MEQ/L (ref 8–16)
APTT: 26.9 SECONDS (ref 22–38)
BUN BLDV-MCNC: 13 MG/DL (ref 7–22)
CALCIUM SERPL-MCNC: 8.4 MG/DL (ref 8.5–10.5)
CHLORIDE BLD-SCNC: 103 MEQ/L (ref 98–111)
CO2: 26 MEQ/L (ref 23–33)
CREAT SERPL-MCNC: 0.8 MG/DL (ref 0.4–1.2)
EKG ATRIAL RATE: 70 BPM
EKG P AXIS: 67 DEGREES
EKG P-R INTERVAL: 280 MS
EKG Q-T INTERVAL: 502 MS
EKG QRS DURATION: 182 MS
EKG QTC CALCULATION (BAZETT): 542 MS
EKG R AXIS: -23 DEGREES
EKG T AXIS: 132 DEGREES
EKG VENTRICULAR RATE: 70 BPM
ERYTHROCYTE [DISTWIDTH] IN BLOOD BY AUTOMATED COUNT: 15 % (ref 11.5–14.5)
ERYTHROCYTE [DISTWIDTH] IN BLOOD BY AUTOMATED COUNT: 54.4 FL (ref 35–45)
GFR SERPL CREATININE-BSD FRML MDRD: > 90 ML/MIN/1.73M2
GLUCOSE BLD-MCNC: 132 MG/DL (ref 70–108)
HCT VFR BLD CALC: 40.5 % (ref 42–52)
HEMOGLOBIN: 12.5 GM/DL (ref 14–18)
INR BLD: 1.14 (ref 0.85–1.13)
LV EF: 58 %
LVEF MODALITY: NORMAL
MCH RBC QN AUTO: 30.3 PG (ref 26–33)
MCHC RBC AUTO-ENTMCNC: 30.9 GM/DL (ref 32.2–35.5)
MCV RBC AUTO: 98.3 FL (ref 80–94)
PLATELET # BLD: 149 THOU/MM3 (ref 130–400)
PMV BLD AUTO: 12.1 FL (ref 9.4–12.4)
POTASSIUM REFLEX MAGNESIUM: 3.9 MEQ/L (ref 3.5–5.2)
RBC # BLD: 4.12 MILL/MM3 (ref 4.7–6.1)
SODIUM BLD-SCNC: 138 MEQ/L (ref 135–145)
WBC # BLD: 9.7 THOU/MM3 (ref 4.8–10.8)

## 2020-09-15 PROCEDURE — 36415 COLL VENOUS BLD VENIPUNCTURE: CPT

## 2020-09-15 PROCEDURE — 6370000000 HC RX 637 (ALT 250 FOR IP): Performed by: PHYSICIAN ASSISTANT

## 2020-09-15 PROCEDURE — 80048 BASIC METABOLIC PNL TOTAL CA: CPT

## 2020-09-15 PROCEDURE — 2580000003 HC RX 258: Performed by: INTERNAL MEDICINE

## 2020-09-15 PROCEDURE — 85027 COMPLETE CBC AUTOMATED: CPT

## 2020-09-15 PROCEDURE — 6360000002 HC RX W HCPCS: Performed by: INTERNAL MEDICINE

## 2020-09-15 PROCEDURE — APPSS60 APP SPLIT SHARED TIME 46-60 MINUTES: Performed by: PHYSICIAN ASSISTANT

## 2020-09-15 PROCEDURE — 93005 ELECTROCARDIOGRAM TRACING: CPT | Performed by: INTERNAL MEDICINE

## 2020-09-15 PROCEDURE — 33361 REPLACE AORTIC VALVE PERQ: CPT | Performed by: INTERNAL MEDICINE

## 2020-09-15 PROCEDURE — 85730 THROMBOPLASTIN TIME PARTIAL: CPT

## 2020-09-15 PROCEDURE — 2500000003 HC RX 250 WO HCPCS: Performed by: INTERNAL MEDICINE

## 2020-09-15 PROCEDURE — 6370000000 HC RX 637 (ALT 250 FOR IP): Performed by: INTERNAL MEDICINE

## 2020-09-15 PROCEDURE — 85610 PROTHROMBIN TIME: CPT

## 2020-09-15 PROCEDURE — 93306 TTE W/DOPPLER COMPLETE: CPT

## 2020-09-15 PROCEDURE — 94760 N-INVAS EAR/PLS OXIMETRY 1: CPT

## 2020-09-15 PROCEDURE — 93010 ELECTROCARDIOGRAM REPORT: CPT | Performed by: NUCLEAR MEDICINE

## 2020-09-15 RX ORDER — LOSARTAN POTASSIUM 25 MG/1
12.5 TABLET ORAL DAILY
Status: DISCONTINUED | OUTPATIENT
Start: 2020-09-15 | End: 2020-09-15

## 2020-09-15 RX ORDER — AMLODIPINE BESYLATE 5 MG/1
5 TABLET ORAL DAILY
Qty: 30 TABLET | Refills: 3 | Status: CANCELLED | OUTPATIENT
Start: 2020-09-15

## 2020-09-15 RX ORDER — AMLODIPINE BESYLATE 5 MG/1
5 TABLET ORAL DAILY
Status: DISCONTINUED | OUTPATIENT
Start: 2020-09-15 | End: 2020-09-15

## 2020-09-15 RX ORDER — LOSARTAN POTASSIUM 25 MG/1
37.5 TABLET ORAL DAILY
Status: DISCONTINUED | OUTPATIENT
Start: 2020-09-15 | End: 2020-09-15 | Stop reason: HOSPADM

## 2020-09-15 RX ORDER — CLOPIDOGREL BISULFATE 75 MG/1
75 TABLET ORAL DAILY
Qty: 30 TABLET | Refills: 3 | Status: SHIPPED | OUTPATIENT
Start: 2020-09-16 | End: 2020-10-02 | Stop reason: SDUPTHER

## 2020-09-15 RX ORDER — LOSARTAN POTASSIUM 50 MG/1
50 TABLET ORAL DAILY
Status: DISCONTINUED | OUTPATIENT
Start: 2020-09-16 | End: 2020-09-15 | Stop reason: HOSPADM

## 2020-09-15 RX ORDER — LOSARTAN POTASSIUM 50 MG/1
50 TABLET ORAL DAILY
Qty: 30 TABLET | Refills: 3 | Status: SHIPPED | OUTPATIENT
Start: 2020-09-16 | End: 2020-09-22

## 2020-09-15 RX ADMIN — LOSARTAN POTASSIUM 12.5 MG: 25 TABLET, FILM COATED ORAL at 08:23

## 2020-09-15 RX ADMIN — ACETAMINOPHEN 650 MG: 325 TABLET ORAL at 00:01

## 2020-09-15 RX ADMIN — Medication 3 G: at 06:12

## 2020-09-15 RX ADMIN — TAMSULOSIN HYDROCHLORIDE 0.4 MG: 0.4 CAPSULE ORAL at 08:24

## 2020-09-15 RX ADMIN — MICONAZOLE NITRATE: 20 POWDER TOPICAL at 10:03

## 2020-09-15 RX ADMIN — PAROXETINE HYDROCHLORIDE 30 MG: 30 TABLET, FILM COATED ORAL at 08:24

## 2020-09-15 RX ADMIN — AMIODARONE HYDROCHLORIDE 200 MG: 200 TABLET ORAL at 08:24

## 2020-09-15 RX ADMIN — CLOPIDOGREL BISULFATE 75 MG: 75 TABLET ORAL at 08:24

## 2020-09-15 RX ADMIN — APIXABAN 5 MG: 5 TABLET, FILM COATED ORAL at 10:03

## 2020-09-15 RX ADMIN — LOSARTAN POTASSIUM 37.5 MG: 25 TABLET, FILM COATED ORAL at 12:02

## 2020-09-15 ASSESSMENT — PAIN SCALES - GENERAL
PAINLEVEL_OUTOF10: 2
PAINLEVEL_OUTOF10: 3
PAINLEVEL_OUTOF10: 2

## 2020-09-15 ASSESSMENT — PAIN - FUNCTIONAL ASSESSMENT: PAIN_FUNCTIONAL_ASSESSMENT: PREVENTS OR INTERFERES SOME ACTIVE ACTIVITIES AND ADLS

## 2020-09-15 ASSESSMENT — PAIN DESCRIPTION - DESCRIPTORS: DESCRIPTORS: ACHING

## 2020-09-15 ASSESSMENT — PAIN DESCRIPTION - PROGRESSION
CLINICAL_PROGRESSION: GRADUALLY IMPROVING

## 2020-09-15 ASSESSMENT — PAIN DESCRIPTION - PAIN TYPE: TYPE: SURGICAL PAIN

## 2020-09-15 ASSESSMENT — PAIN DESCRIPTION - LOCATION: LOCATION: GROIN

## 2020-09-15 ASSESSMENT — PAIN DESCRIPTION - FREQUENCY: FREQUENCY: INTERMITTENT

## 2020-09-15 ASSESSMENT — PAIN DESCRIPTION - ONSET: ONSET: ON-GOING

## 2020-09-15 ASSESSMENT — PAIN DESCRIPTION - ORIENTATION: ORIENTATION: RIGHT;LEFT

## 2020-09-15 NOTE — PROGRESS NOTES
Patient given discharge instructions via teachback method. Patient and daughter verbalize understanding of new medications and side effects. They verbalize understanding of follow-up appointments. Patient and daughter verbalizes understanding of care of his groin sites and to monitor for bleeding. Patient taken to the discharge lobby in his electric chair to [facility] ambulette.

## 2020-09-15 NOTE — CARE COORDINATION
9/15/20, 7:15 AM EDT  DISCHARGE PLANNING EVALUATION:    Amrit Gaines       Admitted from: 2E 9/14/2020/ Blanca Heller day: 1   Location: [address] [place] [address] Reason for admit: Aortic stenosis, severe [I35.0] Status: IP  Admit order signed?: yes  PMH:  has a past medical history of Anxiety, Aortic stenosis, Atrial fibrillation, new onset ([facility] BPH (benign prostatic hyperplasia), CHF (congestive heart failure) ([facility] Diabetes mellitus ([facility] Hyperlipidemia, Hypertension, and Osteoarthritis. Procedure: 9/14 TAVR  9/15 Echo to be done. Pertinent abnormal Imaging: None  Medications:  Scheduled Meds:   chlorhexidine   Topical Once    0.9 % sodium chloride  250 mL Intravenous Once    sodium chloride flush  10 mL Intravenous 2 times per day    aspirin  81 mg Oral Daily    clopidogrel  75 mg Oral Daily    tamsulosin  0.4 mg Oral Daily    PARoxetine  30 mg Oral QAM    atorvastatin  40 mg Oral Nightly    amiodarone  200 mg Oral Daily     Continuous Infusions:   sodium chloride 50 mL/hr at 09/14/20 2343      Pertinent Info/Orders/Treatment Plan:  Pt here for elective procedure. POD #1 TAVR. Echo to be done today prior to discharge. Diet: Diet NPO Time Specified   Smoking status:  reports that he quit smoking about 47 years ago. His smoking use included cigarettes. He has a 10.00 pack-year smoking history. He has never used smokeless tobacco.   PCP: Valorie Alcantar MD  Readmission 30 days or less: No  Readmission Risk Score: 15%    Discharge Planning Evaluation  Current Residence:  Private Residence  Living Arrangements:  Spouse/Significant Other   Support Systems:  Spouse/Significant Other, Children  Current Services PTA:     Potential Assistance Needed:  Home Care  Potential Assistance Purchasing Medications:  No  Does patient want to participate in local refill/ meds to beds program?  Yes  Type of Home Care Services:  Nursing Services  Patient expects to be discharged to:  HOME  Expected Discharge date:      Follow Up Appointment: Best Day/ Time:      Patient Goals/Plan/Treatment Preferences: Spoke with pt and daughter. Pt lives at home with his wife, who is also disabled. Pt usually could drive, but has not for the past 3-4 months while not feeling well. H has O2 at home and uses prn. Home O2 is through [address] Pt also has an electric wheelchair. Daughter is planning to stay with parents for a couple of weeks. Denies HH currently but states he is interested once his daughter leaves. Explained that he would need to go through his PCP or his Cardiologist in [place] to get this ordered. Verbalized understanding. Transportation/Food Security/Housekeeping Addressed:  No issues identified.  Evaluation: no, SW is assisting with arranging transportation home for pt.     9/15/20, 11:19 AM EDT    Patient goals/plan/ treatment preferences discussed by  and . Patient goals/plan/ treatment preferences reviewed with patient/ family. Patient/ family verbalize understanding of discharge plan and are in agreement with goal/plan/treatment preferences. Understanding was demonstrated using the teach back method. AVS provided by RN at time of discharge, which includes all necessary medical information pertaining to the patients current course of illness, treatment, post-discharge goals of care, and treatment preferences.         IMM Letter  IMM Letter given to Patient/Family/Significant other/Guardian/POA/by[de-identified]   IMM Letter date given[de-identified] 09/15/20  IMM Letter time given[de-identified] 4801       Electronically signed by Alejandra Ching RN on 9/15/2020 at 11:19 AM

## 2020-09-15 NOTE — TELEPHONE ENCOUNTER
Dr. Tianna Newby, we wanted to inform you of the patient's discharge plan and medication regimen post TAVR. Due to his elevated -150 we increased his Losartan to 50mg from 12.5mg and held Coreg due to standard protocol post TAVR implant. Please evaluate next week to see if his original mediations and doses could be resumed. Thanks!

## 2020-09-15 NOTE — OP NOTE
DATE: 09/15/20    PATIENT: Bin Burton    MRN: [id number]     Acct: [id number]    PREOP DIAGNOSIS:   Severe aortic stenosis    Postop diagnosis:  Severe aortic stenosis    Procedure:  Transcatheter aortic valve replacement with a Medtronic 34 mm Evolut Pro Plus prosthesis    OPERATORS:  Luz Elena Castorena MD; Sanjeev Rmoan MD    ESTIMATED BLOOD LOSS: 50 ml    A pre-procedure discussion was conducted with the patient to confirm/exclude candidacy for open surgical salvage in case of procedure failure. After informed consent was obtained from the patient, the patient was brought to the hybrid operating room and prepped in sterile fashion. Infiltration of the bilateral inguinal regions was accomplished with 2% lidocaine. Using micropuncture and modified Seldinger technique, a 6 Fr sheath was inserted into the right internal jugular vein. A 5 Fr pacemaker was inserted into position under fluoroscopic guidance into the right ventricle, with verification of appropriate pacing thresholds. Using the same technique under fluoroscopic guidance, a 6 Fr sheath was inserted into the left common femoral artery. Similarly, a 4 Fr sheath, followed after contrast verification by a 6 Fr sheath was inserted into the right common femoral artery. This was replaced with a 6 Fr J4 catheter over a guidewire, and a Supra Core wire was used to traverse the aortic arch. We then performed standard preclose technique by deploying two Percloses in orthogonal fashion and leaving them incomplete. A 16 Fr sheath was inserted over the wire under guidance. The patient was heparinized to an ACT above 250 seconds. A straight pigtail was placed via the left femoral artery and aortography was performed in a coplanar view to ensure alignment. The aortic valve was crossed using a straight wire through an AL1 catheter. The guidewire was changed to a J-tipped wire, on which was inserted an angled pigtail.   Direct hemodynamic measurements were obtained. The valve was then checked for appropriate loading on fluoroscopy. A Safari wire was inserted into the angled pigtail, with proper final positioning in the LV apex. Predilatation was performed under rapid ventricular pacing and fluoroscopic guidance with a 22 x 4.5 True balloon. Under fluoroscopic guidance, the transcatheter valve was advanced around the arch without difficulty, and positioned appropriately through the annulus under a coplanar view. Adjustment was made to remove parallax. Slow valve deployment was initiated under rapid ventricular pacing, with angiographic verification of proper position. The valve released without difficulty. Echocardiography was done thereafter, demonstrating that there was no significant paravalvular leak, that the ejection fraction was unchanged, there was no regional wall motion abnormality, no pericardial effusion, and there was no change in mitral regurgitation. Subsequent hemodynamics confirmed no significant pressure gradient between the LV and the AO, and no change in the left ventricular end-diastolic pressure. All equipment was removed from the patient. Protamine 50 mg was administered. We then proceeded with closure of the right groin by closing the Percloses in sequential fashion over a 0.035 guidewire and an 8 Fr Angioseal.  The contralateral femoral artery was closed with a 6 Fr Angioseal after angiography confirmed no vascular injury. Neurologic evaluation post the procedure was completely intact with a good motor sensation and mental status. IMMEDIATE COMPLICATIONS:  None. MEDICATIONS:  See EMR. SUMMARY:  Successful transcatheter aortic valve replacement with a 34 mm Evolut Pro Plus valve via transfemoral approach.

## 2020-09-15 NOTE — PROGRESS NOTES
PHASE 1 CARDIAC REHABILITATION   CABG, AVR, MVR, TVR, AMI, PCI's  Exercise Physiologists      Pt declined ambulation. Pt stated he had walked with his RN 3x and did not have to walk again. Education given: Phase 2 cardiac rehab information.   Pt would like staff to sent his referral to [address] [place] [address] (Given upon Discharge):

## 2020-09-15 NOTE — TELEPHONE ENCOUNTER
Pre-TAVR Assessment      Straight STS Score = 6.8%      Total Estimated Operative Morbidity and Mortality Risk = 31.2%        5 METER WALK ASSESSMENTS (seconds): Unable to complete    Time #1 =     Time #2 =     Time #3 =          Mini-Cog Total Score = 3    A cut point of <3 on the Mini-Cog has been validated for dementia screening, but many individuals with clinically meaningful cognitive impairment will score higher,  When greater sensitivity is desired, a cut point of <4 is recommended as it may indicate a need for further evaluation of cognitive status. EFT Score = 2                        EFT Score                    1-Year Mortality                             TAVR   SAVR                               0-1                6%      3%                             2       15%                     7%                             3                             28%                    16%                             4                             30%                    38%                             5                             65%                     50%            Dental Clearance = obtained        Risk Assessment and Prediction Tool     Value Score   What is your age? 46-71 years old     71-68 years old  > 76years old    [] 2   [] 1   [x] 0   What is your gender? Male  Female    [x] 2   [] 1   On average how far can you walk?  2 blocks or more (with /without a rest)  1-2 blocks (with/ without a rest)  Household most of the time     [] 2   [] 1   [x] 0   Which walking aid do you use most often? Nothing  Nicholos Tanvir or crutches    [] 2   [] 1   [x] 0   Do you use support services such as meals on wheels, home health aide, home nurse, or private pay help?    None or 1 time per week  2 or more times per week  [x] 1   [] 0   Will you have someone (Care Partner) at home who will be able to assist you with care for at least 72 hours after you leave the hospital? (i.e. Meal preparation, assistance getting up out of chair)   Yes  No    [x] 3   [] 0    Total Score 6     Key: Destination at discharge from acute care predicted by score. Score < 6  SNF  Score 6-9 Additional intervention to discharge directly home (i.e. Home Care)  Score> 9 Directly home      Jennifer [address])      The following questions refer to your heart failure and how it may affect your life. Please read and complete the following questions. There is no right or wrong answers. Please shila the answer that best applies to you. 1. Heart failure affects different people in different ways. Some feel shortness of breath while others feel fatigue. Please indicate how much you are limited by heart failure (shortness of breath or fatigue) in your ability to do the following activities over the past 2 weeks. Activity Extremely Limited Quite a bit limited Moderatly Limited Slightly Limited Not at all Limited Limited for other reasons/ did not do the activity   Showering/ Bathing         x     Walking 1 block on Level ground      x   Hurrying or jogging (as if to catch a bus)      x        1         2       3  4       5   6     2. Over the past 2 weeks, how many times did you have swelling in your feet, ankles or legs when you woke up in the morning? Every morning 3 or more times per week, but not every day 1-2 times per week Less than once a week Never over the past 2 weeks     x       `        1          2   3       4                     5            3. Over the past 2 weeks, on average, how many times has fatigue limited your ability to do what you wanted? All of the time Several times per day At least once a day 3 or more times per week 1-2 times per week Less than once a week Never over the past 2 weeks       x                1        2     3  4        5       6  7        4. Over the past 2 weeks, on average, how many times has shortness of breath limited your ability to do what you    wanted?      All of the time Several times per day At least once a day 3 or more times per week 1-2 times per week Less than once a week Never over the past 2 weeks           x            1        2     3  4         5       6  7     5. Over the past 2 weeks, on average, how many times have you been forced to sleep sitting up in a chair or with at least 3 pillows to prop you up because of shortness of breath? Every night 3 or more times per week, but not every day 1-2 times per week Less than once a week Never over the past 2 weeks         x   `        1          2   3       4                     5        6.  Over the past 2 weeks, how much has your heart failure limited your enjoyment of life? It has extremely limited my enjoyment of life It has limited my enjoyment of life quite a bit It has moderately limited my enjoyment of life It has slightly limited my enjoyment of life It has not limited my enjoyment of life       x           1          2   3       4           5     7.  If you had to spend the rest of your life with your heart failure the way it is right now, how would you feel about this? Not at all satisfied Mostly dissatisfied Somewhat satisfied Mostly satisfied Completely satisfied     x                  1                         2                            3                        4                            5        8. How much does your heart failure affect your lifestyle?  Please indicate how your heart failure may have limited your participation in the following activities over the past 2 weeks    Activity Severely limited Limited quite a bit Moderately limited Slightly limited Did not limit at all Does not apply or did not do this activity   Hobbies, recreational activities   x      Working or doing household chores   x      Visiting family or friends out of your home     x                1      2   3         4       5            6

## 2020-09-15 NOTE — DISCHARGE SUMMARY
Structural Heart/Cardiology Discharge Summary       Name:  Joey Gupta  YOB: 1945  Medical Record Number:  [id number]    Date of Admission:  9/14/2020  Date of Discharge:  9/15/2020    Admitting physician: Chantell Vang MD  Discharge to: Home  Condition at d/c: [facility] Problem List:  Patient Active Problem List   Diagnosis    Essential hypertension    Mixed hyperlipidemia    Osteoarthritis of both knees    Obesity, morbid (more than 100 lbs over ideal weight or BMI > 40) (HCC)    Carpal tunnel syndrome on left    Acute combined systolic and diastolic HF (heart failure), NYHA class 3 ([address]    Severe aortic stenosis    Dyslipidemia    Acute respiratory failure with hypoxia ([address] Due to CHF & Obesity    Epistaxis from Eliquis and Aspirin    Encounter for removal of nasal packing    Sepsis secondary to UTI ([address]    Septic shock due to urinary tract infection ([address] /  Levophed and fluids    Aortic stenosis, severe       Procedures: TAVR    Hospital Course: Underwent successful TAVR on 09/14/20. Hgb remained stable post procedure. No rhythm issues post-procedure. Uneventful hospital stay. He was ambulating with no issues. Discharge physical examination:   Vitals:    09/15/20 1045   BP: 133/61   Pulse: 82   Resp:    Temp:    SpO2:      General Appearance: alert ,conversing, in no acute distress  Cardiovascular: normal rate, regular rhythm, normal S1 and S2, no murmurs, rubs, clicks, or gallops  Pulmonary/Chest: clear to auscultation bilaterally- no wheezes, rales or rhonchi, normal air movement, no respiratory distress  Neurological: alert, oriented, normal speech, no focal findings or movement disorder noted. Pulses: Bilateral radial, femoral, DP, and PT pulses noted  Lower Extremity: No edema noted. Groin incisions healing appropriately-No signs of infection or hematoma.     Discharge Medications:         Medication List      START taking these medications clopidogrel 75 MG tablet  Commonly known as:  PLAVIX  Take 1 tablet by mouth daily  Start taking on:  September 16, 2020        CHANGE how you take these medications    losartan 50 MG tablet  Commonly known as:  COZAAR  Take 1 tablet by mouth daily  Start taking on:  September 16, 2020  What changed:    · medication strength  · how much to take        CONTINUE taking these medications    acetaminophen 500 MG tablet  Commonly known as:  TYLENOL     amiodarone 200 MG tablet  Commonly known as:  CORDARONE  Take 1 tablet by mouth daily     apixaban 5 MG Tabs tablet  Commonly known as:  ELIQUIS  Take 1 tablet by mouth 2 times daily     atorvastatin 40 MG tablet  Commonly known as:  LIPITOR  Take 1 tablet by mouth daily     furosemide 20 MG tablet  Commonly known as:  Lasix  Take 1 tablet by mouth daily     PARoxetine 30 MG tablet  Commonly known as:  PAXIL  Take 1 tablet by mouth every morning     potassium chloride 20 MEQ extended release tablet  Commonly known as:  KLOR-CON M  Take 1 tablet by mouth daily     tamsulosin 0.4 MG capsule  Commonly known as:  FLOMAX  Take 1 capsule by mouth daily        STOP taking these medications    carvedilol 3.125 MG tablet  Commonly known as:  COREG           Where to Get Your Medications      These medications were sent to [facility] [address], [address]  [address] [place] [address], [address] [place] [address] [address] [address]    Phone:  [phone]   · clopidogrel 75 MG tablet  · losartan 50 MG tablet             Follow Up Plan  1. Follow up with Cardiology in 1 week for incision check and post TAVR f/u.   2. Follow up with primary care provider in 1 week  3. Continue Plavix and eliquis. At 3 months stop plavix and continue ASA and eliquis  4. Increase Losartan until office visit  5. Hold coreg 3.25 mg until office visit   6. Pt is fully agreeable to discharge plans. All questions were thoroughly answered.      Electronically signed by Angie Galvan PA-C on 9/15/2020 at 10:56 AM

## 2020-09-15 NOTE — PLAN OF CARE
Problem: Discharge Planning:  Goal: Participates in care planning  Description: Participates in care planning  Outcome: Ongoing   Care plan reviewed with patient and daughter. Patient and daughter verbalize understanding of the plan of care and contribute to goal setting.
signs and symptoms  Outcome: Ongoing  Note: Surgical sites or clear of hematoma, and redness, show no signs on infection, will continue to assess      Care plan reviewed with patient and family. Patient and family verbalize understanding of the plan of care and contribute to goal setting.

## 2020-09-15 NOTE — PROCEDURES
4-[place] Islander  micropuncture sheath into the right femoral artery. Angiography  performed demonstrating common femoral artery puncture. I then upsized  the access to a 6-[place] Islander. I deployed two Perclose sutures in orthogonal  fashion. I then upsized the access sequentially with a 14-[place] Islander  dilator over a 0.035 Supra Core wire and then up to an 18-[place] cup  sheath. Heparin IV was given. ACT was confirmed to be above 250  seconds. I used a straight pigtail via the left femoral artery and  performed an aortography in coplanar projection. Thereafter, using a  6-[place] Islander AL1 diagnostic catheter with 0.035 straight-tipped guidewire,  crossed the aortic valve without complication. I exchanged that for a  6-[place] Islander angled pigtail. Hemodynamics were captured. Based on the  preprocedure CTA measurements, an Evolut Pro Plus 34 valve was chosen  and loaded per [facility] recommendations. Loading was checked on  fluoroscopy. I then exchanged that for an extra small Safari wire,  placed in the left ventricular apex. I then elected to predilate the  valve using a 22 x 4.5  True dilation balloon. This was prepped per  's recommendations. I did advance it across the aortic  valve. I then paced the patient at 180 paces per minute. Once the  pulse pressure was narrow and the blood pressure was low enough, we  rapidly inflated and then deflated the balloon ensuring there was no  Residual waist.  The balloon was then removed. I then removed an 18-[place] Islander  sheath. I then inserted the valve in sheathless fashion into the  patient using fluoroscopy. I then crossed the arch in an [place]  projection. I crossed the aortic valve with TAVR prosthesis. I removed  the parallax from the TAVR prosthesis. I then slowly and steadily began  to deploy the valve. At approximately 30% deployment, we started pacing  the patient at 120 paces per minute.   We did limited injections,  angiography demonstrated that we were at the right position. Once the  valve was 80% flared, repeat aortography demonstrated that we are  probably at the end of the valve. Thereafter, I closed the nose cone  and released the rest of the valves without any complication. CTA was  performed demonstrating there was no significant pericardial effusion,  no residual LV to AO systolic gradient, no change in mitral valve  apparatus, no wall motion abnormality, no change in EF and confirmed  these findings, and no perivalvular leak. Therefore, no further  intervention was undertaken. Thereafter  hemodynamics were captured. I  re-crossed the valve and ensured there was no gradient or change in LVEDP. ECG was  performed demonstrating no significant conduction abnormality. Given  all these findings, no further intervention was undertaken. The right  femoral artery delivery system was removed over a 0.035 guidewire  And perclose  Was completed and then I used a 6 Fr Angioseal for secondary hemostasis. Left femoral artery  was closed with 6-Australian Angio-Seal and the right internal jugular vein,  and the pacemaker and sheath were removed and manual pressure was used  for hemostasis. IV protamine was given. IMMEDIATE COMPLICATIONS:  None. MEDICATIONS:  See EMR. ACCESS:  See above. ESTIMATED BLOOD LOSS:  Less than 50 mL. SUMMARY:  Successful percutaneous transfemoral transcatheter aortic  valve replacement with Evolut Pro Plus 34 facilitated by preimplantation  balloon aortic valvuloplasty with 22 x 4.5 True dilation balloon. PLAN:  1. DAPT. 2.  Optimal medical therapy. 3.  Risk factor management. 4.  Routine access care. 5.  Bedrest  6. IV fluids. 7.  Restart home meds. 6.  TTE in the a.m.  7.  Postprocedure antibiotics. 8.  Cardiac rehab.  9.  Follow up in valve clinic in one week postprocedure. All the above was explained to the patient and the patient's family. They are agreeable and amenable to the above plan.         Ayana Guerrero

## 2020-09-16 NOTE — TELEPHONE ENCOUNTER
Thank you so much for the update. He is scheduled to see me on 9/22/2020, we will keep you updated.   Thank you

## 2020-09-16 NOTE — TELEPHONE ENCOUNTER
The patient's daughter Claudean December called ([phone]) stating that she was worried about her father's BP post TAVR. At this time the patient is completely asymptomatic and feeling good. Below are his BP's for today:     0800- 142/56  1200- 158/56  1300- 146/56    Prior to discharge from his TAVR procedure the home dose of Losartan was increased from 12.5 mg daily to 50 mg and Coreg was stopped until the 7 follow up appointment. Do you have any further recommendations for this patient?

## 2020-09-17 ENCOUNTER — TELEPHONE (OUTPATIENT)
Dept: CARDIOLOGY | Age: 75
End: 2020-09-17

## 2020-09-17 RX ORDER — AMLODIPINE BESYLATE 5 MG/1
5 TABLET ORAL DAILY
Qty: 30 TABLET | Refills: 2 | Status: SHIPPED | OUTPATIENT
Start: 2020-09-17 | End: 2020-09-22

## 2020-09-17 NOTE — TELEPHONE ENCOUNTER
Mary Granados from Dr. Charo Elder office called stating that Mr. Karthik Rey had his TAVR procedure done however he has been having high blood pressure readings. Dr. Charo Elder stopped his Coreg and increased his Losartan and just started him on Norvasc. Oneil Carey was wondering if Dr. Mary Jane Washington would want to see him sooner or not. He has an apt on 9/22 at 3 pm and I let Dr. Mary Jane Washington know this. Dr. Mary Jane Washington wants Mr. Manzo to keep his blood pressure twice day and give us a call with these readings. If needed we will see him sooner. Pt verbalized understanding. Thanks!

## 2020-09-17 NOTE — TELEPHONE ENCOUNTER
Patient's daughter Chula White and she verbalized understanding. This message will be cc'd to Dr. Maricel Carmona.

## 2020-09-22 ENCOUNTER — OFFICE VISIT (OUTPATIENT)
Dept: CARDIOLOGY | Age: 75
End: 2020-09-22
Payer: MEDICARE

## 2020-09-22 ENCOUNTER — HOSPITAL ENCOUNTER (OUTPATIENT)
Age: 75
Discharge: HOME | End: 2020-09-22
Payer: MEDICARE

## 2020-09-22 VITALS
RESPIRATION RATE: 18 BRPM | DIASTOLIC BLOOD PRESSURE: 49 MMHG | HEART RATE: 64 BPM | SYSTOLIC BLOOD PRESSURE: 98 MMHG | WEIGHT: 315 LBS | BODY MASS INDEX: 47.74 KG/M2 | OXYGEN SATURATION: 96 % | HEIGHT: 68 IN

## 2020-09-22 LAB
ANION GAP SERPL CALCULATED.3IONS-SCNC: 10 MMOL/L (ref 9–17)
BNP INTERPRETATION: NORMAL
BUN BLDV-MCNC: 21 MG/DL (ref 8–23)
BUN/CREAT BLD: 20 (ref 9–20)
CALCIUM SERPL-MCNC: 8.9 MG/DL (ref 8.6–10.4)
CHLORIDE BLD-SCNC: 99 MMOL/L (ref 98–107)
CO2: 27 MMOL/L (ref 20–31)
CREAT SERPL-MCNC: 1.05 MG/DL (ref 0.7–1.2)
GFR AFRICAN AMERICAN: >60 ML/MIN
GFR NON-AFRICAN AMERICAN: >60 ML/MIN
GFR SERPL CREATININE-BSD FRML MDRD: ABNORMAL ML/MIN/{1.73_M2}
GFR SERPL CREATININE-BSD FRML MDRD: ABNORMAL ML/MIN/{1.73_M2}
GLUCOSE BLD-MCNC: 106 MG/DL (ref 70–99)
HCT VFR BLD CALC: 41.7 % (ref 40.7–50.3)
HEMOGLOBIN: 12.8 G/DL (ref 13–17)
MCH RBC QN AUTO: 29.6 PG (ref 25.2–33.5)
MCHC RBC AUTO-ENTMCNC: 30.7 G/DL (ref 28.4–34.8)
MCV RBC AUTO: 96.5 FL (ref 82.6–102.9)
NRBC AUTOMATED: 0 PER 100 WBC
PDW BLD-RTO: 14.6 % (ref 11.8–14.4)
PLATELET # BLD: 212 K/UL (ref 138–453)
PMV BLD AUTO: 11.5 FL (ref 8.1–13.5)
POTASSIUM SERPL-SCNC: 4.7 MMOL/L (ref 3.7–5.3)
PRO-BNP: 154 PG/ML
RBC # BLD: 4.32 M/UL (ref 4.21–5.77)
SODIUM BLD-SCNC: 136 MMOL/L (ref 135–144)
WBC # BLD: 7 K/UL (ref 3.5–11.3)

## 2020-09-22 PROCEDURE — 99214 OFFICE O/P EST MOD 30 MIN: CPT | Performed by: INTERNAL MEDICINE

## 2020-09-22 PROCEDURE — 80048 BASIC METABOLIC PNL TOTAL CA: CPT

## 2020-09-22 PROCEDURE — 85027 COMPLETE CBC AUTOMATED: CPT

## 2020-09-22 PROCEDURE — 4040F PNEUMOC VAC/ADMIN/RCVD: CPT | Performed by: INTERNAL MEDICINE

## 2020-09-22 PROCEDURE — 93010 ELECTROCARDIOGRAM REPORT: CPT | Performed by: INTERNAL MEDICINE

## 2020-09-22 PROCEDURE — 1036F TOBACCO NON-USER: CPT | Performed by: INTERNAL MEDICINE

## 2020-09-22 PROCEDURE — G8417 CALC BMI ABV UP PARAM F/U: HCPCS | Performed by: INTERNAL MEDICINE

## 2020-09-22 PROCEDURE — 93005 ELECTROCARDIOGRAM TRACING: CPT | Performed by: INTERNAL MEDICINE

## 2020-09-22 PROCEDURE — 83880 ASSAY OF NATRIURETIC PEPTIDE: CPT

## 2020-09-22 PROCEDURE — G8427 DOCREV CUR MEDS BY ELIG CLIN: HCPCS | Performed by: INTERNAL MEDICINE

## 2020-09-22 PROCEDURE — 1123F ACP DISCUSS/DSCN MKR DOCD: CPT | Performed by: INTERNAL MEDICINE

## 2020-09-22 PROCEDURE — 36415 COLL VENOUS BLD VENIPUNCTURE: CPT

## 2020-09-22 PROCEDURE — 99211 OFF/OP EST MAY X REQ PHY/QHP: CPT | Performed by: INTERNAL MEDICINE

## 2020-09-22 PROCEDURE — 1111F DSCHRG MED/CURRENT MED MERGE: CPT | Performed by: INTERNAL MEDICINE

## 2020-09-22 RX ORDER — LOSARTAN POTASSIUM 25 MG/1
25 TABLET ORAL DAILY
Qty: 90 TABLET | Refills: 3 | Status: SHIPPED | OUTPATIENT
Start: 2020-09-22 | End: 2021-09-13

## 2020-09-22 NOTE — PATIENT INSTRUCTIONS
SURVEY:    You may be receiving a survey from [email or web address] regarding your visit today. Please complete the survey to enable us to provide the highest quality of care to you and your family. If you cannot score us a very good on any question, please call the office to discuss how we could have made your experience a very good one. Thank you.     Your MA today was Idalmis Jimenez

## 2020-09-22 NOTE — PROGRESS NOTES
Alondra Melaar am scribing for and in the presence of Neel Hobson MD, F.A.C.C..    Patient: Otilia Bryan  : 1945  Date of Visit: 2020    History of Present Illness:        Dear Kiko Kauffman MD    I had the pleasure of seeing Otilia Bryan in my office today. Mr. Miquel Ku is a 66 y.o. male with a history of a history of heart failure. He has a history of hypertension and hyperlipidemia for years, and has borderline diabetes. Symptoms of heart failure: Mr. Miquel Ku reports that he has a fairly poor exercise tolerance. His says that he can really not ambulate at all due to his weight. .     ECG on 3/20: sinus tachycardia with first-degree AV block and nonspecific intraventricular conduction delay. Echocardiogram done 3/4/20 which showed severely reduced left ventricular systolic function with ejection fraction of 35% and moderate to severe aortic stenosis. This can be an underestimation because of reduced left ventricular systolic function. Mild to moderate aortic regurgitation. Moderate mitral regurgitation and moderate diastolic dysfunction. Heart Catheterization done on 3/12/20: LMCA: Normal 0% stenosis. LAD: Mild irregularities 20-30% LCx: Lesion on 2nd Ob Angie Ostial 50% stenosis. RCA: Mild irregularities 10-20%. EF:35%. Severe aortic stenosis by cardiac catheterization with peak to peak gradient of 67 mmHg and mean gradient of 48 mmHg across the aortic valve. Admission to [facility] on 3/4/20-3/9/20 due to acute heart failure: Patient treated for acute on chronic combined CHF and aortic stenosis, later developed atrial fibrillation with rapid ventricular response. Moved to the ICU and started on amiodarone drip and then later converted back to normal sinus rhythm. Amiodarone oral tablet was started prior to discharge. On 2020, he presented to the emergency room with epistaxis. Nasal packing done that removed after 3 days.     Transcatheter aortic valve replacement with a Medtronic 34 mm Evolut Pro Plus prosthesis 9/15/2020    Mr. Subhash Peraza is here today for follow up after having TAVR. He reported doing okay overall. He has chronic, stable shortness of breath on minimal exertion that stayed the same since last time. Although patient reported that he is sleeping well at night, his daughter said his breathing is very uncomfortable during sleep. She thinks that he has sleep apnea syndrome. No clear history of orthopnea or PND. History of snoring and waking up in the morning not refreshed. He denies any chest pain, pressure or tightness. No palpitations or dizziness. No fever or cough. No abdominal pain, nausea or vomiting. No problems with his current medications. Wt Readings from Last 3 Encounters:   20 (!) 351 lb (159.2 kg)   09/15/20 (!) 362 lb 3.2 oz (164.3 kg)   20 (!) 365 lb (165.6 kg)       PAST MEDICAL HISTORY:        Past Medical History:   Diagnosis Date    Anxiety     Aortic stenosis     Atrial fibrillation, new onset ([address] 2020    BPH (benign prostatic hyperplasia)     CHF (congestive heart failure) (HCC)     Diabetes mellitus [facility]     Hyperlipidemia     Hypertension     Osteoarthritis    Nonischemic cardiomyopathy. Severe aortic stenosis. CURRENT ALLERGIES: Patient has no known allergies. REVIEW OF SYSTEMS: 14 systems were reviewed. Pertinent positives and negatives as above, all else negative.      Past Surgical History:   Procedure Laterality Date    CARDIAC CATHETERIZATION  2020    VASECTOMY      Social History:  Social History     Tobacco Use    Smoking status: Former Smoker     Packs/day: 1.00     Years: 10.00     Pack years: 10.00     Types: Cigarettes     Last attempt to quit: 1973     Years since quittin.7    Smokeless tobacco: Never Used   Substance Use Topics    Alcohol use: No    Drug use: No        CURRENT MEDICATIONS:        Outpatient Medications Marked as Taking for the 9/22/20 encounter (Office Visit) with Isi Dsouza MD   Medication Sig Dispense Refill    tamsulosin (FLOMAX) 0.4 MG capsule Take 1 capsule by mouth daily 30 capsule 0    losartan (COZAAR) 50 MG tablet Take 1 tablet by mouth daily 30 tablet 3    clopidogrel (PLAVIX) 75 MG tablet Take 1 tablet by mouth daily 30 tablet 3    potassium chloride (KLOR-CON M) 20 MEQ extended release tablet Take 1 tablet by mouth daily 90 tablet 3    amiodarone (CORDARONE) 200 MG tablet Take 1 tablet by mouth daily 90 tablet 3    atorvastatin (LIPITOR) 40 MG tablet Take 1 tablet by mouth daily 90 tablet 3    furosemide (LASIX) 20 MG tablet Take 1 tablet by mouth daily 60 tablet 3    PARoxetine (PAXIL) 30 MG tablet Take 1 tablet by mouth every morning 90 tablet 3    apixaban (ELIQUIS) 5 MG TABS tablet Take 1 tablet by mouth 2 times daily 60 tablet 3    acetaminophen (TYLENOL) 500 MG tablet Take 500 mg by mouth every 6 hours as needed for Pain         FAMILY HISTORY: family history includes Arthritis in his mother; Heart Disease in his mother; High Blood Pressure in his brother and mother; High Cholesterol in his mother; Stroke in his mother. PHYSICAL EXAMINATION:     BP (!) 98/49 (Site: Left Upper Arm, Position: Sitting, Cuff Size: Large Adult)   Pulse 64   Resp 18   Ht 5' 8\" (1.727 m)   Wt (!) 351 lb (159.2 kg) Comment: per daughter  SpO2 96%   BMI 53.37 kg/m²  Body mass index is 53.37 kg/m². Constitutional: He is oriented to person, place, and time. He appears well-developed and well-nourished. In no acute distress. HEENT: Normocephalic and atraumatic. No JVD present. Carotid bruit is not present. No mass and no thyromegaly present. No lymphadenopathy present. Cardiovascular: Normal rate, regular rhythm, normal heart sounds. Exam reveals no gallop and no friction rubs. No murmur was heard. Pulmonary/Chest: Effort normal and breath sounds normal. No respiratory distress. He has no wheezes, rhonchi or rales. Abdominal: Soft, non-tender. Bowel sounds and aorta are normal. He exhibits no organomegaly, mass or bruit. Extremities: Chronic bilateral lower extremity edema with chronic bilateral erythema of the legs. No cyanosis or clubbing. 2+ radial and carotid pulses. Cannot palpate distal lower extremity pulses . Vascular changes noted. Neurological: He is alert and oriented to person, place, and time. No evidence of gross cranial nerve deficit. Coordination appeared normal.   Skin: Skin is warm and dry. There is no rash or diaphoresis. Psychiatric: He has a normal mood and affect.  His speech is normal and behavior is normal.      MOST RECENT LABS ON RECORD:   Lab Results   Component Value Date    WBC 9.7 09/15/2020    HGB 12.5 (L) 09/15/2020    HCT 40.5 (L) 09/15/2020     09/15/2020    CHOL 101 03/05/2020    TRIG 148 03/05/2020    HDL 26 (L) 03/05/2020    ALT 15 09/14/2020    AST 14 09/14/2020     09/15/2020    K 3.9 09/15/2020     09/15/2020    CREATININE 0.8 09/15/2020    BUN 13 09/15/2020    CO2 26 09/15/2020    TSH 2.83 05/15/2020    PSA 1.49 01/11/2019    INR 1.14 (H) 09/15/2020    GLUF 103 (H) 01/11/2019    LABA1C 5.9 03/02/2017       ASSESSMENT:     Patient Active Problem List    Diagnosis Date Noted    Severe aortic stenosis      Priority: High    Dyslipidemia      Priority: High    Aortic stenosis, severe 09/14/2020    Septic shock due to urinary tract infection (HCC) /  Levophed and fluids 05/27/2020    Sepsis secondary to UTI ([address] 05/15/2020    Encounter for removal of nasal packing 04/15/2020    Epistaxis from Eliquis and Aspirin 04/13/2020    Acute respiratory failure with hypoxia (HCC) Due to CHF & Obesity 03/05/2020    Acute combined systolic and diastolic HF (heart failure), NYHA class 3 ([address] 03/04/2020    Carpal tunnel syndrome on left 01/08/2019    Obesity, morbid (more than 100 lbs over ideal weight or BMI > 40) ([address] 12/15/2017    Osteoarthritis of both knees eye exams at least on a yearly basis. Stroke Risk: His CHADS2-VASc score is greater than 2 (2.2% stroke risk)  Anticoagulation: Continue Apixaban (Eliquis) 5 mg every 12 hours. I also reminded him to watch for signs of blood in his stool or black tarry stools and stop the medication immediately if this develops as this could be life threatening.  Essential Hypertension: Controlled  · Beta Blocker: Not indicated. · ACE Inibitor/ARB: DECREASE to losartan (Cozaar) 25 mg daily. · Diuretics: Continue furosemide (Lasix) 20 mg every morning. · Hyperlipidemia: Mixed, LDL 45 mg/dL on 3/5/2020  · Cholesterol Reduction Therapy: Continue Atorvastatin (Lipitor) 40 mg daily.  Suspected Obstructive Sleep Apnea: Given Mr. Manzo's symptoms of daytime tiredness and not waking up rested in the morning, I advised her to consider undergoing a sleep apnea screening which she agreed to do. Therefore I ordered a Apnea link home screening monitor for her. Finally, I recommended that he continue his other medications and follow up with you as previously scheduled. FOLLOW UP:   I told Mr. Manzo to call my office if he had any problems, but otherwise I asked him to Return in about 3 months (around 12/22/2020). However, I would be happy to see him sooner should the need arise. Sincerely,  Mohini Frazier MD, F.A.C.C. [facility] Cardiology Specialist    [address]  Phone: [phone], Fax: [phone]     I believe that the risk of significant morbidity and mortality related to the patient's current medical conditions are: high    The documentation recorded by the scribe, accurately and completely reflects the services I personally performed and the decisions made by me. Mohini Frazier MD, F.A.C.C.  September 22, 2020

## 2020-09-23 ENCOUNTER — TELEPHONE (OUTPATIENT)
Dept: CARDIOLOGY | Age: 75
End: 2020-09-23

## 2020-09-23 PROBLEM — I50.41 ACUTE COMBINED SYSTOLIC AND DIASTOLIC HF (HEART FAILURE), NYHA CLASS 3 (HCC): Status: RESOLVED | Noted: 2020-03-04 | Resolved: 2020-09-23

## 2020-09-23 PROBLEM — J96.01 ACUTE RESPIRATORY FAILURE WITH HYPOXIA (HCC): Status: RESOLVED | Noted: 2020-03-05 | Resolved: 2020-09-23

## 2020-09-23 PROBLEM — N39.0 SEPTIC SHOCK DUE TO URINARY TRACT INFECTION (HCC): Status: RESOLVED | Noted: 2020-05-27 | Resolved: 2020-09-23

## 2020-09-23 PROBLEM — R65.21 SEPTIC SHOCK DUE TO URINARY TRACT INFECTION (HCC): Status: RESOLVED | Noted: 2020-05-27 | Resolved: 2020-09-23

## 2020-09-23 PROBLEM — A41.9 SEPTIC SHOCK DUE TO URINARY TRACT INFECTION (HCC): Status: RESOLVED | Noted: 2020-05-27 | Resolved: 2020-09-23

## 2020-09-23 NOTE — TELEPHONE ENCOUNTER
----- Message from Shant Newman MD sent at 9/22/2020  9:54 PM EDT -----  Blood work is excellent. Continue therapy as discussed in the office today. Please call with questions and/or concerns.   Thank you

## 2020-09-28 ENCOUNTER — HOSPITAL ENCOUNTER (OUTPATIENT)
Dept: SLEEP CENTER | Age: 75
Discharge: HOME | End: 2020-09-28
Payer: MEDICARE

## 2020-09-28 PROCEDURE — 95806 SLEEP STUDY UNATT&RESP EFFT: CPT

## 2020-09-30 LAB — STATUS: NORMAL

## 2020-10-01 ENCOUNTER — TELEPHONE (OUTPATIENT)
Dept: CARDIOLOGY | Age: 75
End: 2020-10-01

## 2020-10-01 NOTE — TELEPHONE ENCOUNTER
----- Message from Damon Burk MD sent at 9/30/2020 10:47 PM EDT -----  The sleep study suggested mild sleep apnea syndrome. Because of his history of heart failure, he will need treatment with CPAP therapy. CPAP titration study ordered.   Thank you

## 2020-10-07 ENCOUNTER — HOSPITAL ENCOUNTER (OUTPATIENT)
Dept: CARDIAC REHAB | Age: 75
Setting detail: THERAPIES SERIES
Discharge: HOME | End: 2020-10-07
Payer: MEDICARE

## 2020-10-07 VITALS
WEIGHT: 315 LBS | RESPIRATION RATE: 20 BRPM | HEART RATE: 76 BPM | BODY MASS INDEX: 46.65 KG/M2 | SYSTOLIC BLOOD PRESSURE: 124 MMHG | OXYGEN SATURATION: 97 % | HEIGHT: 69 IN | DIASTOLIC BLOOD PRESSURE: 66 MMHG

## 2020-10-07 ASSESSMENT — PATIENT HEALTH QUESTIONNAIRE - PHQ9: SUM OF ALL RESPONSES TO PHQ QUESTIONS 1-9: 2

## 2020-10-07 NOTE — PROGRESS NOTES
Cardiac Rehab Initial History and Assessment    Yaneth Mayes 1945  10/7/2020    Primary Diagnosis: s/p TAVR  Living Will: No   On File: N/A  Durable Power of :No    Medical History  Past Medical History:   Diagnosis Date    Anxiety     Aortic stenosis     Atrial fibrillation, new onset ([facility] 03/05/2020    BPH (benign prostatic hyperplasia)     CHF (congestive heart failure) (HCC)     Diabetes mellitus (HCC)     borderline    Hyperlipidemia     Hypertension     Lymphedema     bilat legs    On home O2     Osteoarthritis     Septic shock due to [facility] tract infection ([facility] /  Levophed and fluids 5/27/2020     Past Surgical History:   Procedure Laterality Date    CARDIAC CATHETERIZATION  03/2020    OTHER SURGICAL HISTORY  09/14/2020    TAVR/ Dr. Kelly Valenzuela       Family History  Family History   Problem Relation Age of Onset    Arthritis Mother     Heart Disease Mother     High Cholesterol Mother     High Blood Pressure Mother     Stroke Mother     High Blood Pressure Brother          Symptoms:  1. Angina   [x] None   [] Tightness   [] Shortness of Breath   [] Pressure    [] Nausea   [] Sharp, Stabbing  [] Pallor   [] Indigestion, Heartburn [] Sweaty    Where was discomfort located? Precipitating Factors? Relieved by:    2. Arrhythmia   [x] None   [] Irregular Beats (skips) [] Pacer    [] Atrial Fibrillation  [] AICD    On any Medications? 3. Congestive Heart Failure   [] None   [x] Pedal Edema  [x] Unusual weight gain   [x] SOB with mild exertion [x] Fatigue    4. Vascular   [x] None   [] Carotid Narrowing  [] R [] L   [] Peripheral claudication [] R [] L    5. Musculoskeletal    [] None   [] Back Pain  Where? [x] Joint discomfort Where?  Bilateral knees  Socio-Economic    Marital Status:  to Carrie Noland    Nutrition    Appetite:  []  Too Good    [x]  Good  []  Poor    Diet: low sodium  Eating out 0 times/wk  Alcohol Consumption: [] Yes [x] No   Type:  Frequency:    Caffeine: [] Yes [x] No  Type:  Amount:    Water intake per day: 1 gallon a day  Vitamins/Natural herbal products: none    Psychological    [] Depression  [] Tearful  [] Fearful  [x] Cheerful  [] Anxious  [x] Motivated  [] Overwhelmed    Treatment: Paxil    Diabetes    [x] Yes (borderline)  [] No  How lon-6 years  Latest BS: 104  Frequency of Checks:2 x week  Medication:     Stress    Source: health; taking care of wife  Relaxation techniques: computer art  Hobbies: design     Level of Education    [] 8th Grade  [] Associates  [] Masters  [x] [employer] [] Bachelor  []  Other:    Depression Screening:  [] Have you been feeling sad. ..down in the dumps? [] Have you lost interest in your job, sports, hobbies, friends? [x] Do you often feel tired? [x] Do you have trouble sleeping or do you sleep too much? [] Have you been gaining or losing weight? [] Do you often feel down on yourself, that everything is your fault? [] Do you have troubled making decisions or concentrating on your work? [] Do you often feel agitated or like you can barely move? [] Do you ever feel that life isn't worth living?    *If greater than 5 symptoms listed,  notified. Cardiac Rehab Pre - Test  1. The heart is a muscle that acts like a pump to deliver oxygen and blood to the rest of the body. [x] True   [] False  2. Healing from the damage of a heart attach is complete in two weeks. [] True   [x] False  3. Smoking has no direct effect on the heart - it only effects your lungs. [] True   [x] False  4. Using all the salt you want is acceptable for all heart patients. [] True   [x] False  5. Chest pain that is relieved by rest or nitroglycerine is called Angina. [x] True   [] False  6. Shortness of breath, indigestion, sweating, tightness or pain in your chest are symptoms of a heart attack. [x] True   [] False  7.  Swelling of the feet and ankles only means you've been on your feet too much. [] True   [x] False  8. Saturated fats raised your blood cholesterol level more than anything else in your diet. [x] True   [] False  9. High Blood pressure can take care of itself by rest alone. [] True   [x] False  10. Walking is one of the best exercises for heart attack patients. [] True   [x] False    Physical Findings   BP: 124/66   Pulse: 76   Resp: 20   SpO2: 97 %   Lungs clear. Heart tones regular. Denies chest pain. LR. Bilateral lower leg edema 2+ pitting. Patient wears home oxygen since surgery and currently uses motorized chair.     Goals:   -increased stamina/strength to 30-50 total exercise by increasing 1-2 level/wk and 1-2   min/wk  to achieve THR and RPE 11-13  -introduce weights/ therabands 2-4# for 5-10 reps  -manage BP better  -improved cholesterol and  Triglycerides  -develop regular exercise 30 min daily

## 2020-10-12 ENCOUNTER — HOSPITAL ENCOUNTER (OUTPATIENT)
Dept: CARDIAC REHAB | Age: 75
Setting detail: THERAPIES SERIES
Discharge: HOME | End: 2020-10-12
Payer: MEDICARE

## 2020-10-12 ENCOUNTER — APPOINTMENT (OUTPATIENT)
Dept: CARDIAC REHAB | Age: 75
End: 2020-10-12
Payer: MEDICARE

## 2020-10-12 PROCEDURE — 93798 PHYS/QHP OP CAR RHAB W/ECG: CPT

## 2020-10-12 NOTE — PROGRESS NOTES
Cardiac Rehabilitation   Physician Order Form    Elio Colunga  1945    [x] Phase 2 ECG Monitored Cardiac Rehabilitation    [] MI   [] PTCA with/without stents  [] CABG  [x] Heart Valve Repair/Replaced   [] Stable Angina [] Other:     Onset Date: 09/15/2020                    Cardiac Education Goals: (see individualized treatment plan for specific goals, progression & compliance)    [x] Hypertension [x] Physical Inactivity  [x] A & P  [] Smoking  [] Coping/Depression  [x] Medications  [] Diabetes  [x] Obesity   [] Angina  [x] Hyperlipidemia [x] Stress   [x] Home Exercise                     Prescribed Exercise Plan:    Target HR:       Duration:31-60 min  Frequency:2x/day  Initial Met Level:1.7  Limitations: wheelchair    Modalities:  []Treadmill   [x] Recumb. Stepper/bike  [] Elliptical  [] Air Dyne  [x] Weights/therabands    · Aerobic exercise to total 31-60 minutes. Progressing by 1-2 minutes per week and/or 1-2 levels per week per patient tolerance using various modalities; according to Hedy Scale 11-13 and THR  · Strength training starting with weights 1-3 # / 5-8 reps progressing to 5-10 # / 15-20 reps; therabands red-gray 5-20 reps. Per patient symptoms use:  · Appropriate ACLS Algorhythm for Cardiac Events. · Nitroglycerine 0.4mg SLq 5mins X 3 for angina pain. · 12 lead EKG for c/o chest pain or change in rhythm. · Nasal O2 for SaO2 <90% or symptoms warranted. · Blood sugar monitoring for Hyper/Hypoglycemia symptoms.

## 2020-10-12 NOTE — PROGRESS NOTES
Phase II Cardiac Rehab Individualized Treatment Plan-Initial     Patient Name: Dior Acuña  Date of Initial Assessment: 10/12/2020  ACCOUNT #: [de-identified]  Diagnosis:s/p TAVR   Onset Date: 09/15/2020  Referring Physician: Hoa Godoy  Risk Stratification: high  Session Number: 1   EXERCISE    Stages of Change:   ? pre-contemplation   x Action   ? Contemplate   ? Maintainence   ? Prep   ? Relapse          Exercise Prescription:  Mode:TM ? B x STP ? EL ? R  Frequency: 2x/wk  Duration: 31-60 min  Intensity: 1.7 METS achieved  Target HR     Maximum HR 87  Plan/Goal: Increase 1-2 levels/week or 1-2 min/week to achieve target HR and RPE   11-13. ? Angina with Exertion THR:    ? Resistance Training Weight (lbs):   Reps:     Hypertension:  x Yes  ? No  Resting BP: 116/66  Peak Exercise BP: 120/72  BP Meds: losartan    Intervention:  Home Exercise:  Type: chair stretches, walk in house in short interval  Duration: 30 min  Frequency: daily   ? Resistance Training    Education:   x Equipment [address]  ? Self pulse   ? Proper use weights/therabands   x S/S to report  x Low Na Diet   x Warm up/ Cool down  ? BP Medication    x RPE Scale   ? Understand BP   x Ex Safety   ? Exercise specialist class-Home Exercise       Target Goal:   -Individual Exercise Plan  -BP<140/90 or <130/80 if DM   -Aerobic active 30 + minutes 5-7 days per week    Nutrition    Stages of Change:   ? pre-contemplation   x Action   ? Contemplate   ? Maintainence   ? Prep   ? Relapse    Lipids: Total Cholesterol: 101  Triglycerides: 148  HDL: 26  LDL: 45  Lipid Meds: lipitor     Diabetes:  ? Yes  xNo  FBS:   HbA1c:5.9  Diabetes Medication:  ?  Monitor BS at home   Frequency?:     Weight Management:  Last Weight: 357.9  Height: 5'9\"  BMI: 51.8  Wt Goal: 1-2 lbs/wk  Alcohol:   Social History     Substance and Sexual Activity   Alcohol Use No     Diet Assessment Tool: Food Diary  Special Diet: low fat, low na    Intervention:   x Dietitian Consult x Nurse/Patient Discussion     ? Diet Class           ? Referred to Diabetes Education     Education:  ? S&S hypo/hyperglycemia  x Low fat/low cholesterol diet  ? Weight loss methods      ? Relate Diabetes/CAD     x Eating heart healthy handout    Target Goal:  -LDL-C<100 if triglycerides are > 200  -LDL-C < 70 for high risk patients  -HbA1c < 7%  -BMI < 25   Education    Stages of Change:    ? pre-contemplation x Action   ? Contemplate   ? Maintainence   ? Prep   ? Relapse    Learning Barriers:   ? Speech   ? Cognitive   ? Literacy   ? Visions   ? Hearing   x Ready Learn    Knowledge test score: 90%      Family support: x Yes  ? No    Tobacco use:   Social History     Tobacco Use   Smoking Status Former Smoker    Packs/day: 1.00    Years: 10.00    Pack years: 10.00    Types: Cigarettes    Last attempt to quit: 1973    Years since quittin.8   Smokeless Tobacco Never Used       Intervention:  ? Referred to smoking cessation counselor     ? Individual education and counseling  ? Tobacco adjunct  ? Informed of education class schedule     Education:   ? Risk Factors/Modifications  ? Psychological aspects  ? Angina    ? Medications  ? CHF      ? Cardiac A&P    Target Goal:  -Complete cessation of tobacco use (if applicable)  -Continued risk factor modifications  -Recognizing signs/symptoms to report  -Proper use of meds    Psychosocial  Stages of Change:    ? pre-contemplation  x Action   ? Contemplate   ? Maintainence   ? Prep   ? Relapse    Psychosocial Test:  Tool Used: [place] Quality of Life  Score: not entered in the computer   Depression screening score:     Intervention:   ? Psych Consult/  ? Uses stress management skills    ? Physician Referral    ? Stress management class  Medications:     Education:    ? Coping Techniques   ? Relaxation techniques   ? S/S of Depression    Target Goal:  -Assess presence or absence of depression using a valid screening tool.   -Maximize coping skills.  -Positive support system. Preventative Medication:   ? Aspirin       x Beta Blockade      x Statin or other lipid lowering agent     ? Clopidogrel  x ACE Inhibitor   x Other anticoagulation medications     Fall Risk assess: x? Yes  ? No  Assistive Device:   ? Cane  ? Walker x Wheel Chair  ?  Gait belt    Patient/Program goal:   -increased stamina/strength to 30-50 total exercise by increasing 1-2 level/wk and 1-2   min/wk  to achieve THR and RPE 11-13  -introduce weights/ therabands 2-4# for 5-10 reps  -manage BP better  -improved cholesterol and  Triglycerides  -develop regular exercise 30 min daily    Physician Changes/Comments:      Cardiac Rehab Staff

## 2020-10-13 ENCOUNTER — HOSPITAL ENCOUNTER (OUTPATIENT)
Dept: NON INVASIVE DIAGNOSTICS | Age: 75
Discharge: HOME | End: 2020-10-13
Payer: MEDICARE

## 2020-10-13 ENCOUNTER — HOSPITAL ENCOUNTER (OUTPATIENT)
Age: 75
Discharge: HOME | End: 2020-10-13
Payer: MEDICARE

## 2020-10-13 LAB
ANION GAP SERPL CALCULATED.3IONS-SCNC: 11 MMOL/L (ref 9–17)
BUN BLDV-MCNC: 11 MG/DL (ref 8–23)
BUN/CREAT BLD: 15 (ref 9–20)
CALCIUM SERPL-MCNC: 9 MG/DL (ref 8.6–10.4)
CHLORIDE BLD-SCNC: 100 MMOL/L (ref 98–107)
CO2: 26 MMOL/L (ref 20–31)
CREAT SERPL-MCNC: 0.73 MG/DL (ref 0.7–1.2)
GFR AFRICAN AMERICAN: >60 ML/MIN
GFR NON-AFRICAN AMERICAN: >60 ML/MIN
GFR SERPL CREATININE-BSD FRML MDRD: NORMAL ML/MIN/{1.73_M2}
GFR SERPL CREATININE-BSD FRML MDRD: NORMAL ML/MIN/{1.73_M2}
GLUCOSE BLD-MCNC: 98 MG/DL (ref 70–99)
HCT VFR BLD CALC: 42.4 % (ref 40.7–50.3)
HEMOGLOBIN: 13.2 G/DL (ref 13–17)
LV EF: 55 %
LVEF MODALITY: NORMAL
MCH RBC QN AUTO: 29.9 PG (ref 25.2–33.5)
MCHC RBC AUTO-ENTMCNC: 31.1 G/DL (ref 28.4–34.8)
MCV RBC AUTO: 95.9 FL (ref 82.6–102.9)
NRBC AUTOMATED: 0 PER 100 WBC
PDW BLD-RTO: 14.6 % (ref 11.8–14.4)
PLATELET # BLD: 173 K/UL (ref 138–453)
PMV BLD AUTO: 12 FL (ref 8.1–13.5)
POTASSIUM SERPL-SCNC: 4.3 MMOL/L (ref 3.7–5.3)
RBC # BLD: 4.42 M/UL (ref 4.21–5.77)
SODIUM BLD-SCNC: 137 MMOL/L (ref 135–144)
WBC # BLD: 6.6 K/UL (ref 3.5–11.3)

## 2020-10-13 PROCEDURE — 80048 BASIC METABOLIC PNL TOTAL CA: CPT

## 2020-10-13 PROCEDURE — 6360000004 HC RX CONTRAST MEDICATION: Performed by: INTERNAL MEDICINE

## 2020-10-13 PROCEDURE — C8929 TTE W OR WO FOL WCON,DOPPLER: HCPCS

## 2020-10-13 PROCEDURE — 36415 COLL VENOUS BLD VENIPUNCTURE: CPT

## 2020-10-13 PROCEDURE — 85027 COMPLETE CBC AUTOMATED: CPT

## 2020-10-13 RX ADMIN — PERFLUTREN 1.76 MG: 6.52 INJECTION, SUSPENSION INTRAVENOUS at 14:01

## 2020-10-15 ENCOUNTER — APPOINTMENT (OUTPATIENT)
Dept: CARDIAC REHAB | Age: 75
End: 2020-10-15
Payer: MEDICARE

## 2020-10-15 ENCOUNTER — OFFICE VISIT (OUTPATIENT)
Dept: CARDIOLOGY | Age: 75
End: 2020-10-15
Payer: MEDICARE

## 2020-10-15 ENCOUNTER — HOSPITAL ENCOUNTER (OUTPATIENT)
Dept: CARDIAC REHAB | Age: 75
Setting detail: THERAPIES SERIES
Discharge: HOME | End: 2020-10-15
Payer: MEDICARE

## 2020-10-15 VITALS
SYSTOLIC BLOOD PRESSURE: 119 MMHG | OXYGEN SATURATION: 98 % | DIASTOLIC BLOOD PRESSURE: 71 MMHG | RESPIRATION RATE: 17 BRPM | BODY MASS INDEX: 46.65 KG/M2 | HEART RATE: 70 BPM | HEIGHT: 69 IN | WEIGHT: 315 LBS

## 2020-10-15 PROCEDURE — 1036F TOBACCO NON-USER: CPT | Performed by: INTERNAL MEDICINE

## 2020-10-15 PROCEDURE — G8417 CALC BMI ABV UP PARAM F/U: HCPCS | Performed by: INTERNAL MEDICINE

## 2020-10-15 PROCEDURE — 93798 PHYS/QHP OP CAR RHAB W/ECG: CPT

## 2020-10-15 PROCEDURE — 4040F PNEUMOC VAC/ADMIN/RCVD: CPT | Performed by: INTERNAL MEDICINE

## 2020-10-15 PROCEDURE — 99214 OFFICE O/P EST MOD 30 MIN: CPT | Performed by: INTERNAL MEDICINE

## 2020-10-15 PROCEDURE — G8427 DOCREV CUR MEDS BY ELIG CLIN: HCPCS | Performed by: INTERNAL MEDICINE

## 2020-10-15 PROCEDURE — 1111F DSCHRG MED/CURRENT MED MERGE: CPT | Performed by: INTERNAL MEDICINE

## 2020-10-15 PROCEDURE — 1123F ACP DISCUSS/DSCN MKR DOCD: CPT | Performed by: INTERNAL MEDICINE

## 2020-10-15 PROCEDURE — 99211 OFF/OP EST MAY X REQ PHY/QHP: CPT | Performed by: INTERNAL MEDICINE

## 2020-10-15 PROCEDURE — G8484 FLU IMMUNIZE NO ADMIN: HCPCS | Performed by: INTERNAL MEDICINE

## 2020-10-15 NOTE — PATIENT INSTRUCTIONS
SURVEY:    You may be receiving a survey from [employer] regarding your visit today. Please complete the survey to enable us to provide the highest quality of care to you and your family. If you cannot score us a very good on any question, please call the office to discuss how we could have made your experience a very good one. Thank you.

## 2020-10-15 NOTE — PROGRESS NOTES
Donte Shepherd am scribing for and in the presence of Martir Cheng MD, F.A.C.C..    Patient: Harley Dodd  : 1945  Date of Visit: October 15, 2020    History of Present Illness:        Dear Birgit Gracia MD    I had the pleasure of seeing Harley Dodd in my office today. Mr. Robbie Heck is a 66 y.o. male with a history of a history of heart failure. He has a history of hypertension and hyperlipidemia for years, and has borderline diabetes. Symptoms of heart failure: Mr. Robbie Heck reports that he has a fairly poor exercise tolerance. His says that he can really not ambulate at all due to his weight and shortness of breath. ECG on 3/20: sinus tachycardia with first-degree AV block and nonspecific intraventricular conduction delay. Echocardiogram done 3/4/20 which showed severely reduced left ventricular systolic function with ejection fraction of 35% and moderate to severe aortic stenosis. This can be an underestimation because of reduced left ventricular systolic function. Mild to moderate aortic regurgitation. Moderate mitral regurgitation and moderate diastolic dysfunction. Heart Catheterization done on 3/12/20: LMCA: Normal 0% stenosis. LAD: Mild irregularities 20-30% LCx: Lesion on 2nd Ob Angie Ostial 50% stenosis. RCA: Mild irregularities 10-20%. EF:35%. Severe aortic stenosis by cardiac catheterization with peak to peak gradient of 67 mmHg and mean gradient of 48 mmHg across the aortic valve. Admission to [facility] on 3/4/20-3/9/20 due to acute heart failure: Patient treated for acute on chronic combined CHF and aortic stenosis, later developed atrial fibrillation with rapid ventricular response. Moved to the ICU and started on amiodarone drip and then later converted back to normal sinus rhythm. Amiodarone oral tablet was started prior to discharge. On 2020, he presented to the emergency room with epistaxis.   Nasal packing done that removed after 3 days.    Transcatheter aortic valve replacement with a Medtronic 34 mm Evolut Pro Plus prosthesis 9/15/2020    Echocardiogram done on 10/13/2020 showed an EF of 55%. Mild to moderate LV hypertrophy. Status post TAVR with mean gradient of 9 mmHg, mild perivalvular regurgitation noted. Mild mitral regurgitation. Moderate diastolic dysfunction is seen. Mr. Omkar Lechuga is here today for a follow up. He reported doing really well since his TAVR procedure. He started cardiac rehab and he is going to cardiac rehab after his visit. He reported feeling much better. He still have chronic shortness of breath on exertion but this is getting better. No orthopnea or PND. No fever or cough. No chest pain, pressure or tightness. No bleeding problems from Plavix and Eliquis. His weight is stable. No nausea, vomiting or abdominal pain. His weight is stable. Blood pressures controlled. He still have chronic bilateral lower leg edema but this is actually getting better since last visit. He was wondering if he can go back to the [facility] because of his lymphedema. He sleeps well at night but is in the process of being tested for sleep apnea. History of snoring and waking up in the morning not refreshed. His daughter has told him that he jerks when he sleeps quite often. Wt Readings from Last 3 Encounters:   10/15/20 (!) 350 lb (158.8 kg)   10/07/20 (!) 350 lb (158.8 kg)   09/23/20 (!) 351 lb (159.2 kg)       PAST MEDICAL HISTORY:        Past Medical History:   Diagnosis Date    Anxiety     Aortic stenosis     Atrial fibrillation, new onset ([address] 03/05/2020    BPH (benign prostatic hyperplasia)     CHF (congestive heart failure) ([address]     Diabetes mellitus ([address]     borderline    Hyperlipidemia     Hypertension     Lymphedema     bilat legs    On home O2     Osteoarthritis     Septic shock due to urinary tract infection ([address] /  Levophed and fluids 5/27/2020   Nonischemic cardiomyopathy.   Severe aortic stenosis. CURRENT ALLERGIES: Patient has no known allergies. REVIEW OF SYSTEMS: 14 systems were reviewed. Pertinent positives and negatives as above, all else negative. Past Surgical History:   Procedure Laterality Date    CARDIAC CATHETERIZATION  2020    OTHER SURGICAL HISTORY  2020    TAVR/ Dr. Leigh Mahmood History:  Social History     Tobacco Use    Smoking status: Former Smoker     Packs/day: 1.00     Years: 10.00     Pack years: 10.00     Types: Cigarettes     Last attempt to quit: 1973     Years since quittin.8    Smokeless tobacco: Never Used   Substance Use Topics    Alcohol use: No    Drug use: No        CURRENT MEDICATIONS:        Outpatient Medications Marked as Taking for the 10/15/20 encounter (Office Visit) with Lonnie Davila MD   Medication Sig Dispense Refill    tamsulosin (FLOMAX) 0.4 MG capsule Take 1 capsule by mouth once daily 30 capsule 0    apixaban (ELIQUIS) 5 MG TABS tablet Take 1 tablet by mouth 2 times daily 60 tablet 0    clopidogrel (PLAVIX) 75 MG tablet Take 1 tablet by mouth daily 30 tablet 3    losartan (COZAAR) 25 MG tablet Take 1 tablet by mouth daily 90 tablet 3    potassium chloride (KLOR-CON M) 20 MEQ extended release tablet Take 1 tablet by mouth daily 90 tablet 3    amiodarone (CORDARONE) 200 MG tablet Take 1 tablet by mouth daily 90 tablet 3    atorvastatin (LIPITOR) 40 MG tablet Take 1 tablet by mouth daily 90 tablet 3    furosemide (LASIX) 20 MG tablet Take 1 tablet by mouth daily 60 tablet 3    PARoxetine (PAXIL) 30 MG tablet Take 1 tablet by mouth every morning 90 tablet 3    acetaminophen (TYLENOL) 500 MG tablet Take 500 mg by mouth every 6 hours as needed for Pain         FAMILY HISTORY: family history includes Arthritis in his mother; Heart Disease in his mother; High Blood Pressure in his brother and mother; High Cholesterol in his mother; Stroke in his mother.      PHYSICAL EXAMINATION:     /71 (Site: Right Lower Arm, Position: Sitting, Cuff Size: Medium Adult)   Pulse 70   Resp 17   Ht 5' 9\" (1.753 m)   Wt (!) 350 lb (158.8 kg)   SpO2 98%   BMI 51.69 kg/m²  Body mass index is 51.69 kg/m². Constitutional: He is oriented to person, place, and time. He appears well-developed and well-nourished. In no acute distress. HEENT: Normocephalic and atraumatic. No JVD present. Carotid bruit is not present. No mass and no thyromegaly present. No lymphadenopathy present. Cardiovascular: Normal rate, regular rhythm, normal heart sounds. Exam reveals no gallop and no friction rubs. 1/6 systolic murmur, 2nd intercostal space on the LEFT just lateral to the sternum  Pulmonary/Chest: Effort normal and breath sounds normal. No respiratory distress. He has no wheezes, rhonchi or rales. Abdominal: Soft, non-tender. Bowel sounds and aorta are normal. He exhibits no organomegaly, mass or bruit. Extremities: 1+ 1/2 up to the knees bilaterally. No cyanosis or clubbing. 2+ radial and carotid pulses. Distal extremity pulses: 2+ bilaterally. Chronic vascular changes again noted. Neurological: He is alert and oriented to person, place, and time. No evidence of gross cranial nerve deficit. Coordination appeared normal.   Skin: Skin is warm and dry. There is no rash or diaphoresis. Psychiatric: He has a normal mood and affect.  His speech is normal and behavior is normal.      MOST RECENT LABS ON RECORD:   Lab Results   Component Value Date    WBC 6.6 10/13/2020    HGB 13.2 10/13/2020    HCT 42.4 10/13/2020     10/13/2020    CHOL 101 03/05/2020    TRIG 148 03/05/2020    HDL 26 (L) 03/05/2020    ALT 15 09/14/2020    AST 14 09/14/2020     10/13/2020    K 4.3 10/13/2020     10/13/2020    CREATININE 0.73 10/13/2020    BUN 11 10/13/2020    CO2 26 10/13/2020    TSH 2.83 05/15/2020    PSA 1.49 01/11/2019    INR 1.14 (H) 09/15/2020    GLUF 103 (H) 01/11/2019    LABA1C 5.9 03/02/2017       ASSESSMENT: Patient Active Problem List    Diagnosis Date Noted    Severe aortic stenosis /  TAVR 2020      Priority: High    Dyslipidemia      Priority: High    Encounter for removal of nasal packing 04/15/2020    Epistaxis from Eliquis and Aspirin 04/13/2020    Carpal tunnel syndrome on left 01/08/2019    Obesity, morbid (more than 100 lbs over ideal weight or BMI > 40) ([facility] 12/15/2017    Osteoarthritis of both knees 06/13/2017    Essential hypertension 12/13/2016    Mixed hyperlipidemia 12/13/2016        Diagnosis Orders   1. Chronic combined systolic and diastolic congestive heart failure ([facility]     2. Severe aortic stenosis     3. S/P TAVR (transcatheter aortic valve replacement)     4. PAF (paroxysmal atrial fibrillation) ([facility]     5. Essential hypertension     6. Mixed hyperlipidemia         PLAN:         Chronic combined heart failure: New York Heart Association Class: IIb (Marked symptoms during daily activities)   Beta Blocker: Not indicated at this time.  ACE Inibitor/ARB: Continue losartan (Cozaar) 25 mg daily.  Diuretics: Continue furosemide (Lasix) 20 mg every morning.  Heart failure counseling: I advised them to try and keep their legs up whenever possible and to limit salt in their diet. · Severe Aortic Stenosis: symptomatic S/P TAVR 9/14/2020. · Beta Blocker: Not indicated at this time. · ACE Inibitor/ARB: Continue losartan (Cozaar) 25 mg daily. · Diuretics: Continue furosemide (Lasix) 20 mg every morning. · I advised them to try and keep their legs up whenever possible and to limit salt in their diet. Paroxysmal Atrial Fibrillation: Rhythm Control  Rhythm Control Agent: Continue Amiodarone (Cordarone) 200 mg once daily    Monitoring: Amiodarone Since he is being maintained on Amiodarone, I told him that we will need to closely monitor him for potential side effects.  These include monitoring LFTs and TSH at least every 6 months as well as chest x-rays, pulmonary function

## 2020-10-19 ENCOUNTER — APPOINTMENT (OUTPATIENT)
Dept: CARDIAC REHAB | Age: 75
End: 2020-10-19
Payer: MEDICARE

## 2020-10-19 ENCOUNTER — HOSPITAL ENCOUNTER (OUTPATIENT)
Dept: CARDIAC REHAB | Age: 75
Setting detail: THERAPIES SERIES
Discharge: HOME | End: 2020-10-19
Payer: MEDICARE

## 2020-10-19 PROCEDURE — 93798 PHYS/QHP OP CAR RHAB W/ECG: CPT

## 2020-10-22 ENCOUNTER — HOSPITAL ENCOUNTER (OUTPATIENT)
Dept: CARDIAC REHAB | Age: 75
Setting detail: THERAPIES SERIES
Discharge: HOME | End: 2020-10-22
Payer: MEDICARE

## 2020-10-22 ENCOUNTER — APPOINTMENT (OUTPATIENT)
Dept: CARDIAC REHAB | Age: 75
End: 2020-10-22
Payer: MEDICARE

## 2020-10-22 PROCEDURE — 93798 PHYS/QHP OP CAR RHAB W/ECG: CPT

## 2020-10-26 ENCOUNTER — APPOINTMENT (OUTPATIENT)
Dept: CARDIAC REHAB | Age: 75
End: 2020-10-26
Payer: MEDICARE

## 2020-10-26 ENCOUNTER — HOSPITAL ENCOUNTER (OUTPATIENT)
Dept: CARDIAC REHAB | Age: 75
Setting detail: THERAPIES SERIES
Discharge: HOME | End: 2020-10-26
Payer: MEDICARE

## 2020-10-26 PROCEDURE — 93798 PHYS/QHP OP CAR RHAB W/ECG: CPT

## 2020-10-29 ENCOUNTER — HOSPITAL ENCOUNTER (OUTPATIENT)
Dept: CARDIAC REHAB | Age: 75
Setting detail: THERAPIES SERIES
Discharge: HOME | End: 2020-10-29
Payer: MEDICARE

## 2020-10-29 ENCOUNTER — APPOINTMENT (OUTPATIENT)
Dept: CARDIAC REHAB | Age: 75
End: 2020-10-29
Payer: MEDICARE

## 2020-10-29 PROCEDURE — 93798 PHYS/QHP OP CAR RHAB W/ECG: CPT

## 2020-10-30 NOTE — PROGRESS NOTES
Cardiopulmonary Rehab   Medical Nutrition Therapy Food Diary Evaluation    Patient Name: Ta Roldan  Registered Dietitian:  Aydee Murry RDN, LD  Date:  10/30/2020    Dear Julia Love,    Thank you for sharing your information about your eating patterns, it serves not only to help me see what you are eating, but you as well. I see that you are currently not eating 3 meals a day and that you skipped breakfast, breakfast really is the most important meal of the day. Especially being a diabetic, it is important to have something for breakfast within 1 hour of rising. Whole grains, fruits and vegetables, along with lean meats and low fat dairy are the cornerstones of your health (I did not see any sources of fruits, vegetables, or whole grains on your food diary which are important to a healthy diet). It is good to see that you are choosing baked option with chicken, but I would suggest adding brown rice or baked potato, broccoli, strawberries, and yogurt with the meal. Egg whites are a good source of protein, consider making them into an omelette and adding mushrooms, onions, etc. With some whole grain toast and fresh fruit. I noticed water on your food diary, but no amount that you are drinking. I would suggest aiming for 64 oz of water each day. There was no mention of physical activity, I hope you are including at least 30 minutes a minimum of 5 days each week as you are allowed per any activity restrictions. With that in mind, look below for some suggestions. Your Rate Your Plate (RYP) Score was: 79, which means: you are making many healthy choices    Recommendations from the Dietitian based on your RYP and Food Diary / activity record to improve health and decrease risk factors    1. Eat 3 well balanced meals with a variety of lean meats, fresh fruits, vegetables, whole grains and low fat dairy. 2. Have breakfast every day within the first hour of rising. 3. Space 3 meals at every 4.5-5 hours. 4. Add fruits, non-starchy vegetables (broccoli, carrots, celery, etc.) daily aiming for 3 servings of each every day. 5. Include 30 minutes of physical activity a minimum of 5 days each week (walking, riding a stationary bike, lifting light weights, etc.). 6. Limit sodium to < 2,000 mg per day avoiding canned and pre-packaged foods. 7. Consider an appointment with the dietitian to learn about a heart healthy diet and guide you through reading food labels, meal planning, etc.     If there are many things to change, try making change with one recommendation, then move on from there. Recommendations are based on guidelines from the American Heart Association, American Diabetes Association and current literature.     Feel free to call with questions or concerns [phone] or [address] RDN, LD

## 2020-11-02 ENCOUNTER — HOSPITAL ENCOUNTER (OUTPATIENT)
Dept: CARDIAC REHAB | Age: 75
Setting detail: THERAPIES SERIES
Discharge: HOME | End: 2020-11-02
Payer: MEDICARE

## 2020-11-02 ENCOUNTER — APPOINTMENT (OUTPATIENT)
Dept: CARDIAC REHAB | Age: 75
End: 2020-11-02
Payer: MEDICARE

## 2020-11-05 ENCOUNTER — HOSPITAL ENCOUNTER (OUTPATIENT)
Dept: CARDIAC REHAB | Age: 75
Setting detail: THERAPIES SERIES
Discharge: HOME | End: 2020-11-05
Payer: MEDICARE

## 2020-11-05 ENCOUNTER — APPOINTMENT (OUTPATIENT)
Dept: CARDIAC REHAB | Age: 75
End: 2020-11-05
Payer: MEDICARE

## 2020-11-05 PROCEDURE — 93798 PHYS/QHP OP CAR RHAB W/ECG: CPT

## 2020-11-09 ENCOUNTER — HOSPITAL ENCOUNTER (OUTPATIENT)
Dept: CARDIAC REHAB | Age: 75
Setting detail: THERAPIES SERIES
Discharge: HOME | End: 2020-11-09
Payer: MEDICARE

## 2020-11-09 ENCOUNTER — APPOINTMENT (OUTPATIENT)
Dept: CARDIAC REHAB | Age: 75
End: 2020-11-09
Payer: MEDICARE

## 2020-11-09 PROCEDURE — 93798 PHYS/QHP OP CAR RHAB W/ECG: CPT

## 2020-11-11 NOTE — PROGRESS NOTES
Phase II Cardiac Rehab Individualized Treatment Plan-30 Day     Patient Name: Hubert Campbell  Date of Initial Assessment: 11/11/2020  ACCOUNT #: [de-identified]  Diagnosis: S/P TAVR  Onset Date: 09/15/2020  Referring Physician: Dr. Esperanza Ahmadi  Risk Stratification: mod  Session Number: 9   EXERCISE    Stages of Change:   [] pre-contemplation   [x] Action   [] Contemplate   [] Maintainence   [] Prep   [] Relapse          Exercise Prescription:  Mode: [] TM [] B [x] STP [] EL [] R  Frequency: 2/week  Duration: 31-60 min  Intensity: mets of 1.8  Plan/Goal: Increase 1-2 levels/week or 1-2 min/week to achieve target HR and RPE   11-13. Progression: Has increased mets from 1.7 to 1.8.  *Pt. Has severe chronic knee pain--uses motorized wheelchair          Target HR     Maximum      [] Angina with Exertion THR:    [] Resistance Training Weight (lbs):   Reps:     Hypertension:  [x] Yes  [] No  Resting BP: 140/72  Peak Exercise BP: 114/60  [] Med change? Intervention:  Home Exercise:  Type: walks around his home with walker  Duration:short durations  Frequency: daily   [] Resistance Training    Education:   [x] Equipment [place]  [] Self pulse   [] Proper use weights/therabands   [x] S/S to report  [x] Low Na Diet    [x] Warm up/ Cool down  [] BP Medication    [x] RPE Scale   [] Understand BP   [x] Ex Safety   [] Exercise specialist class-Home Exercise       Target Goal:   -Individual Exercise Plan  -BP<140/90 or <130/80 if DM   -Aerobic active 30 + minutes 5-7 days per week    Nutrition    Stages of Change:   [] pre-contemplation   [x] Action   [] Contemplate   [] Maintainence   [] Prep   [] Relapse    Lipids: No new labs since March 5, 2020  Total Cholesterol:  101  Triglycerides: 148  HDL: 26  LDL: 45  [] Med Change? Diabetes:  [] Yes  [x] No  Random BS:  HbA1c: 5.9  [] Med Change?     Weight Management:  Wt Goal: 1-2 lbs/wk    Intervention:   [x] Dietitian Consult       [x] Nurse/Patient Discussion     [] Diet Class           [] Referred to Diabetes Education     Education:  [] S&S hypo/hyperglycemia  [x] Low fat/low cholesterol diet  [] Weight loss methods      [] Relate Diabetes/CAD     [x] Eating heart healthy handout    Target Goal:  -LDL-C<100 if triglycerides are > 200  -LDL-C < 70 for high risk patients  -HbA1c < 7%  -BMI < 25   Education    Stages of Change:    [] pre-contemplation   [x] Action   [] Contemplate   [] Maintainence   [] Prep   [] Relapse    Family support: [x] Yes  [] No    Tobacco use: [] Yes  [] No    Intervention:  [] Referred to smoking cessation counselor     [] Individual education and counseling  [] Tobacco adjunct  [] Informed of education class schedule     Education:   [] Risk Factors/Modifications  [] Psychological aspects  [] Angina    [] Medications  [] CHF      [] Cardiac A&P    Target Goal:  -Complete cessation of tobacco use (if applicable)  -Continued risk factor modifications  -Recognizing signs/symptoms to report  -Proper use of meds    Psychosocial  Stages of Change:    [] pre-contemplation   [x] Action   [] Contemplate   [] Maintainence   [] Prep   [] Relapse      Intervention:   [] Psych Consult/  [] Uses stress management skills    [] Physician Referral    [] Stress management class   [] Med Change? Education:    [] Coping Techniques   [] Relaxation techniques   [] S/S of Depression    Target Goal:  -Assess presence or absence of depression using a valid screening tool. -Maximize coping skills.  -Positive support system.     Preventative Medication:   [] Aspirin       [] Beta Blockade      [x] Statin or other lipid lowering agent     [x] Clopidogrel   [x] ACE Inhibitor   [] Other anticoagulation medications     Fall Risk assess: [x] Yes  [] No  Assistive Device:   [] Cane  [x] Walker [x] Wheel Chair  [] Gait belt    Patient/Program goal:   -increased stamina/strength to 30-50 total exercise by increasing 1-2 level/wk and 1-2   min/wk  to achieve THR and RPE 11-13--Has increased hi level from 1 to 2  -introduce weights/ therabands 2-4# for 5-10 reps staff will introduce in future sessions  -manage BP better--140/70's-114/60  -improved cholesterol and  Triglycerides no new values  -develop regular exercise 30 min daily--walks around home with walker in short intervals    Physician Changes/Comments:      Cardiac Rehab Staff

## 2020-11-12 ENCOUNTER — APPOINTMENT (OUTPATIENT)
Dept: CARDIAC REHAB | Age: 75
End: 2020-11-12
Payer: MEDICARE

## 2020-11-12 ENCOUNTER — HOSPITAL ENCOUNTER (OUTPATIENT)
Dept: CARDIAC REHAB | Age: 75
Setting detail: THERAPIES SERIES
Discharge: HOME | End: 2020-11-12
Payer: MEDICARE

## 2020-11-12 PROCEDURE — 93798 PHYS/QHP OP CAR RHAB W/ECG: CPT

## 2020-11-15 ENCOUNTER — HOSPITAL ENCOUNTER (OUTPATIENT)
Age: 75
Setting detail: SPECIMEN
Discharge: HOME | End: 2020-11-15
Payer: MEDICARE

## 2020-11-15 PROCEDURE — 87086 URINE CULTURE/COLONY COUNT: CPT

## 2020-11-15 PROCEDURE — 87077 CULTURE AEROBIC IDENTIFY: CPT

## 2020-11-15 PROCEDURE — 87186 SC STD MICRODIL/AGAR DIL: CPT

## 2020-11-16 ENCOUNTER — HOSPITAL ENCOUNTER (OUTPATIENT)
Dept: CARDIAC REHAB | Age: 75
Setting detail: THERAPIES SERIES
Discharge: HOME | End: 2020-11-16
Payer: MEDICARE

## 2020-11-16 ENCOUNTER — APPOINTMENT (OUTPATIENT)
Dept: CARDIAC REHAB | Age: 75
End: 2020-11-16
Payer: MEDICARE

## 2020-11-16 PROCEDURE — 93798 PHYS/QHP OP CAR RHAB W/ECG: CPT

## 2020-11-17 LAB
CULTURE: ABNORMAL
Lab: ABNORMAL
SPECIMEN DESCRIPTION: ABNORMAL

## 2020-11-19 ENCOUNTER — APPOINTMENT (OUTPATIENT)
Dept: CARDIAC REHAB | Age: 75
End: 2020-11-19
Payer: MEDICARE

## 2020-11-19 ENCOUNTER — HOSPITAL ENCOUNTER (OUTPATIENT)
Dept: CARDIAC REHAB | Age: 75
Setting detail: THERAPIES SERIES
Discharge: HOME | End: 2020-11-19
Payer: MEDICARE

## 2020-11-19 PROCEDURE — 93798 PHYS/QHP OP CAR RHAB W/ECG: CPT

## 2020-11-23 ENCOUNTER — APPOINTMENT (OUTPATIENT)
Dept: CARDIAC REHAB | Age: 75
End: 2020-11-23
Payer: MEDICARE

## 2020-11-23 ENCOUNTER — HOSPITAL ENCOUNTER (OUTPATIENT)
Dept: CARDIAC REHAB | Age: 75
Setting detail: THERAPIES SERIES
Discharge: HOME | End: 2020-11-23
Payer: MEDICARE

## 2020-11-23 PROCEDURE — 93798 PHYS/QHP OP CAR RHAB W/ECG: CPT

## 2020-11-26 ENCOUNTER — APPOINTMENT (OUTPATIENT)
Dept: CARDIAC REHAB | Age: 75
End: 2020-11-26
Payer: MEDICARE

## 2020-11-30 ENCOUNTER — APPOINTMENT (OUTPATIENT)
Dept: CARDIAC REHAB | Age: 75
End: 2020-11-30
Payer: MEDICARE

## 2020-11-30 ENCOUNTER — HOSPITAL ENCOUNTER (OUTPATIENT)
Dept: CARDIAC REHAB | Age: 75
Setting detail: THERAPIES SERIES
Discharge: HOME | End: 2020-11-30
Payer: MEDICARE

## 2020-11-30 PROCEDURE — 93798 PHYS/QHP OP CAR RHAB W/ECG: CPT

## 2020-12-03 ENCOUNTER — APPOINTMENT (OUTPATIENT)
Dept: CARDIAC REHAB | Age: 75
End: 2020-12-03
Payer: MEDICARE

## 2020-12-03 ENCOUNTER — HOSPITAL ENCOUNTER (OUTPATIENT)
Dept: CARDIAC REHAB | Age: 75
Setting detail: THERAPIES SERIES
Discharge: HOME | End: 2020-12-03
Payer: MEDICARE

## 2020-12-03 PROCEDURE — 93798 PHYS/QHP OP CAR RHAB W/ECG: CPT

## 2020-12-07 ENCOUNTER — HOSPITAL ENCOUNTER (OUTPATIENT)
Dept: CARDIAC REHAB | Age: 75
Setting detail: THERAPIES SERIES
Discharge: HOME | End: 2020-12-07
Payer: MEDICARE

## 2020-12-07 ENCOUNTER — APPOINTMENT (OUTPATIENT)
Dept: CARDIAC REHAB | Age: 75
End: 2020-12-07
Payer: MEDICARE

## 2020-12-07 ENCOUNTER — HOSPITAL ENCOUNTER (OUTPATIENT)
Dept: CARDIAC REHAB | Age: 75
Setting detail: THERAPIES SERIES
End: 2020-12-07
Payer: MEDICARE

## 2020-12-07 PROCEDURE — 93798 PHYS/QHP OP CAR RHAB W/ECG: CPT

## 2020-12-08 NOTE — PROGRESS NOTES
Phase II Cardiac Rehab Individualized Treatment Plan-60 Day     Patient Name: Lianet Perkins  Date of Initial Assessment: 12/8/2020  ACCOUNT #: [de-identified]  Diagnosis: S/P TAVR   Onset Date: 09/15/2020  Referring Physician: Dr. Trudy Biggs  Risk Stratification: mod  Session Number: 16   EXERCISE    Stages of Change:   [] pre-contemplation  [x] Action   [] Contemplate    [] Maintainence   [] Prep    [] Relapse          Exercise Prescription:  Mode: [] TM [] B [x] STP [] R  Frequency: 2x/wk  Duration: 31-60 min  Intensity: mets of 1.8 achieved--has not increased workloads due to chronic pain/arthritic issues in his knees. Plan/Goal: Increase 1-2 levels/week or 1-2 min/week to achieve target HR and RPE   12-16 on Hedy RPE Scale. Progression: Has maintained workloads          Target     Maximum HR 99     [] Angina with Exertion THR: 122   [] Resistance Training Weight (lbs):   Reps:     Hypertension:  [x] Yes  [] No  Resting BP: 150/82  Peak Exercise BP: 122/76  [] Med change? Intervention:  Home Exercise:  Type: foot bike  Duration: 30 min  Frequency: daily   [] Resistance Training    Education:   [x] Equipment [place]  [] Self pulse   [] Proper use weights/therabands   [x] S/S to report  [x] Low Na Diet    [x] Warm up/ Cool down  [] BP Medication    [x] RPE Scale   [] Understand BP   [x] Ex Safety   [] Exercise specialist class-Home Exercise       Target Goal:   -Individual Exercise Plan  -BP<140/90 or <130/80 if DM   -Aerobic active 30 + minutes 5-7 days per week    Nutrition    Stages of Change:   [] pre-contemplation  [x] Action   [] Contemplate    [] Maintainence   [] Prep    [] Relapse    Lipids: Total Cholesterol:  No new labs   Triglycerides:   HDL:   LDL:   [] Med Change? Diabetes:  [] Yes  [x] No  Random BS:  HbA1c:  [] Med Change?     Weight Management:  Wt Goal: 1-2 lbs/wk    Intervention:   [] Dietitian Consult       [x] Nurse/Patient Discussion     [] Diet Class           [] Referred to due to chronic knee pain  -introduce weights/ therabands 2-4# for 5-10 reps staff will introduce in future sessions  -manage BP better--150/80's-122/76  -improved cholesterol and  Triglycerides no new values  -develop regular exercise 30 min daily--walks around home with walker in short intervals, ises foot bike 30 min.  daily    Physician Changes/Comments:      Cardiac Rehab Staff

## 2020-12-10 ENCOUNTER — APPOINTMENT (OUTPATIENT)
Dept: CARDIAC REHAB | Age: 75
End: 2020-12-10
Payer: MEDICARE

## 2020-12-10 ENCOUNTER — HOSPITAL ENCOUNTER (OUTPATIENT)
Dept: CARDIAC REHAB | Age: 75
Setting detail: THERAPIES SERIES
Discharge: HOME | End: 2020-12-10
Payer: MEDICARE

## 2020-12-10 PROCEDURE — 93798 PHYS/QHP OP CAR RHAB W/ECG: CPT

## 2020-12-14 ENCOUNTER — APPOINTMENT (OUTPATIENT)
Dept: CARDIAC REHAB | Age: 75
End: 2020-12-14
Payer: MEDICARE

## 2020-12-17 ENCOUNTER — HOSPITAL ENCOUNTER (OUTPATIENT)
Dept: CARDIAC REHAB | Age: 75
Setting detail: THERAPIES SERIES
Discharge: HOME | End: 2020-12-17
Payer: MEDICARE

## 2020-12-17 ENCOUNTER — APPOINTMENT (OUTPATIENT)
Dept: CARDIAC REHAB | Age: 75
End: 2020-12-17
Payer: MEDICARE

## 2020-12-17 PROCEDURE — 93798 PHYS/QHP OP CAR RHAB W/ECG: CPT

## 2020-12-21 ENCOUNTER — APPOINTMENT (OUTPATIENT)
Dept: CARDIAC REHAB | Age: 75
End: 2020-12-21
Payer: MEDICARE

## 2020-12-21 ENCOUNTER — HOSPITAL ENCOUNTER (OUTPATIENT)
Dept: CARDIAC REHAB | Age: 75
Setting detail: THERAPIES SERIES
Discharge: HOME | End: 2020-12-21
Payer: MEDICARE

## 2020-12-21 PROCEDURE — 93798 PHYS/QHP OP CAR RHAB W/ECG: CPT

## 2020-12-24 ENCOUNTER — APPOINTMENT (OUTPATIENT)
Dept: CARDIAC REHAB | Age: 75
End: 2020-12-24
Payer: MEDICARE

## 2020-12-28 ENCOUNTER — APPOINTMENT (OUTPATIENT)
Dept: CARDIAC REHAB | Age: 75
End: 2020-12-28
Payer: MEDICARE

## 2020-12-28 ENCOUNTER — HOSPITAL ENCOUNTER (OUTPATIENT)
Dept: CARDIAC REHAB | Age: 75
Setting detail: THERAPIES SERIES
Discharge: HOME | End: 2020-12-28
Payer: MEDICARE

## 2020-12-28 PROCEDURE — 93798 PHYS/QHP OP CAR RHAB W/ECG: CPT

## 2020-12-31 ENCOUNTER — APPOINTMENT (OUTPATIENT)
Dept: CARDIAC REHAB | Age: 75
End: 2020-12-31
Payer: MEDICARE

## 2021-01-04 ENCOUNTER — APPOINTMENT (OUTPATIENT)
Dept: CARDIAC REHAB | Age: 76
End: 2021-01-04
Payer: MEDICARE

## 2021-01-04 ENCOUNTER — HOSPITAL ENCOUNTER (OUTPATIENT)
Dept: CARDIAC REHAB | Age: 76
Setting detail: THERAPIES SERIES
Discharge: HOME | End: 2021-01-04
Payer: MEDICARE

## 2021-01-04 PROCEDURE — 93798 PHYS/QHP OP CAR RHAB W/ECG: CPT

## 2021-01-06 NOTE — PROGRESS NOTES
-increased stamina/strength to 30-50 total exercise by increasing 1-2 level/wk and 1-2   min/wk  to achieve THR and RPE 11-13--Has not increased workloads due to chronic knee pain  -introduce weights/ therabands 2-4# for 5-10 reps staff will introduce in future sessions  -manage BP better--150/80's-122/76  -improved cholesterol and  Triglycerides no new values given heart healthy diet info, will schedule 1:1 with dietician  -develop regular exercise 30 min daily--walks around home with walker in short intervals, uses foot bike 30 min.  Daily      Physician Changes/Comments:      Cardiac Rehab Staff

## 2021-01-07 ENCOUNTER — APPOINTMENT (OUTPATIENT)
Dept: CARDIAC REHAB | Age: 76
End: 2021-01-07
Payer: MEDICARE

## 2021-01-07 ENCOUNTER — HOSPITAL ENCOUNTER (OUTPATIENT)
Dept: CARDIAC REHAB | Age: 76
Setting detail: THERAPIES SERIES
Discharge: HOME | End: 2021-01-07
Payer: MEDICARE

## 2021-01-07 PROCEDURE — 93798 PHYS/QHP OP CAR RHAB W/ECG: CPT

## 2021-01-11 ENCOUNTER — HOSPITAL ENCOUNTER (OUTPATIENT)
Dept: CARDIAC REHAB | Age: 76
Setting detail: THERAPIES SERIES
Discharge: HOME | End: 2021-01-11
Payer: MEDICARE

## 2021-01-11 ENCOUNTER — APPOINTMENT (OUTPATIENT)
Dept: CARDIAC REHAB | Age: 76
End: 2021-01-11
Payer: MEDICARE

## 2021-01-11 ENCOUNTER — TELEPHONE (OUTPATIENT)
Dept: CARDIOLOGY | Age: 76
End: 2021-01-11

## 2021-01-11 PROCEDURE — 93798 PHYS/QHP OP CAR RHAB W/ECG: CPT

## 2021-01-14 ENCOUNTER — APPOINTMENT (OUTPATIENT)
Dept: CARDIAC REHAB | Age: 76
End: 2021-01-14
Payer: MEDICARE

## 2021-01-14 ENCOUNTER — HOSPITAL ENCOUNTER (OUTPATIENT)
Dept: NON INVASIVE DIAGNOSTICS | Age: 76
Discharge: HOME | End: 2021-01-14
Payer: MEDICARE

## 2021-01-14 ENCOUNTER — HOSPITAL ENCOUNTER (OUTPATIENT)
Dept: CARDIAC REHAB | Age: 76
Setting detail: THERAPIES SERIES
Discharge: HOME | End: 2021-01-14
Payer: MEDICARE

## 2021-01-14 LAB
EKG ATRIAL RATE: 93 BPM
EKG Q-T INTERVAL: 454 MS
EKG QRS DURATION: 170 MS
EKG QTC CALCULATION (BAZETT): 493 MS
EKG R AXIS: 141 DEGREES
EKG T AXIS: 7 DEGREES
EKG VENTRICULAR RATE: 71 BPM

## 2021-01-14 PROCEDURE — 93798 PHYS/QHP OP CAR RHAB W/ECG: CPT

## 2021-01-14 PROCEDURE — 93226 XTRNL ECG REC<48 HR SCAN A/R: CPT

## 2021-01-14 PROCEDURE — 93010 ELECTROCARDIOGRAM REPORT: CPT | Performed by: INTERNAL MEDICINE

## 2021-01-14 PROCEDURE — 93005 ELECTROCARDIOGRAM TRACING: CPT | Performed by: INTERNAL MEDICINE

## 2021-01-14 PROCEDURE — 93225 XTRNL ECG REC<48 HRS REC: CPT

## 2021-01-18 ENCOUNTER — APPOINTMENT (OUTPATIENT)
Dept: CARDIAC REHAB | Age: 76
End: 2021-01-18
Payer: MEDICARE

## 2021-01-18 ENCOUNTER — HOSPITAL ENCOUNTER (OUTPATIENT)
Dept: CARDIAC REHAB | Age: 76
Setting detail: THERAPIES SERIES
Discharge: HOME | End: 2021-01-18
Payer: MEDICARE

## 2021-01-18 PROCEDURE — 93798 PHYS/QHP OP CAR RHAB W/ECG: CPT

## 2021-01-19 ENCOUNTER — OFFICE VISIT (OUTPATIENT)
Dept: CARDIOLOGY | Age: 76
End: 2021-01-19
Payer: MEDICARE

## 2021-01-19 VITALS
BODY MASS INDEX: 44.1 KG/M2 | RESPIRATION RATE: 18 BRPM | HEIGHT: 71 IN | HEART RATE: 68 BPM | DIASTOLIC BLOOD PRESSURE: 73 MMHG | OXYGEN SATURATION: 98 % | WEIGHT: 315 LBS | SYSTOLIC BLOOD PRESSURE: 127 MMHG

## 2021-01-19 DIAGNOSIS — I35.0 SEVERE AORTIC STENOSIS: ICD-10-CM

## 2021-01-19 DIAGNOSIS — I48.0 PAF (PAROXYSMAL ATRIAL FIBRILLATION) (HCC): ICD-10-CM

## 2021-01-19 DIAGNOSIS — I10 ESSENTIAL HYPERTENSION: ICD-10-CM

## 2021-01-19 DIAGNOSIS — I50.42 CHRONIC COMBINED SYSTOLIC AND DIASTOLIC CONGESTIVE HEART FAILURE (HCC): Primary | ICD-10-CM

## 2021-01-19 DIAGNOSIS — E78.2 MIXED HYPERLIPIDEMIA: ICD-10-CM

## 2021-01-19 DIAGNOSIS — G47.33 OSA (OBSTRUCTIVE SLEEP APNEA): ICD-10-CM

## 2021-01-19 LAB
ACQUISITION DURATION: NORMAL S
AVERAGE HEART RATE: 63 BPM
EKG DIAGNOSIS: NORMAL
FASTEST SUPRAVENTRICULAR RATE: 100 BPM
HOLTER MAX HEART RATE: 92 BPM
HOOKUP DATE: NORMAL
HOOKUP TIME: NORMAL
LONGEST SUPRAVENTRICULAR RATE: 77 BPM
MAX HEART RATE TIME/DATE: NORMAL
MIN HEART RATE TIME/DATE: NORMAL
MIN HEART RATE: 37 BPM
NUMBER OF FASTEST SUPRAVENTRICULAR BEATS: 3
NUMBER OF LONGEST SUPRAVENTRICULAR BEATS: 3
NUMBER OF QRS COMPLEXES: NORMAL
NUMBER OF SUPRAVENTRICULAR BEATS IN RUNS: 18
NUMBER OF SUPRAVENTRICULAR COUPLETS: 76
NUMBER OF SUPRAVENTRICULAR ECTOPICS: 325
NUMBER OF SUPRAVENTRICULAR ISOLATED BEATS: 155
NUMBER OF SUPRAVENTRICULAR RUNS: 6
NUMBER OF VENTRICULAR BEATS IN RUNS: 0
NUMBER OF VENTRICULAR BIGEMINAL CYCLES: 0
NUMBER OF VENTRICULAR COUPLETS: 0
NUMBER OF VENTRICULAR ECTOPICS: 0
NUMBER OF VENTRICULAR ISOLATED BEATS: 0
NUMBER OF VENTRICULAR RUNS: 0

## 2021-01-19 PROCEDURE — G8484 FLU IMMUNIZE NO ADMIN: HCPCS | Performed by: INTERNAL MEDICINE

## 2021-01-19 PROCEDURE — 99211 OFF/OP EST MAY X REQ PHY/QHP: CPT | Performed by: INTERNAL MEDICINE

## 2021-01-19 PROCEDURE — G8417 CALC BMI ABV UP PARAM F/U: HCPCS | Performed by: INTERNAL MEDICINE

## 2021-01-19 PROCEDURE — 99214 OFFICE O/P EST MOD 30 MIN: CPT | Performed by: INTERNAL MEDICINE

## 2021-01-19 PROCEDURE — G8427 DOCREV CUR MEDS BY ELIG CLIN: HCPCS | Performed by: INTERNAL MEDICINE

## 2021-01-19 PROCEDURE — 1123F ACP DISCUSS/DSCN MKR DOCD: CPT | Performed by: INTERNAL MEDICINE

## 2021-01-19 PROCEDURE — 1036F TOBACCO NON-USER: CPT | Performed by: INTERNAL MEDICINE

## 2021-01-19 PROCEDURE — 4040F PNEUMOC VAC/ADMIN/RCVD: CPT | Performed by: INTERNAL MEDICINE

## 2021-01-19 NOTE — PATIENT INSTRUCTIONS
SURVEY:    You may be receiving a survey from [employer] regarding your visit today. Please complete the survey to enable us to provide the highest quality of care to you and your family. If you cannot score us a very good on any question, please call the office to discuss how we could have made your experience a very good one. Thank you.

## 2021-01-19 NOTE — PROGRESS NOTES
Meño Schulz am scribing for and in the presence of Bandar Hearn MD, F.A.C.C..    Patient: Marcos Ellis  : 1945  Date of Visit: 2021    History of Present Illness:        Dear Yasmine Fraser MD    I had the pleasure of seeing Marcos Ellis in my office today. Mr. Sun Prado is a 66 y.o. male with a history of a history of heart failure. He has a history of hypertension and hyperlipidemia for years, and has borderline diabetes. Symptoms of heart failure: Mr. Sun Prado reports that he has a fairly poor exercise tolerance. His says that he can really not ambulate at all due to his weight and shortness of breath. ECG on 3/20: sinus tachycardia with first-degree AV block and nonspecific intraventricular conduction delay. Echocardiogram done 3/4/20 which showed severely reduced left ventricular systolic function with ejection fraction of 35% and moderate to severe aortic stenosis. This can be an underestimation because of reduced left ventricular systolic function. Mild to moderate aortic regurgitation. Moderate mitral regurgitation and moderate diastolic dysfunction. Heart Catheterization done on 3/12/20: LMCA: Normal 0% stenosis. LAD: Mild irregularities 20-30% LCx: Lesion on 2nd Ob Angie Ostial 50% stenosis. RCA: Mild irregularities 10-20%. EF:35%. Severe aortic stenosis by cardiac catheterization with peak to peak gradient of 67 mmHg and mean gradient of 48 mmHg across the aortic valve. Admission to [facility] on 3/4/20-3/9/20 due to acute heart failure: Patient treated for acute on chronic combined CHF and aortic stenosis, later developed atrial fibrillation with rapid ventricular response. Moved to the ICU and started on amiodarone drip and then later converted back to normal sinus rhythm. Amiodarone oral tablet was started prior to discharge. On 2020, he presented to the emergency room with epistaxis. Nasal packing done that removed after 3 days. Transcatheter aortic valve replacement with a Medtronic 34 mm Evolut Pro Plus prosthesis 9/15/2020    Echocardiogram done on 10/13/2020 showed an EF of 55%. Mild to moderate LV hypertrophy. Status post TAVR with mean gradient of 9 mmHg, mild perivalvular regurgitation noted. Mild mitral regurgitation. Moderate diastolic dysfunction is seen. Holter monitor done on 1/14/2021: Sinus rhythm with intraventricular conduction delay and occasional PACs and very short runs of atrial tachycardia. The longest was 3 beats at a heart rate of 100 bpm.  No significant ventricular arrhythmia. Mr. Donna Cason is here today for a 3 month follow up. He reported doing really well since his last visit. He continues to go to cardiac rehab twice a week. He reported that his weight is fluctuating. He has gained 8 balance and then lost 2 pounds back over the last few days. He said that he loves eating and he has hard time adapting to low-salt diet. He denies any chest pain, pressure or tightness. He has his chronic shortness of breath on exertion and is using oxygen all the time about 2 L/min. He reported that he sleeps well at night. No orthopnea or PND. He uses a scooter most of the time. No dizziness or lightheadedness. No palpitations. No problems with current medications apart from the price of the Eliquis.       Wt Readings from Last 3 Encounters:   01/19/21 (!) 363 lb (164.7 kg)   12/09/20 (!) 354 lb (160.6 kg)   10/15/20 (!) 350 lb (158.8 kg)       PAST MEDICAL HISTORY:        Past Medical History:   Diagnosis Date    Anxiety     Aortic stenosis     Atrial fibrillation, new onset ([address] 03/05/2020    BPH (benign prostatic hyperplasia)     CHF (congestive heart failure) (HCC)     Diabetes mellitus (HCC)     borderline    Hyperlipidemia     Hypertension     Lymphedema     bilat legs    On home O2     Osteoarthritis     Septic shock due to urinary tract infection ([address] /  Levophed and fluids 5/27/2020 Nonischemic cardiomyopathy. Severe aortic stenosis. CURRENT ALLERGIES: Patient has no known allergies. REVIEW OF SYSTEMS: 14 systems were reviewed. Pertinent positives and negatives as above, all else negative.      Past Surgical History:   Procedure Laterality Date    CARDIAC CATHETERIZATION  2020    OTHER SURGICAL HISTORY  2020    TAVR/ Dr. Migel Cam History:  Social History     Tobacco Use    Smoking status: Former Smoker     Packs/day: 1.00     Years: 10.00     Pack years: 10.00     Types: Cigarettes     Quit date: 1973     Years since quittin.0    Smokeless tobacco: Never Used   Substance Use Topics    Alcohol use: Never     Frequency: Never     Drinks per session: Patient refused     Binge frequency: Never    Drug use: No        CURRENT MEDICATIONS:        Outpatient Medications Marked as Taking for the 21 encounter (Office Visit) with Nuno Taylor MD   Medication Sig Dispense Refill    tamsulosin (FLOMAX) 0.4 MG capsule Take 1 capsule by mouth daily 30 capsule 5    apixaban (ELIQUIS) 5 MG TABS tablet Take 1 tablet by mouth 2 times daily 60 tablet 3    clopidogrel (PLAVIX) 75 MG tablet Take 1 tablet by mouth daily 30 tablet 3    losartan (COZAAR) 25 MG tablet Take 1 tablet by mouth daily 90 tablet 3    potassium chloride (KLOR-CON M) 20 MEQ extended release tablet Take 1 tablet by mouth daily 90 tablet 3    amiodarone (CORDARONE) 200 MG tablet Take 1 tablet by mouth daily 90 tablet 3    atorvastatin (LIPITOR) 40 MG tablet Take 1 tablet by mouth daily 90 tablet 3    furosemide (LASIX) 20 MG tablet Take 1 tablet by mouth daily 60 tablet 3    PARoxetine (PAXIL) 30 MG tablet Take 1 tablet by mouth every morning 90 tablet 3    acetaminophen (TYLENOL) 500 MG tablet Take 500 mg by mouth every 6 hours as needed for Pain FAMILY HISTORY: family history includes Arthritis in his mother; Heart Disease in his mother; High Blood Pressure in his brother and mother; High Cholesterol in his mother; Stroke in his mother. PHYSICAL EXAMINATION:     /73 (Site: Left Upper Arm, Position: Sitting, Cuff Size: Large Adult)   Pulse 68   Resp 18   Ht 5' 10.5\" (1.791 m)   Wt (!) 363 lb (164.7 kg)   SpO2 98%   BMI 51.35 kg/m²  Body mass index is 51.35 kg/m². Constitutional: He is oriented to person, place, and time. He appears well-developed and well-nourished. In no acute distress. HEENT: Normocephalic and atraumatic. No JVD present. Carotid bruit is not present. No mass and no thyromegaly present. No lymphadenopathy present. Cardiovascular: Normal rate, regular rhythm, normal heart sounds. Exam reveals no gallop and no friction rubs. 1/6 systolic murmur, 2nd intercostal space on the LEFT just lateral to the sternum  Pulmonary/Chest: Effort normal and breath sounds normal. No respiratory distress. He has no wheezes, rhonchi or rales. Abdominal: Soft, non-tender. Bowel sounds and aorta are normal. He exhibits no organomegaly, mass or bruit. Extremities: 1+ 1/2 up to the knees bilaterally. No cyanosis or clubbing. 2+ radial and carotid pulses. Distal extremity pulses: 2+ bilaterally. Chronic vascular changes again noted. Neurological: He is alert and oriented to person, place, and time. No evidence of gross cranial nerve deficit. Coordination appeared normal.   Skin: Skin is warm and dry. There is no rash or diaphoresis. Psychiatric: He has a normal mood and affect.  His speech is normal and behavior is normal.      MOST RECENT LABS ON RECORD:   Lab Results   Component Value Date    WBC 6.6 10/13/2020    HGB 13.2 10/13/2020    HCT 42.4 10/13/2020     10/13/2020    CHOL 101 03/05/2020    TRIG 148 03/05/2020    HDL 26 (L) 03/05/2020    ALT 15 09/14/2020    AST 14 09/14/2020     10/13/2020    K 4.3 10/13/2020  10/13/2020    CREATININE 0.73 10/13/2020    BUN 11 10/13/2020    CO2 26 10/13/2020    TSH 2.83 05/15/2020    PSA 1.49 01/11/2019    INR 1.14 (H) 09/15/2020    GLUF 103 (H) 01/11/2019    LABA1C 5.9 03/02/2017       ASSESSMENT:       Patient Active Problem List    Diagnosis Date Noted    Severe aortic stenosis /  TAVR 2020      Priority: High    Dyslipidemia      Priority: High    Encounter for removal of nasal packing 04/15/2020    Epistaxis from Eliquis and Aspirin 04/13/2020    Carpal tunnel syndrome on left 01/08/2019    Obesity, morbid (more than 100 lbs over ideal weight or BMI > 40) ([facility] 12/15/2017    Osteoarthritis of both knees 06/13/2017    Essential hypertension 12/13/2016    Mixed hyperlipidemia 12/13/2016        Diagnosis Orders   1. Chronic combined systolic and diastolic congestive heart failure ([facility]     2. Severe aortic stenosis     3. PAF (paroxysmal atrial fibrillation) ([facility]     4. Essential hypertension     5. Mixed hyperlipidemia     6. MARIA ESTHER (obstructive sleep apnea)       PLAN:        ? Chronic combined heart failure: New York Heart Association Class: IIb (Marked symptoms during daily activities)  ? Beta Blocker: Currently off beta-blocker therapy because of sinus bradycardia on Holter. ? ACE Inibitor/ARB: Continue losartan (Cozaar) 25 mg daily. ? Diuretics: Continue furosemide (Lasix) 20 mg every morning. I will consider increasing Lasix to 40 mg daily after he gets the blood work done. ? Continue Potassium 20 MEQ daily  ? Heart failure counseling: I advised them to try and keep their legs up whenever possible and to limit salt in their diet. ? Additional Testing List: I took the liberty of ordering a BMP for today to assess their potassium and renal function, I took the liberty of ordering a CBC and I ordered a pro BNP level to better assess for the patients level of heart failure, I ordered a echocardiogram to better assess for the etiology of this problem and to help guide future management and I ordered an EVENT MONITOR to try and pinpoint the etiology of their symptoms (CAM - 1 WEEK)     · Severe Aortic Stenosis: symptomatic S/P TAVR 9/14/2020. · Beta Blocker: Not indicated at this time. · ACE Inibitor/ARB: Continue losartan (Cozaar) 25 mg daily. · Diuretics: Continue current dose of Lasix for now. · Continue Potassium 20 MEQ daily  · I advised them to try and keep their legs up whenever possible and to limit salt in their diet. Paroxysmal Atrial Fibrillation: Rhythm Control  Rhythm Control Agent: Continue Amiodarone (Cordarone) 200 mg once daily    Monitoring: Amiodarone Since he is being maintained on Amiodarone, I told him that we will need to closely monitor him for potential side effects. These include monitoring LFTs and TSH at least every 6 months as well as chest x-rays, pulmonary function tests, and eye exams at least on a yearly basis.    Stroke Risk: His CHADS2-VASc score is greater than 2 (2.2% stroke risk) Anticoagulation: Continue Apixaban (Eliquis) 5 mg every 12 hours. I also reminded him to watch for signs of blood in his stool or black tarry stools and stop the medication immediately if this develops as this could be life threatening. Patient is requesting to stop the Eliquis. I told him we will get a get a CAM monitor to make sure he has no recurrence of atrial fibrillation before making this decision. I will also reach out to our EP team regarding his most recent Holter result and I honestly prefers keeping him on blood thinner because he has a chads vascular score of at least 4, I told him we can help in different way to have him take the medicine at a decent co-pay including getting patient assistant program or trying tier reduction. ? Essential Hypertension: Controlled  · Beta Blocker: Not indicated at this time. · ACE Inibitor/ARB: Continue losartan (Cozaar) 25 mg daily. · Diuretics: Continue furosemide (Lasix) 20 mg every morning. · Continue Potassium 20 MEQ daily    · Hyperlipidemia: Mixed - Last LDL on 3/5/2020 was 45 mg/dL   · Cholesterol Reduction Therapy: Continue Atorvastatin (Lipitor) 40 mg daily. ? Obstructive Sleep Apnea:   o Currently on oxygen all the time. o He should schedule a CPAP titration study      Finally, I recommended that he continue his other medications and follow up with you as previously scheduled. FOLLOW UP:   I told Mr. Manzo to call my office if he had any problems, but otherwise I asked him to Return in about 6 months (around 7/19/2021). However, I would be happy to see him sooner should the need arise. Sincerely,  Rosmery Elliott MD, F.A.C.C.   [facility] Specialist    [facility], [address] [place] [address]  Phone: [phone], Fax: [phone]     I believe that the risk of significant morbidity and mortality related to the patient's current medical conditions are: high The documentation recorded by the scribe, accurately and completely reflects the services I personally performed and the decisions made by me. Kirsten Lopez MD, F.A.C.C.  January 19, 2021

## 2021-01-21 ENCOUNTER — HOSPITAL ENCOUNTER (OUTPATIENT)
Dept: NON INVASIVE DIAGNOSTICS | Age: 76
Discharge: HOME | End: 2021-01-21
Payer: MEDICARE

## 2021-01-21 ENCOUNTER — APPOINTMENT (OUTPATIENT)
Dept: CARDIAC REHAB | Age: 76
End: 2021-01-21
Payer: MEDICARE

## 2021-01-21 ENCOUNTER — HOSPITAL ENCOUNTER (OUTPATIENT)
Age: 76
Discharge: HOME | End: 2021-01-21
Payer: MEDICARE

## 2021-01-21 DIAGNOSIS — G47.33 OSA (OBSTRUCTIVE SLEEP APNEA): ICD-10-CM

## 2021-01-21 DIAGNOSIS — I35.0 SEVERE AORTIC STENOSIS: ICD-10-CM

## 2021-01-21 DIAGNOSIS — I10 ESSENTIAL HYPERTENSION: ICD-10-CM

## 2021-01-21 DIAGNOSIS — E78.2 MIXED HYPERLIPIDEMIA: ICD-10-CM

## 2021-01-21 DIAGNOSIS — I48.0 PAF (PAROXYSMAL ATRIAL FIBRILLATION) (HCC): ICD-10-CM

## 2021-01-21 DIAGNOSIS — I50.42 CHRONIC COMBINED SYSTOLIC AND DIASTOLIC CONGESTIVE HEART FAILURE (HCC): ICD-10-CM

## 2021-01-21 LAB
ANION GAP SERPL CALCULATED.3IONS-SCNC: 9 MMOL/L (ref 9–17)
BNP INTERPRETATION: NORMAL
BUN BLDV-MCNC: 22 MG/DL (ref 8–23)
BUN/CREAT BLD: 29 (ref 9–20)
CALCIUM SERPL-MCNC: 9.1 MG/DL (ref 8.6–10.4)
CHLORIDE BLD-SCNC: 101 MMOL/L (ref 98–107)
CHOLESTEROL/HDL RATIO: 4.1
CHOLESTEROL: 127 MG/DL
CO2: 27 MMOL/L (ref 20–31)
CREAT SERPL-MCNC: 0.77 MG/DL (ref 0.7–1.2)
GFR AFRICAN AMERICAN: >60 ML/MIN
GFR NON-AFRICAN AMERICAN: >60 ML/MIN
GFR SERPL CREATININE-BSD FRML MDRD: ABNORMAL ML/MIN/{1.73_M2}
GFR SERPL CREATININE-BSD FRML MDRD: ABNORMAL ML/MIN/{1.73_M2}
GLUCOSE BLD-MCNC: 104 MG/DL (ref 70–99)
HCT VFR BLD CALC: 41 % (ref 40.7–50.3)
HDLC SERPL-MCNC: 31 MG/DL
HEMOGLOBIN: 12.9 G/DL (ref 13–17)
LDL CHOLESTEROL: 69 MG/DL (ref 0–130)
LV EF: 53 %
LVEF MODALITY: NORMAL
MCH RBC QN AUTO: 29.6 PG (ref 25.2–33.5)
MCHC RBC AUTO-ENTMCNC: 31.5 G/DL (ref 28.4–34.8)
MCV RBC AUTO: 94 FL (ref 82.6–102.9)
NRBC AUTOMATED: 0 PER 100 WBC
PDW BLD-RTO: 14.4 % (ref 11.8–14.4)
PLATELET # BLD: 166 K/UL (ref 138–453)
PMV BLD AUTO: 12.5 FL (ref 8.1–13.5)
POTASSIUM SERPL-SCNC: 4.4 MMOL/L (ref 3.7–5.3)
PRO-BNP: 201 PG/ML
RBC # BLD: 4.36 M/UL (ref 4.21–5.77)
SODIUM BLD-SCNC: 137 MMOL/L (ref 135–144)
TRIGL SERPL-MCNC: 136 MG/DL
VLDLC SERPL CALC-MCNC: ABNORMAL MG/DL (ref 1–30)
WBC # BLD: 7.3 K/UL (ref 3.5–11.3)

## 2021-01-21 PROCEDURE — 83880 ASSAY OF NATRIURETIC PEPTIDE: CPT

## 2021-01-21 PROCEDURE — 80048 BASIC METABOLIC PNL TOTAL CA: CPT

## 2021-01-21 PROCEDURE — 93242 EXT ECG>48HR<7D RECORDING: CPT

## 2021-01-21 PROCEDURE — 85027 COMPLETE CBC AUTOMATED: CPT

## 2021-01-21 PROCEDURE — 36415 COLL VENOUS BLD VENIPUNCTURE: CPT

## 2021-01-21 PROCEDURE — 80061 LIPID PANEL: CPT

## 2021-01-21 PROCEDURE — 93306 TTE W/DOPPLER COMPLETE: CPT

## 2021-01-21 PROCEDURE — 93243 EXT ECG>48HR<7D SCAN A/R: CPT

## 2021-01-22 ENCOUNTER — TELEPHONE (OUTPATIENT)
Dept: CARDIOLOGY | Age: 76
End: 2021-01-22

## 2021-01-22 DIAGNOSIS — I50.9 CONGESTIVE HEART FAILURE, UNSPECIFIED HF CHRONICITY, UNSPECIFIED HEART FAILURE TYPE (HCC): Primary | ICD-10-CM

## 2021-01-22 NOTE — TELEPHONE ENCOUNTER
----- Message from Deya Mendez MD sent at 1/21/2021  8:00 PM EST -----  [facility], can you please have him take Lasix 40 mg Monday Wednesday and Friday and 20 mg all other days. Please keep him on your contact list for heart failure management. He also may be a good candidate for pharmacy consult regarding heart failure medications.   Please make a referral.  Thank you

## 2021-01-22 NOTE — TELEPHONE ENCOUNTER
Informed patient that per dr Peter Henson would lik ehim to take lasix 40 mg MWF and 20 mg on remaining days. I also let him know that we will be putting in a HF consult for pharamcy for medication education. Patient verbalized understanding and was in agreement with this.  Order put in for pharmacy referral

## 2021-01-25 ENCOUNTER — APPOINTMENT (OUTPATIENT)
Dept: CARDIAC REHAB | Age: 76
End: 2021-01-25
Payer: MEDICARE

## 2021-01-25 ENCOUNTER — HOSPITAL ENCOUNTER (OUTPATIENT)
Dept: CARDIAC REHAB | Age: 76
Setting detail: THERAPIES SERIES
Discharge: HOME | End: 2021-01-25
Payer: MEDICARE

## 2021-01-25 PROCEDURE — 93798 PHYS/QHP OP CAR RHAB W/ECG: CPT

## 2021-01-26 ENCOUNTER — TELEPHONE (OUTPATIENT)
Dept: PHARMACY | Age: 76
End: 2021-01-26

## 2021-01-26 NOTE — TELEPHONE ENCOUNTER

## 2021-01-27 ENCOUNTER — HOSPITAL ENCOUNTER (OUTPATIENT)
Dept: PHARMACY | Age: 76
Setting detail: THERAPIES SERIES
Discharge: HOME | End: 2021-01-27
Payer: MEDICARE

## 2021-01-27 VITALS
BODY MASS INDEX: 52.42 KG/M2 | SYSTOLIC BLOOD PRESSURE: 165 MMHG | WEIGHT: 315 LBS | HEART RATE: 65 BPM | DIASTOLIC BLOOD PRESSURE: 81 MMHG

## 2021-01-27 DIAGNOSIS — I35.0 SEVERE AORTIC STENOSIS: Primary | Chronic | ICD-10-CM

## 2021-01-27 PROCEDURE — 99212 OFFICE O/P EST SF 10 MIN: CPT

## 2021-01-27 NOTE — PROGRESS NOTES
[facility] his BP was high during his  heart failure medication management consultation. We need to keep tack of his BP at home and during cardiac rehab.  Thank you

## 2021-01-27 NOTE — PROGRESS NOTES
[x]   Dyspnea at rest -- Patient cannot ambulate and uses oxygen all the time at 2 L/min   []   Dyspnea while sleeping    Patient Findings:     []  Missed doses  []  Diet changes  []  Sodium intake changes   []  Night time cough   []  Other   []  Early saiety, abd fullness []  Chest pain   []  Constipation   []  Night time bathroom trips  []  Alcohol intake changes  []  Acute illness  []  Edema    []  Number of pillows or recliner  []  Activity changes   [x]  Fatigue that limits activity []  Heart racing or skipping beats  []  Light headedness  []  Dizziness    []      []  Problems sleeping    Functional Activity New York Heart Association Classification  []   Class I No limitation of physical activity. Ordinary physical activity does not cause undue fatigue, palpitation, dyspnea (shortness of breath). []   Class II Slight limitation of physical activity. Comfortable at rest. Ordinary physical activity results in fatigue, palpitation, dyspnea (shortness of breath). []   Class III  Fredrick limitation of physical activity. Comfortable at rest.  Less than ordinary activity causes fatigue, palpitation, dyspnea. [x]   Class IV Unable to carry on any physical activity without discomfort. Symptoms of heart failure at rest.  If any physical activity is undertaken, discomfort increases.         Last CHF Hospital Admission: 3/4/29-3/9/20      OUTPATIENT MEDICATIONS:  Outpatient Medications Marked as Taking for the 1/27/21 encounter [facility] Encounter) with [facility]   Medication Sig Dispense Refill    tamsulosin (FLOMAX) 0.4 MG capsule Take 1 capsule by mouth daily 30 capsule 5    apixaban (ELIQUIS) 5 MG TABS tablet Take 1 tablet by mouth 2 times daily 60 tablet 3    clopidogrel (PLAVIX) 75 MG tablet Take 1 tablet by mouth daily 30 tablet 3    losartan (COZAAR) 25 MG tablet Take 1 tablet by mouth daily 90 tablet 3  potassium chloride (KLOR-CON M) 20 MEQ extended release tablet Take 1 tablet by mouth daily 90 tablet 3    amiodarone (CORDARONE) 200 MG tablet Take 1 tablet by mouth daily 90 tablet 3    atorvastatin (LIPITOR) 40 MG tablet Take 1 tablet by mouth daily 90 tablet 3    furosemide (LASIX) 20 MG tablet Take 1 tablet by mouth daily (Patient taking differently: Take 20 mg by mouth See Admin Instructions Dose increase 21 per Dr. Aleman Pass - 40 mg po MWF; 20 mg all other days) 60 tablet 3    PARoxetine (PAXIL) 30 MG tablet Take 1 tablet by mouth every morning 90 tablet 3    acetaminophen (TYLENOL) 500 MG tablet Take 500 mg by mouth every 6 hours as needed for Pain               Objective / Assessment     Patient Active Problem List   Diagnosis Code    Essential hypertension I10    Mixed hyperlipidemia E78.2    Osteoarthritis of both knees M17.0    Obesity, morbid (more than 100 lbs over ideal weight or BMI > 40) ([facility]) E66.01    Carpal tunnel syndrome on left G56.02    Severe aortic stenosis /  TAVR  I35.0    Dyslipidemia E78.5    Epistaxis from Eliquis and Aspirin R04.0    Encounter for removal of nasal packing Z48.00     Social History     Tobacco Use    Smoking status: Former Smoker     Packs/day: 1.00     Years: 10.00     Pack years: 10.00     Types: Cigarettes     Quit date:      Years since quittin.1    Smokeless tobacco: Never Used   Substance Use Topics    Alcohol use: Never     Frequency: Never     Drinks per session: Patient refused     Binge frequency: Never    Drug use: No       Potassium   Date Value   2021 4.4 mmol/L   10/13/2020 4.3 mmol/L   2020 4.7 mmol/L   2020 4.3 meq/L   2020 4.2 meq/L   2020 3.4 mmol/L (L)     Potassium reflex Magnesium (meq/L)   Date Value   09/15/2020 3.9     BUN (mg/dL)   Date Value   2021 22   10/13/2020 11   2020 21   09/15/2020 13   2020 16   2020 18     CREATININE (mg/dL)   Date Value 01/21/2021 0.77   10/13/2020 0.73   09/22/2020 1.05   09/15/2020 0.8   09/14/2020 0.6   06/17/2020 0.6     TSH (mIU/L)   Date Value   05/15/2020 2.83   03/04/2020 3.43     No components found for: T4      Plan:      · Chronic combined heart failure: EF:  35% via echocardiogram on 3/4/20  · Beta Blocker for EF </= 40%: No beta-blocker therapy currently - sinus bradycardia  · Diuretics: Furosemide 40 mg po every MWF; 20 mg all other days  · ACE/ARB/ARNI for EF </= 40%: Losartan 25 mg po daily  · Rhythm Control Agent:  Amiodarone 200 mg po daily - LFTs and TSH monitoring every 6 months recommended. Most recent labs on chart - liver enzymes 9/20 and TSH 5/20. · Nonpharmacologic management of Heart Failure: I told patient to continue using lymphedema boots  and I advised patient to try and keep legs up whenever possible and to limit salt in diet. · Laboratory testing:   ? Basic metabolic panel (BMP) in 6 weeks from now to assess  potassium and renal function. ? TSH and LFTs in 6 weeks from now - amiodarone potential side effect monitoring     · Atherosclerotic Heart Disease:   · Antiplatelet Agent: Plavix 75 mg po daily  · Anticoagulation:  Eliquis 5 mg po BID. Patient would prefer to stop Eliquis. Patient states that he did have 2 episodes of epistaxis, however, no further episodes since he stopped taking ASA 81 mg. CAM monitor in place currently to make sure no recurrence of atrial fibrillation - before decision to stop Eliquis is made per cardiology. Patient has CHADS vascular score of at least 4. I also reminded patient to watch for signs of blood in stool or black tarry stools and stop the medication immediately if this develops as this could be life threatening. · Statin Therapy: Atorvastatin 40 mg po daily     Essential Hypertension:   ? Diuretics:  Furosemide 40 mg every MWF; 20 mg all other days  ?  Beta Blocker: No beta-blocker therapy currently - sinus bradycardia Patient Education/Instructions: I did spend about 15 minutes with patient going over his heart failure packet including dietary guidelines, the signs and symptoms to watch for including shortness of breath, weight gain, lightheadedness/dizziness. Overall patient states that he is feeling better since increasing furosemide. Patient is attending cardiopulmonary rehab visits twice weekly. Although he loves to eat, patient states that he is committed to reducing sodium in his diet and making better food choices. Patient is also trying to adjust to CPAP nightly. I asked patient to call the clinic if develops persistent or worsening shortness of breath or weight gain of more than 3 pounds in 1-7 days. Patient verbalized understanding. Medication changes:  1/22/21 - Increase Lasix to 40 mg po every MWF and 20 mg all other days     BMP in 6 weeks to evaluate renal function and potassium. LFTs and TSH in 6 weeks to monitor amiodarone therapy. Return to CHF clinic in 6 weeks. Thank you for the referral,    Carlotta Willett.  Shanon Ledbetter

## 2021-01-28 ENCOUNTER — APPOINTMENT (OUTPATIENT)
Dept: CARDIAC REHAB | Age: 76
End: 2021-01-28
Payer: MEDICARE

## 2021-01-28 ENCOUNTER — HOSPITAL ENCOUNTER (OUTPATIENT)
Dept: CARDIAC REHAB | Age: 76
Setting detail: THERAPIES SERIES
Discharge: HOME | End: 2021-01-28
Payer: MEDICARE

## 2021-01-28 PROCEDURE — 93798 PHYS/QHP OP CAR RHAB W/ECG: CPT

## 2021-02-01 ENCOUNTER — TELEPHONE (OUTPATIENT)
Dept: CARDIOLOGY | Age: 76
End: 2021-02-01

## 2021-02-01 ENCOUNTER — APPOINTMENT (OUTPATIENT)
Dept: CARDIAC REHAB | Age: 76
End: 2021-02-01
Payer: MEDICARE

## 2021-02-01 ENCOUNTER — HOSPITAL ENCOUNTER (OUTPATIENT)
Dept: CARDIAC REHAB | Age: 76
Setting detail: THERAPIES SERIES
Discharge: HOME | End: 2021-02-01
Payer: MEDICARE

## 2021-02-01 PROCEDURE — 93798 PHYS/QHP OP CAR RHAB W/ECG: CPT

## 2021-02-01 NOTE — TELEPHONE ENCOUNTER
Patient has not gotten labs ordered done yet, will get them later this week. He wanted to know if the monitor he was wearing showed his heart stopping as much as the sleep lab said it did. Also wanted to know if the monitor showed any improvement at night when he was wearing CPAP. Has 14 days of Eliquis left, including the samples given today. He states he will have to go off of medication then due to cost, unless we get more samples for him.

## 2021-02-02 ENCOUNTER — TELEPHONE (OUTPATIENT)
Dept: CARDIOLOGY | Age: 76
End: 2021-02-02

## 2021-02-02 NOTE — PROCEDURES
[address]                                 EVENT MONITOR    PATIENT NAME: Vidya Jerry                     :        1945  MED REC NO:   [id number]                              ROOM:  ACCOUNT NO:   [de-identified]                           ADMIT DATE: 2021  PROVIDER:     Antonia Gambino    CARDIOVASCULAR DIAGNOSTIC DEPARTMENT    DATE OF STUDY:  2021    ORDERING PROVIDER:  Antonia Gambino MD    PRIMARY CARE PROVIDER:  Andi Montejo. Claudean Leff, MD    INTERPRETING PHYSICIAN: Antonia Gambino MD    DIAGNOSIS:  Paroxysmal atrial fibrillation. PHYSICIAN INTERPRETATION:  1.  6 days and 21 hours recorded. 2.  Baseline rhythm is sinus with average heart rate of 61 bpm ranging  between 46 and 90 bpm.  3.  First-degree AV block, Mobitz type 1 second-degree AV block, and  occasional high-grade/third-degree AV block recorded. 4.  The QRS is wide indicating infra-hisian disease. 5.  Minimum heart rate occurred during second-degree AV block at 32 bpm.        BRIDGETTE Harris    D: 2021 14:51:12       T: 2021 14:52:16     COLEMAN_URMILA  Job#: [id number]     Doc#: Unknown    CC:  Andi Montejo.  Claudean Leff, MD

## 2021-02-02 NOTE — TELEPHONE ENCOUNTER
Heart failure follow up call:      Called patient and stated he did gain a lot of weight at first but over the last week he has lost 8 or 9 pounds.     Informed him to get blood work done the next time he comes in for cardiac rehab which will be Thursday (he states he will come in at that time to do blood work)      No symptoms other than some knee problems     Please advise.  Thanks

## 2021-02-02 NOTE — TELEPHONE ENCOUNTER
Heart monitor did not show any improvement in his heart rate. Please let me see him sometime next week to discuss further management.   Thank you

## 2021-02-03 ENCOUNTER — TELEPHONE (OUTPATIENT)
Dept: CARDIOLOGY | Age: 76
End: 2021-02-03

## 2021-02-03 NOTE — TELEPHONE ENCOUNTER
----- Message from Marisol Cameron MD sent at 2/2/2021 10:46 PM EST -----  Abnormal heart monitor showing slow heartbeats. Please let me see him on Thursday. Please call with questions and/or concerns.   Thank you

## 2021-02-03 NOTE — TELEPHONE ENCOUNTER
Need to be seen in the office on Thursday because of abnormal heart monitor showing slow heartbeats.   Thank you

## 2021-02-04 ENCOUNTER — OFFICE VISIT (OUTPATIENT)
Dept: CARDIOLOGY | Age: 76
End: 2021-02-04
Payer: MEDICARE

## 2021-02-04 ENCOUNTER — HOSPITAL ENCOUNTER (OUTPATIENT)
Dept: CARDIAC REHAB | Age: 76
Setting detail: THERAPIES SERIES
Discharge: HOME | End: 2021-02-04
Payer: MEDICARE

## 2021-02-04 ENCOUNTER — APPOINTMENT (OUTPATIENT)
Dept: CARDIAC REHAB | Age: 76
End: 2021-02-04
Payer: MEDICARE

## 2021-02-04 VITALS
WEIGHT: 315 LBS | SYSTOLIC BLOOD PRESSURE: 112 MMHG | DIASTOLIC BLOOD PRESSURE: 70 MMHG | RESPIRATION RATE: 18 BRPM | BODY MASS INDEX: 44.1 KG/M2 | OXYGEN SATURATION: 97 % | HEIGHT: 71 IN | HEART RATE: 40 BPM

## 2021-02-04 DIAGNOSIS — I44.1 AV BLOCK, 2ND DEGREE: ICD-10-CM

## 2021-02-04 DIAGNOSIS — G47.33 OSA (OBSTRUCTIVE SLEEP APNEA): ICD-10-CM

## 2021-02-04 DIAGNOSIS — Z95.2 S/P TAVR (TRANSCATHETER AORTIC VALVE REPLACEMENT): ICD-10-CM

## 2021-02-04 DIAGNOSIS — R00.1 BRADYCARDIA: Primary | ICD-10-CM

## 2021-02-04 DIAGNOSIS — I10 ESSENTIAL HYPERTENSION: ICD-10-CM

## 2021-02-04 DIAGNOSIS — I48.0 PAF (PAROXYSMAL ATRIAL FIBRILLATION) (HCC): ICD-10-CM

## 2021-02-04 DIAGNOSIS — E78.2 MIXED HYPERLIPIDEMIA: ICD-10-CM

## 2021-02-04 DIAGNOSIS — I35.0 SEVERE AORTIC STENOSIS: ICD-10-CM

## 2021-02-04 DIAGNOSIS — I50.42 CHRONIC COMBINED SYSTOLIC AND DIASTOLIC CHF, NYHA CLASS 3 (HCC): ICD-10-CM

## 2021-02-04 PROCEDURE — G8427 DOCREV CUR MEDS BY ELIG CLIN: HCPCS | Performed by: INTERNAL MEDICINE

## 2021-02-04 PROCEDURE — G8417 CALC BMI ABV UP PARAM F/U: HCPCS | Performed by: INTERNAL MEDICINE

## 2021-02-04 PROCEDURE — 1036F TOBACCO NON-USER: CPT | Performed by: INTERNAL MEDICINE

## 2021-02-04 PROCEDURE — 1123F ACP DISCUSS/DSCN MKR DOCD: CPT | Performed by: INTERNAL MEDICINE

## 2021-02-04 PROCEDURE — 99215 OFFICE O/P EST HI 40 MIN: CPT | Performed by: INTERNAL MEDICINE

## 2021-02-04 PROCEDURE — 4040F PNEUMOC VAC/ADMIN/RCVD: CPT | Performed by: INTERNAL MEDICINE

## 2021-02-04 PROCEDURE — 93798 PHYS/QHP OP CAR RHAB W/ECG: CPT

## 2021-02-04 PROCEDURE — G8484 FLU IMMUNIZE NO ADMIN: HCPCS | Performed by: INTERNAL MEDICINE

## 2021-02-04 PROCEDURE — 99211 OFF/OP EST MAY X REQ PHY/QHP: CPT | Performed by: INTERNAL MEDICINE

## 2021-02-04 NOTE — PROGRESS NOTES
Phase II Cardiac Rehab Individualized Treatment Plan-120 Day     Patient Name: Zoila Reyes  Date of Initial Assessment: 2/4/2021  ACCOUNT #: [de-identified]  Diagnosis: TAVR   Onset Date: 9/15/2021  Referring Physician: Dr Marlen Gutierrez  Risk Stratification: High  Session Number: 29   EXERCISE    Stages of Change:   [] pre-contemplation   [x] Action   [] Contemplate   [] Maintainence   [] Prep   [] Relapse          Exercise Prescription:  Mode: [x] TM [x] B [x] STP [] EL [x] R  Frequency: 3X/week  Duration: 31-60 minutes  Intensity: METS 1.8  Progression: Increase 1-2 levels/week or 1-2 min/week to achieve target HR and RPE 11-13. Has not increased METS in past 30 days. Refuses to increase workload due to chronic knee pain. [] Angina with Exertion THR:    [] Resistance Training Weight (lbs): 3 lbs  Reps: 10    Hypertension:  [x] Yes  [] No  Resting BP: 140/82  Peak Exercise BP: 138/82  [] Med change? Intervention:  Home Exercise:  Type: Hand/pedal bike  Duration: 30 days  Frequency: Daily   [] Resistance Training    Education:   [x] Equipment [place]  [] Self pulse   [x] Proper use weights/therabands   [x] S/S to report  [x] Low Na Diet    [x] Warm up/ Cool down  [] BP Medication    [x] RPE Scale   [] Understand BP   [] Ex Safety   [] Exercise specialist class-Home Exercise       Target Goal:   -Individual Exercise Plan  -BP<140/90 or <130/80 if DM   -Aerobic active 30 + minutes 5-7 days per week    Nutrition    Stages of Change:   [] pre-contemplation   [x] Action   [] Contemplate   [] Maintainence   [] Prep   [] Relapse    Lipids: Total Cholesterol: 127  Triglycerides: 136  HDL: 31  LDL: 69  [] Med Change? Diabetes:  [x] Yes  [] No  Random BS:Has not reported  HbA1c:No new result  [] Med Change?     Weight Management:  Wt Goal: 1-2 lbs/wk    Intervention:   [] Dietitian Consult- Declined 1/14/2020       [x] Nurse/Patient Discussion     [] Diet Class           [] Referred to Diabetes Education Education:  [] S&S hypo/hyperglycemia  [x] Low fat/low cholesterol diet  [] Weight loss methods      [] Relate Diabetes/CAD     [x] Eating heart healthy handout    Target Goal:  -LDL-C<100 if triglycerides are > 200  -LDL-C < 70 for high risk patients  -HbA1c < 7%  -BMI < 25   Education    Stages of Change:    [] pre-contemplation   [x] Action   [] Contemplate   [] Maintainence   [] Prep   [] Relapse    Family support: [x] Yes  [] No    Tobacco use: [] Yes  [x] No    Intervention:  [] Referred to smoking cessation counselor     [] Individual education and counseling  [] Tobacco adjunct  [] Informed of education class schedule     Education:   [] Risk Factors/Modifications  [] Psychological aspects  [] Angina    [] Medications  [] CHF      [] Cardiac A&P    Target Goal:  -Complete cessation of tobacco use (if applicable)  -Continued risk factor modifications  -Recognizing signs/symptoms to report  -Proper use of meds    Psychosocial  Stages of Change:    [] pre-contemplation   [x] Action   [] Contemplate   [] Maintainence   [] Prep   [] Relapse    Intervention:   [] Psych Consult/  [x] Uses stress management skills    [] Physician Referral    [] Stress management class   [] Med Change? Education:    [] Coping Techniques   [] Relaxation techniques   [] S/S of Depression    Target Goal:  -Assess presence or absence of depression using a valid screening tool. -Maximize coping skills.  -Positive support system.     Preventative Medication:   [] Aspirin       [] Beta Blockade      [x] Statin or other lipid lowering agent     [x] Clopidogrel   [] ACE Inhibitor   [x] Other anticoagulation medications     Fall Risk assess: [x] Yes  [] No  Assistive Device:Scooter   [] Cane  [] [place] [] Wheel Chair  [] Gait belt    Patient/Program goal:   -increased stamina/strength to 30-50 total exercise by increasing 1-2 level/wk and 1-2  min/wk  to achieve THR and RPE 11-13--Has not increased workloads due to chronic knee pain in past 30 days. Arrhythmia noted and awaiting scheduling for pacemaker insertion. Attends program 2 days/week only. -introduce weights/ therabands 2-4# for 5-10 reps Using 3 lb weights and level 3/4 therabands for 10 reps  -manage BP better--Variable, but usually -140's  -improved cholesterol and  Triglycerides Results of 1/21/2021 reviewed and improved slightly, but need further improvement. Taking lipitor.  Declined offer of 1;1 consult with dietician.   -develop regular exercise 30 min daily-- Reports uses pedal/hand bike 30 minutes daily     Physician Changes/Comments:      Cardiac Rehab Staff

## 2021-02-04 NOTE — PATIENT INSTRUCTIONS
SURVEY:    You may be receiving a survey from [facility] [facility] regarding your visit today. Please complete the survey to enable us to provide the highest quality of care to you and your family. If you cannot score us a very good on any question, please call the office to discuss how we could have made your experience a very good one. Thank you.     Your MA today was Dewayne Pepper

## 2021-02-04 NOTE — PROGRESS NOTES
Jose Manuel Mcconnell am scribing for and in the presence of Rosalind Licona MD, F.A.C.C..    Patient: Richelle De Los Santos  : 1945  Date of Visit: 2021    History of Present Illness:        Dear Cesar Rangel MD    I had the pleasure of seeing Richelle De Los Santos in my office today. Mr. Olesya Ennis is a 66 y.o. male with a history of a history of heart failure. He has a history of hypertension and hyperlipidemia for years, and has borderline diabetes. Symptoms of heart failure: Mr. Olesya Ennis reports that he has a fairly poor exercise tolerance. His says that he can really not ambulate at all due to his weight and shortness of breath. ECG on 3/20: sinus tachycardia with first-degree AV block and nonspecific intraventricular conduction delay. Echocardiogram done 3/4/20 which showed severely reduced left ventricular systolic function with ejection fraction of 35% and moderate to severe aortic stenosis. This can be an underestimation because of reduced left ventricular systolic function. Mild to moderate aortic regurgitation. Moderate mitral regurgitation and moderate diastolic dysfunction. Heart Catheterization done on 3/12/20: LMCA: Normal 0% stenosis. LAD: Mild irregularities 20-30% LCx: Lesion on 2nd Ob Angie Ostial 50% stenosis. RCA: Mild irregularities 10-20%. EF:35%. Severe aortic stenosis by cardiac catheterization with peak to peak gradient of 67 mmHg and mean gradient of 48 mmHg across the aortic valve. Admission to [facility] on 3/4/20-3/9/20 due to acute heart failure: Patient treated for acute on chronic combined CHF and aortic stenosis, later developed atrial fibrillation with rapid ventricular response. Moved to the ICU and started on amiodarone drip and then later converted back to normal sinus rhythm. Amiodarone oral tablet was started prior to discharge. On 2020, he presented to the emergency room with epistaxis. Nasal packing done that removed after 3 days. 21 (!) 370 lb 9.6 oz (168.1 kg)   21 (!) 363 lb (164.7 kg)       PAST MEDICAL HISTORY:        Past Medical History:   Diagnosis Date    Anxiety     Aortic stenosis     Atrial fibrillation, new onset ([facility] 2020    BPH (benign prostatic hyperplasia)     CHF (congestive heart failure) (HCC)     Diabetes mellitus (HCC)     borderline    Hyperlipidemia     Hypertension     Lymphedema     bilat legs    On home O2     Osteoarthritis     Septic shock due to urinary tract infection ([facility] /  Levophed and fluids 2020   Nonischemic cardiomyopathy. Severe aortic stenosis. CURRENT ALLERGIES: Patient has no known allergies. REVIEW OF SYSTEMS: 14 systems were reviewed. Pertinent positives and negatives as above, all else negative. Past Surgical History:   Procedure Laterality Date    CARDIAC CATHETERIZATION  2020    OTHER SURGICAL HISTORY  2020    TAVR/ Dr. Javy Huff History:  Social History     Tobacco Use    Smoking status: Former Smoker     Packs/day: 1.00     Years: 10.00     Pack years: 10.00     Types: Cigarettes     Quit date:      Years since quittin.1    Smokeless tobacco: Never Used   Substance Use Topics    Alcohol use: Never     Frequency: Never     Drinks per session: Patient refused     Binge frequency: Never    Drug use: No        CURRENT MEDICATIONS:        Outpatient Medications Marked as Taking for the 21 encounter (Office Visit) with Sherley Franco MD   Medication Sig Dispense Refill    apixaban (ELIQUIS) 5 MG TABS tablet Take 1 tablet by mouth 2 times daily for 14 days Samples dispensed.  Lot [id number] Exp [id number] 60 tablet 0    clopidogrel (PLAVIX) 75 MG tablet Take 1 tablet by mouth daily 30 tablet 5    tamsulosin (FLOMAX) 0.4 MG capsule Take 1 capsule by mouth daily 30 capsule 5    losartan (COZAAR) 25 MG tablet Take 1 tablet by mouth daily 90 tablet 3  potassium chloride (KLOR-CON M) 20 MEQ extended release tablet Take 1 tablet by mouth daily 90 tablet 3    amiodarone (CORDARONE) 200 MG tablet Take 1 tablet by mouth daily 90 tablet 3    atorvastatin (LIPITOR) 40 MG tablet Take 1 tablet by mouth daily 90 tablet 3    furosemide (LASIX) 20 MG tablet Take 1 tablet by mouth daily (Patient taking differently: Take 20 mg by mouth See Admin Instructions Dose increase 1/21/21 per Dr. Cristy Kidd - 40 mg po MWF; 20 mg all other days) 60 tablet 3    PARoxetine (PAXIL) 30 MG tablet Take 1 tablet by mouth every morning 90 tablet 3    acetaminophen (TYLENOL) 500 MG tablet Take 500 mg by mouth every 6 hours as needed for Pain         FAMILY HISTORY: family history includes Arthritis in his mother; Heart Disease in his mother; High Blood Pressure in his brother and mother; High Cholesterol in his mother; Stroke in his mother. PHYSICAL EXAMINATION:     /70 (Site: Left Upper Arm, Position: Sitting, Cuff Size: Large Adult)   Pulse 118   Resp 18   Ht 5' 10.51\" (1.791 m)   Wt (!) 361 lb (163.7 kg)   SpO2 97%   BMI 51.05 kg/m²  Body mass index is 51.05 kg/m². Constitutional: He is oriented to person, place, and time. He appears well-developed and well-nourished. In no acute distress. HEENT: Normocephalic and atraumatic. No JVD present. Carotid bruit is not present. No mass and no thyromegaly present. No lymphadenopathy present. Cardiovascular: Normal rate, regular rhythm, normal heart sounds. Exam reveals no gallop and no friction rubs. 1/6 systolic murmur, 2nd intercostal space on the LEFT just lateral to the sternum  Pulmonary/Chest: Effort normal and breath sounds normal. No respiratory distress. He has no wheezes, rhonchi or rales. Abdominal: Soft, non-tender. Bowel sounds and aorta are normal. He exhibits no organomegaly, mass or bruit. Extremities: 1+ 1/2 up to the knees bilaterally. No cyanosis or clubbing. 2+ radial and carotid pulses. Distal extremity pulses: 2+ bilaterally. Chronic vascular changes again noted. Neurological: He is alert and oriented to person, place, and time. No evidence of gross cranial nerve deficit. Coordination appeared normal.   Skin: Skin is warm and dry. There is no rash or diaphoresis. Psychiatric: He has a normal mood and affect. His speech is normal and behavior is normal.      MOST RECENT LABS ON RECORD:   Lab Results   Component Value Date    WBC 7.3 01/21/2021    HGB 12.9 (L) 01/21/2021    HCT 41.0 01/21/2021     01/21/2021    CHOL 127 01/21/2021    TRIG 136 01/21/2021    HDL 31 (L) 01/21/2021    ALT 15 09/14/2020    AST 14 09/14/2020     01/21/2021    K 4.4 01/21/2021     01/21/2021    CREATININE 0.77 01/21/2021    BUN 22 01/21/2021    CO2 27 01/21/2021    TSH 2.83 05/15/2020    PSA 1.49 01/11/2019    INR 1.14 (H) 09/15/2020    GLUF 103 (H) 01/11/2019    LABA1C 5.9 03/02/2017       ASSESSMENT:       Patient Active Problem List    Diagnosis Date Noted    Severe aortic stenosis /  TAVR 2020      Priority: High    Dyslipidemia      Priority: High    Encounter for removal of nasal packing 04/15/2020    Epistaxis from Eliquis and Aspirin 04/13/2020    Carpal tunnel syndrome on left 01/08/2019    Obesity, morbid (more than 100 lbs over ideal weight or BMI > 40) ([address] 12/15/2017    Osteoarthritis of both knees 06/13/2017    Essential hypertension 12/13/2016    Mixed hyperlipidemia 12/13/2016        Diagnosis Orders   1. Bradycardia     2. Chronic combined systolic and diastolic CHF, NYHA class 3 ([address]     3. Severe aortic stenosis     4. S/P TAVR (transcatheter aortic valve replacement)     5. PAF (paroxysmal atrial fibrillation) ([address]     6. Essential hypertension     7. Mixed hyperlipidemia     8.  MARIA ESTHER (obstructive sleep apnea)       PLAN:

## 2021-02-05 ENCOUNTER — TELEPHONE (OUTPATIENT)
Dept: CARDIOLOGY CLINIC | Age: 76
End: 2021-02-05

## 2021-02-05 DIAGNOSIS — Z13.9 SCREENING FOR CONDITION: ICD-10-CM

## 2021-02-05 DIAGNOSIS — I44.1 AV BLOCK, 2ND DEGREE: ICD-10-CM

## 2021-02-05 DIAGNOSIS — R00.1 BRADYCARDIA: Primary | ICD-10-CM

## 2021-02-05 NOTE — TELEPHONE ENCOUNTER
VERBAL ORDERS PER DR RUEDA TO CONTACT THIS PT AND SET HIM UP FOR A DUAL PACEMAKER IMPLANT / REFERRAL FROM DR STEVEN RUEDA SAID WE CAN GIVE THE PT THE OPTION TO EITHER COME INTO THE OFFICE TO SPEAK TO HIM ABOUT IT FIRST OR WE CAN JUST SCHEDULE HIM FOR THE PROCEDURE AND DR RUEDA WILL SPEAK TO HIM IN THE CATH LAB      LM FOR PT TO CALL OUR OF  PLAN:      ? Bradycardia: Minimally symptomatic only because patient is not active. Mobitz type II second-degree AV block and intermittent complete heart block was seen. Most of the episodes are recorded between 7:53 AM.  Patient cannot determine if he was awake or asleep at that time. ? Patient also has left bundle branch block and history of paroxysmal atrial fibrillation. He is currently only on amiodarone. No other AV asia blocking agents.     ? I think he is minimally symptomatic only because of limited mobility. He is using a motorized wheelchair all the time. It is possible also that his resistant heart failure can be caused by the slow heart rate. I think dual-chamber pacemaker therapy is indicated.     ? Additional Testing List: Intra Cardiac Pacemaker: I discussed the risks, benefits, and alternatives to pacemaker therapy including the relatively small but potential risks of bleeding, infection, arrythmia, a contrast induced allergy and/or kidney damage, damage to the lung including the possibility of collapse, stroke, heart attack, death, or the need for further procedures. Although I did not think it would be advisable, I also discussed the alternate treatment strategy of no treatment or continued treatment with medicines which would probably be of limited benefit. They verbalized understanding of the risks benefits and alternatives and stated that they would like to proceed with pacemaker placement.  Therefore with your blessing I will plan to refer the patient to have this procedure performed by Dr. Gregorio Rivera at [address] [place] [address] in [facility] as soon as possible. I will call Dr Charity Hayes personally and get patient set up for a Virtual Visit and I will call patient to let him know when he is set up for this.   ? I do want him to have a CBC, TSH and BMP done before pacemaker procedure.     MARIO

## 2021-02-05 NOTE — TELEPHONE ENCOUNTER
Spoke to Oliverio Knowles. So many things need to be lined up in order to get him on the schedule . A form needs to be filled out and signed by dr Tc Adan and sent to [facility] for approval. They need the surgery date and then need to see if they are available that day. I called pt and explained this to him and he said he understands and he is fine with this. I told him Alban Barnard, our  will be here on Monday and she will contact [facility] AND him to get this all set up. Told him he will also need covid testing as well and Alban Barnard will give him further instructions when everything is line up . Pt was thankful     I placed the order for the dual pacemaker implant and I will speak to lydia on Monday aobut this.  I also put the [facility] form with the order that will need filled out and signed

## 2021-02-05 NOTE — TELEPHONE ENCOUNTER
Pt called the office back and he asked me if anyone said that he has no transportation to [facility]. He said he had a heart valve performed here in [facility] and they sent a bus to come and get him. He said he does not have any money so he cannot pay for it and he has not family that can bring him here. He said he will have the procedure done if our facility has someone to come to [place] to get him and then take him back home and he said he will have it done without seeing the DR first. I told him I needed to speak to my  first to check on transport.  No orders put in until I hear back from [facility]

## 2021-02-08 ENCOUNTER — HOSPITAL ENCOUNTER (OUTPATIENT)
Dept: CARDIAC REHAB | Age: 76
Setting detail: THERAPIES SERIES
Discharge: HOME | End: 2021-02-08
Payer: MEDICARE

## 2021-02-08 ENCOUNTER — APPOINTMENT (OUTPATIENT)
Dept: CARDIAC REHAB | Age: 76
End: 2021-02-08
Payer: MEDICARE

## 2021-02-08 PROCEDURE — 93798 PHYS/QHP OP CAR RHAB W/ECG: CPT

## 2021-02-09 ENCOUNTER — TELEPHONE (OUTPATIENT)
Dept: CARDIOLOGY | Age: 76
End: 2021-02-09

## 2021-02-09 NOTE — TELEPHONE ENCOUNTER
Medical Necessity [facility]  Waiting for approval,     Once approval has been obtained. -  I would like to schedule the patient for New Dual pacer on 02.18.2021  Arrival 12pm

## 2021-02-09 NOTE — TELEPHONE ENCOUNTER
Patient states heart rate has been running between 36-45. He states surgery for pacemaker is not until 2/17. Is concerned about bradycardia, wants to know what you recommend.

## 2021-02-09 NOTE — TELEPHONE ENCOUNTER
I will try reaching out to Dr. Alecia Alicea to speed up the pacemaker insertion procedure.   Thank you

## 2021-02-11 ENCOUNTER — TELEPHONE (OUTPATIENT)
Dept: CARDIOLOGY CLINIC | Age: 76
End: 2021-02-11

## 2021-02-11 ENCOUNTER — APPOINTMENT (OUTPATIENT)
Dept: CARDIAC REHAB | Age: 76
End: 2021-02-11
Payer: MEDICARE

## 2021-02-11 ENCOUNTER — HOSPITAL ENCOUNTER (OUTPATIENT)
Dept: CARDIAC REHAB | Age: 76
Setting detail: THERAPIES SERIES
Discharge: HOME | End: 2021-02-11
Payer: MEDICARE

## 2021-02-11 ENCOUNTER — HOSPITAL ENCOUNTER (OUTPATIENT)
Dept: LAB | Age: 76
Discharge: HOME | End: 2021-02-11
Payer: MEDICARE

## 2021-02-11 DIAGNOSIS — Z13.9 SCREENING FOR CONDITION: ICD-10-CM

## 2021-02-11 PROCEDURE — U0003 INFECTIOUS AGENT DETECTION BY NUCLEIC ACID (DNA OR RNA); SEVERE ACUTE RESPIRATORY SYNDROME CORONAVIRUS 2 (SARS-COV-2) (CORONAVIRUS DISEASE [COVID-19]), AMPLIFIED PROBE TECHNIQUE, MAKING USE OF HIGH THROUGHPUT TECHNOLOGIES AS DESCRIBED BY CMS-2020-01-R: HCPCS

## 2021-02-11 PROCEDURE — C9803 HOPD COVID-19 SPEC COLLECT: HCPCS

## 2021-02-11 PROCEDURE — 93798 PHYS/QHP OP CAR RHAB W/ECG: CPT

## 2021-02-11 PROCEDURE — U0005 INFEC AGEN DETEC AMPLI PROBE: HCPCS

## 2021-02-11 NOTE — TELEPHONE ENCOUNTER
Patient was notified, that from Dr. Rafaela Dias ' s standpoint he is able to stop the Plavix. Patient was given instructions and verbalized understanding.

## 2021-02-11 NOTE — TELEPHONE ENCOUNTER
Spoke with Ivan Lott at [facility] up scheduled for 02.18.2021 at 10/1030am   2 E is to call [facility] for the ride home. I scheduled this return home as a Will call when ready. ,     Procedure: Dual chamber Pacemaker-  MDT  Date: 02.18.2021  Arrival Time: 12 noon  Meds to Hold: Eliquis x 1 day prior      Covid:  testing ordered, to be done: [facility] 02.11.2021      Instructions verbalized to the patient.

## 2021-02-12 ENCOUNTER — TELEPHONE (OUTPATIENT)
Dept: CARDIOLOGY | Age: 76
End: 2021-02-12

## 2021-02-12 LAB
SARS-COV-2, RAPID: NORMAL
SARS-COV-2: NORMAL
SARS-COV-2: NOT DETECTED
SOURCE: NORMAL

## 2021-02-15 ENCOUNTER — HOSPITAL ENCOUNTER (OUTPATIENT)
Dept: CARDIAC REHAB | Age: 76
Setting detail: THERAPIES SERIES
Discharge: HOME | End: 2021-02-15
Payer: MEDICARE

## 2021-02-15 PROCEDURE — 93798 PHYS/QHP OP CAR RHAB W/ECG: CPT

## 2021-02-17 ENCOUNTER — PREP FOR PROCEDURE (OUTPATIENT)
Dept: CARDIOLOGY | Age: 76
End: 2021-02-17

## 2021-02-17 RX ORDER — SODIUM CHLORIDE 0.9 % (FLUSH) 0.9 %
10 SYRINGE (ML) INJECTION PRN
Status: CANCELLED | OUTPATIENT
Start: 2021-02-17

## 2021-02-17 RX ORDER — CHLORHEXIDINE GLUCONATE 4 G/100ML
SOLUTION TOPICAL ONCE
Status: CANCELLED | OUTPATIENT
Start: 2021-02-17 | End: 2021-02-17

## 2021-02-17 RX ORDER — SODIUM CHLORIDE 0.9 % (FLUSH) 0.9 %
10 SYRINGE (ML) INJECTION EVERY 12 HOURS SCHEDULED
Status: CANCELLED | OUTPATIENT
Start: 2021-02-17

## 2021-02-18 ENCOUNTER — APPOINTMENT (OUTPATIENT)
Dept: GENERAL RADIOLOGY | Age: 76
End: 2021-02-18
Attending: INTERNAL MEDICINE
Payer: MEDICARE

## 2021-02-18 PROCEDURE — 71046 X-RAY EXAM CHEST 2 VIEWS: CPT

## 2021-02-25 ENCOUNTER — OFFICE VISIT (OUTPATIENT)
Dept: CARDIOLOGY | Age: 76
End: 2021-02-25
Payer: MEDICARE

## 2021-02-25 VITALS
DIASTOLIC BLOOD PRESSURE: 66 MMHG | OXYGEN SATURATION: 96 % | SYSTOLIC BLOOD PRESSURE: 135 MMHG | HEART RATE: 65 BPM | RESPIRATION RATE: 18 BRPM

## 2021-02-25 DIAGNOSIS — I10 ESSENTIAL HYPERTENSION: ICD-10-CM

## 2021-02-25 DIAGNOSIS — I50.42 CHRONIC COMBINED SYSTOLIC AND DIASTOLIC CHF, NYHA CLASS 3 (HCC): ICD-10-CM

## 2021-02-25 DIAGNOSIS — R00.1 BRADYCARDIA: ICD-10-CM

## 2021-02-25 DIAGNOSIS — E78.2 MIXED HYPERLIPIDEMIA: ICD-10-CM

## 2021-02-25 DIAGNOSIS — I44.1 AV BLOCK, 2ND DEGREE: ICD-10-CM

## 2021-02-25 DIAGNOSIS — G47.33 OSA (OBSTRUCTIVE SLEEP APNEA): ICD-10-CM

## 2021-02-25 DIAGNOSIS — I35.0 SEVERE AORTIC STENOSIS: ICD-10-CM

## 2021-02-25 DIAGNOSIS — Z95.0 S/P PLACEMENT OF CARDIAC PACEMAKER: ICD-10-CM

## 2021-02-25 DIAGNOSIS — I48.0 PAF (PAROXYSMAL ATRIAL FIBRILLATION) (HCC): Primary | ICD-10-CM

## 2021-02-25 DIAGNOSIS — Z95.2 S/P TAVR (TRANSCATHETER AORTIC VALVE REPLACEMENT): ICD-10-CM

## 2021-02-25 PROCEDURE — 99214 OFFICE O/P EST MOD 30 MIN: CPT | Performed by: INTERNAL MEDICINE

## 2021-02-25 PROCEDURE — 1036F TOBACCO NON-USER: CPT | Performed by: INTERNAL MEDICINE

## 2021-02-25 PROCEDURE — 4040F PNEUMOC VAC/ADMIN/RCVD: CPT | Performed by: INTERNAL MEDICINE

## 2021-02-25 PROCEDURE — 1123F ACP DISCUSS/DSCN MKR DOCD: CPT | Performed by: INTERNAL MEDICINE

## 2021-02-25 PROCEDURE — G8427 DOCREV CUR MEDS BY ELIG CLIN: HCPCS | Performed by: INTERNAL MEDICINE

## 2021-02-25 PROCEDURE — G8417 CALC BMI ABV UP PARAM F/U: HCPCS | Performed by: INTERNAL MEDICINE

## 2021-02-25 PROCEDURE — 99211 OFF/OP EST MAY X REQ PHY/QHP: CPT | Performed by: INTERNAL MEDICINE

## 2021-02-25 PROCEDURE — G8484 FLU IMMUNIZE NO ADMIN: HCPCS | Performed by: INTERNAL MEDICINE

## 2021-02-25 NOTE — PROGRESS NOTES
Bisi Knott am scribing for and in the presence of Rosmery Elliott MD, F.A.C.C..    Patient: Zoila Reyes  : 1945  Date of Visit: 2021    History of Present Illness:        Dear Ene Arellano MD    I had the pleasure of seeing Zoila Reyes in my office today. Mr. Shannon Wagner is a 66 y.o. male who presented for follow-up. 1-He has  a history of heart failure. He has a history of hypertension and hyperlipidemia for years, and has borderline diabetes. Symptoms of heart failure: Mr. Shannon Wagner reports that he has a fairly poor exercise tolerance. His says that he can really not ambulate at all due to his weight and shortness of breath. 2- ECG on 3/20: sinus tachycardia with first-degree AV block and nonspecific intraventricular conduction delay. 3-Echocardiogram done 3/4/20 which showed severely reduced left ventricular systolic function with ejection fraction of 35% and moderate to severe aortic stenosis. This can be an underestimation because of reduced left ventricular systolic function. Mild to moderate aortic regurgitation. Moderate mitral regurgitation and moderate diastolic dysfunction. 4-Heart Catheterization done on 3/12/20: LMCA: Normal 0% stenosis. LAD: Mild irregularities 20-30% LCx: Lesion on 2nd Ob Angie Ostial 50% stenosis. RCA: Mild irregularities 10-20%. EF:35%. Severe aortic stenosis by cardiac catheterization with peak to peak gradient of 67 mmHg and mean gradient of 48 mmHg across the aortic valve. 5-Admission to [facility] on 3/4/20-3/9/20 due to acute heart failure: Patient treated for acute on chronic combined CHF and aortic stenosis, later developed atrial fibrillation with rapid ventricular response. Moved to the ICU and started on amiodarone drip and then later converted back to normal sinus rhythm. Amiodarone oral tablet was started prior to discharge. 6-On 2020, he presented to the emergency room with epistaxis.   Nasal packing done that removed after 3 days. 7-Transcatheter aortic valve replacement with a Medtronic 34 mm Evolut Pro Plus prosthesis 9/15/2020    8-Echocardiogram done on 10/13/2020 showed an EF of 55%. Mild to moderate LV hypertrophy. Status post TAVR with mean gradient of 9 mmHg, mild perivalvular regurgitation noted. Mild mitral regurgitation. Moderate diastolic dysfunction is seen. 9-Holter monitor done on 1/14/2021: Sinus rhythm with intraventricular conduction delay and occasional PACs and very short runs of atrial tachycardia. The longest was 3 beats at a heart rate of 100 bpm.  No significant ventricular arrhythmia. 10-Echo done on 1/21/2021- EF 50-55%, Mild to moderately increased left ventricular wall thickness with a mildly increased left ventricular cavity size. The left atrium is severely dilated (>40) with a left atrial volume index of 43 ml/m2. S/P TAVR with a mean gradient of 11 mmHg. Trivial aortic regurgitation noted. Evidence of moderate diastolic dysfunction is seen    11-CAM monitor worn on 1/21/2020- 6 days and 21 hours recorded. Average heart rate was 61 bpm, ranging from 46 to 90 bpm. First degree AV block, Mobitz type 1 second-degree AV block and occasional high grade/third degree AV block. Minimum HR occurred during 2nd degree AV block was 32 bpm.     12-Medtronic pacemaker insertion on 2/18/2021 by Dr Stanislaw Adamson    Mr. Becki Rodriguez is here today for a follow up post pacemaker insertion. He is doing well since his procedure. He denies any chest pain, pressure or tightness. He has his chronic shortness of breath on exertion and is using oxygen all the time about 2 L/min. He reported that he sleeps well at night. No orthopnea or PND. He uses a scooter most of the time. No dizziness or lightheadedness. No palpitations. No problems with current medications apart from the price of the Eliquis. He said he will be very happy if ended up taking him off of the Eliquis.   I told him since he has a pacemaker now, if we got routine transmissions and he has no recurrence of atrial fibrillation we may end up taking him off of the Eliquis because his 1 episode of atrial fibrillation happened in the setting of acute heart failure. Wt Readings from Last 3 Encounters:   21 (!) 360 lb (163.3 kg)   21 (!) 361 lb (163.7 kg)   21 (!) 370 lb 9.6 oz (168.1 kg)       PAST MEDICAL HISTORY:        Past Medical History:   Diagnosis Date    Anxiety     Aortic stenosis     Atrial fibrillation, new onset ([facility] 2020    BPH (benign prostatic hyperplasia)     CHF (congestive heart failure) ([facility]     Diabetes mellitus (HCC)     borderline    Hyperlipidemia     Hypertension     Lymphedema     bilat legs    On home O2     Osteoarthritis     Septic shock due to urinary tract infection ([facility] /  Levophed and fluids 2020   Nonischemic cardiomyopathy. Severe aortic stenosis. CURRENT ALLERGIES: Patient has no known allergies. REVIEW OF SYSTEMS: 14 systems were reviewed. Pertinent positives and negatives as above, all else negative.      Past Surgical History:   Procedure Laterality Date    CARDIAC CATHETERIZATION  2020    OTHER SURGICAL HISTORY  2020    TAVR/ Dr. Fernandes Nations History:  Social History     Tobacco Use    Smoking status: Former Smoker     Packs/day: 1.00     Years: 10.00     Pack years: 10.00     Types: Cigarettes     Quit date:      Years since quittin.1    Smokeless tobacco: Never Used   Substance Use Topics    Alcohol use: Never     Frequency: Never     Drinks per session: Patient refused     Binge frequency: Never    Drug use: No        CURRENT MEDICATIONS:        Outpatient Medications Marked as Taking for the 21 encounter (Office Visit) with Maged Mckay MD   Medication Sig Dispense Refill    apixaban (ELIQUIS) 5 MG TABS tablet Take 1 tablet by mouth 2 times daily for 7 days Sample dispensed-Lot [id number] Exp  14 tablet 0    clopidogrel (PLAVIX) 75 MG tablet Take 1 tablet by mouth daily 30 tablet 5    tamsulosin (FLOMAX) 0.4 MG capsule Take 1 capsule by mouth daily 30 capsule 5    losartan (COZAAR) 25 MG tablet Take 1 tablet by mouth daily 90 tablet 3    potassium chloride (KLOR-CON M) 20 MEQ extended release tablet Take 1 tablet by mouth daily 90 tablet 3    amiodarone (CORDARONE) 200 MG tablet Take 1 tablet by mouth daily 90 tablet 3    atorvastatin (LIPITOR) 40 MG tablet Take 1 tablet by mouth daily 90 tablet 3    furosemide (LASIX) 20 MG tablet Take 1 tablet by mouth daily (Patient taking differently: Take 20 mg by mouth See Admin Instructions Dose increase 1/21/21 per Dr. Duggan Brood - 40 mg po MWF; 20 mg all other days) 60 tablet 3    PARoxetine (PAXIL) 30 MG tablet Take 1 tablet by mouth every morning 90 tablet 3    acetaminophen (TYLENOL) 500 MG tablet Take 500 mg by mouth every 6 hours as needed for Pain         FAMILY HISTORY: family history includes Arthritis in his mother; Heart Disease in his mother; High Blood Pressure in his brother and mother; High Cholesterol in his mother; Stroke in his mother. PHYSICAL EXAMINATION:     /66 (Site: Right Upper Arm, Position: Sitting, Cuff Size: Large Adult)   Pulse 65   Resp 18   SpO2 96%  There is no height or weight on file to calculate BMI. Constitutional: He is oriented to person, place, and time. He appears well-developed and well-nourished. In no acute distress. HEENT: Normocephalic and atraumatic. No JVD present. Carotid bruit is not present. No mass and no thyromegaly present. No lymphadenopathy present. Cardiovascular: Normal rate, regular rhythm, normal heart sounds. Exam reveals no gallop and no friction rubs. 1/6 systolic murmur, 2nd intercostal space on the LEFT just lateral to the sternum  Pulmonary/Chest: Effort normal and breath sounds normal. No respiratory distress. He has no wheezes, rhonchi or rales. Abdominal: Soft, non-tender. Bowel sounds and aorta are normal. He exhibits no organomegaly, mass or bruit. Extremities: 1+ 1/2 up to the knees bilaterally. No cyanosis or clubbing. 2+ radial and carotid pulses. Distal extremity pulses: 2+ bilaterally. Chronic vascular changes again noted. Neurological: He is alert and oriented to person, place, and time. No evidence of gross cranial nerve deficit. Coordination appeared normal.   Skin: Skin is warm and dry. There is no rash or diaphoresis. Psychiatric: He has a normal mood and affect. His speech is normal and behavior is normal.      MOST RECENT LABS ON RECORD:   Lab Results   Component Value Date    WBC 7.6 02/18/2021    HGB 12.7 (L) 02/18/2021    HCT 39.5 (L) 02/18/2021     02/18/2021    CHOL 127 01/21/2021    TRIG 136 01/21/2021    HDL 31 (L) 01/21/2021    ALT 15 09/14/2020    AST 14 09/14/2020     02/18/2021    K 4.6 02/18/2021     02/18/2021    CREATININE 0.8 02/18/2021    BUN 18 02/18/2021    CO2 26 02/18/2021    TSH 2.83 05/15/2020    PSA 1.49 01/11/2019    INR 1.16 (H) 02/18/2021    GLUF 103 (H) 01/11/2019    LABA1C 5.9 03/02/2017       ASSESSMENT:       Patient Active Problem List    Diagnosis Date Noted    Severe aortic stenosis /  TAVR 2020      Priority: High    Dyslipidemia      Priority: High    AV block, 3rd degree (HCC)      Priority: Low    Encounter for removal of nasal packing 04/15/2020     Priority: Low    Epistaxis from Eliquis and Aspirin 04/13/2020     Priority: Low    Carpal tunnel syndrome on left 01/08/2019     Priority: Low    Obesity, morbid (more than 100 lbs over ideal weight or BMI > 40) ([facility] 12/15/2017     Priority: Low    Osteoarthritis of both knees 06/13/2017     Priority: Low    Essential hypertension 12/13/2016     Priority: Low    Mixed hyperlipidemia 12/13/2016     Priority: Low        Diagnosis Orders   1. PAF (paroxysmal atrial fibrillation) ([facility]     2. Bradycardia     3. AV block, 2nd degree     4.  S/P placement of cardiac pacemaker     5. Chronic combined systolic and diastolic CHF, NYHA class 3 ([address]     6. Severe aortic stenosis     7. S/P TAVR (transcatheter aortic valve replacement)     8. Essential hypertension     9. Mixed hyperlipidemia     10. MARIA ESTHER (obstructive sleep apnea)       PLAN:      · History of bradycardia:    Dual Chamber Pacemaker: Medtronic implanted on 2/18/2021 by Dr Isacc Estrada Indication for Device Placement: Symptomatic Bradycardia, second degree AV block, complete heart block.  Interrogation Findings: We will plan to recheck their device at their next scheduled appointment date.  Pacemaker insertion site looked clean. No signs of infection or hematoma.  I will have our heart failure nurse contact [employer] to send him home monitor. We will get a routine biweekly check for atrial fibrillation. If no significant atrial fibrillation recorded over the next several weeks we may end up taking him off of the Eliquis.  Chronic combined heart failure: New York Heart Association Class: IIb (Marked symptoms during daily activities)   Beta Blocker: Currently off beta-blocker therapy because of sinus bradycardia and intermittent high-grade AV block.  ACE Inibitor/ARB: Continue losartan (Cozaar) 25 mg daily.  Diuretics: Continue furosemide (Lasix) 40 mg every Monday, Wednesday and Friday. And 20 mg all other days.  Continue Potassium 20 MEQ daily   Heart failure counseling: I advised them to try and keep their legs up whenever possible and to limit salt in their diet. · Severe Aortic Stenosis: S/P TAVR 9/14/2020. · Beta Blocker: Not indicated at this time. · ACE Inibitor/ARB: Continue losartan (Cozaar) 25 mg daily. · Diuretics: Continue current dose of Lasix. · Continue Potassium 20 MEQ daily  · I advised them to try and keep their legs up whenever possible and to limit salt in their diet.      Paroxysmal Atrial Fibrillation: Rhythm Control  Rhythm Control Agent: Continue Amiodarone (Cordarone) 200 mg once daily    Monitoring: Amiodarone Since he is being maintained on Amiodarone, I told him that we will need to closely monitor him for potential side effects. These include monitoring LFTs and TSH at least every 6 months as well as chest x-rays, pulmonary function tests, and eye exams at least on a yearly basis. Stroke Risk: His CHADS2-VASc score is greater than 2 (2.2% stroke risk)  Anticoagulation: Continue Apixaban (Eliquis) 5 mg every 12 hours. As a stated above, we will monitor his atrial fibrillation via pacemaker home monitoring system. We will get a biweekly transmission and if no atrial fibrillation recorded over the next several weeks we may end up taking him of the Eliquis. Patient is very anxious to stay off Eliquis because of the cost and is not willing to take warfarin. I told him we will discuss this further.  Essential Hypertension: Controlled  · Beta Blocker: Not indicated at this time. · ACE Inibitor/ARB: Continue losartan (Cozaar) 25 mg daily. · Diuretics: Continue furosemide (Lasix) 40 mg every Monday, Wednesday and Friday. and 20 mg all other days. · Continue Potassium 20 MEQ daily    · Hyperlipidemia: Mixed - Last LDL on 3/5/2020 was 45 mg/dL   · Cholesterol Reduction Therapy: Continue Atorvastatin (Lipitor) 40 mg daily.  Obstructive Sleep Apnea:   o Currently on oxygen all the time. o He is trying to use a CPAP machine at night. Finally, I recommended that he continue his other medications and follow up with you as previously scheduled. FOLLOW UP:   I told Mr. Manzo to call my office if he had any problems, but otherwise I asked him to Return in about 3 months (around 5/25/2021) for Follow up. However, I would be happy to see him sooner should the need arise. Sincerely,  Sebas Thurman MD, F.A.C.C.   [facility] Cardiology Specialist    [address], [address] [place] [address]  Phone: [phone], Fax: [phone]     I believe that the risk of significant morbidity and mortality related to the patient's current medical conditions are: intermediate-high. >30 minutes were spent during prep work, discussion and exam of the patient, and follow up documentation and all of their questions were answered. The documentation recorded by the scribe, accurately and completely reflects the services I personally performed and the decisions made by me. Ritesh Back MD, F.A.C.C.  February 25, 2021

## 2021-02-25 NOTE — PATIENT INSTRUCTIONS
SURVEY:    You may be receiving a survey from [employer] regarding your visit today. Please complete the survey to enable us to provide the highest quality of care to you and your family. If you cannot score us a very good on any question, please call the office to discuss how we could have made your experience a very good one. Thank you. Schedule a Vaccine  When you qualify to receive the vaccine per the [address] guidelines, call the [facility]) COVID-19 Vaccination Hotline to schedule your appointment or to get additional information about the [facility]) locations which are offering the COVID-19 vaccine. To be most effective, it's important that you receive two doses of one of the COVID-19 vaccines. -If you are receiving the Rebolledo Peter vaccine, your second shot will be scheduled as close to 21 days after the first shot as possible. -If you are receiving the Moderna vaccine, your second shot will be scheduled as close to 28 days after the first shot as possible. [facility] [address] Vaccination Hotline: [phone]    In partnership with [facility] and [facility] HEALTH Departments, patients can call [phone], Monday-Friday 8:00am-4:00pm for scheduling at our Hospitals. Or visit the [facility] websites for additional information of vaccine administration locations. Links to [facility]) website and Health Department websites:    [employer]. [email or web address]    [email or web address]    [email or web address]

## 2021-03-01 ENCOUNTER — TELEPHONE (OUTPATIENT)
Dept: CARDIOLOGY | Age: 76
End: 2021-03-01

## 2021-03-01 DIAGNOSIS — G47.33 OSA (OBSTRUCTIVE SLEEP APNEA): Primary | ICD-10-CM

## 2021-03-01 NOTE — TELEPHONE ENCOUNTER
Patient called and wanted to let you know that his CPAP machine was taken away today due to not using it. His insurance company said that they will pay for a new machine but he needs to [facility] all [facility] you be willing to order again?

## 2021-03-01 NOTE — TELEPHONE ENCOUNTER
Let us see how he will do without the machine. If remained symptomatic we may send him to pulmonary for evaluation.   Thank you

## 2021-03-02 NOTE — TELEPHONE ENCOUNTER
Pt aware
Pt states he has been off his CPAP machine for two days and can tell a difference and would like refereed to pulmonary,please.
Sure I will make a referral to Dr. Robby Pickard or one of his associates for the treatment of his sleep apnea syndrome.
- - -

## 2021-03-08 ENCOUNTER — HOSPITAL ENCOUNTER (OUTPATIENT)
Age: 76
Discharge: HOME | End: 2021-03-08
Payer: MEDICARE

## 2021-03-08 ENCOUNTER — TELEPHONE (OUTPATIENT)
Dept: CARDIOLOGY | Age: 76
End: 2021-03-08

## 2021-03-08 DIAGNOSIS — I35.0 SEVERE AORTIC STENOSIS: Chronic | ICD-10-CM

## 2021-03-08 DIAGNOSIS — E78.2 MIXED HYPERLIPIDEMIA: ICD-10-CM

## 2021-03-08 DIAGNOSIS — G47.33 OBSTRUCTIVE SLEEP APNEA (ADULT) (PEDIATRIC): ICD-10-CM

## 2021-03-08 DIAGNOSIS — I10 ESSENTIAL HYPERTENSION: ICD-10-CM

## 2021-03-08 DIAGNOSIS — I44.1 AV BLOCK, 2ND DEGREE: ICD-10-CM

## 2021-03-08 DIAGNOSIS — I35.0 SEVERE AORTIC STENOSIS: Primary | Chronic | ICD-10-CM

## 2021-03-08 DIAGNOSIS — E66.01 OBESITY, MORBID (MORE THAN 100 LBS OVER IDEAL WEIGHT OR BMI > 40) (HCC): Primary | Chronic | ICD-10-CM

## 2021-03-08 DIAGNOSIS — R00.1 BRADYCARDIA: ICD-10-CM

## 2021-03-08 LAB
ALBUMIN SERPL-MCNC: 3.8 G/DL (ref 3.5–5.2)
ALBUMIN/GLOBULIN RATIO: 1 (ref 1–2.5)
ALP BLD-CCNC: 58 U/L (ref 40–129)
ALT SERPL-CCNC: 17 U/L (ref 5–41)
ANION GAP SERPL CALCULATED.3IONS-SCNC: 11 MMOL/L (ref 9–17)
AST SERPL-CCNC: 17 U/L
BILIRUB SERPL-MCNC: 0.54 MG/DL (ref 0.3–1.2)
BILIRUBIN DIRECT: <0.08 MG/DL
BILIRUBIN, INDIRECT: NORMAL MG/DL (ref 0–1)
BUN BLDV-MCNC: 15 MG/DL (ref 8–23)
BUN/CREAT BLD: 23 (ref 9–20)
CALCIUM SERPL-MCNC: 8.9 MG/DL (ref 8.6–10.4)
CHLORIDE BLD-SCNC: 102 MMOL/L (ref 98–107)
CO2: 25 MMOL/L (ref 20–31)
CREAT SERPL-MCNC: 0.66 MG/DL (ref 0.7–1.2)
GFR AFRICAN AMERICAN: >60 ML/MIN
GFR NON-AFRICAN AMERICAN: >60 ML/MIN
GFR SERPL CREATININE-BSD FRML MDRD: ABNORMAL ML/MIN/{1.73_M2}
GFR SERPL CREATININE-BSD FRML MDRD: ABNORMAL ML/MIN/{1.73_M2}
GLOBULIN: NORMAL G/DL (ref 1.5–3.8)
GLUCOSE BLD-MCNC: 116 MG/DL (ref 70–99)
HCT VFR BLD CALC: 39.5 % (ref 40.7–50.3)
HEMOGLOBIN: 12.5 G/DL (ref 13–17)
MCH RBC QN AUTO: 30 PG (ref 25.2–33.5)
MCHC RBC AUTO-ENTMCNC: 31.6 G/DL (ref 28.4–34.8)
MCV RBC AUTO: 94.7 FL (ref 82.6–102.9)
NRBC AUTOMATED: 0 PER 100 WBC
PDW BLD-RTO: 14.4 % (ref 11.8–14.4)
PLATELET # BLD: 163 K/UL (ref 138–453)
PMV BLD AUTO: 11.7 FL (ref 8.1–13.5)
POTASSIUM SERPL-SCNC: 4.2 MMOL/L (ref 3.7–5.3)
RBC # BLD: 4.17 M/UL (ref 4.21–5.77)
SODIUM BLD-SCNC: 138 MMOL/L (ref 135–144)
TOTAL PROTEIN: 7.5 G/DL (ref 6.4–8.3)
TSH SERPL DL<=0.05 MIU/L-ACNC: 2.37 MIU/L (ref 0.3–5)
WBC # BLD: 7.2 K/UL (ref 3.5–11.3)

## 2021-03-08 PROCEDURE — 85027 COMPLETE CBC AUTOMATED: CPT

## 2021-03-08 PROCEDURE — 80076 HEPATIC FUNCTION PANEL: CPT

## 2021-03-08 PROCEDURE — 80048 BASIC METABOLIC PNL TOTAL CA: CPT

## 2021-03-08 PROCEDURE — 84443 ASSAY THYROID STIM HORMONE: CPT

## 2021-03-08 PROCEDURE — 36415 COLL VENOUS BLD VENIPUNCTURE: CPT

## 2021-03-08 NOTE — TELEPHONE ENCOUNTER
Patient's DME provider took CPAP back because insurance wasn't covering it. Patient would like to continue using CPAP. Per DME, they will be able to provide CPAP if they have order for new sleep study.

## 2021-03-10 ENCOUNTER — TELEPHONE (OUTPATIENT)
Dept: PHARMACY | Age: 76
End: 2021-03-10

## 2021-03-10 NOTE — TELEPHONE ENCOUNTER
Recent Travel Screening and Travel History documentation:     Travel Screening     Question   Response    In the last month, have you been in contact with someone who was confirmed or suspected to have Coronavirus / COVID-19? No / Unsure    Have you had a COVID-19 viral test in the last 14 days? No    Do you have any of the following new or worsening symptoms? None of these    Have you traveled internationally or domestically in the last month?   No      Travel History   Travel since 02/10/21     No documented travel since 02/10/21

## 2021-03-11 ENCOUNTER — HOSPITAL ENCOUNTER (OUTPATIENT)
Dept: PHARMACY | Age: 76
Setting detail: THERAPIES SERIES
Discharge: HOME | End: 2021-03-11
Payer: MEDICARE

## 2021-03-11 DIAGNOSIS — I50.9 CHF (CONGESTIVE HEART FAILURE), NYHA CLASS III, UNSPECIFIED FAILURE CHRONICITY, UNSPECIFIED TYPE (HCC): Primary | ICD-10-CM

## 2021-03-11 PROCEDURE — 99212 OFFICE O/P EST SF 10 MIN: CPT

## 2021-03-11 RX ORDER — M-VIT,TX,IRON,MINS/CALC/FOLIC 27MG-0.4MG
1 TABLET ORAL DAILY
COMMUNITY

## 2021-03-11 RX ORDER — ACETAMINOPHEN 160 MG
2000 TABLET,DISINTEGRATING ORAL DAILY
COMMUNITY

## 2021-03-11 NOTE — PROGRESS NOTES
[facility]  Medication Management  Pharmacist  Heart Failure    [address] [facility]  [address]  Phone: [phone]  Fax: [phone]      NAME: Richelle De Los Santos  MEDICAL RECORD NUMBER:  [id number]  AGE: 66 y.o. GENDER: male  : 1945  EPISODE DATE:  3/11/2021      Heart Failure Management referral by Dr. Thor Quintero    Dry weight: 362 ? pounds     Today's Wt: 365 lb  Wt Readings from Last 6 Encounters:   21 (!) 360 lb (163.3 kg)   21 (!) 361 lb (163.7 kg)   21 (!) 370 lb 9.6 oz (168.1 kg)   21 (!) 363 lb (164.7 kg)   20 (!) 354 lb (160.6 kg)   10/15/20 (!) 350 lb (158.8 kg)    and   Ht Readings from Last 1 Encounters:   21 5' 10\" (1.778 m)              /73  HR:64  O2Sat: 97     Extremities and pitting edema -Lymphedema bilateral legs some edema in feet and ankles but very minimal  Skin Red and warm but intact. Dry I recommended he use lotion daily. Subjective   Mr. Olesya Ennis is a 66 y.o. male here for the Heart Failure Services. They are here today for a comprehensive medication review including over the counter medications and herbal products, overall wellbeing assessment, transition of care and any needed adjustments with updates and recommendations communicated to the referring physician. Patient Symptoms- Patient sleeps in a chair due to acid reflux. Patient had a pacemaker placed 2 weeks ago and incision looks great. Patient states he feels better since pacemaker placement. Patient has not had anyone call him about his sleep study.     Shortness of Breath:   []   None   []   Dyspnea on exertion   []   Dyspnea with normal activities  [x]   Dyspnea at rest -patient cannot ambulate and uses oxygen all the time at 2 L/min  []   Dyspnea while sleeping    Patient Findings: Sleeps in chair due to acid reflux  []  Missed doses  []  Diet changes  []  Sodium intake changes   []  Night time cough   []  Other   []  Early saiety, abd fullness []  Chest pain   []  Constipation   []  Night time bathroom trips  []  Alcohol intake changes  []  Acute illness  []  Edema    [x]  Number of pillows or recliner  []  Activity changes   [x]  Fatigue that limits activity []  Heart racing or skipping beats  []  Light headedness  []  Dizziness    [x]  Problems sleeping    Functional Activity New York Heart Association Classification  []   Class I No limitation of physical activity. Ordinary physical activity does not cause undue fatigue, palpitation, dyspnea (shortness of breath). []   Class II Slight limitation of physical activity. Comfortable at rest. Ordinary physical activity results in fatigue, palpitation, dyspnea (shortness of breath). []   Class III  Fredrick limitation of physical activity. Comfortable at rest.  Less than ordinary activity causes fatigue, palpitation, dyspnea. [x]   Class IV Unable to carry on any physical activity without discomfort. Symptoms of heart failure at rest.  If any physical activity is undertaken, discomfort increases.     Last CHF Hospital Admission: 3-4-20  -  3-9-20    OUTPATIENT MEDICATIONS:  Outpatient Medications Marked as Taking for the 3/11/21 encounter [facility] Encounter) with [facility]   Medication Sig Dispense Refill    Multiple Vitamins-Minerals (THERAPEUTIC MULTIVITAMIN-MINERALS) tablet Take 1 tablet by mouth daily      Cholecalciferol (VITAMIN D3) 50 MCG (2000 UT) CAPS Take by mouth      apixaban (ELIQUIS) 5 MG TABS tablet Take by mouth 2 times daily      clopidogrel (PLAVIX) 75 MG tablet Take 1 tablet by mouth daily 30 tablet 5    tamsulosin (FLOMAX) 0.4 MG capsule Take 1 capsule by mouth daily 30 capsule 5    losartan (COZAAR) 25 MG tablet Take 1 tablet by mouth daily 90 tablet 3    potassium chloride (KLOR-CON M) 20 MEQ extended release tablet Take 1 tablet by mouth daily 90 tablet 3    amiodarone (CORDARONE) 200 MG tablet Take 1 tablet by mouth daily 90 tablet 3    atorvastatin (LIPITOR) 40 MG tablet Take 1 tablet by mouth daily 90 tablet 3    furosemide (LASIX) 20 MG tablet Take 1 tablet by mouth daily (Patient taking differently: Take 20 mg by mouth See Admin Instructions Dose increase 21 per Dr. Aracelis Marcos - 40 mg po MWF; 20 mg all other days) 60 tablet 3    PARoxetine (PAXIL) 30 MG tablet Take 1 tablet by mouth every morning 90 tablet 3         Objective / Assessment     Patient Active Problem List   Diagnosis Code    Essential hypertension I10    Mixed hyperlipidemia E78.2    Osteoarthritis of both knees M17.0    Obesity, morbid (more than 100 lbs over ideal weight or BMI > 40) ([facility]) E66.01    Carpal tunnel syndrome on left G56.02    Severe aortic stenosis /  TAVR  I35.0    Dyslipidemia E78.5    Epistaxis from Eliquis and Aspirin R04.0    Encounter for removal of nasal packing Z48.00    AV block, 3rd degree ([facility]) I44.2     Social History     Tobacco Use    Smoking status: Former Smoker     Packs/day: 1.00     Years: 10.00     Pack years: 10.00     Types: Cigarettes     Quit date:      Years since quittin.2    Smokeless tobacco: Never Used   Substance Use Topics    Alcohol use: Never     Frequency: Never     Drinks per session: Patient refused     Binge frequency: Never    Drug use: No       Potassium   Date Value   2021 4.2 mmol/L   2021 4.4 mmol/L   10/13/2020 4.3 mmol/L   2020 4.7 mmol/L   2020 4.3 meq/L   2020 4.2 meq/L     Potassium reflex Magnesium (meq/L)   Date Value   2021 4.6   09/15/2020 3.9     BUN (mg/dL)   Date Value   2021 15   2021 18   2021 22   10/13/2020 11   2020 21   09/15/2020 13     CREATININE (mg/dL)   Date Value   2021 0.66 (L)   2021 0.8   2021 0.77   10/13/2020 0.73   2020 1.05   09/15/2020 0.8     TSH (mIU/L)   Date Value   2021 2.37   05/15/2020 2.83   2020 3.43     No components found for: T4      Plan:      · Chronic Systolic and/or Diastolic Heart Failure (diagnosis): EF:35% via echocardiogram on 3-4-20  · Beta Blocker for EF </= 40%: none- sinus bradycardia  · Diuretics: Furosemide 40 mg MWF and 20 mg all other days  · ACE/ARB/ARNI for EF </= 40%: Losartan 25 mg daily  · Spironolactone for EF </= 35%: none  · Nonpharmacologic management of Heart Failure: I told patient to continue wearing lower extremity compression stockings and I advised patient to try and keep legs up whenever possible and to limit salt in diet. · Laboratory testing:   ? Basic metabolic panel (BMP) in 6 weeks from now to assess her potassium and renal function. · Atherosclerotic Heart Disease:   · Antiplatelet Agent: Plavix 75 mg po daily  · Anticoagulation:  Eliquis 5 mg po BID. Patient would prefer to stop Eliquis due to cost.  CAM monitor in place to verify no more episodes of A-fib before discontinuation of Eliquis per cardiology. I also reminded patient to watch for signs of blood in her stool or black tarry stools and stop the medication immediately if this develops as this could be life threatening. · Statin Therapy: Atorvastatin 40 mg daily. Essential Hypertension:   ? Diuretics: . Furosemide 40 mg MWF and 20 mg all other days  ? Beta Blocker: none - bradycardia    Patient Education/Instructions: I did spend about 15 minutes with patient going over his heart failure packet including dietary guidelines, the signs and symptoms to watch for including shortness of breath, weight gain, lightheadedness/dizziness. I asked patient to call the clinic if develops persistent or worsening shortness of breath or weight gain of more than 3 pounds in 1-7 days. Patient verbalized understanding. Patient sleeps in a chair due to acid reflux. Patient had a pacemaker placed 2 weeks ago and incision looks great. Patient states he feels better since pacemaker placement.       Patient has not had anyone call him about his sleep study. Patient jerks in his sleep states he feels like he gets hit with an electric bolt. Patient thinks it may be better when using his CPAP. I called Sleep lab but I had to leave a message. I gave patient sleep lab phone number ([phone]) so he could contact them regarding new sleep study and CPAP. Explained need to see Dr Jos Christianson, pulmonologist to continue with CPAP machine and obtain needed documentation. Gave patient \"Great Living Heart failure and Sodium\" and reviewed positive picks. Tried to get more Eliquis samples but Dr Jerilyn Mcbride office is out of samples right now. They will call patient when they get more samples.      Medication changes since last visit patient wants to restart multivitamin and Vitamin D.  I informed patient that those OTCs were fine and added them to his medication list.    Thank you for the referral,    Ramon Lyons PharmD

## 2021-03-12 ENCOUNTER — TELEPHONE (OUTPATIENT)
Dept: CARDIOLOGY | Age: 76
End: 2021-03-12

## 2021-03-12 NOTE — TELEPHONE ENCOUNTER
----- Message from Rosio Moya MD sent at 3/12/2021 11:47 AM EST -----  Blood work is good. Continue current therapy and follow-up.   Thank you

## 2021-04-12 ENCOUNTER — OFFICE VISIT (OUTPATIENT)
Dept: CARDIOLOGY | Age: 76
End: 2021-04-12
Payer: MEDICARE

## 2021-04-12 VITALS
RESPIRATION RATE: 17 BRPM | HEIGHT: 69 IN | BODY MASS INDEX: 46.65 KG/M2 | OXYGEN SATURATION: 98 % | WEIGHT: 315 LBS | DIASTOLIC BLOOD PRESSURE: 71 MMHG | HEART RATE: 66 BPM | SYSTOLIC BLOOD PRESSURE: 113 MMHG

## 2021-04-12 DIAGNOSIS — I50.42 CHRONIC COMBINED SYSTOLIC AND DIASTOLIC CONGESTIVE HEART FAILURE (HCC): ICD-10-CM

## 2021-04-12 DIAGNOSIS — G47.33 OSA ON CPAP: ICD-10-CM

## 2021-04-12 DIAGNOSIS — Z95.2 S/P TAVR (TRANSCATHETER AORTIC VALVE REPLACEMENT): ICD-10-CM

## 2021-04-12 DIAGNOSIS — Z87.898 HISTORY OF BRADYCARDIA: ICD-10-CM

## 2021-04-12 DIAGNOSIS — E78.2 MIXED HYPERLIPIDEMIA: ICD-10-CM

## 2021-04-12 DIAGNOSIS — I48.0 PAF (PAROXYSMAL ATRIAL FIBRILLATION) (HCC): ICD-10-CM

## 2021-04-12 DIAGNOSIS — I10 ESSENTIAL HYPERTENSION: ICD-10-CM

## 2021-04-12 DIAGNOSIS — I35.0 SEVERE AORTIC STENOSIS: ICD-10-CM

## 2021-04-12 DIAGNOSIS — Z99.89 OSA ON CPAP: ICD-10-CM

## 2021-04-12 DIAGNOSIS — Z95.0 PACEMAKER: Primary | ICD-10-CM

## 2021-04-12 PROCEDURE — 93288 INTERROG EVL PM/LDLS PM IP: CPT | Performed by: INTERNAL MEDICINE

## 2021-04-12 PROCEDURE — G8427 DOCREV CUR MEDS BY ELIG CLIN: HCPCS | Performed by: INTERNAL MEDICINE

## 2021-04-12 PROCEDURE — 1123F ACP DISCUSS/DSCN MKR DOCD: CPT | Performed by: INTERNAL MEDICINE

## 2021-04-12 PROCEDURE — 4040F PNEUMOC VAC/ADMIN/RCVD: CPT | Performed by: INTERNAL MEDICINE

## 2021-04-12 PROCEDURE — 99211 OFF/OP EST MAY X REQ PHY/QHP: CPT | Performed by: INTERNAL MEDICINE

## 2021-04-12 PROCEDURE — 1036F TOBACCO NON-USER: CPT | Performed by: INTERNAL MEDICINE

## 2021-04-12 PROCEDURE — 99214 OFFICE O/P EST MOD 30 MIN: CPT | Performed by: INTERNAL MEDICINE

## 2021-04-12 PROCEDURE — G8417 CALC BMI ABV UP PARAM F/U: HCPCS | Performed by: INTERNAL MEDICINE

## 2021-04-12 NOTE — PATIENT INSTRUCTIONS
SURVEY:    You may be receiving a survey from [employer] regarding your visit today. Please complete the survey to enable us to provide the highest quality of care to you and your family. If you cannot score us a very good on any question, please call the office to discuss how we could have made your experience a very good one. Thank you.

## 2021-04-12 NOTE — PROGRESS NOTES
Juan Luis Lilly am scribing for and in the presence of Nate Veliz MD, F.A.C.C..    Patient: Candie Camargo  : 1945  Date of Visit: 2021    History of Present Illness:        Dear Alejandra Dickey MD    I had the pleasure of seeing Candie Camagro in my office today. Mr. Zo Tabor is a 66 y.o. male who presented for follow-up. 1-He has  a history of heart failure. He has a history of hypertension and hyperlipidemia for years, and has borderline diabetes. Symptoms of heart failure: Mr. Zo Tabor reports that he has a fairly poor exercise tolerance. His says that he can really not ambulate at all due to his weight and shortness of breath. 2- ECG on 3/20: sinus tachycardia with first-degree AV block and nonspecific intraventricular conduction delay. 3-Echocardiogram done 3/4/20 which showed severely reduced left ventricular systolic function with ejection fraction of 35% and moderate to severe aortic stenosis. This can be an underestimation because of reduced left ventricular systolic function. Mild to moderate aortic regurgitation. Moderate mitral regurgitation and moderate diastolic dysfunction. 4-Heart Catheterization done on 3/12/20: LMCA: Normal 0% stenosis. LAD: Mild irregularities 20-30% LCx: Lesion on 2nd Ob Angie Ostial 50% stenosis. RCA: Mild irregularities 10-20%. EF:35%. Severe aortic stenosis by cardiac catheterization with peak to peak gradient of 67 mmHg and mean gradient of 48 mmHg across the aortic valve. 5-Admission to [facility] on 3/4/20-3/9/20 due to acute heart failure: Patient treated for acute on chronic combined CHF and aortic stenosis, later developed atrial fibrillation with rapid ventricular response. Moved to the ICU and started on amiodarone drip and then later converted back to normal sinus rhythm. Amiodarone oral tablet was started prior to discharge. 6-On 2020, he presented to the emergency room with epistaxis.   Nasal packing done that removed after 3 days. 7-Transcatheter aortic valve replacement with a Medtronic 34 mm Evolut Pro Plus prosthesis 9/15/2020    8-Echocardiogram done on 10/13/2020 showed an EF of 55%. Mild to moderate LV hypertrophy. Status post TAVR with mean gradient of 9 mmHg, mild perivalvular regurgitation noted. Mild mitral regurgitation. Moderate diastolic dysfunction is seen. 9-Holter monitor done on 1/14/2021: Sinus rhythm with intraventricular conduction delay and occasional PACs and very short runs of atrial tachycardia. The longest was 3 beats at a heart rate of 100 bpm.  No significant ventricular arrhythmia. 10-Echo done on 1/21/2021- EF 50-55%, Mild to moderately increased left ventricular wall thickness with a mildly increased left ventricular cavity size. The left atrium is severely dilated (>40) with a left atrial volume index of 43 ml/m2. S/P TAVR with a mean gradient of 11 mmHg. Trivial aortic regurgitation noted. Evidence of moderate diastolic dysfunction is seen    11-CAM monitor worn on 1/21/2020- 6 days and 21 hours recorded. Average heart rate was 61 bpm, ranging from 46 to 90 bpm. First degree AV block, Mobitz type 1 second-degree AV block and occasional high grade/third degree AV block. Minimum HR occurred during 2nd degree AV block was 32 bpm.     12-Medtronic pacemaker insertion on 2/18/2021 by Dr Kelby Alicia interrogated today in office today with Rep on 4/12/2021: Normal pacemaker function. No atrial fibrillation. Mr. Robin Dia is here today for a follow up. He is doing great cardiac wise. He reports he feels like he has a lot more energy. He has had some leg swelling but not worsening. He has been drinking plenty to stay hydrated. He has his chronic shortness of breath on exertion and is using oxygen all the time about 2 L/min. He reported that he sleeps well at night with no issues. He denies any chest pain, pressure or tightness. No orthopnea or PND.  No lightheaded/dizziness or palpitations. No bleeding problems, bowel issues, problems with current medications or any other concerns at this time. No abdominal pain, nausea or vomiting. No cough, fever or chills. PAST MEDICAL HISTORY:        Past Medical History:   Diagnosis Date    Anxiety     Aortic stenosis     [facility]    BPH (benign prostatic hyperplasia)     CHF (congestive heart failure) (HCC)     Diabetes mellitus (HCC)     borderline    Hyperlipidemia     Hypertension     Lymphedema     bilat legs    On home O2     Osteoarthritis     Septic shock due to urinary tract infection ([facility] /  Levophed and fluids 2020   Nonischemic cardiomyopathy. Severe aortic stenosis. CURRENT ALLERGIES: Patient has no known allergies. REVIEW OF SYSTEMS: 14 systems were reviewed. Pertinent positives and negatives as above, all else negative.      Past Surgical History:   Procedure Laterality Date    CARDIAC CATHETERIZATION  2020    OTHER SURGICAL HISTORY  2020    TAVR/ Dr. Tiffanie Trotter History:  Social History     Tobacco Use    Smoking status: Former Smoker     Packs/day: 1.00     Years: 10.00     Pack years: 10.00     Types: Cigarettes     Quit date:      Years since quittin.3    Smokeless tobacco: Never Used   Substance Use Topics    Alcohol use: Never     Frequency: Never     Drinks per session: Patient refused     Binge frequency: Never    Drug use: No        CURRENT MEDICATIONS:        Outpatient Medications Marked as Taking for the 21 encounter (Office Visit) with Lisbeth Christensen MD   Medication Sig Dispense Refill    furosemide (LASIX) 20 MG tablet Take 1 tablet by mouth daily 60 tablet 0    Multiple Vitamins-Minerals (THERAPEUTIC MULTIVITAMIN-MINERALS) tablet Take 1 tablet by mouth daily      Cholecalciferol (VITAMIN D3) 50 MCG ( UT) CAPS Take by mouth      clopidogrel (PLAVIX) 75 MG tablet Take 1 tablet by mouth daily 30 tablet 5    tamsulosin (FLOMAX) 0.4 MG capsule Take 1 capsule by mouth daily 30 capsule 5    losartan (COZAAR) 25 MG tablet Take 1 tablet by mouth daily 90 tablet 3    potassium chloride (KLOR-CON M) 20 MEQ extended release tablet Take 1 tablet by mouth daily 90 tablet 3    amiodarone (CORDARONE) 200 MG tablet Take 1 tablet by mouth daily 90 tablet 3    atorvastatin (LIPITOR) 40 MG tablet Take 1 tablet by mouth daily 90 tablet 3    PARoxetine (PAXIL) 30 MG tablet Take 1 tablet by mouth every morning 90 tablet 3    acetaminophen (TYLENOL) 500 MG tablet Take 500 mg by mouth every 6 hours as needed for Pain         FAMILY HISTORY: family history includes Arthritis in his mother; Heart Disease in his mother; High Blood Pressure in his brother and mother; High Cholesterol in his mother; Stroke in his mother. PHYSICAL EXAMINATION:     /71 (Site: Right Upper Arm, Position: Sitting, Cuff Size: Large Adult)   Pulse 66   Resp 17   Ht 5' 8.5\" (1.74 m)   Wt (!) 365 lb (165.6 kg)   SpO2 98%   BMI 54.69 kg/m²  Body mass index is 54.69 kg/m². Constitutional: He is oriented to person, place, and time. He appears well-developed and well-nourished. In no acute distress. HEENT: Normocephalic and atraumatic. No JVD present. Carotid bruit is not present. No mass and no thyromegaly present. No lymphadenopathy present. Cardiovascular: Normal rate, regular rhythm, normal heart sounds. Exam reveals no gallop and no friction rubs. 1/6 systolic murmur, 2nd intercostal space on the LEFT just lateral to the sternum  Pulmonary/Chest: Effort normal and breath sounds normal. No respiratory distress. He has no wheezes, rhonchi or rales. Abdominal: Soft, non-tender. Bowel sounds and aorta are normal. He exhibits no organomegaly, mass or bruit. Extremities: Chronic bilateral lower extremity edema with chronic vascular changes and chronic cellulitis. Unchanged from prior.   No cyanosis or replacement)     6. PAF (paroxysmal atrial fibrillation) ([address]     7. Essential hypertension     8. Mixed hyperlipidemia     9. MARIA ESTHER on CPAP       PLAN:         Dual Chamber Pacemaker: Medtronic implanted on 2/18/2021 by Dr Johana Romo - History of bradycardia:    Indication for Device Placement: Symptomatic Bradycardia, second degree AV block, complete heart block.  Interrogation Findings: Within normal limits and therefore no changes were made. No atrial fibrillation. No significant ventricular arrhythmia.  Chronic combined heart failure: New York Heart Association Class: IIb (Marked symptoms during daily activities)   Beta Blocker: Currently off beta-blocker therapy because of sinus bradycardia and intermittent high-grade AV block.  ACE Inibitor/ARB: Continue losartan (Cozaar) 25 mg daily.  Diuretics: Continue furosemide (Lasix) 20 mg every morning.  Continue Potassium 20 MEQ daily   Heart failure counseling: I advised them to try and keep their legs up whenever possible and to limit salt in their diet. · Severe Aortic Stenosis: S/P TAVR 9/14/2020. · Beta Blocker: Not indicated at this time. · ACE Inibitor/ARB: Continue losartan (Cozaar) 25 mg daily. · Diuretics: Continue furosemide (Lasix) 20 mg every morning. · Continue Potassium 20 MEQ daily  · I advised them to try and keep their legs up whenever possible and to limit salt in their diet. Paroxysmal Atrial Fibrillation: Rhythm Control  Rhythm Control Agent: Continue Amiodarone (Cordarone) 200 mg once daily    Monitoring: Amiodarone Since he is being maintained on Amiodarone, I told him that we will need to closely monitor him for potential side effects. These include monitoring LFTs and TSH at least every 6 months as well as chest x-rays, pulmonary function tests, and eye exams at least on a yearly basis. Stroke Risk: His CHADS2-VASc score is greater than 2 (2.2% stroke risk)  Patient requesting to stop Eliquis.   He is extremely annoyed about the price of the medicine and he is also refusing warfarin therapy. I had an extensive discussion with him regarding the benefits and risks of stopping Eliquis. Patient is adamant about not taking a blood thinner and we did not record any atrial fibrillation since pacemaker insertion. I told him we can stop the Eliquis and get pacemaker transmission every 2 weeks to monitor his atrial fibrillation. I told him if he has recurrent atrial fibrillation then we have to restart oral anticoagulation. Patient agreed.  Essential Hypertension: Controlled  · Beta Blocker: Not indicated at this time. · ACE Inibitor/ARB: Continue losartan (Cozaar) 25 mg daily. · Diuretics: Continue furosemide (Lasix) 20 mg every morning. · Hyperlipidemia: Mixed - Last LDL on 3/5/2020 was 45 mg/dL   · Cholesterol Reduction Therapy: Continue Atorvastatin (Lipitor) 40 mg daily.  Obstructive Sleep Apnea:   o Currently on oxygen all the time. o He is trying to use a CPAP machine at night. Finally, I recommended that he continue his other medications and follow up with you as previously scheduled. FOLLOW UP:   I told Mr. Manzo to call my office if he had any problems, but otherwise I asked him to Return in about 6 months (around 10/12/2021) for with a every 2 week pacemaker check to check for AFIB. However, I would be happy to see him sooner should the need arise. Sincerely,  Cecilia Botello MD, F.A.C.C. [facility] Cardiology Specialist    [address] [facility], [address] [place] [address]  Phone: [phone], Fax: [phone]     I believe that the risk of significant morbidity and mortality related to the patient's current medical conditions are: intermediate-high. >30 minutes were spent during prep work, discussion and exam of the patient, and follow up documentation and all of their questions were answered.     The documentation recorded by the scribe, accurately and completely reflects the services I personally performed and the decisions made by me. Donna Jamil MD, F.A.C.C.  April 12, 2021

## 2021-04-20 ENCOUNTER — HOSPITAL ENCOUNTER (OUTPATIENT)
Age: 76
Discharge: HOME | End: 2021-04-20
Payer: MEDICARE

## 2021-04-20 DIAGNOSIS — I50.9 CHF (CONGESTIVE HEART FAILURE), NYHA CLASS III, UNSPECIFIED FAILURE CHRONICITY, UNSPECIFIED TYPE (HCC): ICD-10-CM

## 2021-04-20 LAB
ANION GAP SERPL CALCULATED.3IONS-SCNC: 8 MMOL/L (ref 9–17)
BUN BLDV-MCNC: 22 MG/DL (ref 8–23)
BUN/CREAT BLD: 29 (ref 9–20)
CALCIUM SERPL-MCNC: 8.8 MG/DL (ref 8.6–10.4)
CHLORIDE BLD-SCNC: 100 MMOL/L (ref 98–107)
CO2: 28 MMOL/L (ref 20–31)
CREAT SERPL-MCNC: 0.77 MG/DL (ref 0.7–1.2)
GFR AFRICAN AMERICAN: >60 ML/MIN
GFR NON-AFRICAN AMERICAN: >60 ML/MIN
GFR SERPL CREATININE-BSD FRML MDRD: ABNORMAL ML/MIN/{1.73_M2}
GFR SERPL CREATININE-BSD FRML MDRD: ABNORMAL ML/MIN/{1.73_M2}
GLUCOSE BLD-MCNC: 115 MG/DL (ref 70–99)
POTASSIUM SERPL-SCNC: 4.1 MMOL/L (ref 3.7–5.3)
SODIUM BLD-SCNC: 136 MMOL/L (ref 135–144)

## 2021-04-20 PROCEDURE — 80048 BASIC METABOLIC PNL TOTAL CA: CPT

## 2021-04-20 PROCEDURE — 36415 COLL VENOUS BLD VENIPUNCTURE: CPT

## 2021-04-21 ENCOUNTER — TELEPHONE (OUTPATIENT)
Dept: PHARMACY | Age: 76
End: 2021-04-21

## 2021-04-21 ENCOUNTER — TELEPHONE (OUTPATIENT)
Dept: CARDIOLOGY | Age: 76
End: 2021-04-21

## 2021-04-21 NOTE — TELEPHONE ENCOUNTER
Recent Travel Screening and Travel History documentation:     Travel Screening     Question   Response    In the last month, have you been in contact with someone who was confirmed or suspected to have Coronavirus / COVID-19? No / Unsure    Have you had a COVID-19 viral test in the last 14 days? No    Do you have any of the following new or worsening symptoms? None of these    Have you traveled internationally or domestically in the last month? No      Travel History   Travel since 03/21/21     No documented travel since 03/21/21         Had labs draw on Monday.     Evelin Zepeda, PharmD 4/21/2021 9:42 AM

## 2021-04-21 NOTE — TELEPHONE ENCOUNTER
----- Message from Guanaco Morse MD sent at 4/20/2021  9:25 PM EDT -----  Kidney function is good. Continue current therapy.   Thank you

## 2021-04-22 ENCOUNTER — HOSPITAL ENCOUNTER (OUTPATIENT)
Dept: PHARMACY | Age: 76
Setting detail: THERAPIES SERIES
Discharge: HOME | End: 2021-04-22
Payer: MEDICARE

## 2021-04-22 ENCOUNTER — OFFICE VISIT (OUTPATIENT)
Dept: PULMONOLOGY | Age: 76
End: 2021-04-22
Payer: MEDICARE

## 2021-04-22 VITALS
HEART RATE: 80 BPM | SYSTOLIC BLOOD PRESSURE: 118 MMHG | BODY MASS INDEX: 55.44 KG/M2 | DIASTOLIC BLOOD PRESSURE: 64 MMHG | WEIGHT: 315 LBS

## 2021-04-22 VITALS
HEIGHT: 69 IN | HEART RATE: 66 BPM | BODY MASS INDEX: 46.65 KG/M2 | DIASTOLIC BLOOD PRESSURE: 64 MMHG | SYSTOLIC BLOOD PRESSURE: 132 MMHG | RESPIRATION RATE: 16 BRPM | WEIGHT: 315 LBS | TEMPERATURE: 98.2 F | OXYGEN SATURATION: 97 %

## 2021-04-22 DIAGNOSIS — I10 ESSENTIAL HYPERTENSION: ICD-10-CM

## 2021-04-22 DIAGNOSIS — I35.0 SEVERE AORTIC STENOSIS: Chronic | ICD-10-CM

## 2021-04-22 DIAGNOSIS — G47.33 OSA (OBSTRUCTIVE SLEEP APNEA): Primary | ICD-10-CM

## 2021-04-22 DIAGNOSIS — Z87.891 STOPPED SMOKING WITH GREATER THAN 10 PACK YEAR HISTORY: ICD-10-CM

## 2021-04-22 DIAGNOSIS — I44.2 AV BLOCK, 3RD DEGREE (HCC): ICD-10-CM

## 2021-04-22 DIAGNOSIS — J96.11 CHRONIC RESPIRATORY FAILURE WITH HYPOXIA (HCC): ICD-10-CM

## 2021-04-22 DIAGNOSIS — I50.42 CHF (CONGESTIVE HEART FAILURE), NYHA CLASS IV, CHRONIC, COMBINED (HCC): Primary | ICD-10-CM

## 2021-04-22 DIAGNOSIS — E66.01 CLASS 3 SEVERE OBESITY DUE TO EXCESS CALORIES WITH SERIOUS COMORBIDITY AND BODY MASS INDEX (BMI) OF 50.0 TO 59.9 IN ADULT (HCC): ICD-10-CM

## 2021-04-22 PROCEDURE — 99203 OFFICE O/P NEW LOW 30 MIN: CPT | Performed by: INTERNAL MEDICINE

## 2021-04-22 PROCEDURE — 4040F PNEUMOC VAC/ADMIN/RCVD: CPT | Performed by: INTERNAL MEDICINE

## 2021-04-22 PROCEDURE — 1123F ACP DISCUSS/DSCN MKR DOCD: CPT | Performed by: INTERNAL MEDICINE

## 2021-04-22 PROCEDURE — 99214 OFFICE O/P EST MOD 30 MIN: CPT | Performed by: INTERNAL MEDICINE

## 2021-04-22 PROCEDURE — 99212 OFFICE O/P EST SF 10 MIN: CPT

## 2021-04-22 PROCEDURE — G8427 DOCREV CUR MEDS BY ELIG CLIN: HCPCS | Performed by: INTERNAL MEDICINE

## 2021-04-22 PROCEDURE — G8417 CALC BMI ABV UP PARAM F/U: HCPCS | Performed by: INTERNAL MEDICINE

## 2021-04-22 PROCEDURE — 1036F TOBACCO NON-USER: CPT | Performed by: INTERNAL MEDICINE

## 2021-04-22 RX ORDER — SIMETHICONE 80 MG
80 TABLET,CHEWABLE ORAL EVERY 6 HOURS PRN
COMMUNITY

## 2021-04-22 ASSESSMENT — ENCOUNTER SYMPTOMS
BACK PAIN: 1
EYES NEGATIVE: 1
SHORTNESS OF BREATH: 1

## 2021-04-22 NOTE — PROGRESS NOTES
D3) 50 MCG (2000) CAPS Take by mouth      clopidogrel (PLAVIX) 75 MG tablet Take 1 tablet by mouth daily 30 tablet 5    tamsulosin (FLOMAX) 0.4 MG capsule Take 1 capsule by mouth daily 30 capsule 5    losartan (COZAAR) 25 MG tablet Take 1 tablet by mouth daily 90 tablet 3    potassium chloride (KLOR-CON M) 20 MEQ extended release tablet Take 1 tablet by mouth daily 90 tablet 3    amiodarone (CORDARONE) 200 MG tablet Take 1 tablet by mouth daily 90 tablet 3    atorvastatin (LIPITOR) 40 MG tablet Take 1 tablet by mouth daily 90 tablet 3    PARoxetine (PAXIL) 30 MG tablet Take 1 tablet by mouth every morning 90 tablet 3    acetaminophen (TYLENOL) 500 MG tablet Take 500 mg by mouth every 6 hours as needed for Pain       No current facility-administered medications for this visit. Family History   Problem Relation Age of Onset    Arthritis Mother     Heart Disease Mother     High Cholesterol Mother     High Blood Pressure Mother     Stroke Mother     High Blood Pressure Brother        Social History     Tobacco Use    Smoking status: Former Smoker     Packs/day: 1.00     Years: 10.00     Pack years: 10.00     Types: Cigarettes     Quit date:      Years since quittin.3    Smokeless tobacco: Never Used   Substance Use Topics    Alcohol use: Never     Frequency: Never     Drinks per session: Patient refused     Binge frequency: Never    Drug use: No       Review of Systems   Constitutional: Negative. HENT: Negative. Eyes: Negative. Respiratory: Positive for shortness of breath. Cardiovascular: Positive for leg swelling. Musculoskeletal: Positive for back pain and gait problem. All other systems reviewed and are negative. Objective:     Physical Exam  Vitals signs and nursing note reviewed. Constitutional:       Appearance: He is well-developed. Comments: Confined to motorized wheelchair.    HENT:      Mouth/Throat:      Comments: Large tongue , low hanging soft 5. AV block, 3rd degree (HCC)    6. Stopped smoking with greater than 10 pack year history    7. Chronic respiratory failure with hypoxia (HCC)          Plan:      1. Repeat home sleep study. 2. Anticipate auto titrating CPAP thereafter. 3. Stressed importance of CPAP compliance. 4. Weight loss. 5. Avoid sedative hypnotics and alcohol at bedtime. 6. Sleep with head of the bed elevated and in the lateral decubitus position. 7. Patient received both of his Covid vaccinations. 8. Return after above.       Electronically signed by Mario Alberto Trevizo DO on 4/22/2021 at 1:26 PM

## 2021-04-22 NOTE — PROGRESS NOTES
[facility] [place] Management  Pharmacist  Heart Failure     [facility]  [address]  Phone: [phone]  Fax: [phone]      NAME: Juan Francisco Bansal  MEDICAL RECORD NUMBER:  [id number]  AGE: 66 y.o. GENDER: male  : 1945  EPISODE DATE:  2021      Heart Failure Management referral by Dr. Carmelo Yeager weight: 362? pounds     Today's Wt: 370 lb Up 5 lb since last visit  Patient states he attributes about half that weight to eating too much and half to fluid. Patient admits to NOT follow ing low sodium diet. Claims he will going forward. Wt Readings from Last 6 Encounters:   21 (!) 370 lb (167.8 kg)   21 (!) 365 lb (165.6 kg)   21 (!) 360 lb (163.3 kg)   21 (!) 361 lb (163.7 kg)   21 (!) 370 lb 9.6 oz (168.1 kg)   21 (!) 363 lb (164.7 kg)    and   Ht Readings from Last 1 Encounters:   21 5' 8.5\" (1.74 m)                 /64  HR:80  O2Sat: 95     Extremities and pitting edema Very little edema  Skin Warm and intake. Recommend lotion daily. Subjective   Mr. Armida Kim is a 66 y.o. male here for the Heart Failure Services. They are here today for a comprehensive medication review including over the counter medications and herbal products, overall wellbeing assessment, transition of care and any needed adjustments with updates and recommendations communicated to the referring physician. Patient Symptoms:  Patient claims he is more active. Patient claims he is going to start following the low sodium diet we talked about.     Shortness of Breath:   []   None   []   Dyspnea on exertion   [x]   Dyspnea with normal activities  []   Dyspnea at rest  []   Dyspnea while sleeping    Patient Findings:   []  Missed doses  []  Diet changes  []  Sodium intake changes   []  Night time cough   []  Other   []  Early saiety, abd fullness []  Chest pain   []  Constipation   []  Night time bathroom trips  []  Alcohol intake changes  []  Acute illness  []  Edema    [x]  Number of pillows or recliner  [x]  Activity changes (more active)  []  Fatigue that limits activity []  Heart racing or skipping beats  []  Light headedness  []  Dizziness    []      [x]  Problems sleeping    Functional Activity New York Heart Association Classification  []   Class I No limitation of physical activity. Ordinary physical activity does not cause undue fatigue, palpitation, dyspnea (shortness of breath). []   Class II Slight limitation of physical activity. Comfortable at rest. Ordinary physical activity results in fatigue, palpitation, dyspnea (shortness of breath). []   Class III  Fredrick limitation of physical activity. Comfortable at rest.  Less than ordinary activity causes fatigue, palpitation, dyspnea. [x]   Class IV Unable to carry on any physical activity without discomfort. Symptoms of heart failure at rest.  If any physical activity is undertaken, discomfort increases.       Last CHF Hospital Admission: 3-20    OUTPATIENT MEDICATIONS:  Outpatient Medications Marked as Taking for the 4/22/21 encounter [facility] Encounter) with [employer]   Medication Sig Dispense Refill    simethicone (MYLICON) 80 MG chewable tablet Take 80 mg by mouth every 6 hours as needed for Flatulence (Gas pain)      furosemide (LASIX) 20 MG tablet Take 1 tablet by mouth daily (Patient taking differently: Take 20 mg by mouth See Admin Instructions Takes 2 tablets for 40 mg MWF and 1 tablet for 20 mg other 4 days) 60 tablet 0    clopidogrel (PLAVIX) 75 MG tablet Take 1 tablet by mouth daily 30 tablet 5    tamsulosin (FLOMAX) 0.4 MG capsule Take 1 capsule by mouth daily 30 capsule 5    losartan (COZAAR) 25 MG tablet Take 1 tablet by mouth daily 90 tablet 3    potassium chloride (KLOR-CON M) 20 MEQ extended release tablet Take 1 tablet by mouth daily 90 tablet 3    amiodarone (CORDARONE) 200 MG tablet Take 1 tablet by mouth daily 90 tablet 3    atorvastatin (LIPITOR) 40 MG tablet Take 1 tablet by mouth daily 90 tablet 3    PARoxetine (PAXIL) 30 MG tablet Take 1 tablet by mouth every morning 90 tablet 3       Objective / Assessment     Patient Active Problem List   Diagnosis Code    Essential hypertension I10    Mixed hyperlipidemia E78.2    Osteoarthritis of both knees M17.0    Obesity, morbid (more than 100 lbs over ideal weight or BMI > 40) ([facility]) E66.01    Carpal tunnel syndrome on left G56.02    Severe aortic stenosis /  TAVR  I35.0    Dyslipidemia E78.5    Epistaxis from Eliquis and Aspirin R04.0    Encounter for removal of nasal packing Z48.00    AV block, 3rd degree ([facility]) I44.2     Social History     Tobacco Use    Smoking status: Former Smoker     Packs/day: 1.00     Years: 10.00     Pack years: 10.00     Types: Cigarettes     Quit date:      Years since quittin.3    Smokeless tobacco: Never Used   Substance Use Topics    Alcohol use: Never     Frequency: Never     Drinks per session: Patient refused     Binge frequency: Never    Drug use: No       Potassium   Date Value   2021 4.1 mmol/L   2021 4.2 mmol/L   2021 4.4 mmol/L   10/13/2020 4.3 mmol/L   2020 4.7 mmol/L   2020 4.3 meq/L     Potassium reflex Magnesium (meq/L)   Date Value   2021 4.6   09/15/2020 3.9     BUN (mg/dL)   Date Value   2021 22   2021 15   2021 18   2021 22   10/13/2020 11   2020 21     CREATININE (mg/dL)   Date Value   2021 0.77   2021 0.66 (L)   2021 0.8   2021 0.77   10/13/2020 0.73   2020 1.05     TSH (mIU/L)   Date Value   2021 2.37   05/15/2020 2.83   2020 3.43     No components found for: T4      Plan:      · Chronic Systolic and/or Diastolic Heart Failure (diagnosis): EF:  35%  via echocardiogram on 3-4-20  · Beta Blocker for EF </= 40%: none- bradycardia  · Diuretics: Furosemide 40 mg MWF and 20 mg other 4 days  · Continue Potassium 20 MEQ daily  · ACE/ARB/ARNI for EF </= 40%: Losartan 25 mg daily  · Spironolactone for EF </= 35%: none  · Nonpharmacologic management of Heart Failure: I told patient to continue wearing lower extremity compression stockings and I advised patient to try and keep legs up whenever possible and to limit salt in diet. · Laboratory testing:   ? Basic metabolic panel (BMP) in 6 weeks from now to assess his potassium and renal function. · Atherosclerotic Heart Disease:   · Antiplatelet Agent: Plavix 75 mg daily I also reminded patient to watch for signs of blood in her stool or black tarry stools and stop the medication immediately if this develops as this could be life threatening. · Anticoagulation: STOPPED Eliquis 3-16-21 PER Dr Melania Saini. Patient would prefer to stop Eliquis due to cost.  CAM monitor in place to verify no more episodes of A-fib before discontinuation of Eliquis per cardiology. I also reminded patient to watch for signs of blood in her stool or black tarry stools and stop the medication immediately if this develops as this could be life threatening.  According to his pacemaker, he has no                                    further atrial fibrillation. He can stop the Eliquis but we need a weekly transmission to check for his A. fib. [employer] to arrange for weekly transmission from his [employer] pacemaker  · Statin Therapy: Atorvastatin 40 mg daily     Essential Hypertension:   ? Diuretics: . Furosemide 40 mg MWF and 20 mg other 4 days   (Continue Potassium 20 MEQ daily)  ? Beta Blocker: none - bradycardia    Patient has an appt with Dr Garner Goldmann today after this appt to discuss getting a sleep study and CPAP. Insurance requires Dr Garner Goldmann put in certain documentation.        Patient Education/Instructions: I did spend about 15 minutes with patient going over his heart failure packet including dietary guidelines, the signs and symptoms to watch for including shortness of

## 2021-04-22 NOTE — PATIENT INSTRUCTIONS
SURVEY:    You may be receiving a survey from [employer] regarding your visit today. Please complete the survey to enable us to provide the highest quality of care to you and your family. If you cannot score us a very good on any question, please call the office to discuss how we could have made your experience a very good one. Thank you. Please recheck your blood pressure when you go home and make sure you take your prescribed medication if applicable . Please follow up with your PCP or ER if needed.

## 2021-04-27 ENCOUNTER — NURSE ONLY (OUTPATIENT)
Dept: CARDIOLOGY | Age: 76
End: 2021-04-27
Payer: MEDICARE

## 2021-04-27 DIAGNOSIS — I44.2 AV BLOCK, 3RD DEGREE (HCC): ICD-10-CM

## 2021-04-27 DIAGNOSIS — Z95.0 PACEMAKER: ICD-10-CM

## 2021-04-27 PROCEDURE — 93294 REM INTERROG EVL PM/LDLS PM: CPT | Performed by: INTERNAL MEDICINE

## 2021-04-28 ENCOUNTER — HOSPITAL ENCOUNTER (OUTPATIENT)
Dept: SLEEP CENTER | Age: 76
Discharge: HOME | End: 2021-04-28
Payer: MEDICARE

## 2021-04-28 DIAGNOSIS — G47.33 OSA (OBSTRUCTIVE SLEEP APNEA): ICD-10-CM

## 2021-04-28 PROCEDURE — 95806 SLEEP STUDY UNATT&RESP EFFT: CPT

## 2021-04-28 PROCEDURE — G0400 HOME SLEEP TEST/TYPE 4 PORTA: HCPCS

## 2021-05-04 LAB — STATUS: NORMAL

## 2021-06-01 ENCOUNTER — HOSPITAL ENCOUNTER (OUTPATIENT)
Age: 76
Discharge: HOME | End: 2021-06-01
Payer: MEDICARE

## 2021-06-01 DIAGNOSIS — I50.42 CHF (CONGESTIVE HEART FAILURE), NYHA CLASS IV, CHRONIC, COMBINED (HCC): ICD-10-CM

## 2021-06-01 LAB
ANION GAP SERPL CALCULATED.3IONS-SCNC: 11 MMOL/L (ref 9–17)
BUN BLDV-MCNC: 15 MG/DL (ref 8–23)
BUN/CREAT BLD: 25 (ref 9–20)
CALCIUM SERPL-MCNC: 9.1 MG/DL (ref 8.6–10.4)
CHLORIDE BLD-SCNC: 101 MMOL/L (ref 98–107)
CO2: 26 MMOL/L (ref 20–31)
CREAT SERPL-MCNC: 0.59 MG/DL (ref 0.7–1.2)
GFR AFRICAN AMERICAN: >60 ML/MIN
GFR NON-AFRICAN AMERICAN: >60 ML/MIN
GFR SERPL CREATININE-BSD FRML MDRD: ABNORMAL ML/MIN/{1.73_M2}
GFR SERPL CREATININE-BSD FRML MDRD: ABNORMAL ML/MIN/{1.73_M2}
GLUCOSE BLD-MCNC: 105 MG/DL (ref 70–99)
POTASSIUM SERPL-SCNC: 4 MMOL/L (ref 3.7–5.3)
SODIUM BLD-SCNC: 138 MMOL/L (ref 135–144)

## 2021-06-01 PROCEDURE — 80048 BASIC METABOLIC PNL TOTAL CA: CPT

## 2021-06-01 PROCEDURE — 36415 COLL VENOUS BLD VENIPUNCTURE: CPT

## 2021-06-02 ENCOUNTER — TELEPHONE (OUTPATIENT)
Dept: PHARMACY | Age: 76
End: 2021-06-02

## 2021-06-02 NOTE — TELEPHONE ENCOUNTER
Recent Travel Screening and Travel History documentation:     Travel Screening     Question   Response    In the last month, have you been in contact with someone who was confirmed or suspected to have Coronavirus / COVID-19? Unable to assess    Have you had a COVID-19 viral test in the last 14 days? Unable to assess    Do you have any of the following new or worsening symptoms? Unable to assess    Have you traveled internationally or domestically in the last month? Unable to assess      Travel History   Travel since 05/02/21     No documented travel since 05/02/21         Had to leave [facility].     Jaylen Milton, PharmD 6/2/2021 1:37 PM

## 2021-06-03 ENCOUNTER — HOSPITAL ENCOUNTER (OUTPATIENT)
Dept: PHARMACY | Age: 76
Setting detail: THERAPIES SERIES
Discharge: HOME | End: 2021-06-03
Payer: MEDICARE

## 2021-06-03 VITALS
WEIGHT: 315 LBS | DIASTOLIC BLOOD PRESSURE: 56 MMHG | HEART RATE: 72 BPM | SYSTOLIC BLOOD PRESSURE: 135 MMHG | OXYGEN SATURATION: 98 % | BODY MASS INDEX: 53.93 KG/M2

## 2021-06-03 DIAGNOSIS — I44.2 AV BLOCK, 3RD DEGREE (HCC): Primary | ICD-10-CM

## 2021-06-03 PROCEDURE — 99212 OFFICE O/P EST SF 10 MIN: CPT

## 2021-06-03 NOTE — PROGRESS NOTES
[facility] [place]  Pharmacist  Heart Failure     [facility]  [address]  Phone: [phone]  Fax: [phone]      NAME: Mariel Heller  MEDICAL RECORD NUMBER:  [id number]  AGE: 66 y.o. GENDER: male  : 1945  EPISODE DATE:  6/3/2021      Heart Failure Management referral by Dr. Aston Singh    Dry weight: 360 pounds? ?     Today's Wt: 365 pounds  Wt Readings from Last 6 Encounters:   21 (!) 365 lb 3.2 oz (165.7 kg)   21 (!) 370 lb (167.8 kg)   21 (!) 370 lb (167.8 kg)   21 (!) 365 lb (165.6 kg)   21 (!) 360 lb (163.3 kg)   21 (!) 361 lb (163.7 kg)    and   Ht Readings from Last 1 Encounters:   21 5' 9\" (1.753 m)                   BP: 135/56   HR: 72  O2Sat: 98%     Extremities and pitting edema - chronic BLE edema - unchanged. Skin warm and dry     Subjective   Mr. Tong Terrazas is a 66 y.o. male here for the Heart Failure Services. They are here today for a comprehensive medication review including over the counter medications and herbal products, overall wellbeing assessment, transition of care and any needed adjustments with updates and recommendations communicated to the referring physician. Patient Symptoms:  Patient reports he is doing well and feels the best he has felt in years. Leg swelling remains the same. Weight is decreased 5 pounds since 21. Patient reports he has decreased his sodium consumption and has been trying much harder to make healthy food choices. SOB with exertion. Patient uses a motorized wheelchair but has been trying to use his walker at home and build strength with goal of being able to ambulate again. Patient only using oxygen at night (2 L/min). Patient was previously on oxygen continuously at 2 L/min. Patient had a CPAP machine, but patient states it was taken away (per Medicare) due to noncompliance.  Patient states he is working again to be \"recertified\" for a CPAP machine, and it has been a very difficult and time consuming process. Shortness of Breath:    []   Dyspnea on exertion   [x]   Dyspnea with normal activities  []   Dyspnea at rest  []   Dyspnea while sleeping    Patient Findings:   []  Missed doses  [x]  Diet changes  [x]  Sodium intake changes   []  Night time cough   []  Other   []  Early saiety, abd fullness []  Chest pain   []  Constipation   []  Night time bathroom trips  []  Alcohol intake changes  []  Acute illness  []  Edema    []  Number of pillows or recliner  [x]  Activity changes (improved - more active)   []  Fatigue that limits activity []  Heart racing or skipping beats  []  Light headedness  []  Dizziness    []      []  Problems sleeping    Functional Activity New York Heart Association Classification  []   Class I No limitation of physical activity. Ordinary physical activity does not cause undue fatigue, palpitation, dyspnea (shortness of breath). []   Class II Slight limitation of physical activity. Comfortable at rest. Ordinary physical activity results in fatigue, palpitation, dyspnea (shortness of breath). []   Class III  Fredrick limitation of physical activity. Comfortable at rest.  Less than ordinary activity causes fatigue, palpitation, dyspnea. [x]   Class IV Unable to carry on any physical activity without discomfort. Symptoms of heart failure at rest.  If any physical activity is undertaken, discomfort increases.       Last CHF Hospital Admission: 3-20    OUTPATIENT MEDICATIONS:  Outpatient Medications Marked as Taking for the 6/3/21 encounter [facility] Encounter) with [address]   Medication Sig Dispense Refill    simethicone (MYLICON) 80 MG chewable tablet Take 80 mg by mouth every 6 hours as needed for Flatulence (Gas pain)      furosemide (LASIX) 20 MG tablet Take 1 tablet by mouth daily (Patient taking differently: Take 20 mg by mouth See Admin Instructions Takes 2 tablets for 40 mg MWF and 1 tablet for 20 mg other 4 days) 60 tablet 0    clopidogrel (PLAVIX) 75 MG tablet Take 1 tablet by mouth daily 30 tablet 5    tamsulosin (FLOMAX) 0.4 MG capsule Take 1 capsule by mouth daily 30 capsule 5    losartan (COZAAR) 25 MG tablet Take 1 tablet by mouth daily 90 tablet 3    potassium chloride (KLOR-CON M) 20 MEQ extended release tablet Take 1 tablet by mouth daily 90 tablet 3    amiodarone (CORDARONE) 200 MG tablet Take 1 tablet by mouth daily 90 tablet 3    atorvastatin (LIPITOR) 40 MG tablet Take 1 tablet by mouth daily 90 tablet 3    PARoxetine (PAXIL) 30 MG tablet Take 1 tablet by mouth every morning 90 tablet 3           Objective / Assessment     Patient Active Problem List   Diagnosis Code    Essential hypertension I10    Mixed hyperlipidemia E78.2    Osteoarthritis of both knees M17.0    Obesity, morbid (more than 100 lbs over ideal weight or BMI > 40) ([facility]) E66.01    Carpal tunnel syndrome on left G56.02    Dyslipidemia E78.5    Epistaxis from Eliquis and Aspirin R04.0    Encounter for removal of nasal packing Z48.00    AV block, 3rd degree ([facility]) I44.2     Social History     Tobacco Use    Smoking status: Former Smoker     Packs/day: 1.00     Years: 10.00     Pack years: 10.00     Types: Cigarettes     Quit date:      Years since quittin.4    Smokeless tobacco: Never Used   Vaping Use    Vaping Use: Never used   Substance Use Topics    Alcohol use: Never    Drug use: No       Potassium (mmol/L)   Date Value   2021 4.0   2021 4.1   2021 4.2   2021 4.4   10/13/2020 4.3   2020 4.7     Potassium reflex Magnesium (meq/L)   Date Value   2021 4.6   09/15/2020 3.9     BUN (mg/dL)   Date Value   2021 15   2021 22   2021 15   2021 18   2021 22   10/13/2020 11     CREATININE (mg/dL)   Date Value   2021 0.59 (L)   2021 0.77   2021 0.66 (L)   2021 0.8   2021 0.77   10/13/2020 0.73     TSH (mIU/L)   Date Value   03/08/2021 2.37   05/15/2020 2.83   03/04/2020 3.43     No components found for: T4      Plan:      · Chronic Systolic and/or Diastolic Heart Failure (diagnosis): EF: 50-55% via echocardiogram on 1/21/21  · Beta Blocker for EF </= 40%: none - bradycardia  · Diuretics: Furosemide 40 mg po every MWF and 20 mg po all other days      Continue Potassium 20 mEq po daily  · ACE/ARB/ARNI for EF </= 40%: Losartan 25 mg po daily  · Nonpharmacologic management of Heart Failure: I told patient to continue wearing lower extremity compression stockings and I advised patient to try and keep legs up whenever possible and to limit salt in diet. · Laboratory testing:   ? Basic metabolic panel (BMP) in October to assess potassium and renal function - prior to cardiology appointment     · Atherosclerotic Heart Disease:   · Antiplatelet Agent: Plavix 75 mg po daily      I also reminded patient to watch for signs of blood in stool or black tarry stools and stop the medication immediately if this develops as this could be life threatening. Anticoagulation:  STOPPED Eliquis 3/16/21 per Dr. Kiana Gurrola - due to patient concern for cost and patient refusal to switch to warfarin. Pacemaker transmission q 2 weeks to monitor a-fib. Patient to resume anticoagulation if recurrent a-fib. · Statin Therapy: Atorvastatin 40 mg po daily     Essential Hypertension:   ? Diuretics: Furosemide 40 mg po every MWF and 20 mg po all other days  ? Beta Blocker: none - bradycardia    Patient Education/Instructions: I did spend about 15 minutes with patient going over his heart failure packet including dietary guidelines, the signs and symptoms to watch for including shortness of breath, weight gain, lightheadedness/dizziness. I asked patient to call the clinic if develops persistent or worsening shortness of breath or weight gain of more than 3 pounds in 1-7 days. Patient verbalized understanding.      CPAP / sleep apnea - monitoring per pulmonology with re-acquisition of CPAP machine in process pending re-certification. Patient has follow up appointment with Dr. Ana Brito on 7/15/21. Recommend continued use of lymphedema boots twice daily as well as elevating legs when sitting. I encouraged patient to stay well hydrated - especially with summer months approaching. I also encouraged patient to continue with his healthier food choices and decreased sodium consumption. Follow up with Dr. Jitendra Nagy in October. I instructed patient to have TSH and liver function tests drawn prior to visit (q 6 month monitoring secondary to amiodarone). BMP also ordered. Lab orders placed. I also recommended annual eye exam.  Annual CXR will be due February 2022. Patient will return to CHF clinic in 6 months - December 2021. Thank you for the referral,    Priscilla Nathan.  Geeta Encarnacion, [address] [place] [address] Tracking Only     Intervention Detail: Lab(s) Ordered   Total # of Interventions Recommended: 3   Total # of Interventions Accepted: 3   Time Spent (min): 60

## 2021-06-11 ENCOUNTER — TELEPHONE (OUTPATIENT)
Dept: CARDIOLOGY CLINIC | Age: 76
End: 2021-06-11

## 2021-06-11 DIAGNOSIS — Z95.2 HISTORY OF TRANSCATHETER AORTIC VALVE REPLACEMENT (TAVR): Primary | ICD-10-CM

## 2021-06-11 NOTE — TELEPHONE ENCOUNTER
Orders received from Dr. Kiko Lawrence to schedule the patient for a CBC and ECHO prior to the 1 year post TAVR follow up appointment with Dr. Lieutenant Elizondo.

## 2021-06-28 NOTE — PROGRESS NOTES
Subjective:      Patient ID: Krista Ramirez is a 64 y.o. male. HPI  Patient here for follow-up for MARIA ESTHER. Patient was last seen in the office in April 2021 per Dr. Alexander Elmore. Patient also has a history of aortic valve disease status post TAVR. DME: [facility]: Average bedtime 3-4 AM, average wake time- 8:30-9 AM. Nasal pillows- does like any mask interface. . Uses machine very night. Sleep is better without machine when he sleeps in recliner for nap in the afternoon for approx several hours. Reports increased energy since TAVR. Unfortunately no compliance data available for my review. Patient is fully up-to-date with Covid vaccine. Medications:   No pulmonary medications  O2 at 1.5 LPM nocturnally and PRN during the day      PRIOR WORKUP:  PFT:  None on chart    CT Imaging:  CT chest 6/17/2020: Negative for pulmonary nodules or infiltrate. There is scarring/atelectasis of left lung base. Negative for pulmonary emboli. No mediastinal adenopathy. Sleep Study:  Home sleep study 4/29/2021: Patient wore the device for approximately 7 hours and during this timeframe had a total of 4 apneas and 112 hypopneas with desaturation of at least 4%. Respiratory event index was 17.4 events per hour. SPO2 desaturation lowest was 75% with an average of 89%. Moderate sleep apnea.     Laboratory Evaluation:    Immunizations:   Immunization History   Administered Date(s) Administered    COVID-19, Moderna, PF, 100mcg/0.5mL 02/26/2021, 03/27/2021    Hepatitis B (Recombivax HB) 12/15/1998, 01/19/1999    Hepatitis B vaccine 12/15/1998, 01/19/1999    Influenza A ([id number]) Vaccine PF IM 11/23/2009    Influenza Vaccine, unspecified formulation 10/15/2017    Influenza Virus Vaccine 09/26/2017, 09/26/2017    Influenza, High Dose (Fluzone 65 yrs and older) 10/03/2018, 10/07/2019    Influenza, High-dose, Quadv, 65 yrs +, IM (Fluzone) 10/21/2020    Pneumococcal Conjugate 13-valent (Raine Bloodgood) 04/03/2017    Pneumococcal Polysaccharide (Dteoxfndx09) 2021        No flowsheet data found.     BP (!) 119/56 (Site: Left Upper Arm, Position: Sitting, Cuff Size: Large Adult)   Pulse 63   Temp 97.6 °F (36.4 °C) (Tympanic)   Resp 20   Ht 5' 9\" (1.753 m)   Wt (!) 365 lb (165.6 kg)   SpO2 98% Comment: O2 1.5 L/NC  BMI 53.90 kg/m²     Past Medical History:   Diagnosis Date    Anxiety     Aortic stenosis     Atrial fibrillation, new onset ([facility] [address] 2020    BPH (benign prostatic hyperplasia)     CHF (congestive heart failure) (HCC)     Diabetes mellitus (HCC)     borderline    Hyperlipidemia     Hypertension     Lymphedema     bilat legs    On home O2     Osteoarthritis     Septic shock due to urinary tract infection (HCC) /  Levophed and fluids 2020     Past Surgical History:   Procedure Laterality Date    CARDIAC CATHETERIZATION  2020    OTHER SURGICAL HISTORY  2020    TAVR/ Dr. Jenny Freeman     Family History   Problem Relation Age of Onset    Arthritis Mother     Heart Disease Mother     High Cholesterol Mother     High Blood Pressure Mother     Stroke Mother     High Blood Pressure Brother        Social History     Socioeconomic History    Marital status:      Spouse name: Lex Wilson Number of children: Not on file    Years of education: Not on file    Highest education level: Not on file   Occupational History    Not on file   Tobacco Use    Smoking status: Former Smoker     Packs/day: 1.00     Years: 10.00     Pack years: 10.00     Types: Cigarettes     Quit date:      Years since quittin.5    Smokeless tobacco: Never Used   Vaping Use    Vaping Use: Never used   Substance and Sexual Activity    Alcohol use: Never    Drug use: No    Sexual activity: Not on file   Other Topics Concern    Not on file   Social History Narrative    Not on file     Social Determinants of Health     Financial Resource Strain: Low Risk     Difficulty of Paying Living Expenses: Not hard at all   Food Insecurity: No Food Insecurity    Worried About [facility] in the Last Year: Never true    Ran Out of Food in the Last Year: Never true   Transportation Needs:     Lack of Transportation (Medical):  Lack of Transportation (Non-Medical):    Physical Activity:     Days of Exercise per Week:     Minutes of Exercise per Session:    Stress:     Feeling of Stress :    Social Connections: Unknown    Frequency of Communication with Friends and Family: Once a week    Frequency of Social Gatherings with Friends and Family: Once a week    Attends Advent Services: Never    Active Member of Clubs or Organizations: No    Attends Club or Organization Meetings: Never    Marital Status: Not on file   Intimate Partner Violence:     Fear of Current or Ex-Partner:     Emotionally Abused:     Physically Abused:     Sexually Abused:        Review of Systems   Constitutional: Negative. HENT: Negative. Eyes: Negative. Respiratory:        Denies any change in shortness of breath, cough, sputum production. Denies any purulent sputum or hemoptysis. Cardiovascular: Negative. Gastrointestinal: Negative. Endocrine: Negative. Genitourinary: Negative. Musculoskeletal: Negative. Skin: Negative. Allergic/Immunologic: Negative. Neurological: Negative. Hematological: Negative. Psychiatric/Behavioral: Negative. Objective:       Physical Exam  General appearance - alert, well appearing, and in no distress, overweight and uses a scooter for mobility.   Mental status - alert, oriented to person, place, and time  Eyes - pupils equal and reactive, extraocular eye movements intact  Ears - not examined  Nose - normal and patent, no erythema, discharge or polyps  Mouth - mucous membranes moist, pharynx normal without lesions  Neck - supple, no significant adenopathy  Chest - clear to auscultation, no wheezes, rales or rhonchi, symmetric air entry  Heart -normal rate, regular rhythm, normal S1, S2, no murmurs, rubs, clicks or gallops  Abdomen - soft, nontender, nondistended, no masses or organomegaly  Neuro- alert, oriented, normal speech, no focal findings or movement disorder noted}  Extremities - peripheral pulses normal, no pedal edema, no clubbing or cyanosis  Skin - normal coloration and turgor, no rashes, no suspicious skin lesions noted     Wt Readings from Last 3 Encounters:   07/15/21 (!) 365 lb (165.6 kg)   06/23/21 (!) 368 lb (166.9 kg)   06/03/21 (!) 365 lb 3.2 oz (165.7 kg)       Results for orders placed or performed during the hospital encounter of 41/85/32   Basic Metabolic Panel   Result Value Ref Range    Glucose 105 (H) 70 - 99 mg/dL    BUN 15 8 - 23 mg/dL    CREATININE 0.59 (L) 0.70 - 1.20 mg/dL    Bun/Cre Ratio 25 (H) 9 - 20    Calcium 9.1 8.6 - 10.4 mg/dL    Sodium 138 135 - 144 mmol/L    Potassium 4.0 3.7 - 5.3 mmol/L    Chloride 101 98 - 107 mmol/L    CO2 26 20 - 31 mmol/L    Anion Gap 11 9 - 17 mmol/L    GFR Non-African American >60 >60 mL/min    GFR African American >60 >60 mL/min    GFR Comment          GFR Staging             No results found. Assessment:      1. MARIA ESTHER (obstructive sleep apnea)    2. Class 3 severe obesity due to excess calories with serious comorbidity and body mass index (BMI) of 50.0 to 59.9 in adult ([facility])    3. Stopped smoking with greater than 10 pack year history    4. Severe aortic stenosis /  TAVR 2020    5. Chronic respiratory failure with hypoxia ([facility])          Plan:      1. Medications reviewed, on no pulmonary medications. 2. Educated and clarified the medication use. 3. Recommend flu vaccination in the fall annually. Due in fall  4. Patient is up-to-date with vaccinations from pulmonary perspective. Including Covid  5. Maintain an active lifestyle. 6. Patient's questions were answered to his satisfaction. 7. Patient is a former smoker who quit more than 40 years ago.   Not eligible for CT lung screening. 8. Supplemental oxygen continued at 1.5 L/min nocturnally as well as as needed during the day. 9. CT scan of the chest was reviewed  10. Compliance data not available for my review. Per patient report they use the machine faithfully every night, and report refreshing and restorative sleep without residual daytime fatigue  11.  We'll see the patient back in 9 months with compliance report          Electronically signed by SYLVIA Blanton CNP on 7/15/2021 at 10:29 AM

## 2021-07-15 ENCOUNTER — OFFICE VISIT (OUTPATIENT)
Dept: PULMONOLOGY | Age: 76
End: 2021-07-15
Payer: MEDICARE

## 2021-07-15 VITALS
HEART RATE: 63 BPM | HEIGHT: 69 IN | OXYGEN SATURATION: 98 % | BODY MASS INDEX: 46.65 KG/M2 | WEIGHT: 315 LBS | SYSTOLIC BLOOD PRESSURE: 119 MMHG | TEMPERATURE: 97.6 F | RESPIRATION RATE: 20 BRPM | DIASTOLIC BLOOD PRESSURE: 56 MMHG

## 2021-07-15 DIAGNOSIS — J96.11 CHRONIC RESPIRATORY FAILURE WITH HYPOXIA (HCC): ICD-10-CM

## 2021-07-15 DIAGNOSIS — Z87.891 STOPPED SMOKING WITH GREATER THAN 10 PACK YEAR HISTORY: ICD-10-CM

## 2021-07-15 DIAGNOSIS — G47.33 OSA (OBSTRUCTIVE SLEEP APNEA): Primary | ICD-10-CM

## 2021-07-15 DIAGNOSIS — I35.0 SEVERE AORTIC STENOSIS: ICD-10-CM

## 2021-07-15 DIAGNOSIS — E66.01 CLASS 3 SEVERE OBESITY DUE TO EXCESS CALORIES WITH SERIOUS COMORBIDITY AND BODY MASS INDEX (BMI) OF 50.0 TO 59.9 IN ADULT (HCC): ICD-10-CM

## 2021-07-15 PROCEDURE — 1036F TOBACCO NON-USER: CPT | Performed by: NURSE PRACTITIONER

## 2021-07-15 PROCEDURE — 4040F PNEUMOC VAC/ADMIN/RCVD: CPT | Performed by: NURSE PRACTITIONER

## 2021-07-15 PROCEDURE — G8427 DOCREV CUR MEDS BY ELIG CLIN: HCPCS | Performed by: NURSE PRACTITIONER

## 2021-07-15 PROCEDURE — 1123F ACP DISCUSS/DSCN MKR DOCD: CPT | Performed by: NURSE PRACTITIONER

## 2021-07-15 PROCEDURE — 99213 OFFICE O/P EST LOW 20 MIN: CPT | Performed by: NURSE PRACTITIONER

## 2021-07-15 PROCEDURE — G8417 CALC BMI ABV UP PARAM F/U: HCPCS | Performed by: NURSE PRACTITIONER

## 2021-07-15 ASSESSMENT — ENCOUNTER SYMPTOMS
ALLERGIC/IMMUNOLOGIC NEGATIVE: 1
GASTROINTESTINAL NEGATIVE: 1
EYES NEGATIVE: 1

## 2021-07-15 NOTE — PATIENT INSTRUCTIONS
SURVEY:    You may be receiving a survey from [employer] regarding your visit today. Please complete the survey to enable us to provide the highest quality of care to you and your family. If you cannot score us a very good on any question, please call the office to discuss how we could have made your experience a very good one. Thank you.

## 2021-07-27 ENCOUNTER — NURSE ONLY (OUTPATIENT)
Dept: CARDIOLOGY | Age: 76
End: 2021-07-27
Payer: MEDICARE

## 2021-07-27 DIAGNOSIS — I44.2 AV BLOCK, 3RD DEGREE (HCC): ICD-10-CM

## 2021-07-27 DIAGNOSIS — Z95.0 PACEMAKER: Primary | ICD-10-CM

## 2021-07-27 PROCEDURE — 93294 REM INTERROG EVL PM/LDLS PM: CPT | Performed by: INTERNAL MEDICINE

## 2021-08-16 ENCOUNTER — TELEPHONE (OUTPATIENT)
Dept: CARDIOLOGY | Age: 76
End: 2021-08-16

## 2021-08-16 NOTE — TELEPHONE ENCOUNTER
Mr. Malik Curtis called into the office today and wanted to be sure that heart wise it would be safe for him to go see his dentist due to him needed some fillings done. Please advise. Thanks!

## 2021-08-17 NOTE — TELEPHONE ENCOUNTER
I LVM for Mr. Manzo to let him know that per Dr. Mio Miranda his heart condition is a stable to go for any dental procedure but he will need antibiotic prophylaxis prior to any dental procedure because he has pacemaker and bioprosthetic valve. I asked him to call our office with any other questions or concerns. Thanks!

## 2021-08-17 NOTE — TELEPHONE ENCOUNTER
His heart condition is a stable to go for any dental procedure but he will need antibiotic prophylaxis prior to any dental procedure because he has pacemaker and bioprosthetic valve.   Thank you

## 2021-09-15 ENCOUNTER — TELEPHONE (OUTPATIENT)
Dept: CARDIOLOGY | Age: 76
End: 2021-09-15

## 2021-09-15 ENCOUNTER — HOSPITAL ENCOUNTER (EMERGENCY)
Age: 76
Discharge: HOME | End: 2021-09-15
Payer: MEDICARE

## 2021-09-15 VITALS
RESPIRATION RATE: 18 BRPM | DIASTOLIC BLOOD PRESSURE: 89 MMHG | OXYGEN SATURATION: 96 % | HEART RATE: 84 BPM | SYSTOLIC BLOOD PRESSURE: 157 MMHG | TEMPERATURE: 98.4 F

## 2021-09-15 DIAGNOSIS — R04.0 EPISTAXIS: Primary | ICD-10-CM

## 2021-09-15 PROCEDURE — 99284 EMERGENCY DEPT VISIT MOD MDM: CPT

## 2021-09-15 PROCEDURE — 6370000000 HC RX 637 (ALT 250 FOR IP): Performed by: PHYSICIAN ASSISTANT

## 2021-09-15 PROCEDURE — 30903 CONTROL OF NOSEBLEED: CPT

## 2021-09-15 RX ORDER — OXYMETAZOLINE HYDROCHLORIDE 0.05 G/100ML
2 SPRAY NASAL ONCE
Status: COMPLETED | OUTPATIENT
Start: 2021-09-15 | End: 2021-09-15

## 2021-09-15 RX ADMIN — OXYMETAZOLINE HYDROCHLORIDE 2 SPRAY: 0.05 SPRAY NASAL at 14:35

## 2021-09-15 ASSESSMENT — ENCOUNTER SYMPTOMS
VOMITING: 0
CONSTIPATION: 0
ABDOMINAL PAIN: 0
EYE DISCHARGE: 0
NAUSEA: 0
WHEEZING: 0
RHINORRHEA: 0
EYE REDNESS: 0
SORE THROAT: 0
BACK PAIN: 0
DIARRHEA: 0
COUGH: 0
SHORTNESS OF BREATH: 0
BLOOD IN STOOL: 0
CHEST TIGHTNESS: 0

## 2021-09-15 NOTE — TELEPHONE ENCOUNTER
Mr. Wellington Reddy called in to report he just developed a nose bleed and cannot get it to stop with applying pressure. Per Dr. Federico Shepherd, he wants him to come to the ER to be evaluated and keep applying pressure. He verbalized understanding.

## 2021-09-15 NOTE — ED NOTES
[facility] will be here at 1700 to pick patient up. Meal tray ordered.       [facility], RN  09/15/21 1493

## 2021-09-15 NOTE — ED PROVIDER NOTES
[facility] ED  EMERGENCY DEPARTMENT ENCOUNTER      Pt Name: Yaneth Mayes  MRN: [id number]  [place] 1945  Date of evaluation: 9/15/2021  Provider: Sonja Manzo Dr     Chief Complaint   Patient presents with    Epistaxis     arrives by ems for nose bleed ongoing for past 45 minutes. patient is on plavix and said his blood pressure is up because he ate a lot of sodium yesterday         HISTORY OF PRESENT ILLNESS   (Location/Symptom, Timing/Onset, Context/Setting,Quality, Duration, Modifying Factors, Severity)  Note limiting factors. Yaneth Mayes is a64 y.o. male who presents to the emergency department with complaints of nosebleed that started this morning. Patient reports he has had a history of nosebleeds before and has had to come to the ER for them. States he is on Plavix. Denies any trauma or injury reports just blowing nose. He denies any headache or dizziness. Denies any lightheadedness. He otherwise has no other complaints at this time. Reports of his blood pressure is up is because he has been eating more sodium than normal.  He has no other complaints this time denies any pain. HPI    Nursing Notes werereviewed. REVIEW OF SYSTEMS    (2-9 systems for level 4, 10 or more for level 5)     Review of Systems   Constitutional: Negative for chills, diaphoresis and fever. HENT: Positive for nosebleeds. Negative for congestion, ear pain, rhinorrhea and sore throat. Eyes: Negative for discharge, redness and visual disturbance. Respiratory: Negative for cough, chest tightness, shortness of breath and wheezing. Cardiovascular: Negative for chest pain and palpitations. Gastrointestinal: Negative for abdominal pain, blood in stool, constipation, diarrhea, nausea and vomiting. Endocrine: Negative for polydipsia, polyphagia and polyuria. Genitourinary: Negative for decreased urine volume, difficulty urinating, dysuria, frequency and hematuria. mouth daily     PAROXETINE (PAXIL) 30 MG TABLET    Take 1 tablet by mouth every morning    POTASSIUM CHLORIDE (KLOR-CON M10) 10 MEQ EXTENDED RELEASE TABLET    Take 2 tablets by mouth daily    SIMETHICONE (MYLICON) 80 MG CHEWABLE TABLET    Take 80 mg by mouth every 6 hours as needed for Flatulence (Gas pain)    TAMSULOSIN (FLOMAX) 0.4 MG CAPSULE    Take 1 capsule by mouth daily         ALLERGIES   Patient has no known allergies. FAMILY HISTORY       Family History   Problem Relation Age of Onset    Arthritis Mother     Heart Disease Mother     High Cholesterol Mother     High Blood Pressure Mother     Stroke Mother     High Blood Pressure Brother           SOCIAL HISTORY       Social History     Socioeconomic History    Marital status:      Spouse name: Sandra Hess Number of children: None    Years of education: None    Highest education level: None   Occupational History    None   Tobacco Use    Smoking status: Former Smoker     Packs/day: 1.00     Years: 10.00     Pack years: 10.00     Types: Cigarettes     Quit date:      Years since quittin.7    Smokeless tobacco: Never Used   Vaping Use    Vaping Use: Never used   Substance and Sexual Activity    Alcohol use: Never    Drug use: No    Sexual activity: None   Other Topics Concern    None   Social History Narrative    None     Social Determinants of Health     Financial Resource Strain: Low Risk     Difficulty of Paying Living Expenses: Not hard at all   Food Insecurity: No Food Insecurity    Worried About [facility] in the Last Year: Never true    [facility] in the Last Year: Never true   Transportation Needs:     Lack of Transportation (Medical):      Lack of Transportation (Non-Medical):    Physical Activity:     Days of Exercise per Week:     Minutes of Exercise per Session:    Stress:     Feeling of Stress :    Social Connections: Unknown    Frequency of Communication with Friends and Family: Once a week  Frequency of Social Gatherings with Friends and Family: Once a week    Attends Mu-ism Services: Never    Active Member of Clubs or Organizations: No    Attends Club or Organization Meetings: Never    Marital Status: Not on file   Intimate Partner Violence:     Fear of Current or Ex-Partner:     Emotionally Abused:     Physically Abused:     Sexually Abused:        SCREENINGS    Norman Coma Scale  Eye Opening: Spontaneous  Best Verbal Response: Oriented  Best Motor Response: Obeys commands  Hebert Coma Scale Score: 15        PHYSICAL EXAM    (up to 7 for level 4, 8 or more for level 5)     ED Triage Vitals [09/15/21 1421]   BP Temp Temp Source Pulse Resp SpO2 Height Weight   (!) 157/89 98.4 °F (36.9 °C) Tympanic 84 18 96 % -- --       Physical Exam  Vitals and nursing note reviewed. Constitutional:       General: He is not in acute distress. Appearance: He is well-developed. He is not diaphoretic. HENT:      Head: Normocephalic and atraumatic. Right Ear: External ear normal.      Left Ear: External ear normal.      Nose:      Comments: Epistaxis noted on the left. Appears to be an anterior bleed. No septal hematoma no visualized foreign body a piece of cotton that I have removed. Mouth/Throat:      Mouth: Mucous membranes are moist.   Eyes:      General: No scleral icterus. Right eye: No discharge. Left eye: No discharge. Conjunctiva/sclera: Conjunctivae normal.      Pupils: Pupils are equal, round, and reactive to light. Neck:      Trachea: No tracheal deviation. Cardiovascular:      Rate and Rhythm: Normal rate and regular rhythm. Pulmonary:      Effort: Pulmonary effort is normal. No respiratory distress. Breath sounds: Normal breath sounds. No stridor. No wheezing or rhonchi. Abdominal:      General: Abdomen is flat. Musculoskeletal:         General: No tenderness or deformity. Normal range of motion.       Cervical back: Normal range of motion and neck supple. Skin:     General: Skin is warm and dry. Capillary Refill: Capillary refill takes less than 2 seconds. Findings: No erythema or rash. Neurological:      General: No focal deficit present. Mental Status: He is alert and oriented to person, place, and time. Cranial Nerves: No cranial nerve deficit. Motor: No abnormal muscle tone. Deep Tendon Reflexes: Reflexes are normal and symmetric. Psychiatric:         Behavior: Behavior normal.         DIAGNOSTIC RESULTS     EKG: All EKG's are interpreted by the Emergency Department Physician who either signs orCo-signs this chart in the absence of a cardiologist.      RADIOLOGY:   Non-plainfilm images such as CT, Ultrasound and MRI are read by the radiologist. Plain radiographic images are visualized and preliminarily interpreted by the emergency physician with the below findings:      Interpretationper the Radiologist below, if available at the time of this note:    No orders to display         ED BEDSIDE ULTRASOUND:   Performed by ED Physician - none    LABS:  Labs Reviewed - No data to display    All other labs were within normal range or not returned as of this dictation. EMERGENCY DEPARTMENT COURSE and DIFFERENTIAL DIAGNOSIS/MDM:   Vitals:    Vitals:    09/15/21 1421   BP: (!) 157/89   Pulse: 84   Resp: 18   Temp: 98.4 °F (36.9 °C)   TempSrc: Tympanic   SpO2: 96%         MDM  68-year-old male history of Plavix use presents secondary to nosebleed. This is happened to him before most recently within the last year. He was minimally hypertensive. Bleeding controlled with Rhino Rocket at this time. Will observe patient to ensure he does not rebleed. He is otherwise hemodynamically stable. No bruising to his skin. No trauma no injury it happened after he was nasal congestion. Epistaxis that is completely resolved with nasal packing. I did keep patient here for an hour after packing he did not rebleed at all. Blood pressure is controlled. Patient is resting comfortably at this time and in no distress. I have updated patient on current results. We discussed at length the patient's diagnosis. Patient understands to follow up with primary care provider in the next 2 days. Patient verbalized understanding of this. We went over medications. Strict return warnings were given. Patient will return to the emergency room as needed with new or worsening complaints. He understands any to call ENT. Packing needs to be removed within 3 days. He understands if he goes home and has any more bleeding to return back to the ER immediately.      Procedures    FINAL IMPRESSION      1. Epistaxis        DISPOSITION/PLAN   DISPOSITION Decision To Discharge 09/15/2021 03:18:41 PM      PATIENT REFERRED TO:  [facility] ED  [address]  [address]  [phone]    If symptoms worsen, As needed    Kusum Johnson MD  [facility] [address], [address]  [phone]    Schedule an appointment as soon as possible for a visit       Dayron Moore MD  [facility]  [phone]    Schedule an appointment as soon as possible for a visit in 1 day  Schedule follow-up with ear nose and throat doctor      DISCHARGE MEDICATIONS:  New Prescriptions    No medications on file              Summation      Patient Course:      ED Medications administered this visit:    Medications   oxymetazoline (AFRIN) 0.05 % nasal spray 2 spray (2 sprays Each Nostril Given by Other 9/15/21 7187)       New Prescriptions from this visit:    New Prescriptions    No medications on file       Follow-up:  [facility] ED  [address]  [address]  [phone]    If symptoms worsen, As needed    MD [facility], [address]  [phone]    Schedule an appointment as soon as possible for a visit       Dayron Moore MD  [address] [place] [id number]  [phone]    Schedule an appointment as soon as possible for a visit in 1 day  Schedule follow-up with ear nose and throat doctor        Final Impression:   1.  Epistaxis               (Please note that portions of this note were completed with a voice recognition program.  Efforts were made to edit the dictations but occasionally words are mis-transcribed.)           Roman Carlisle PA-C  09/15/21 4188

## 2021-09-17 ENCOUNTER — HOSPITAL ENCOUNTER (OUTPATIENT)
Age: 76
Discharge: HOME | End: 2021-09-17
Payer: MEDICARE

## 2021-09-17 ENCOUNTER — TELEPHONE (OUTPATIENT)
Dept: CARDIOLOGY | Age: 76
End: 2021-09-17

## 2021-09-17 ENCOUNTER — HOSPITAL ENCOUNTER (EMERGENCY)
Age: 76
Discharge: HOME | End: 2021-09-17
Payer: MEDICARE

## 2021-09-17 ENCOUNTER — HOSPITAL ENCOUNTER (OUTPATIENT)
Dept: NON INVASIVE DIAGNOSTICS | Age: 76
Discharge: HOME | End: 2021-09-17
Payer: MEDICARE

## 2021-09-17 VITALS
OXYGEN SATURATION: 95 % | HEART RATE: 80 BPM | RESPIRATION RATE: 18 BRPM | HEIGHT: 69 IN | DIASTOLIC BLOOD PRESSURE: 67 MMHG | WEIGHT: 315 LBS | TEMPERATURE: 99.2 F | SYSTOLIC BLOOD PRESSURE: 158 MMHG | BODY MASS INDEX: 46.65 KG/M2

## 2021-09-17 DIAGNOSIS — Z95.2 HISTORY OF TRANSCATHETER AORTIC VALVE REPLACEMENT (TAVR): ICD-10-CM

## 2021-09-17 DIAGNOSIS — R04.0 EPISTAXIS: Primary | ICD-10-CM

## 2021-09-17 DIAGNOSIS — I44.2 AV BLOCK, 3RD DEGREE (HCC): ICD-10-CM

## 2021-09-17 LAB
ALBUMIN SERPL-MCNC: 4.3 G/DL (ref 3.5–5.2)
ALBUMIN/GLOBULIN RATIO: 1.1 (ref 1–2.5)
ALP BLD-CCNC: 60 U/L (ref 40–129)
ALT SERPL-CCNC: 15 U/L (ref 5–41)
ANION GAP SERPL CALCULATED.3IONS-SCNC: 13 MMOL/L (ref 9–17)
AST SERPL-CCNC: 18 U/L
BILIRUB SERPL-MCNC: 1.05 MG/DL (ref 0.3–1.2)
BILIRUBIN DIRECT: 0.22 MG/DL
BILIRUBIN, INDIRECT: 0.83 MG/DL (ref 0–1)
BUN BLDV-MCNC: 11 MG/DL (ref 8–23)
BUN/CREAT BLD: 18 (ref 9–20)
CALCIUM SERPL-MCNC: 9.1 MG/DL (ref 8.6–10.4)
CHLORIDE BLD-SCNC: 98 MMOL/L (ref 98–107)
CO2: 26 MMOL/L (ref 20–31)
CREAT SERPL-MCNC: 0.6 MG/DL (ref 0.7–1.2)
GFR AFRICAN AMERICAN: >60 ML/MIN
GFR NON-AFRICAN AMERICAN: >60 ML/MIN
GFR SERPL CREATININE-BSD FRML MDRD: ABNORMAL ML/MIN/{1.73_M2}
GFR SERPL CREATININE-BSD FRML MDRD: ABNORMAL ML/MIN/{1.73_M2}
GLOBULIN: NORMAL G/DL (ref 1.5–3.8)
GLUCOSE BLD-MCNC: 112 MG/DL (ref 70–99)
HCT VFR BLD CALC: 43.7 % (ref 40.7–50.3)
HEMOGLOBIN: 14.2 G/DL (ref 13–17)
LV EF: 53 %
LVEF MODALITY: NORMAL
MCH RBC QN AUTO: 30.7 PG (ref 25.2–33.5)
MCHC RBC AUTO-ENTMCNC: 32.5 G/DL (ref 28.4–34.8)
MCV RBC AUTO: 94.4 FL (ref 82.6–102.9)
NRBC AUTOMATED: 0 PER 100 WBC
PDW BLD-RTO: 14.5 % (ref 11.8–14.4)
PLATELET # BLD: 188 K/UL (ref 138–453)
PMV BLD AUTO: 11.7 FL (ref 8.1–13.5)
POTASSIUM SERPL-SCNC: 4.6 MMOL/L (ref 3.7–5.3)
RBC # BLD: 4.63 M/UL (ref 4.21–5.77)
SODIUM BLD-SCNC: 137 MMOL/L (ref 135–144)
TOTAL PROTEIN: 8.2 G/DL (ref 6.4–8.3)
TSH SERPL DL<=0.05 MIU/L-ACNC: 2.03 MIU/L (ref 0.3–5)
WBC # BLD: 10.7 K/UL (ref 3.5–11.3)

## 2021-09-17 PROCEDURE — 84443 ASSAY THYROID STIM HORMONE: CPT

## 2021-09-17 PROCEDURE — 80076 HEPATIC FUNCTION PANEL: CPT

## 2021-09-17 PROCEDURE — 80048 BASIC METABOLIC PNL TOTAL CA: CPT

## 2021-09-17 PROCEDURE — 93306 TTE W/DOPPLER COMPLETE: CPT

## 2021-09-17 PROCEDURE — 85027 COMPLETE CBC AUTOMATED: CPT

## 2021-09-17 PROCEDURE — 99282 EMERGENCY DEPT VISIT SF MDM: CPT

## 2021-09-17 PROCEDURE — 36415 COLL VENOUS BLD VENIPUNCTURE: CPT

## 2021-09-17 RX ORDER — LIDOCAINE HYDROCHLORIDE AND EPINEPHRINE 10; 10 MG/ML; UG/ML
20 INJECTION, SOLUTION INFILTRATION; PERINEURAL ONCE
Status: DISCONTINUED | OUTPATIENT
Start: 2021-09-17 | End: 2021-09-17 | Stop reason: HOSPADM

## 2021-09-17 RX ORDER — CEPHALEXIN 500 MG/1
500 CAPSULE ORAL 2 TIMES DAILY
Qty: 14 CAPSULE | Refills: 0 | Status: SHIPPED | OUTPATIENT
Start: 2021-09-17 | End: 2021-12-27 | Stop reason: ALTCHOICE

## 2021-09-17 RX ORDER — OXYMETAZOLINE HYDROCHLORIDE 0.05 G/100ML
2 SPRAY NASAL 2 TIMES DAILY
Qty: 14.7 ML | Refills: 3 | Status: SHIPPED | OUTPATIENT
Start: 2021-09-17 | End: 2021-10-17

## 2021-09-17 RX ORDER — LIDOCAINE HYDROCHLORIDE 20 MG/ML
JELLY TOPICAL PRN
Status: DISCONTINUED | OUTPATIENT
Start: 2021-09-17 | End: 2021-09-17 | Stop reason: HOSPADM

## 2021-09-17 RX ORDER — OXYMETAZOLINE HYDROCHLORIDE 0.05 G/100ML
2 SPRAY NASAL ONCE
Status: DISCONTINUED | OUTPATIENT
Start: 2021-09-17 | End: 2021-09-17 | Stop reason: HOSPADM

## 2021-09-17 ASSESSMENT — ENCOUNTER SYMPTOMS
COUGH: 0
SORE THROAT: 0

## 2021-09-17 NOTE — ED PROVIDER NOTES
[facility] ED  eMERGENCY dEPARTMENT eNCOUnter      Pt Name: Jeannie De Oliveira  MRN: [id number]  [place] 1945  Date of evaluation: 9/17/21      CHIEF COMPLAINT       Chief Complaint   Patient presents with    Other     Pt states he needs nasal balloon removed         HISTORY OF PRESENT ILLNESS    Jeannie De Oliveira is a 64 y.o. male who presents complaining of nasal packing in left nares since weds, wants it removed  The history is provided by the patient. Epistaxis  Location:  L nare (had rhino rocket placed on wed, wants it taken out, no bleeding, he has held plavix for the past 3 days. He did not make an appt with ENT as they are out of town. )  Progression:  Resolved  Chronicity:  New  Context: anticoagulants    Relieved by:  Nasal tampon  Worsened by:  Nothing  Ineffective treatments:  None tried  Associated symptoms: no blood in oropharynx, no cough, no dizziness, no facial pain, no fever, no headaches, no sore throat and no syncope    Risk factors comment:  Plavix which has been held since weds      REVIEW OF SYSTEMS       Review of Systems   Constitutional: Negative for fever. HENT: Positive for nosebleeds. Negative for sore throat. Respiratory: Negative for cough. Cardiovascular: Negative for syncope. Neurological: Negative for dizziness and headaches. All other systems reviewed and are negative.       PAST MEDICAL HISTORY     Past Medical History:   Diagnosis Date    Anxiety     Aortic stenosis     Atrial fibrillation, new onset ([address] 03/05/2020    BPH (benign prostatic hyperplasia)     CHF (congestive heart failure) (HCC)     Diabetes mellitus (HCC)     borderline    Hyperlipidemia     Hypertension     Lymphedema     bilat legs    On home O2     Osteoarthritis     Septic shock due to urinary tract infection ([address] /  Levophed and fluids 5/27/2020       SURGICAL HISTORY       Past Surgical History:   Procedure Laterality Date    CARDIAC CATHETERIZATION  03/2020    OTHER SURGICAL HISTORY  2020    TAVR/ Dr. Pedro Gutierrez       Previous Medications    ACETAMINOPHEN (TYLENOL) 500 MG TABLET    Take 500 mg by mouth every 6 hours as needed for Pain     AMIODARONE (PACERONE) 200 MG TABLET    Take 1 tablet by mouth daily    ATORVASTATIN (LIPITOR) 40 MG TABLET    Take 1 tablet by mouth once daily    CHOLECALCIFEROL (VITAMIN D3) 50 MCG (2000 UT) CAPS    Take by mouth     CLOPIDOGREL (PLAVIX) 75 MG TABLET    Take 1 tablet by mouth daily    FUROSEMIDE (LASIX) 20 MG TABLET    Take 2 tabs by mouth for 40mg Monday,  and Friday, and take 1 tab on the other days    LOSARTAN (COZAAR) 25 MG TABLET    Take 1 tablet by mouth once daily    MULTIPLE VITAMINS-MINERALS (THERAPEUTIC MULTIVITAMIN-MINERALS) TABLET    Take 1 tablet by mouth daily     PAROXETINE (PAXIL) 30 MG TABLET    Take 1 tablet by mouth every morning    POTASSIUM CHLORIDE (KLOR-CON M10) 10 MEQ EXTENDED RELEASE TABLET    Take 2 tablets by mouth daily    SIMETHICONE (MYLICON) 80 MG CHEWABLE TABLET    Take 80 mg by mouth every 6 hours as needed for Flatulence (Gas pain)    TAMSULOSIN (FLOMAX) 0.4 MG CAPSULE    Take 1 capsule by mouth daily       ALLERGIES     has No Known Allergies. FAMILY HISTORY     He indicated that his mother is . He indicated that his father is . He indicated that the status of his brother is unknown. SOCIAL HISTORY      reports that he quit smoking about 48 years ago. His smoking use included cigarettes. He has a 10.00 pack-year smoking history. He has never used smokeless tobacco. He reports that he does not drink alcohol and does not use drugs. PHYSICAL EXAM     INITIAL VITALS: BP (!) 158/67   Pulse 80   Temp 99.2 °F (37.3 °C)   Resp 18   Ht 5' 9\" (1.753 m)   Wt (!) 365 lb (165.6 kg)   SpO2 95%   BMI 53.90 kg/m²      Physical Exam  Vitals and nursing note reviewed. Constitutional:       Appearance: He is well-developed.    HENT: Head: Normocephalic and atraumatic. Nose:      Comments: Rhino rocket to left nares  Cardiovascular:      Rate and Rhythm: Normal rate and regular rhythm. Heart sounds: Normal heart sounds. Pulmonary:      Effort: Pulmonary effort is normal.      Breath sounds: Normal breath sounds. Neurological:      Mental Status: He is alert and oriented to person, place, and time. MEDICAL DECISION MAKING:     Packing removed he tolerated this well. He does have some mild blood noted at the vestibule in the lateral aspect it appears this is irritated from having the packing in his nose. But there is no posterior bleed or intranasal bleed noted. He was given Afrin. Recommend he follow-up with ear nose and throat in 1 to 2 days he needs to call the office and make an appointment for follow-up. We will start him on Keflex to avoid infection intranasally. Recommend Afrin spray once or twice a day cold compresses if bleeding worsens any other concerns please return to the emergency department    DIAGNOSTIC RESULTS     EKG: All EKG's are interpreted by the Emergency Department Physician who either signs or Co-signs this chart in the absence of acardiologist.        RADIOLOGY:Allplain film, CT, MRI, and formal ultrasound images (except ED bedside ultrasound) are read by the radiologist and the images and interpretations are directly viewed by the emergency physician. LABS:All lab results were reviewed by myself, and all abnormals are listed below.   Labs Reviewed - No data to display      EMERGENCY DEPARTMENT COURSE:   Vitals:    Vitals:    09/17/21 1015 09/17/21 1017   BP:  (!) 158/67   Pulse: 80    Resp: 18    Temp: 99.2 °F (37.3 °C)    SpO2: 95%    Weight: (!) 365 lb (165.6 kg)    Height: 5' 9\" (1.753 m)        The patient was given the following medications while in the emergency department:  Orders Placed This Encounter   Medications    lidocaine (XYLOCAINE) 2 % uro-jet    oxymetazoline (AFRIN) 0.05 % nasal spray 2 spray    lidocaine-EPINEPHrine 1 %[id number] injection 20 mL    oxymetazoline (12 HOUR NASAL SPRAY) 0.05 % nasal spray     Si sprays by Nasal route 2 times daily     Dispense:  14.7 mL     Refill:  3    cephALEXin (KEFLEX) 500 MG capsule     Sig: Take 1 capsule by mouth 2 times daily for 7 days     Dispense:  14 capsule     Refill:  0       -------------------------      CRITICAL CARE:     CONSULTS:  None    PROCEDURES:  Procedures    FINAL IMPRESSION      1. Epistaxis Stable         DISPOSITION/PLAN   DISPOSITION Decision To Discharge 2021 12:58:34 PM      PATIENT REFERREDTO:  No follow-up provider specified.     DISCHARGEMEDICATIONS:  New Prescriptions    CEPHALEXIN (KEFLEX) 500 MG CAPSULE    Take 1 capsule by mouth 2 times daily for 7 days    OXYMETAZOLINE (12 HOUR NASAL SPRAY) 0.05 % NASAL SPRAY    2 sprays by Nasal route 2 times daily       (Please note that portions of this note were completed with a voice recognition program.  Efforts were made to edit thedictations but occasionally words are mis-transcribed.)    JORDANA Duckworth PA-C  [phone]

## 2021-09-17 NOTE — TELEPHONE ENCOUNTER
Patient reports that the plug from his nose has been removed and he is not having anymore bleeding. Wants to know when he can start his plavix back up. Per Dr Jones Simpler can restart plavix. Informed patient and patient verbalized understanding.

## 2021-09-17 NOTE — TELEPHONE ENCOUNTER
----- Message from Sofiya Louis MD sent at 9/17/2021  2:25 PM EDT -----  Blood work is good. Continue current therapy and follow up. Please call with questions and/or concerns.  Thank you

## 2021-09-23 ENCOUNTER — OFFICE VISIT (OUTPATIENT)
Dept: OTOLARYNGOLOGY | Age: 76
End: 2021-09-23
Payer: MEDICARE

## 2021-09-23 VITALS — DIASTOLIC BLOOD PRESSURE: 67 MMHG | SYSTOLIC BLOOD PRESSURE: 139 MMHG | TEMPERATURE: 98 F | HEART RATE: 75 BPM

## 2021-09-23 DIAGNOSIS — R04.0 EPISTAXIS: Primary | ICD-10-CM

## 2021-09-23 DIAGNOSIS — Z79.02 LONG TERM (CURRENT) USE OF ANTITHROMBOTICS/ANTIPLATELETS: ICD-10-CM

## 2021-09-23 PROBLEM — Z48.00 ENCOUNTER FOR REMOVAL OF NASAL PACKING: Status: RESOLVED | Noted: 2020-04-15 | Resolved: 2021-09-23

## 2021-09-23 PROCEDURE — G8417 CALC BMI ABV UP PARAM F/U: HCPCS | Performed by: PHYSICIAN ASSISTANT

## 2021-09-23 PROCEDURE — 1036F TOBACCO NON-USER: CPT | Performed by: PHYSICIAN ASSISTANT

## 2021-09-23 PROCEDURE — 99211 OFF/OP EST MAY X REQ PHY/QHP: CPT

## 2021-09-23 PROCEDURE — 4040F PNEUMOC VAC/ADMIN/RCVD: CPT | Performed by: PHYSICIAN ASSISTANT

## 2021-09-23 PROCEDURE — 1123F ACP DISCUSS/DSCN MKR DOCD: CPT | Performed by: PHYSICIAN ASSISTANT

## 2021-09-23 PROCEDURE — 99213 OFFICE O/P EST LOW 20 MIN: CPT | Performed by: PHYSICIAN ASSISTANT

## 2021-09-23 PROCEDURE — G8427 DOCREV CUR MEDS BY ELIG CLIN: HCPCS | Performed by: PHYSICIAN ASSISTANT

## 2021-09-23 ASSESSMENT — ENCOUNTER SYMPTOMS
STRIDOR: 0
COLOR CHANGE: 0
RHINORRHEA: 1
SINUS PAIN: 0
SHORTNESS OF BREATH: 0
TROUBLE SWALLOWING: 0
RECTAL PAIN: 0
FACIAL SWELLING: 0
PHOTOPHOBIA: 0
COUGH: 0
APNEA: 1
EYE PAIN: 0
VOICE CHANGE: 0
BACK PAIN: 0
ANAL BLEEDING: 0
BLOOD IN STOOL: 0
ABDOMINAL DISTENTION: 0
CONSTIPATION: 0
EYE ITCHING: 0
CHOKING: 0
ABDOMINAL PAIN: 0
DIARRHEA: 0
NAUSEA: 0
WHEEZING: 0
SINUS PRESSURE: 0
CHEST TIGHTNESS: 0
VOMITING: 0
SORE THROAT: 0
EYE REDNESS: 0
EYE DISCHARGE: 0

## 2021-09-23 NOTE — PROGRESS NOTES
Review of Systems   Constitutional: Negative for activity change, appetite change, chills, diaphoresis, fatigue, fever and unexpected weight change. HENT: Positive for congestion, hearing loss, nosebleeds, postnasal drip, rhinorrhea, sneezing and tinnitus. Negative for dental problem, drooling, ear discharge, ear pain, facial swelling, mouth sores, sinus pressure, sinus pain, sore throat, trouble swallowing and voice change. Eyes: Negative for photophobia, pain, discharge, redness, itching and visual disturbance. Respiratory: Positive for apnea. Negative for cough, choking, chest tightness, shortness of breath, wheezing and stridor. Cardiovascular: Positive for leg swelling. Negative for chest pain and palpitations. Gastrointestinal: Negative for abdominal distention, abdominal pain, anal bleeding, blood in stool, constipation, diarrhea, nausea, rectal pain and vomiting. Endocrine: Negative for cold intolerance, heat intolerance, polydipsia, polyphagia and polyuria. Genitourinary: Positive for frequency. Negative for decreased urine volume, difficulty urinating, discharge, dysuria, enuresis, flank pain, genital sores, hematuria, penile pain, penile swelling, scrotal swelling, testicular pain and urgency. Musculoskeletal: Positive for arthralgias and gait problem. Negative for back pain, joint swelling, myalgias, neck pain and neck stiffness. Skin: Negative for color change, pallor, rash and wound. Allergic/Immunologic: Negative for environmental allergies, food allergies and immunocompromised state. Neurological: Negative for dizziness, tremors, seizures, syncope, facial asymmetry, speech difficulty, weakness, light-headedness, numbness and headaches. Hematological: Negative for adenopathy. Bruises/bleeds easily.    Psychiatric/Behavioral: Negative for agitation, behavioral problems, confusion, decreased concentration, dysphoric mood, hallucinations, self-injury, sleep disturbance and suicidal ideas. The patient is not nervous/anxious and is not hyperactive.

## 2021-09-23 NOTE — PATIENT INSTRUCTIONS
NASAL SALINE (SALTWATER) RINSES     Your doctor has recommended the use of saline (salt water) nasal rinses. Nasal rinses have been used for centuries for the treatment of nasal and sinus infection, bothersome secretions, allergy, and nasal dryness. It is safe and effective for use in both children and adults. Daily rinsing of the nasal passages with saline promotes nasal and sinus health by washing away dried mucus, infected secretions, dust, pollen, and mold spores, by moisturizing the nasal lining, promoting normal mucus flow, and naturally decongesting inflamed tissues. Rinses also help with clearing dried blood, mucus, and infection after sinus surgery and promote rapid healing of tissue in this setting. In the beginning it is normal to have some difficulty with performing nasal rinses, but with practice, most people find that it becomes part of their normal routine just as much as brushing teeth or showering, and often, most wouldnt think of leaving home without it. RINSE INGREDIENTS:  No special tools are required for nasal saline rinses, and everything that you need can be picked up at your local pharmacy and grocery. Commercial saline rinses and syringe systems such as NeilMed SINUS RINSE, Ayr Saline Nasal Rinse, or Neti Pot are available, but these may contain preservatives or other irritants, and it is often less expensive and more convenient to make it at home.   You will need the following:  Distilled water (tap water contains irritating minerals and bacteria, but may be used if boiled)  Kosher, pickling, or non-iodized table salt (regular table salt contains iodine, an irritant)  Baking soda (not baking powder)  Corn syrup (optional, for additional moisturizing effect)  White vinegar (for antiseptic effect, if recommended by your doctor)  An airtight container for mixing saline rinse  Rubber bulb syringe (like those used to clear infant noses and ears)  Household bleach (for cleaning container and bulb syringe)    PREPARATION:  To prepare the rinses, measure out 1 quart (1 liter) of distilled or boiled tap water into the mixing container. Then add 2-3 heaping teaspoons of salt and 1 teaspoon of baking soda, mix to dissolve, and place in refrigerator for 1-2 days of storage. You may add 3-4 tablespoons of corn syrup if you experience nasal dryness. If your doctor recommends it, add 1 teaspoon of white vinegar to this mixture as well. STORAGE & CLEANING:  It is advised that you make up fresh rinse every day or two, and that you keep the unused rinse in the refrigerator in an airtight container to prevent bacterial growth. Set out enough rinse for your next use well in advance to let it come to room temperature before use. Wash the container and bulb syringe at least once a week in a quart of water with 2 tablespoons of bleach, rinse and dry to prevent bacterial growth    USING SALINE RINSES:  To perform nasal saline rinses, plan on using at least 1 pint (2 cups) twice daily. Lean over a sink or other basin (some people prefer to do this in the shower as it can be messy, especially when you first start) to catch the rinse as it drains from your nose and mouth. Fill the bulb syringe with the rinse solution and place it into the nostril on one side. Gently squeeze the bulb to flush the nasal passage until the rinse drains clear. Repeat on the other side. You should plan on using the rinses twice a day, which should take about 10 minutes to perform. OTHER MEDICATIONS:  If your doctor has prescribed other nasal spray medications, such as a nasal steroid, it is best to apply these after the nasal rinse so that you do not wash the medication away. It is also safe to continue with your other antihistamine or decongestant medications that your doctor may have prescribed in addition to the saline rinses.   If you have an allergy or adverse reaction to any of the ingredients do not use it in the preparation of the saline rinses. WHEN TO CALL US:  Stop the rinses and notify your doctor if you experience severe pain in the nose or ears, bleeding, or any other problems with the use of the nasal saline rinses. It is normal, especially in the beginning, to experience drainage of saline and nasal secretions into the throat. This is best avoided by holding your breath and leaning forward when using the rinses. Experienced users often will have the rinse exit the opposite nostril without drainage into the throat at all, and this can be achieved by most people with practice. Please call us if you have any additional questions or concerns. NOTICE:  THIS DOCUMENT IS INTENDED SOLELY FOR PATIENT EDUCATIONAL PURPOSES AND IS PROVIDED AS A COURTESY TO PATIENTS OF DR. Martir Valadez MD AND Isai Dickey PA-C. IT IS NOT INTENDED AS A SUBSTITUTE FOR PROFESSIONAL MEDICAL CARE OR ADVICE. THE PATIENT SHOULD SEEK ADVICE FROM THE PHYSICIAN IF THERE ARE ANY QUESTIONS ABOUT THE CONTENTS OR DIRECTIONS PROVIDED IN THIS DOCUMENT.

## 2021-09-23 NOTE — PROGRESS NOTES
[facility] [address] ENT PART OF [facility]  [address] [facility]. [address] [place] [address]  Dept: [phone]  MD Mariano Copeland PA-C Lorraine Browner 64 y.o. male     Patient presents with a chief complaint of Other (Patient in for recurrent epistaxis. Last OV 05/20. Patient states he was in [place] ED last week d/t epistaxis. )     CC documented by nursing staff and noted. /67   Pulse 75   Temp 98 °F (36.7 °C)       History of Presenting Illness: The patient/caregiver reports a history of complaint with the following features: Onset:  9/15/21 - nose bleed started when Pt was sitting at the computer. Last seen here 5/21/20 for epistaxis and this has been the only bleeding incident since then. Timing:  Denies associated nasal injury. Frequency:  Duration:  Duration of bleeding was 45 minutes before getting to hospital.  Prior to arriving at ED, Pt packed left side of nose with cotton balls and then blood started coming out of right side of nose so packed right side with cotton balls and then blood started going down back of throat. Quality/Location:  Epistaxis left side of nose (has resolved). \"Scab\" over the area inside left side of nose per Pt. Has been putting triple antibiotic on the area BID. Nasal tenderness if pushing on outside of left side of nose (when pushing on soft tissue area/alar area). Severity:  ED visit 9/15/21 for nasal bleeding and Rapid Rhino placed in left side of nose and bleeding subsequently stopped. 9/17/21 ED visit to remove Rapid Rhino from left side of nose and note indicates Pt held Plavix for 3 days. Associated symptoms/other symptoms:  Nasal congestion? Yes, especially this time of year per Pt. Has been sneezing more than usual.    Lightheadedness? Denies  Risk factors/other information:  Hx of nasal trauma? Denies  On Plavix. Held Plavix x 3 days (9/15 to 9/17). Back to taking it.   Denies tablet, Take 2 tablets by mouth daily, Disp: 180 tablet, Rfl: 3    tamsulosin (FLOMAX) 0.4 MG capsule, Take 1 capsule by mouth daily, Disp: 30 capsule, Rfl: 11    PARoxetine (PAXIL) 30 MG tablet, Take 1 tablet by mouth every morning, Disp: 90 tablet, Rfl: 1    Multiple Vitamins-Minerals (THERAPEUTIC MULTIVITAMIN-MINERALS) tablet, Take 1 tablet by mouth daily , Disp: , Rfl:     Cholecalciferol (VITAMIN D3) 50 MCG ( UT) CAPS, Take by mouth , Disp: , Rfl:     clopidogrel (PLAVIX) 75 MG tablet, Take 1 tablet by mouth daily, Disp: 30 tablet, Rfl: 5    simethicone (MYLICON) 80 MG chewable tablet, Take 80 mg by mouth every 6 hours as needed for Flatulence (Gas pain), Disp: , Rfl:     acetaminophen (TYLENOL) 500 MG tablet, Take 500 mg by mouth every 6 hours as needed for Pain , Disp: , Rfl:    No Known Allergies   Past Surgical History:   Procedure Laterality Date    CARDIAC CATHETERIZATION  2020    OTHER SURGICAL HISTORY  2020    TAVR/ Dr. Adilson Townsend History     Socioeconomic History    Marital status:      Spouse name: En Ferrera Number of children: Not on file    Years of education: Not on file    Highest education level: Not on file   Occupational History    Not on file   Tobacco Use    Smoking status: Former Smoker     Packs/day: 1.00     Years: 10.00     Pack years: 10.00     Types: Cigarettes     Quit date:      Years since quittin.7    Smokeless tobacco: Never Used   Vaping Use    Vaping Use: Never used   Substance and Sexual Activity    Alcohol use: Never    Drug use: No    Sexual activity: Not on file   Other Topics Concern    Not on file   Social History Narrative    Not on file     Social Determinants of Health     Financial Resource Strain: Low Risk     Difficulty of Paying Living Expenses: Not hard at all   Food Insecurity: No Food Insecurity    Worried About Running Out of Food in the Last Year: Never true    Gus of Food in the Last Year: Never true   Transportation Needs:     Lack of Transportation (Medical):  Lack of Transportation (Non-Medical):    Physical Activity:     Days of Exercise per Week:     Minutes of Exercise per Session:    Stress:     Feeling of Stress :    Social Connections: Unknown    Frequency of Communication with Friends and Family: Once a week    Frequency of Social Gatherings with Friends and Family: Once a week    Attends Congregational Services: Never    Active Member of Clubs or Organizations: No    Attends Club or Organization Meetings: Never    Marital Status: Not on file   Intimate Partner Violence:     Fear of Current or Ex-Partner:     Emotionally Abused:     Physically Abused:     Sexually Abused:      Family History   Problem Relation Age of Onset    Arthritis Mother     Heart Disease Mother     High Cholesterol Mother     High Blood Pressure Mother     Stroke Mother     High Blood Pressure Brother           PHYSICAL EXAM:    The patient was examined today 9/23/2021 with findings as follows:    CONSTITUTIONAL:    General Appearance: well-appearing, nontoxic, alert, no acute distress   Presents in a motorized wheelchair.     Communication: understanding at normal conversational tones, normal voicing, speech intelligible    HEAD/FACE:    Head: atraumatic, normocephalic    Facial Inspection: no lesions, healthy skin    Facial Strength: motor strength normal, symmetric strength, symmetric movement    Temporomandibular Joint:  no trismus, motion symmetric    NOSE:    Nasal Skin: no lesions, no lacerations, no scars    Nasal Dorsum: symmetric with no visible deformities    Nasal Tip: normal symmetric nasal tip, normal nasal valves    Nasal Mucosa: normal, pink and moist, no drainage, no lesions or masses, no crusting    Septum: not markedly deformed, midline, no exposed vessels, no bleeding, no septal granuloma, no perforation    Turbinates: normal size and conformation    ORAL CAVITY/MOUTH:    Lips, teeth, gums: normal lips, normal gums, dentition intact    Oral Mucosa: normal, moist, no lesions    Palate: normal hard palate, normal soft palate, symmetric palatal elevation    Floor of Mouth: normal floor of mouth    Tongue: normal tongue, no lesions, no edema, no masses, normal mucosa, mobile    Tonsils: normal tonsils, no enlargement, symmetric, no lesions or masses, no erythema, no exudate    Posterior pharynx: normally formed, no erythema, no exudate, no PND, no lesions or masses    SKIN:    General Appearance:  warm and dry, no bruising of exposed skin    NEUROLOGICAL SYSTEM:    Orientation: oriented to time, oriented to place, oriented to person, oriented to situation    Cranial Nerves: Cranial Nerves II-XII intact, normal facial movement    PSYCHIATRIC:    Mood and affect:  Affect appropriate for situation/setting. Assessment and Plan:  The patient and/or caregiver is advised on the application of anterior nasal pressure for the control of repeat bleeding. I have advised the use of saline nasal rinses for moisturization of the nasal cavities and an informational sheet on the preparation and use of saline is provided. We have discussed the topical application of Bacitracin or other antibiotic ointment to the anterior nasal septum twice daily as well as the maintenance of proper humidity in the home and the avoidance of nasal trauma to prevent further bleeding. Advised to not use Afrin on a routine basis, but can keep on hand if gets recurrence of epistaxis. Discussed sequelae of prolonged use of Afrin intranasally. Discussed using humidification for CPAP/supplemental oxygen if not already doing so. The patient and/or caregiver is to notify the office if worsening of symptoms is noted. The patient and/or caregiver is able to state an understanding of these recommendations and is agreeable to the treatment plan. Diagnosis Orders   1. Epistaxis     2.  Long term (current) use of antithrombotics/antiplatelets        Return if symptoms worsen or fail to improve.

## 2021-10-12 ENCOUNTER — OFFICE VISIT (OUTPATIENT)
Dept: CARDIOLOGY | Age: 76
End: 2021-10-12
Payer: MEDICARE

## 2021-10-12 VITALS
SYSTOLIC BLOOD PRESSURE: 117 MMHG | HEART RATE: 64 BPM | WEIGHT: 315 LBS | DIASTOLIC BLOOD PRESSURE: 70 MMHG | BODY MASS INDEX: 46.65 KG/M2 | RESPIRATION RATE: 18 BRPM | HEIGHT: 69 IN | OXYGEN SATURATION: 95 %

## 2021-10-12 DIAGNOSIS — I50.42 CHRONIC COMBINED SYSTOLIC AND DIASTOLIC CONGESTIVE HEART FAILURE (HCC): ICD-10-CM

## 2021-10-12 DIAGNOSIS — I44.2 AV BLOCK, 3RD DEGREE (HCC): ICD-10-CM

## 2021-10-12 DIAGNOSIS — I10 ESSENTIAL HYPERTENSION: ICD-10-CM

## 2021-10-12 DIAGNOSIS — Z99.89 OSA ON CPAP: ICD-10-CM

## 2021-10-12 DIAGNOSIS — I35.0 SEVERE AORTIC STENOSIS: ICD-10-CM

## 2021-10-12 DIAGNOSIS — E78.2 MIXED HYPERLIPIDEMIA: ICD-10-CM

## 2021-10-12 DIAGNOSIS — I48.0 PAF (PAROXYSMAL ATRIAL FIBRILLATION) (HCC): ICD-10-CM

## 2021-10-12 DIAGNOSIS — Z95.2 S/P TAVR (TRANSCATHETER AORTIC VALVE REPLACEMENT): ICD-10-CM

## 2021-10-12 DIAGNOSIS — Z95.0 PACEMAKER: Primary | ICD-10-CM

## 2021-10-12 DIAGNOSIS — G47.33 OSA ON CPAP: ICD-10-CM

## 2021-10-12 PROCEDURE — G8417 CALC BMI ABV UP PARAM F/U: HCPCS | Performed by: INTERNAL MEDICINE

## 2021-10-12 PROCEDURE — 1036F TOBACCO NON-USER: CPT | Performed by: INTERNAL MEDICINE

## 2021-10-12 PROCEDURE — 1123F ACP DISCUSS/DSCN MKR DOCD: CPT | Performed by: INTERNAL MEDICINE

## 2021-10-12 PROCEDURE — 4040F PNEUMOC VAC/ADMIN/RCVD: CPT | Performed by: INTERNAL MEDICINE

## 2021-10-12 PROCEDURE — 99214 OFFICE O/P EST MOD 30 MIN: CPT | Performed by: INTERNAL MEDICINE

## 2021-10-12 PROCEDURE — G8484 FLU IMMUNIZE NO ADMIN: HCPCS | Performed by: INTERNAL MEDICINE

## 2021-10-12 PROCEDURE — 99211 OFF/OP EST MAY X REQ PHY/QHP: CPT | Performed by: INTERNAL MEDICINE

## 2021-10-12 PROCEDURE — 93005 ELECTROCARDIOGRAM TRACING: CPT | Performed by: INTERNAL MEDICINE

## 2021-10-12 PROCEDURE — G8427 DOCREV CUR MEDS BY ELIG CLIN: HCPCS | Performed by: INTERNAL MEDICINE

## 2021-10-12 PROCEDURE — 93288 INTERROG EVL PM/LDLS PM IP: CPT | Performed by: INTERNAL MEDICINE

## 2021-10-12 RX ORDER — ASPIRIN 81 MG/1
81 TABLET ORAL DAILY
Qty: 30 TABLET | Refills: 3 | COMMUNITY
Start: 2021-10-12 | End: 2021-10-15

## 2021-10-12 NOTE — PROGRESS NOTES
Nathalie Whiting am scribing for and in the presence of Bennett Stuart MD, F.A.C.C..    Patient: Rohit Brown  : 1945  Date of Visit: 2021    History of Present Illness:        Dear Amna Ochoa MD    I had the pleasure of seeing Rohit Brown in my office today. Mr. Leobardo Luna is a 64 y.o. male who presented for follow-up. 1-He has  a history of heart failure. He has a history of hypertension and hyperlipidemia for years, and has borderline diabetes. Symptoms of heart failure: Mr. Leobardo Luna reports that he has a fairly poor exercise tolerance. His says that he can really not ambulate at all due to his weight and shortness of breath. 2- ECG on 3/20: sinus tachycardia with first-degree AV block and nonspecific intraventricular conduction delay. 3-Echocardiogram done 3/4/20 which showed severely reduced left ventricular systolic function with ejection fraction of 35% and moderate to severe aortic stenosis. This can be an underestimation because of reduced left ventricular systolic function. Mild to moderate aortic regurgitation. Moderate mitral regurgitation and moderate diastolic dysfunction. 4-Heart Catheterization done on 3/12/20: LMCA: Normal 0% stenosis. LAD: Mild irregularities 20-30% LCx: Lesion on 2nd Ob Angie Ostial 50% stenosis. RCA: Mild irregularities 10-20%. EF:35%. Severe aortic stenosis by cardiac catheterization with peak to peak gradient of 67 mmHg and mean gradient of 48 mmHg across the aortic valve. 5-Admission to [facility] on 3/4/20-3/9/20 due to acute heart failure: Patient treated for acute on chronic combined CHF and aortic stenosis, later developed atrial fibrillation with rapid ventricular response. Moved to the ICU and started on amiodarone drip and then later converted back to normal sinus rhythm. Amiodarone oral tablet was started prior to discharge. 6-On 2020, he presented to the emergency room with epistaxis.   Nasal packing done that removed after 3 days. 7-Transcatheter aortic valve replacement with a Medtronic 34 mm Evolut Pro Plus prosthesis 9/15/2020    8-Echocardiogram done on 10/13/2020 showed an EF of 55%. Mild to moderate LV hypertrophy. Status post TAVR with mean gradient of 9 mmHg, mild perivalvular regurgitation noted. Mild mitral regurgitation. Moderate diastolic dysfunction is seen. 9-Holter monitor done on 1/14/2021: Sinus rhythm with intraventricular conduction delay and occasional PACs and very short runs of atrial tachycardia. The longest was 3 beats at a heart rate of 100 bpm.  No significant ventricular arrhythmia. 10-Echo done on 1/21/2021- EF 50-55%, Mild to moderately increased left ventricular wall thickness with a mildly increased left ventricular cavity size. The left atrium is severely dilated (>40) with a left atrial volume index of 43 ml/m2. S/P TAVR with a mean gradient of 11 mmHg. Trivial aortic regurgitation noted. Evidence of moderate diastolic dysfunction is seen    11-CAM monitor worn on 1/21/2020- 6 days and 21 hours recorded. Average heart rate was 61 bpm, ranging from 46 to 90 bpm. First degree AV block, Mobitz type 1 second-degree AV block and occasional high grade/third degree AV block. Minimum HR occurred during 2nd degree AV block was 32 bpm.     12-Medtronic pacemaker insertion on 2/18/2021 by Dr Zabrina Ji interrogated in office today with Rep on 4/12/2021: Normal pacemaker function. No atrial fibrillation. Mr. Carlton Perez is here today for a follow up. He continues to do well. He says he has gained a couple of pounds. He denies any chest pain, pressure or tightness. No orthopnea or PND. No lightheaded/dizziness or palpitations. No bleeding problems, bowel issues, problems with current medications or any other concerns at this time. No abdominal pain, nausea or vomiting. No cough, fever or chills.     EKG done in office today 10/12/2021-ventricular paced atorvastatin (LIPITOR) 40 MG tablet Take 1 tablet by mouth once daily 90 tablet 3    amiodarone (PACERONE) 200 MG tablet Take 1 tablet by mouth daily 90 tablet 1    potassium chloride (KLOR-CON M10) 10 MEQ extended release tablet Take 2 tablets by mouth daily 180 tablet 3    tamsulosin (FLOMAX) 0.4 MG capsule Take 1 capsule by mouth daily 30 capsule 11    PARoxetine (PAXIL) 30 MG tablet Take 1 tablet by mouth every morning 90 tablet 1    simethicone (MYLICON) 80 MG chewable tablet Take 80 mg by mouth every 6 hours as needed for Flatulence (Gas pain)      Multiple Vitamins-Minerals (THERAPEUTIC MULTIVITAMIN-MINERALS) tablet Take 1 tablet by mouth daily       Cholecalciferol (VITAMIN D3) 50 MCG (2000 UT) CAPS Take by mouth       acetaminophen (TYLENOL) 500 MG tablet Take 500 mg by mouth every 6 hours as needed for Pain          FAMILY HISTORY: family history includes Arthritis in his mother; Heart Disease in his mother; High Blood Pressure in his brother and mother; High Cholesterol in his mother; Stroke in his mother. PHYSICAL EXAMINATION:     /70 (Site: Left Upper Arm, Position: Sitting, Cuff Size: Large Adult)   Pulse 64   Resp 18   Ht 5' 9.02\" (1.753 m)   Wt (!) 368 lb (166.9 kg)   SpO2 95%   BMI 54.32 kg/m²  Body mass index is 54.32 kg/m². Constitutional: He is oriented to person, place, and time. He appears well-developed and well-nourished. In no acute distress. HEENT: Normocephalic and atraumatic. No JVD present. Carotid bruit is not present. No mass and no thyromegaly present. No lymphadenopathy present. Cardiovascular: Normal rate, regular rhythm, normal heart sounds. Exam reveals no gallop and no friction rubs. 1/6 systolic murmur, 2nd intercostal space on the LEFT just lateral to the sternum  Pulmonary/Chest: Effort normal and breath sounds normal. No respiratory distress. He has no wheezes, rhonchi or rales. Abdominal: Soft, non-tender.  Bowel sounds and aorta are normal. He exhibits no organomegaly, mass or bruit. Extremities: Chronic bilateral lower extremity edema with chronic vascular changes and chronic cellulitis. Unchanged from prior. No cyanosis or clubbing. 2+ radial and carotid pulses. Distal extremity pulses: 2+ bilaterally. Neurological: He is alert and oriented to person, place, and time. No evidence of gross cranial nerve deficit. Coordination appeared normal.   Skin: Skin is warm and dry. There is no rash or diaphoresis. Psychiatric: He has a normal mood and affect. His speech is normal and behavior is normal.      MOST RECENT LABS ON RECORD:   Lab Results   Component Value Date    WBC 10.7 09/17/2021    HGB 14.2 09/17/2021    HCT 43.7 09/17/2021     09/17/2021    CHOL 127 01/21/2021    TRIG 136 01/21/2021    HDL 31 (L) 01/21/2021    ALT 15 09/17/2021    AST 18 09/17/2021     09/17/2021    K 4.6 09/17/2021    CL 98 09/17/2021    CREATININE 0.60 (L) 09/17/2021    BUN 11 09/17/2021    CO2 26 09/17/2021    TSH 2.03 09/17/2021    PSA 1.49 01/11/2019    INR 1.16 (H) 02/18/2021    GLUF 103 (H) 01/11/2019    LABA1C 5.9 03/02/2017       ASSESSMENT:       Patient Active Problem List    Diagnosis Date Noted    Aortic stenosis, severe     Dyslipidemia     AV block, 3rd degree (HCC)     Carpal tunnel syndrome on left 01/08/2019    Obesity, morbid (more than 100 lbs over ideal weight or BMI > 40) ([address] 12/15/2017    Osteoarthritis of both knees 06/13/2017    Essential hypertension 12/13/2016    Mixed hyperlipidemia 12/13/2016        Diagnosis Orders   1. Pacemaker     2. AV block, 3rd degree (HCC)     3. Chronic combined systolic and diastolic congestive heart failure ([address]     4. Severe aortic stenosis     5. S/P TAVR (transcatheter aortic valve replacement)     6. PAF (paroxysmal atrial fibrillation) ([address]     7. Essential hypertension     8. Mixed hyperlipidemia     9.  MARIA ESTHER on CPAP       PLAN:         Dual Chamber Pacemaker: Medtronic implanted on 2/18/2021 by Dr Gino Auguste - History of bradycardia:    Indication for Device Placement: Symptomatic Bradycardia, second degree AV block, complete heart block.  Interrogation Findings: Within normal limits and therefore no changes were made. No atrial fibrillation. No significant ventricular arrhythmia. Good batter life, 100% paced     Chronic combined heart failure: New York Heart Association Class: IIb (Marked symptoms during daily activities)   Beta Blocker: Currently off beta-blocker therapy because of sinus bradycardia and intermittent high-grade AV block.  ACE Inibitor/ARB: Continue losartan (Cozaar) 25 mg daily.  Diuretics: Continue lasix 40 mg on Monday, Wednesday and Friday and 20 mg every other day.  Continue Potassium 20 MEQ daily   Heart failure counseling: I advised them to try and keep their legs up whenever possible and to limit salt in their diet. · S/P TAVR 9/14/2020. · Beta Blocker: Not indicated at this time. · ACE Inibitor/ARB: Continue losartan (Cozaar) 25 mg daily. · Diuretics: Continue lasix 40 mg on Monday, Wednesday and Friday and 20 mg every other day. · Continue Potassium 20 MEQ daily  · I advised them to try and keep their legs up whenever possible and to limit salt in their diet. Paroxysmal Atrial Fibrillation: Rhythm Control    Antiplatelet Agent: STOP {clopidogrel (Plavix) and START Aspirin 81 mg daily. I also reminded them to watch for signs of bloody or black tarry stools and stop the medication immediately if this develops as this could be life threatening. Rhythm Control Agent: Continue Amiodarone (Cordarone) 200 mg once daily    Monitoring: Amiodarone Since he is being maintained on Amiodarone, I told him that we will need to closely monitor him for potential side effects. These include monitoring LFTs and TSH at least every 6 months as well as chest x-rays, pulmonary function tests, and eye exams at least on a yearly basis.    Stroke Risk: His CHADS2-VASc score is greater than 2 (2.2% stroke risk)     Essential Hypertension: Controlled  · Beta Blocker: Not indicated at this time. · ACE Inibitor/ARB: Continue losartan (Cozaar) 25 mg daily. · Diuretics: Continue lasix 40 mg on Monday, Wednesday and Friday and 20 mg every other day. · Hyperlipidemia: Mixed - Last LDL on 1/21/2021 was 69 mg/dL   · Cholesterol Reduction Therapy: Continue Atorvastatin (Lipitor) 40 mg daily.  Obstructive Sleep Apnea:   o He uses oxygen only at night.  o He is trying to use a CPAP but not very compliant with it. Finally, I recommended that he continue his other medications and follow up with you as previously scheduled. FOLLOW UP:   I told Mr. Manzo to call my office if he had any problems, but otherwise I asked him to Return in about 6 months (around 4/12/2022). However, I would be happy to see him sooner should the need arise. Sincerely,  Owen Benito MD, F.A.C.C. [facility] Cardiology Specialist     [address]  [address] [facility]  Phone: [phone], Fax: [phone]     I believe that the risk of significant morbidity and mortality related to the patient's current medical conditions are: intermediate-high. >30 minutes were spent during prep work, discussion and exam of the patient, and follow up documentation and all of their questions were answered. The documentation recorded by the scribe, accurately and completely reflects the services I personally performed and the decisions made by me. Owen Benito MD, F.A.C.C.  October 12, 2021

## 2021-10-12 NOTE — PATIENT INSTRUCTIONS
SURVEY:    You may be receiving a survey from [employer] regarding your visit today. Please complete the survey to enable us to provide the highest quality of care to you and your family. If you cannot score us a very good on any question, please call the office to discuss how we could have made your experience a very good one. Thank you.

## 2021-10-15 DIAGNOSIS — I48.0 PAF (PAROXYSMAL ATRIAL FIBRILLATION) (HCC): Primary | ICD-10-CM

## 2021-10-15 RX ORDER — ASPIRIN 81 MG/1
81 TABLET ORAL DAILY
Qty: 30 TABLET | Refills: 5 | Status: SHIPPED | OUTPATIENT
Start: 2021-10-15 | End: 2022-03-29

## 2021-11-02 ENCOUNTER — NURSE ONLY (OUTPATIENT)
Dept: CARDIOLOGY | Age: 76
End: 2021-11-02
Payer: MEDICARE

## 2021-11-02 DIAGNOSIS — Z95.0 PACEMAKER: Primary | ICD-10-CM

## 2021-11-02 DIAGNOSIS — I44.2 AV BLOCK, 3RD DEGREE (HCC): ICD-10-CM

## 2021-11-02 PROCEDURE — 93294 REM INTERROG EVL PM/LDLS PM: CPT | Performed by: INTERNAL MEDICINE

## 2021-11-02 NOTE — PROGRESS NOTES
Occupational Therapy  Facility/Department: [facility] AT THE [facility] MED SURG  Daily Treatment Note  NAME: Siena Bello  : 1945  MRN: [id number]    Date of Service: 3/7/2020    Discharge Recommendations:  Continue to assess pending progress, Patient would benefit from continued therapy after discharge, Home with Home health OT       Assessment      Activity Tolerance  Activity Tolerance: Patient Tolerated treatment well  Safety Devices  Safety Devices in place: Yes  Type of devices: Call light within reach; Left in bed  Restraints  Initially in place: Yes         Patient Diagnosis(es): The encounter diagnosis was Congestive heart failure, unspecified HF chronicity, unspecified heart failure type ([address] has a past medical history of Anxiety, Atrial fibrillation, new onset ([address] BPH (benign prostatic hyperplasia), CHF (congestive heart failure) ([facility] [address] [facility] Hyperlipidemia, Hypertension, and Osteoarthritis. has a past surgical history that includes Vasectomy (). Restrictions  Restrictions/Precautions  Restrictions/Precautions: General Precautions, Contact Precautions, Fall Risk  Subjective   General  Chart Reviewed: Yes  Patient assessed for rehabilitation services?: Yes  Response to previous treatment: Patient with no complaints from previous session  Family / Caregiver Present: No  Referring Practitioner: DIMA Khoury  Diagnosis: Acute diastolic CHF  Subjective  Subjective: Pt had no complaints of pain this date. General Comment  Comments: Pt sitting up in bed upon arrival. Pt agreed to participate in therapy session. Orientation     Objective                                                                Type of ROM/Therapeutic Exercise  Type of ROM/Therapeutic Exercise: AROM  Comment: Pt tolerated BUE ther ex with 1# dumbbell x 5 planes x 15 reps x 1 set to increase UE strength and endurance in order to ease completion of ADL tasks. Pt required RBs as needed secondary to fatigue. [facility] DIVISION OF KIDNEY DISEASES AND HYPERTENSION -- FOLLOW UP NOTE  --------------------------------------------------------------------------------  HPI: 71-year-old female with longstanding history of DM (>40 years), HTN, breast cancer, left ear malignancy and CKD was initially admitted to [facility] on 10/24/21 for L ear infection, then transferred to [facility] on 10/29/21 for further ENT management. Planned for lateral temporal bone resection by ENT team. Nephrology consulted for CKD management. On review of labs on [facility], patient noted to have elevated Scr of 1.57 on 9/24/15. Scr progressively increased over the years and was 2.17 on 9/16/19. Scr was 2.63 on 10/29/21. Scr is at 2.84 today. Pt. has been aware of her kidney disease and saw Dr. Dk Rose few months ago as outpatient in [place], [place].     Pt. seen and examined today.  Pt. feels okay but gives history of left facial/ear pain. Denies SOB, CP, HA, or dizziness.     PAST HISTORY  --------------------------------------------------------------------------------  No significant changes to PMH, PSH, FHx, SHx, unless otherwise noted    ALLERGIES & MEDICATIONS  --------------------------------------------------------------------------------  Allergies    No Known Allergies    Intolerances    Standing Inpatient Medications  atorvastatin 40 milliGRAM(s) Oral at bedtime  cefepime   IVPB 1000 milliGRAM(s) IV Intermittent every 12 hours  dextrose 40% Gel 15 Gram(s) Oral once  dextrose 5% + sodium chloride 0.45%. 1000 milliLiter(s) IV Continuous <Continuous>  dextrose 5%. 1000 milliLiter(s) IV Continuous <Continuous>  dextrose 5%. 1000 milliLiter(s) IV Continuous <Continuous>  dextrose 50% Injectable 25 Gram(s) IV Push once  dextrose 50% Injectable 12.5 Gram(s) IV Push once  dextrose 50% Injectable 25 Gram(s) IV Push once  glucagon  Injectable 1 milliGRAM(s) IntraMuscular once  heparin   Injectable 5000 Unit(s) SubCutaneous every 12 hours  hydrALAZINE 50 milliGRAM(s) Oral three times a day  influenza  Vaccine (HIGH DOSE) 0.7 milliLiter(s) IntraMuscular once  insulin glargine Injectable (LANTUS) 20 Unit(s) SubCutaneous at bedtime  insulin lispro (ADMELOG) corrective regimen sliding scale   SubCutaneous three times a day before meals  insulin lispro (ADMELOG) corrective regimen sliding scale   SubCutaneous at bedtime  insulin lispro Injectable (ADMELOG) 10 Unit(s) SubCutaneous three times a day before meals  levothyroxine 75 MICROGram(s) Oral daily  polyethylene glycol 3350 17 Gram(s) Oral daily  senna 2 Tablet(s) Oral at bedtime  sodium bicarbonate 325 milliGRAM(s) Oral two times a day    PRN Inpatient Medications  acetaminophen     Tablet .. 650 milliGRAM(s) Oral every 6 hours PRN  oxyCODONE    IR 5 milliGRAM(s) Oral every 6 hours PRN  oxyCODONE    IR 2.5 milliGRAM(s) Oral every 6 hours PRN    REVIEW OF SYSTEMS  --------------------------------------------------------------------------------  Gen: No fevers , L ear pain +  Respiratory: No dyspnea  CV: No chest pain  GI: No abdominal pain  : No dysuria  MSK: No  edema  Neuro: no dizziness     All other systems were reviewed and are negative, except as noted.    VITALS/PHYSICAL EXAM  --------------------------------------------------------------------------------  T(C): 36.4 (11-02-21 @ 09:33), Max: 36.7 (11-01-21 @ 14:02)  HR: 67 (11-02-21 @ 09:33) (59 - 67)  BP: 150/66 (11-02-21 @ 09:33) (114/50 - 150/66)  RR: 18 (11-02-21 @ 09:33) (17 - 18)  SpO2: 99% (11-02-21 @ 09:33) (97% - 99%)  Wt(kg): --    11-01-21 @ 07:01  -  11-02-21 @ 07:00  --------------------------------------------------------  IN: 1190 mL / OUT: 2200 mL / NET: -1010 mL    11-02-21 @ 07:01  -  11-02-21 @ 11:59  --------------------------------------------------------  IN: 0 mL / OUT: 400 mL / NET: -400 mL    Physical Exam:  	Gen: resting, NAD  	HEENT: L ear with large hole and necrotic/infected lesion seen  	Pulm: CTA B/L, no crackles   	CV: S1S2+  	Abd: Soft, +BS   	Ext: No LE edema B/L  	Neuro: Awake, alert  	Skin: Warm and dry    LABS/STUDIES  --------------------------------------------------------------------------------    134  |  101  |  71  ----------------------------<  131      [11-02-21 @ 07:41]  5.1   |  22  |  3.01        Ca     9.5     [11-02-21 @ 07:41]      Mg     2.20     [11-02-21 @ 07:41]      Phos  4.1     [11-02-21 @ 07:41]    Creatinine Trend:  SCr 3.01 [11-02 @ 07:41]  SCr 2.84 [11-01 @ 07:14]  SCr 2.63 [10-31 @ 19:46]  SCr 2.56 [10-31 @ 06:57]  SCr 2.63 [10-30 @ 10:12]

## 2021-12-01 ENCOUNTER — TELEPHONE (OUTPATIENT)
Dept: CARDIOLOGY | Age: 76
End: 2021-12-01

## 2021-12-01 NOTE — TELEPHONE ENCOUNTER
Mr. Josue Schrader called and states he is having green mucous discharge, low grade fever oxygen is dropping when walking. He really wants antibiotic. He also states he has UTI it burns and has dark color. I advised him to call PCP again and at least get virtual visit set up. Please advise.     Thank you     Tianna Lockwood

## 2021-12-28 ENCOUNTER — HOSPITAL ENCOUNTER (OUTPATIENT)
Age: 76
Setting detail: SPECIMEN
Discharge: HOME | End: 2021-12-28
Payer: MEDICARE

## 2021-12-28 DIAGNOSIS — R35.0 URINARY FREQUENCY: ICD-10-CM

## 2021-12-28 LAB
-: ABNORMAL
AMORPHOUS: ABNORMAL
BACTERIA: ABNORMAL
BILIRUBIN URINE: NEGATIVE
CASTS UA: ABNORMAL /LPF
COLOR: YELLOW
COMMENT UA: ABNORMAL
CRYSTALS, UA: ABNORMAL /HPF
EPITHELIAL CELLS UA: ABNORMAL /HPF (ref 0–5)
GLUCOSE URINE: NEGATIVE
KETONES, URINE: NEGATIVE
LEUKOCYTE ESTERASE, URINE: ABNORMAL
MUCUS: ABNORMAL
NITRITE, URINE: POSITIVE
OTHER OBSERVATIONS UA: ABNORMAL
PH UA: 6 (ref 5–9)
PROTEIN UA: NEGATIVE
RBC UA: ABNORMAL /HPF (ref 0–2)
RENAL EPITHELIAL, UA: ABNORMAL /HPF
SPECIFIC GRAVITY UA: >1.03 (ref 1.01–1.02)
TRICHOMONAS: ABNORMAL
TURBIDITY: CLEAR
URINE HGB: ABNORMAL
UROBILINOGEN, URINE: NORMAL
WBC UA: ABNORMAL /HPF (ref 0–5)
YEAST: ABNORMAL

## 2021-12-28 PROCEDURE — 87077 CULTURE AEROBIC IDENTIFY: CPT

## 2021-12-28 PROCEDURE — 87186 SC STD MICRODIL/AGAR DIL: CPT

## 2021-12-28 PROCEDURE — 81001 URINALYSIS AUTO W/SCOPE: CPT

## 2021-12-28 PROCEDURE — 87086 URINE CULTURE/COLONY COUNT: CPT

## 2021-12-31 LAB
CULTURE: ABNORMAL
CULTURE: ABNORMAL
Lab: ABNORMAL
SPECIMEN DESCRIPTION: ABNORMAL

## 2022-01-01 ENCOUNTER — HOSPITAL ENCOUNTER (EMERGENCY)
Age: 77
Discharge: HOME | End: 2022-01-01
Payer: MEDICARE

## 2022-01-01 VITALS
SYSTOLIC BLOOD PRESSURE: 137 MMHG | DIASTOLIC BLOOD PRESSURE: 54 MMHG | RESPIRATION RATE: 18 BRPM | HEART RATE: 91 BPM | OXYGEN SATURATION: 96 % | TEMPERATURE: 98.3 F

## 2022-01-01 DIAGNOSIS — U07.1 COVID-19: Primary | ICD-10-CM

## 2022-01-01 DIAGNOSIS — U07.1 SARS-COV-2 POSITIVE: ICD-10-CM

## 2022-01-01 PROCEDURE — 99282 EMERGENCY DEPT VISIT SF MDM: CPT

## 2022-01-01 RX ORDER — 0.9 % SODIUM CHLORIDE 0.9 %
1000 INTRAVENOUS SOLUTION INTRAVENOUS ONCE
Status: DISCONTINUED | OUTPATIENT
Start: 2022-01-01 | End: 2022-01-01

## 2022-01-01 RX ORDER — ALBUTEROL SULFATE 90 UG/1
4 AEROSOL, METERED RESPIRATORY (INHALATION) PRN
OUTPATIENT
Start: 2022-01-01

## 2022-01-01 RX ORDER — SODIUM CHLORIDE 0.9 % (FLUSH) 0.9 %
5-40 SYRINGE (ML) INJECTION PRN
Status: CANCELLED | OUTPATIENT
Start: 2022-01-01

## 2022-01-01 RX ORDER — DIPHENHYDRAMINE HYDROCHLORIDE 50 MG/ML
50 INJECTION INTRAMUSCULAR; INTRAVENOUS
OUTPATIENT
Start: 2022-01-01

## 2022-01-01 RX ORDER — EPINEPHRINE 1 MG/ML
0.3 INJECTION, SOLUTION, CONCENTRATE INTRAVENOUS PRN
OUTPATIENT
Start: 2022-01-01

## 2022-01-01 RX ORDER — SODIUM CHLORIDE 9 MG/ML
INJECTION, SOLUTION INTRAVENOUS CONTINUOUS
OUTPATIENT
Start: 2022-01-01

## 2022-01-01 RX ORDER — SODIUM CHLORIDE 9 MG/ML
25 INJECTION, SOLUTION INTRAVENOUS PRN
OUTPATIENT
Start: 2022-01-01

## 2022-01-01 RX ORDER — ONDANSETRON 2 MG/ML
8 INJECTION INTRAMUSCULAR; INTRAVENOUS
OUTPATIENT
Start: 2022-01-01

## 2022-01-01 RX ORDER — ACETAMINOPHEN 325 MG/1
650 TABLET ORAL
OUTPATIENT
Start: 2022-01-01

## 2022-01-01 RX ORDER — SODIUM CHLORIDE 9 MG/ML
INJECTION, SOLUTION INTRAVENOUS CONTINUOUS
Status: CANCELLED | OUTPATIENT
Start: 2022-01-01

## 2022-01-01 NOTE — ED PROVIDER NOTES
[facility]  EMERGENCY DEPARTMENT ENCOUNTER      Pt Name: Sarai Marcos  MRN: [id number]  [place] 1945  Date of evaluation: 1/1/2022  Provider: Brock Jones [place]       Chief Complaint   Patient presents with    Other     Pt/family state he was sent to ED for infusion therapy. Pt states he tested positive for Covid 2 days ago         HISTORY OF PRESENT ILLNESS   (Location/Symptom, Timing/Onset, Context/Setting, Quality, Duration, Modifying Factors, Severity)  Note limiting factors. Sarai Marcos is a 64 y.o. male who presents to the emergency department for a complaint of testing positive for COVID-19. The patient reports he was sent here by Dr. Sukumar Salmon to have a monoclonal antibody infusion. Patient showed up after the clinic was currently closed. Patient denies any shortness of breath. He is on chronic oxygen but reports he has not been requiring any additional oxygen at this time. He was vaccinated against COVID-19 x3 one week ago. He is here just for monoclonal therapy. Nursing Notes were reviewed. REVIEW OF SYSTEMS    (2-9 systems for level 4, 10 or more for level 5)   Constitutional: Negative for fever, chills  Skin: Negative for rash, itching  HENT: Negative for sore throat, rhinorrhea and sinus pain. Eyes: Negative for eye discharge, eye redness  Cardiovascular: Negative for chest pain, dyspnea on exertion  Respiratory: Negative for cough, shortness of breath, wheezing or stridor. Gastrointestinal: Negative for nausea, vomiting, abdominal pain, diarrhea  Genitourinary: Negative for bladder incontinence, dysuria, frequency. Musculoskeletal: Negative for myalgias, back pain, falls. Neurological: Negative for tingling, headaches. Allergy/Immunology: Negative for environmental allergies and urticaria. Lymph/Heme: Negative for easily bruises / bleeds and lymph node swelling.      Except as noted above the remainder of the review of systems was reviewed and negative.        PAST MEDICAL HISTORY     Past Medical History:   Diagnosis Date    Anxiety     Aortic stenosis     Atrial fibrillation, new onset ([facility] [address] [facility] 03/05/2020    BPH (benign prostatic hyperplasia)     CH[facility]congestive [facility] failure) [facility])     Diabetes mellitus ([facility])     borderline    Hyperlipidemia     Hypertension     Lymphedema     bilat legs    On home O2     Osteoarthritis     Septic shock due to [facility] /  Levophed and fluids 2020         SURGICAL HISTORY       Past Surgical History:   Procedure Laterality Date    CARDIAC CATHETERIZATION  03/2020    OTHER SURGICAL HISTORY  09/14/2020    TAVR/ Dr. Airam Starr       Current Discharge Medication List      CONTINUE these medications which have NOT CHANGED    Details   sulfamethoxazole-trimethoprim (BACTRIM DS;SEPTRA DS) 800-160 MG per tablet Take 1 tablet by mouth 2 times daily for 10 days  Qty: 20 tablet, Refills: 0      aspirin EC 81 MG EC tablet Take 1 tablet by mouth daily  Qty: 30 tablet, Refills: 5    Associated Diagnoses: PAF (paroxysmal atrial fibrillation) ([facility])      losartan (COZAAR) 25 MG tablet Take 1 tablet by mouth once daily  Qty: 90 tablet, Refills: 3      furosemide (LASIX) 20 MG tablet Take 2 tabs by mouth for 40mg Monday, Wedn and Friday, and take 1 tab on the other days  Qty: 60 tablet, Refills: 3      atorvastatin (LIPITOR) 40 MG tablet Take 1 tablet by mouth once daily  Qty: 90 tablet, Refills: 3      amiodarone (PACERONE) 200 MG tablet Take 1 tablet by mouth daily  Qty: 90 tablet, Refills: 1      potassium chloride (KLOR-CON M10) 10 MEQ extended release tablet Take 2 tablets by mouth daily  Qty: 180 tablet, Refills: 3      tamsulosin (FLOMAX) 0.4 MG capsule Take 1 capsule by mouth daily  Qty: 30 capsule, Refills: 11      PARoxetine (PAXIL) 30 MG tablet Take 1 tablet by mouth every morning  Qty: 90 tablet, Refills: 1      simethicone (MYLICON) 80 MG chewable tablet Take 80 mg by mouth every 6 hours as needed for Flatulence (Gas pain)      Multiple Vitamins-Minerals (THERAPEUTIC MULTIVITAMIN-MINERALS) tablet Take 1 tablet by mouth daily       Cholecalciferol (VITAMIN D3) 50 MCG (2000 UT) CAPS Take by mouth       acetaminophen (TYLENOL) 500 MG tablet Take 500 mg by mouth every 6 hours as needed for Pain              ALLERGIES     Patient has no known allergies. FAMILY HISTORY       Family History   Problem Relation Age of Onset    Arthritis Mother     Heart Disease Mother     High Cholesterol Mother     High Blood Pressure Mother     Stroke Mother     High Blood Pressure Brother           SOCIAL HISTORY       Social History     Socioeconomic History    Marital status:      Spouse name: Jackie Zamudio Number of children: Not on file    Years of education: Not on file    Highest education level: Not on file   Occupational History    Not on file   Tobacco Use    Smoking status: Former Smoker     Packs/day: 1.00     Years: 10.00     Pack years: 10.00     Types: Cigarettes     Quit date:      Years since quittin.0    Smokeless tobacco: Never Used   Vaping Use    Vaping Use: Never used   Substance and Sexual Activity    Alcohol use: Never    Drug use: No    Sexual activity: Not on file   Other Topics Concern    Not on file   Social History Narrative    Not on file     Social Determinants of Health     Financial Resource Strain: Low Risk     Difficulty of Paying Living Expenses: Not hard at all   Food Insecurity: No Food Insecurity    Worried About [facility] in the Last Year: Never true    [facility] in the Last Year: Never true   Transportation Needs: No Transportation Needs    Lack of Transportation (Medical): No    Lack of Transportation (Non-Medical):  No   Physical Activity: Inactive    Days of Exercise per Week: 0 days    Minutes of Exercise per Session: 0 min   Stress: No Stress Concern Present    Feeling of Stress : Not at all   Social Connections: Moderately Isolated    Frequency of Communication with Friends and Family: Twice a week    Frequency of Social Gatherings with Friends and Family: Twice a week    Attends [employer] Services: Never    Active Member of Clubs or Organizations: No    Attends Club or Organization Meetings: Never    Marital Status:    Intimate Partner Violence: Not At Risk    Fear of Current or Ex-Partner: No    Emotionally Abused: No    Physically Abused: No    Sexually Abused: No   Housing Stability:     Unable to Pay for Housing in the Last Year: Not on file    Number of [place] in the Last Year: Not on file    Unstable Housing in the Last Year: Not on file       SCREENINGS                PHYSICAL EXAM    (up to 7 for level 4, 8 or more for level 5)     ED Triage Vitals   BP Temp Temp src Pulse Resp SpO2 Height Weight   01/01/22 1439 01/01/22 1441 -- 01/01/22 1439 01/01/22 1439 01/01/22 1439 -- --   (!) 137/54 98.3 °F (36.8 °C)  91 18 96 %       Constitutional: Oriented and well-developed, well-nourished, and in no distress. Non-toxic appearance. Head: Normocephalic and atraumatic. Eyes: Extra ocular muscles intact. Pupils are equal, round, and reactive to light. No discharge. No scleral icterus. Neck: Normal range of motion and phonation normal. Neck supple. No JVD present. No spinous process tenderness and no muscular tenderness present. No cervical adenopathy. Cardiovascular: Regular rate and rhythm, normal heart sounds and intact distal pulses. No murmur heard. Pulmonary/Chest: Effort normal and breath sounds normal. No accessory muscle usage or stridor. Not tachypneic. No respiratory distress. No tenderness and no retraction. Abdominal: Soft and non-distended. Bowel sounds are normal. No masses or organomegaly. No significant tenderness. No rebound, no guarding and no CVA tenderness. No appreciable hernia. Musculoskeletal: Normal range of motion.   No edema and no tenderness. Neurological: Alert and oriented. Skin: Skin is warm and dry. No rash noted. No cyanosis or erythema. No pallor. Nails show no clubbing. DIAGNOSTIC RESULTS     EKG: All EKG's are interpreted by the Emergency Department Physician who either signs or Co-signs this chart in the absence of a cardiologist.      RADIOLOGY:   Non-plain film images such as CT, Ultrasound and MRI are read by the radiologist. Plain radiographic images are visualized and preliminarily interpreted by the emergency physician with the below findings:    Interpretation per the Radiologist below, if available at the time of this note:    No orders to display         ED BEDSIDE ULTRASOUND:   Performed by ED Physician - none    LABS:  No results found for this visit on 01/01/22. No orders of the defined types were placed in this encounter. All other labs were within normal range or not returned as of this dictation. EMERGENCY DEPARTMENT COURSE and DIFFERENTIAL DIAGNOSIS/MDM:   Vitals:    Vitals:    01/01/22 1439 01/01/22 1441   BP: (!) 137/54    Pulse: 91    Resp: 18    Temp:  98.3 °F (36.8 °C)   SpO2: 96%        MEDICAL DECISION MAKING:  Per nursing administration the monoclonal antibodies are not to be given here in the hospital and the patient is to wait till outpatient therapy on Monday. Patient is chronic respiratory failure on 2 L nasal cannula, he is not requiring any additional oxygen at this time. He will be discharged in stable condition and directed to have monoclonal therapy on Monday. The patient was evaluated during the global COVID-19 pandemic, and that diagnosis was considered upon their initial presentation. Their evaluation, treatment and testing was consistent with current guidelines for patients who present with complaints or symptoms that may be related to COVID-19.  Full PPE including gloves, gown, N95 or P100 respirator, and eye protection was used during every encounter with this patient. FINAL IMPRESSION      1. COVID-19 Stable         DISPOSITION/PLAN   DISPOSITION Decision To Discharge 01/01/2022 03:22:20 PM      PATIENT REFERRED TO:  Danya Reyes MD  [place] [address], [address] [place] [address]  [phone]    In 2 days        DISCHARGE MEDICATIONS:  Current Discharge Medication List        Controlled Substances Monitoring:     No flowsheet data found.     (Please note that portions of this note were completed with a voice recognition program.  Efforts were made to edit the dictations but occasionally words are mis-transcribed.)    Electronically signed by SYLVIA Koroma CNP on 1/1/2022 at 3:23 PM               SYLVIA Koroma CNP  01/01/22 2527

## 2022-01-03 ENCOUNTER — HOSPITAL ENCOUNTER (OUTPATIENT)
Dept: INFUSION THERAPY | Age: 77
Discharge: HOME | End: 2022-01-03
Payer: MEDICARE

## 2022-01-03 VITALS
RESPIRATION RATE: 18 BRPM | OXYGEN SATURATION: 97 % | DIASTOLIC BLOOD PRESSURE: 60 MMHG | SYSTOLIC BLOOD PRESSURE: 110 MMHG | HEART RATE: 72 BPM

## 2022-01-03 DIAGNOSIS — U07.1 SARS-COV-2 POSITIVE: Primary | ICD-10-CM

## 2022-01-03 PROCEDURE — 6360000002 HC RX W HCPCS: Performed by: INTERNAL MEDICINE

## 2022-01-03 PROCEDURE — 2500000003 HC RX 250 WO HCPCS: Performed by: INTERNAL MEDICINE

## 2022-01-03 PROCEDURE — M0245 HC IV INFUSION BAMLANIVIMAB & ETESEVIMAB W/MONITORING: HCPCS

## 2022-01-03 PROCEDURE — 2580000003 HC RX 258: Performed by: INTERNAL MEDICINE

## 2022-01-03 RX ORDER — ONDANSETRON 2 MG/ML
8 INJECTION INTRAMUSCULAR; INTRAVENOUS
OUTPATIENT
Start: 2022-01-03

## 2022-01-03 RX ORDER — SODIUM CHLORIDE 0.9 % (FLUSH) 0.9 %
5-40 SYRINGE (ML) INJECTION PRN
Status: CANCELLED | OUTPATIENT
Start: 2022-01-03

## 2022-01-03 RX ORDER — EPINEPHRINE 1 MG/ML
0.3 INJECTION, SOLUTION, CONCENTRATE INTRAVENOUS PRN
OUTPATIENT
Start: 2022-01-03

## 2022-01-03 RX ORDER — ALBUTEROL SULFATE 90 UG/1
4 AEROSOL, METERED RESPIRATORY (INHALATION) PRN
OUTPATIENT
Start: 2022-01-03

## 2022-01-03 RX ORDER — SODIUM CHLORIDE 9 MG/ML
INJECTION, SOLUTION INTRAVENOUS CONTINUOUS
OUTPATIENT
Start: 2022-01-03

## 2022-01-03 RX ORDER — ACETAMINOPHEN 325 MG/1
650 TABLET ORAL
OUTPATIENT
Start: 2022-01-03

## 2022-01-03 RX ORDER — DIPHENHYDRAMINE HYDROCHLORIDE 50 MG/ML
50 INJECTION INTRAMUSCULAR; INTRAVENOUS
OUTPATIENT
Start: 2022-01-03

## 2022-01-03 RX ORDER — SODIUM CHLORIDE 9 MG/ML
INJECTION, SOLUTION INTRAVENOUS CONTINUOUS
Status: CANCELLED | OUTPATIENT
Start: 2022-01-03

## 2022-01-03 RX ORDER — SODIUM CHLORIDE 9 MG/ML
25 INJECTION, SOLUTION INTRAVENOUS PRN
OUTPATIENT
Start: 2022-01-03

## 2022-01-03 RX ORDER — SODIUM CHLORIDE 9 MG/ML
INJECTION, SOLUTION INTRAVENOUS CONTINUOUS
Status: DISCONTINUED | OUTPATIENT
Start: 2022-01-03 | End: 2022-01-04 | Stop reason: HOSPADM

## 2022-01-03 RX ORDER — SODIUM CHLORIDE 0.9 % (FLUSH) 0.9 %
5-40 SYRINGE (ML) INJECTION PRN
Status: DISCONTINUED | OUTPATIENT
Start: 2022-01-03 | End: 2022-01-04 | Stop reason: HOSPADM

## 2022-01-03 RX ADMIN — SODIUM CHLORIDE: 9 INJECTION, SOLUTION INTRAVENOUS at 14:22

## 2022-01-03 RX ADMIN — SODIUM CHLORIDE, PRESERVATIVE FREE 10 ML: 5 INJECTION INTRAVENOUS at 13:50

## 2022-01-03 RX ADMIN — SODIUM CHLORIDE: 9 INJECTION, SOLUTION INTRAVENOUS at 14:23

## 2022-02-02 ENCOUNTER — HOSPITAL ENCOUNTER (OUTPATIENT)
Dept: PHARMACY | Age: 77
Setting detail: THERAPIES SERIES
Discharge: HOME | End: 2022-02-02
Payer: MEDICARE

## 2022-02-02 ENCOUNTER — HOSPITAL ENCOUNTER (OUTPATIENT)
Age: 77
Discharge: HOME | End: 2022-02-02
Payer: MEDICARE

## 2022-02-02 VITALS
DIASTOLIC BLOOD PRESSURE: 57 MMHG | HEART RATE: 75 BPM | OXYGEN SATURATION: 97 % | WEIGHT: 315 LBS | BODY MASS INDEX: 56.64 KG/M2 | SYSTOLIC BLOOD PRESSURE: 125 MMHG

## 2022-02-02 DIAGNOSIS — I50.42 CHRONIC COMBINED SYSTOLIC (CONGESTIVE) AND DIASTOLIC (CONGESTIVE) HEART FAILURE (HCC): Primary | ICD-10-CM

## 2022-02-02 DIAGNOSIS — I50.42 CHRONIC COMBINED SYSTOLIC (CONGESTIVE) AND DIASTOLIC (CONGESTIVE) HEART FAILURE (HCC): ICD-10-CM

## 2022-02-02 LAB
ALBUMIN SERPL-MCNC: 4 G/DL (ref 3.5–5.2)
ALBUMIN/GLOBULIN RATIO: 1.1 (ref 1–2.5)
ALP BLD-CCNC: 51 U/L (ref 40–129)
ALT SERPL-CCNC: 19 U/L (ref 5–41)
ANION GAP SERPL CALCULATED.3IONS-SCNC: 12 MMOL/L (ref 9–17)
AST SERPL-CCNC: 15 U/L
BILIRUB SERPL-MCNC: 0.54 MG/DL (ref 0.3–1.2)
BNP INTERPRETATION: NORMAL
BUN BLDV-MCNC: 22 MG/DL (ref 8–23)
BUN/CREAT BLD: 31 (ref 9–20)
CALCIUM SERPL-MCNC: 9.2 MG/DL (ref 8.6–10.4)
CHLORIDE BLD-SCNC: 97 MMOL/L (ref 98–107)
CO2: 27 MMOL/L (ref 20–31)
CREAT SERPL-MCNC: 0.7 MG/DL (ref 0.7–1.2)
GFR AFRICAN AMERICAN: >60 ML/MIN
GFR NON-AFRICAN AMERICAN: >60 ML/MIN
GFR SERPL CREATININE-BSD FRML MDRD: ABNORMAL ML/MIN/{1.73_M2}
GFR SERPL CREATININE-BSD FRML MDRD: ABNORMAL ML/MIN/{1.73_M2}
GLUCOSE BLD-MCNC: 95 MG/DL (ref 70–99)
POTASSIUM SERPL-SCNC: 4.3 MMOL/L (ref 3.7–5.3)
PRO-BNP: 85 PG/ML
SODIUM BLD-SCNC: 136 MMOL/L (ref 135–144)
TOTAL PROTEIN: 7.5 G/DL (ref 6.4–8.3)
TSH SERPL DL<=0.05 MIU/L-ACNC: 2.32 MIU/L (ref 0.3–5)

## 2022-02-02 PROCEDURE — 36415 COLL VENOUS BLD VENIPUNCTURE: CPT

## 2022-02-02 PROCEDURE — 83880 ASSAY OF NATRIURETIC PEPTIDE: CPT

## 2022-02-02 PROCEDURE — 84443 ASSAY THYROID STIM HORMONE: CPT

## 2022-02-02 PROCEDURE — 80053 COMPREHEN METABOLIC PANEL: CPT

## 2022-02-02 PROCEDURE — 99212 OFFICE O/P EST SF 10 MIN: CPT

## 2022-02-02 RX ORDER — ZINC GLUCONATE 50 MG
50 TABLET ORAL DAILY
COMMUNITY
End: 2022-09-07 | Stop reason: ALTCHOICE

## 2022-02-02 NOTE — PROGRESS NOTES
[facility]  Medication Management  Pharmacist  Heart Failure    [address] [facility]  [address]  Phone: [phone]  Fax: [phone]      NAME: Tuan Dunham  MEDICAL RECORD NUMBER:  [id number]  AGE: 64 y.o. GENDER: male  : 1945  EPISODE DATE:  2022      Heart Failure Management referral by Dr. Vaishnavi Dumont    Dry weight: 360 pounds? ?  pounds     Today's Wt: 383.8 lb (Patient has gained weight but eating a lot more with son doing all the cooking)  Wt Readings from Last 6 Encounters:   22 (!) 383 lb 12.8 oz (174.1 kg)   21 (!) 368 lb (166.9 kg)   21 (!) 368 lb (166.9 kg)   10/12/21 (!) 368 lb (166.9 kg)   21 (!) 365 lb (165.6 kg)   07/15/21 (!) 365 lb (165.6 kg)    and   Ht Readings from Last 1 Encounters:   21 5' 9.02\" (1.753 m)              /57   HR:75  O2Sat: 97     Extremities and pitting edema mild 1+ pitting edema  Skin red lower leg from venous stasis with warm dry skin. Subjective   Mr. Denise Welch is a 64 y.o. male here for the Heart Failure Services. They are here today for a comprehensive medication review including over the counter medications and herbal products, overall wellbeing assessment, transition of care and any needed adjustments with updates and recommendations communicated to the referring physician. Patient Symptoms  Patient reports he has been eating more as his son moved in with them and his sons is a good cook. He makes a lot of meat and potatoes. Patient is eating a lot of potatoes and he knows he needs to watch what he eats. Patient bought \"compression\" socks online and is wearing them. Leg swelling remains the same. SOB with exertion. Patient uses a motorized wheelchair. Patient states his knees are bone on bone and he has trouble getting around. Patient uses oxygen continuously at 2 L/min. Patient has a CPAP machine, but stopped using it when he got Covid at the end of December.   I told patient they can take it away again if he's not using it. Patient claims he will start using it again tonight. Shortness of Breath:   []   None   [x]   Dyspnea on exertion   []   Dyspnea with normal activities  []   Dyspnea at rest  []   Dyspnea while sleeping    Patient Findings:   []  Missed doses  [x]  Diet changes  [x]  Sodium intake changes   []  Night time cough   []  Other   []  Early saiety, abd fullness []  Chest pain   []  Constipation   []  Night time bathroom trips  []  Alcohol intake changes  []  Acute illness  [x]  Edema    [x]  Number of pillows or recliner  [x]  Activity changes   []  Fatigue that limits activity []  Heart racing or skipping beats  []  Light headedness  []  Dizziness    []      []  Problems sleeping    Functional Activity New York Heart Association Classification  []   Class I No limitation of physical activity. Ordinary physical activity does not cause undue fatigue, palpitation, dyspnea (shortness of breath). []   Class II Slight limitation of physical activity. Comfortable at rest. Ordinary physical activity results in fatigue, palpitation, dyspnea (shortness of breath). [x]   Class III  Fredrick limitation of physical activity. Comfortable at rest.  Less than ordinary activity causes fatigue, palpitation, dyspnea. []   Class IV Unable to carry on any physical activity without discomfort. Symptoms of heart failure at rest.  If any physical activity is undertaken, discomfort increases.     Last CHF Hospital Admission: 3-4-20  -  3-9-20    OUTPATIENT MEDICATIONS:  Outpatient Medications Marked as Taking for the 2/2/22 encounter [facility] Encounter) with [address]   Medication Sig Dispense Refill    zinc gluconate 50 MG tablet Take 50 mg by mouth daily      neomycin-polymyxin-dexamethasone (MAXITROL) 0.1 % ophthalmic suspension Place 1 drop into both eyes 4 times daily for 7 days 1.4 mL 0    aspirin EC 81 MG EC tablet Take 1 tablet by mouth daily 30 tablet 5    losartan (COZAAR) 25 MG tablet Take 1 tablet by mouth once daily 90 tablet 3    furosemide (LASIX) 20 MG tablet Take 2 tabs by mouth for 40mg Monday,  and Friday, and take 1 tab on the other days 60 tablet 3    atorvastatin (LIPITOR) 40 MG tablet Take 1 tablet by mouth once daily 90 tablet 3    amiodarone (PACERONE) 200 MG tablet Take 1 tablet by mouth daily 90 tablet 1    potassium chloride (KLOR-CON M10) 10 MEQ extended release tablet Take 2 tablets by mouth daily 180 tablet 3    tamsulosin (FLOMAX) 0.4 MG capsule Take 1 capsule by mouth daily 30 capsule 11    PARoxetine (PAXIL) 30 MG tablet Take 1 tablet by mouth every morning 90 tablet 1    simethicone (MYLICON) 80 MG chewable tablet Take 80 mg by mouth every 6 hours as needed for Flatulence (Gas pain)      Multiple Vitamins-Minerals (THERAPEUTIC MULTIVITAMIN-MINERALS) tablet Take 1 tablet by mouth daily       Cholecalciferol (VITAMIN D3) 50 MCG (2000 UT) CAPS Take 2,000 Units by mouth daily            Objective / Assessment     Patient Active Problem List   Diagnosis Code    Essential hypertension I10    Mixed hyperlipidemia E78.2    Osteoarthritis of both knees M17.0    Obesity, morbid (more than 100 lbs over ideal weight or BMI > 40) ([facility] E66.01    Carpal tunnel syndrome on left G56.02    Aortic stenosis, severe I35.0    Dyslipidemia E78.5    AV block, 3rd degree ([facility]) I44.2    SARS-CoV-2 positive U07.1     Social History     Tobacco Use    Smoking status: Former Smoker     Packs/day: 1.00     Years: 10.00     Pack years: 10.00     Types: Cigarettes     Quit date:      Years since quittin.1    Smokeless tobacco: Never Used   Vaping Use    Vaping Use: Never used   Substance Use Topics    Alcohol use: Never    Drug use: No       Potassium (mmol/L)   Date Value   2022 4.3   2021 4.6   2021 4.0   2021 4.1   2021 4.2   2021 4.4     Potassium reflex Magnesium (meq/L)   Date Value   2021 4.6   09/15/2020 3.9     BUN (mg/dL)   Date Value   02/02/2022 22   09/17/2021 11   06/01/2021 15   04/20/2021 22   03/08/2021 15   02/18/2021 18     CREATININE (mg/dL)   Date Value   02/02/2022 0.70   09/17/2021 0.60 (L)   06/01/2021 0.59 (L)   04/20/2021 0.77   03/08/2021 0.66 (L)   02/18/2021 0.8     TSH (mIU/L)   Date Value   02/02/2022 2.32   09/17/2021 2.03   03/08/2021 2.37   05/15/2020 2.83   03/04/2020 3.43     02/02/22  ALT 5 - 41 U/L 19          AST <40 U/L 15                Plan:      · Chronic Systolic and/or Diastolic Heart Failure (diagnosis): EF: 50-55% via echocardiogram on 1/21/21  · Beta Blocker for EF </= 40%: none - bradycardia  · Diuretics: Furosemide 40 mg po every MWF and 20 mg po all other days      Continue Potassium 20 mEq po daily  · ACE/ARB/ARNI for EF </= 40%: Losartan 25 mg po daily  · Nonpharmacologic management of Heart Failure: I told patient to continue wearing lower extremity compression stockings and I advised patient to try and keep legs up whenever possible and to limit salt in diet.   · Laboratory testing:   ? Complete metabolic panel (CMP) today to assess potassium, renal function and liver function due to amiodarone therapy  ? BNP to assess fluid today due to weight gain  ·  Paroxysmal Atrial Fibrillation: Rhythm Control   ·             Antiplatelet Agent: STOP clopidogrel (Plavix) and START Aspirin 81 mg daily. I also reminded them to watch for signs of bloody or black tarry stools and stop the medication immediately if this develops as this could be life threatening. · Rhythm Control Agent: Continue Amiodarone (Cordarone) 200 mg once daily   ·  Monitoring: Amiodarone Since he is being maintained on Amiodarone, I told him that we will need to closely monitor him for potential side effects. These include monitoring LFTs and TSH at least every 6 months as well as chest x-rays, pulmonary function tests, and eye exams at least on a yearly basis.    · Stroke Risk: His CHADS2-VASc score is greater than 2 (2.2% stroke risk)  Anticoagulation:  STOPPED Eliquis 3/16/21 per Dr. Horne Both - due to patient concern for cost and patient refusal to switch to warfarin. Pacemaker transmission q 2 weeks to monitor a-fib. Patient to resume anticoagulation if  recurrent a-fib. · Atherosclerotic Heart Disease:    · Statin Therapy: Atorvastatin 40 mg po daily      Essential Hypertension:   ? Diuretics: Furosemide 40 mg po every MWF and 20 mg po all other days  ? Beta Blocker: none - bradycardia      Patient Education/Instructions: I did spend about 15 minutes with patient going over his dietary guidelines, the signs and symptoms to watch for including shortness of breath, weight gain, lightheadedness/dizziness. I asked patient to call the clinic if develops persistent or worsening shortness of breath or weight gain of more than 3 pounds in 1-7 days. Patient verbalized understanding. Medication changes since last visit Patient tested positive for Covid 21 and received monoclonal antibodies on 1-3-22.     Reviewed with Dr Coleen Hernandez who agrees with plan    Thank you for the referral,    Dinah Denton, [facility] PharmD    For Pharmacy Admin Tracking Only     Intervention Detail: Adherence Monitorin and Lab(s) Ordered   Total # of Interventions Recommended: 1   Total # of Interventions Accepted: 1   Time Spent (min): 90

## 2022-02-03 ENCOUNTER — TELEPHONE (OUTPATIENT)
Dept: CARDIOLOGY | Age: 77
End: 2022-02-03

## 2022-02-03 NOTE — TELEPHONE ENCOUNTER
----- Message from Ivis Farley MD sent at 2/2/2022  7:55 PM EST -----  Blood work is good.   Thank you

## 2022-02-08 ENCOUNTER — NURSE ONLY (OUTPATIENT)
Dept: CARDIOLOGY | Age: 77
End: 2022-02-08
Payer: MEDICARE

## 2022-02-08 DIAGNOSIS — I44.2 AV BLOCK, 3RD DEGREE (HCC): Primary | ICD-10-CM

## 2022-02-08 DIAGNOSIS — Z95.0 PACEMAKER: ICD-10-CM

## 2022-02-08 PROCEDURE — 93294 REM INTERROG EVL PM/LDLS PM: CPT | Performed by: INTERNAL MEDICINE

## 2022-03-08 NOTE — PROGRESS NOTES
[address] [facility]  Medication Management  Pharmacist  Heart Failure     [facility]  [address]  Phone: [phone]  Fax: [phone]      NAME: Chayo Ratliff  MEDICAL RECORD NUMBER:  [id number]  AGE: 64 y.o. GENDER: male  : 1945  EPISODE DATE:  3/9/2022      Heart Failure Management referral by Dr. Gurrola Read    Dry weight: 360 pounds ? ? Today's Wt: 367 pounds  Wt Readings from Last 6 Encounters:   22 (!) 367 lb (166.5 kg)   22 (!) 383 lb 12.8 oz (174.1 kg)   21 (!) 368 lb (166.9 kg)   21 (!) 368 lb (166.9 kg)   10/12/21 (!) 368 lb (166.9 kg)   21 (!) 365 lb (165.6 kg)    and   Ht Readings from Last 1 Encounters:   21 5' 9.02\" (1.753 m)                   /86  HR 80  O2Sat: 95%     Extremities slight edema BLE  Skin BLE red - venous stasis - warm dry skin     Subjective   Mr. Darell Mayen is a 64 y.o. male here for the Heart Failure Services. They are here today for a comprehensive medication review including over the counter medications and herbal products, overall wellbeing assessment, transition of care and any needed adjustments with updates and recommendations communicated to the referring physician. Patient Symptoms:  Patient reports feeling really good today. Weight has decreased 16 pounds since last seen in clinic on 22. He started a keto diet (upon approval from his PCP) about 3 weeks ago. Patient has SOB with exertion. He is in a motorized wheelchair. He is using oxygen continuously at 2 L/min. Patient does report that [facility] evaluated his CPAP machine yesterday and helped him with adjustments. He is going to start wearing CPAP every night again.       Shortness of Breath:   []   None   [x]   Dyspnea on exertion   []   Dyspnea with normal activities  []   Dyspnea at rest  []   Dyspnea while sleeping    Patient Findings:   []  Missed doses  [x]  Diet changes (keto diet for 3 weeks)  [x]  Sodium intake changes (decreased sodium)  []  Night time cough   []  Other   []  Early saiety, abd fullness []  Chest pain   []  Constipation   []  Night time bathroom trips  []  Alcohol intake changes  []  Acute illness  []  Edema    []  Number of pillows or recliner  [x]  Activity changes   [x]  Fatigue that limits activity []  Heart racing or skipping beats  []  Light headedness  []  Dizziness    []      []  Problems sleeping    Functional Activity New York Heart Association Classification  []   Class I No limitation of physical activity. Ordinary physical activity does not cause undue fatigue, palpitation, dyspnea (shortness of breath). []   Class II Slight limitation of physical activity. Comfortable at rest. Ordinary physical activity results in fatigue, palpitation, dyspnea (shortness of breath). [x]   Class III  Fredrick limitation of physical activity. Comfortable at rest.  Less than ordinary activity causes fatigue, palpitation, dyspnea. []   Class IV Unable to carry on any physical activity without discomfort. Symptoms of heart failure at rest.  If any physical activity is undertaken, discomfort increases.     Last CHF Hospital Admission: 3/4/20-3/9/20    OUTPATIENT MEDICATIONS:  Outpatient Medications Marked as Taking for the 3/9/22 encounter [facility] En[facility]) with [address]   Medication Sig Dispense Refill    amiodarone (CORDARONE) 200 MG tablet Take 1 tablet by mouth daily 90 tablet 3    zinc gluconate 50 MG tablet Take 50 mg by mouth daily      aspirin EC 81 MG EC tablet Take 1 tablet by mouth daily 30 tablet 5    losartan (COZAAR) 25 MG tablet Take 1 tablet by mouth once daily 90 tablet 3    furosemide (LASIX) 20 MG tablet Take 2 tabs by mouth for 40mg Monday, Wedn and Friday, and take 1 tab on the other days 60 tablet 3    atorvastatin (LIPITOR) 40 MG tablet Take 1 tablet by mouth once daily 90 tablet 3    potassium chloride (KLOR-CON M10) 10 MEQ extended release tablet Take 2 tablets by mouth daily 180 tablet 3    tamsulosin (FLOMAX) 0.4 MG capsule Take 1 capsule by mouth daily 30 capsule 11    PARoxetine (PAXIL) 30 MG tablet Take 1 tablet by mouth every morning 90 tablet 1    simethicone (MYLICON) 80 MG chewable tablet Take 80 mg by mouth every 6 hours as needed for Flatulence (Gas pain)      Multiple Vitamins-Minerals (THERAPEUTIC MULTIVITAMIN-MINERALS) tablet Take 1 tablet by mouth daily       Cholecalciferol (VITAMIN D3) 50 MCG (2000 UT) CAPS Take 2,000 Units by mouth daily                Objective / Assessment     Patient Active Problem List   Diagnosis Code    Essential hypertension I10    Mixed hyperlipidemia E78.2    Osteoarthritis of both knees M17.0    Obesity, morbid (more than 100 lbs over ideal weight or BMI > 40) ([facility]) E66.01    Carpal tunnel syndrome on left G56.02    Aortic stenosis, severe I35.0    Dyslipidemia E78.5    AV block, 3rd degree ([facility]) I44.2    SARS-CoV-2 positive U07.1     Social History     Tobacco Use    Smoking status: Former Smoker     Packs/day: 1.00     Years: 10.00     Pack years: 10.00     Types: Cigarettes     Quit date:      Years since quittin.1    Smokeless tobacco: Never Used   Vaping Use    Vaping Use: Never used   Substance Use Topics    Alcohol use: Never    Drug use: No       Potassium (mmol/L)   Date Value   2022 4.3   2021 4.6   2021 4.0   2021 4.1   2021 4.2   2021 4.4     Potassium reflex Magnesium (meq/L)   Date Value   2021 4.6   09/15/2020 3.9     BUN (mg/dL)   Date Value   2022 22   2021 11   2021 15   2021 22   2021 15   2021 18     CREATININE (mg/dL)   Date Value   2022 0.70   2021 0.60 (L)   2021 0.59 (L)   2021 0.77   2021 0.66 (L)   2021 0.8     TSH (mIU/L)   Date Value   2022 2.32   2021 2.03   2021 2.37   05/15/2020 2.83   2020 3.43     No results found for: T4      Plan: ·  Chronic Systolic and/or Diastolic Heart Failure (diagnosis): EF: 50-55% via echocardiogram on 9/17/21  · Beta Blocker for EF </= 40%: none - bradycardia  · Diuretics: Furosemide 40 mg po every MWF and 20 mg po all other days      Continue Potassium 20 mEq po daily  · ACE/ARB/ARNI for EF </= 40%: Losartan 25 mg po daily  · Nonpharmacologic management of Heart Failure: I told patient to continue wearing lower extremity compression stockings and I advised patient to try and keep legs up whenever possible and to limit salt in diet.   · Laboratory testing:   ? Complete metabolic panel (CMP) per cardiology to assess potassium and renal function  · BNP per cardiology to assess fluid     ·  Paroxysmal Atrial Fibrillation: Rhythm Control   ·             Antiplatelet Agent: STOP clopidogrel (Plavix) and START Aspirin 81 mg daily. I also reminded them to watch for signs of bloody or black tarry stools and stop the medication immediately if this develops as this could be life threatening. · Rhythm Control Agent: Continue Amiodarone (Cordarone) 200 mg once daily   ·  Monitoring: Amiodarone Since he is being maintained on Amiodarone, I told him that we will need to closely monitor him for potential side effects. These include monitoring LFTs and TSH at least every 6 months as well as chest x-rays, pulmonary function tests, and eye exams at least on a yearly basis.   -- TSH 2/2/22 - 2.32 (wnl)  -- Patient also reports he had an eye exam last month (2/22) - no problems reported    · Stroke Risk: His CHADS2-VASc score is greater than 2 (2.2% stroke risk)  Anticoagulation:  STOPPED Eliquis 3/16/21 per Dr. Kacy Nobles - due to patient concern for cost and patient refusal to switch to warfarin.  Pacemaker transmission q 2 weeks to monitor a-fib.  Patient to resume anticoagulation if  recurrent a-fib.    · Atherosclerotic Heart Disease:     · Statin Therapy: Atorvastatin 40 mg po daily      Essential Hypertension: ? Diuretics: Furosemide 40 mg po every MWF and 20 mg po all other days  ? Beta Blocker: none - bradycardia       Patient Education/Instructions: I did spend about 15 minutes with patient going over his heart failure packet including dietary guidelines, the signs and symptoms to watch for including shortness of breath, weight gain, lightheadedness/dizziness. I asked patient to call the clinic if develops persistent or worsening shortness of breath or weight gain of more than 3 pounds in 1-7 days. Patient verbalized understanding. Medication changes since last visit:      Patient has initiated keto diet approximately 3 weeks ago and is very encouraged with his success thus far. In the process of diet change, he has decreased his sodium intake and leg swelling has improved. Patient has teeth cleaning next week and then fillings the following week. TAVR 9/15/20. Antibiotic prophylaxis needed. Received verbal order for amoxicillin prior to dental appointments per Dr. Lillian Newby. Prescription called to [facility] (voicemail line) for amoxicillin 500 mg capsules - Take 4 capsules (= 2 g) po 30-60 minutes prior to dental procedure   Qty:  8  No refill   Dr. Franklin Sandifer    Thank you for the referral,    [facility] Tracking Only     Intervention Detail: Adherence Monitorin and New Rx: 1, reason: Needs Additional Therapy   Total # of Interventions Recommended: 2   Total # of Interventions Accepted: 2   Time Spent (min): 60

## 2022-03-09 ENCOUNTER — HOSPITAL ENCOUNTER (OUTPATIENT)
Dept: PHARMACY | Age: 77
Setting detail: THERAPIES SERIES
Discharge: HOME | End: 2022-03-09
Payer: MEDICARE

## 2022-03-09 VITALS
SYSTOLIC BLOOD PRESSURE: 146 MMHG | WEIGHT: 315 LBS | DIASTOLIC BLOOD PRESSURE: 86 MMHG | HEART RATE: 80 BPM | BODY MASS INDEX: 54.17 KG/M2 | OXYGEN SATURATION: 95 %

## 2022-03-09 PROCEDURE — 99212 OFFICE O/P EST SF 10 MIN: CPT

## 2022-03-29 DIAGNOSIS — I48.0 PAF (PAROXYSMAL ATRIAL FIBRILLATION) (HCC): ICD-10-CM

## 2022-03-29 RX ORDER — HYDROGEN PEROXIDE 2.65 ML/100ML
LIQUID ORAL; TOPICAL
Qty: 90 TABLET | Refills: 3 | Status: SHIPPED | OUTPATIENT
Start: 2022-03-29

## 2022-04-18 ENCOUNTER — OFFICE VISIT (OUTPATIENT)
Dept: CARDIOLOGY | Age: 77
End: 2022-04-18
Payer: MEDICARE

## 2022-04-18 VITALS
BODY MASS INDEX: 47.74 KG/M2 | OXYGEN SATURATION: 97 % | RESPIRATION RATE: 20 BRPM | DIASTOLIC BLOOD PRESSURE: 77 MMHG | HEIGHT: 68 IN | SYSTOLIC BLOOD PRESSURE: 121 MMHG | WEIGHT: 315 LBS | HEART RATE: 80 BPM

## 2022-04-18 DIAGNOSIS — I10 ESSENTIAL HYPERTENSION: ICD-10-CM

## 2022-04-18 DIAGNOSIS — R00.1 BRADYCARDIA: ICD-10-CM

## 2022-04-18 DIAGNOSIS — Z99.89 OSA ON CPAP: ICD-10-CM

## 2022-04-18 DIAGNOSIS — G47.33 OSA ON CPAP: ICD-10-CM

## 2022-04-18 DIAGNOSIS — E78.2 MIXED HYPERLIPIDEMIA: ICD-10-CM

## 2022-04-18 DIAGNOSIS — Z95.0 PACEMAKER: Primary | ICD-10-CM

## 2022-04-18 DIAGNOSIS — Z95.2 S/P TAVR (TRANSCATHETER AORTIC VALVE REPLACEMENT): ICD-10-CM

## 2022-04-18 DIAGNOSIS — Z45.018 FITTING AND ADJUSTMENT OF CARDIAC PACEMAKER: ICD-10-CM

## 2022-04-18 DIAGNOSIS — I50.42 CHRONIC COMBINED SYSTOLIC AND DIASTOLIC CONGESTIVE HEART FAILURE (HCC): ICD-10-CM

## 2022-04-18 PROCEDURE — 99214 OFFICE O/P EST MOD 30 MIN: CPT | Performed by: INTERNAL MEDICINE

## 2022-04-18 PROCEDURE — 93005 ELECTROCARDIOGRAM TRACING: CPT | Performed by: INTERNAL MEDICINE

## 2022-04-18 PROCEDURE — 93280 PM DEVICE PROGR EVAL DUAL: CPT | Performed by: INTERNAL MEDICINE

## 2022-04-18 PROCEDURE — 1123F ACP DISCUSS/DSCN MKR DOCD: CPT | Performed by: INTERNAL MEDICINE

## 2022-04-18 PROCEDURE — G8417 CALC BMI ABV UP PARAM F/U: HCPCS | Performed by: INTERNAL MEDICINE

## 2022-04-18 PROCEDURE — 93010 ELECTROCARDIOGRAM REPORT: CPT | Performed by: INTERNAL MEDICINE

## 2022-04-18 PROCEDURE — 4040F PNEUMOC VAC/ADMIN/RCVD: CPT | Performed by: INTERNAL MEDICINE

## 2022-04-18 PROCEDURE — G8427 DOCREV CUR MEDS BY ELIG CLIN: HCPCS | Performed by: INTERNAL MEDICINE

## 2022-04-18 PROCEDURE — 1036F TOBACCO NON-USER: CPT | Performed by: INTERNAL MEDICINE

## 2022-04-18 NOTE — PATIENT INSTRUCTIONS
SURVEY:    You may be receiving a survey from [employer] regarding your visit today. Please complete the survey to enable us to provide the highest quality of care to you and your family. If you cannot score us a very good on any question, please call the office to discuss how we could have made your experience a very good one. Thank you.

## 2022-04-18 NOTE — PROGRESS NOTES
Katlyn Dailey am scribing for and in the presence of Jenaro Wiseman MD, F.A.C.C..    Patient: Krista Ramirez  : 1945  Date of Visit: 2022    History of Present Illness:        Dear Jose Umanzor MD    I had the pleasure of seeing Krista Ramirez in my office today. Mr. Walda Carrel is a 64 y.o. male who presented for follow-up. 1-He has  a history of heart failure. He has a history of hypertension and hyperlipidemia for years, and has borderline diabetes. 2- ECG on 3/20: sinus tachycardia with first-degree AV block and nonspecific intraventricular conduction delay. 3-Echocardiogram done 3/4/20 which showed severely reduced left ventricular systolic function with ejection fraction of 35% and moderate to severe aortic stenosis. This can be an underestimation because of reduced left ventricular systolic function. Mild to moderate aortic regurgitation. Moderate mitral regurgitation and moderate diastolic dysfunction. 4-Heart Catheterization done on 3/12/20: LMCA: Normal 0% stenosis. LAD: Mild irregularities 20-30% LCx: Lesion on 2nd Ob Angie Ostial 50% stenosis. RCA: Mild irregularities 10-20%. EF:35%. Severe aortic stenosis by cardiac catheterization with peak to peak gradient of 67 mmHg and mean gradient of 48 mmHg across the aortic valve. 5-Admission to [facility] on 3/4/20-3/9/20 due to acute heart failure: Patient treated for acute on chronic combined CHF and aortic stenosis, later developed atrial fibrillation with rapid ventricular response. Moved to the ICU and started on amiodarone drip and then later converted back to normal sinus rhythm. Amiodarone oral tablet was started prior to discharge. 6-On 2020, he presented to the emergency room with epistaxis. Nasal packing done that removed after 3 days.     7-Transcatheter aortic valve replacement with a Medtronic 34 mm Evolut Pro Plus prosthesis 9/15/2020    8-Echocardiogram done on 10/13/2020 showed an EF of 55%. Mild to moderate LV hypertrophy. Status post TAVR with mean gradient of 9 mmHg, mild perivalvular regurgitation noted. Mild mitral regurgitation. Moderate diastolic dysfunction is seen. 9-Holter monitor done on 1/14/2021: Sinus rhythm with intraventricular conduction delay and occasional PACs and very short runs of atrial tachycardia. The longest was 3 beats at a heart rate of 100 bpm.  No significant ventricular arrhythmia. 10-Echo done on 1/21/2021- EF 50-55%, Mild to moderately increased left ventricular wall thickness with a mildly increased left ventricular cavity size. The left atrium is severely dilated (>40) with a left atrial volume index of 43 ml/m2. S/P TAVR with a mean gradient of 11 mmHg. Trivial aortic regurgitation noted. Evidence of moderate diastolic dysfunction is seen    11-CAM monitor worn on 1/21/2020- 6 days and 21 hours recorded. Average heart rate was 61 bpm, ranging from 46 to 90 bpm. First degree AV block, Mobitz type 1 second-degree AV block and occasional high grade/third degree AV block. Minimum HR occurred during 2nd degree AV block was 32 bpm.     12-Medtronic pacemaker insertion on 2/18/2021 by Dr Arlette Lozano    13- Echo done on 9/17/2021: [employer] left ventricular systolic function appears mildly reduced with an estimated ejection fraction of 50-55%. Moderately increased left ventricular wall thickness with a normal left ventricular cavity size. The left atrium is severely dilated (>40) with a left atrial volume index of 42 ml/m2. S/P TAVR with a mean gradient of 9 mmHg. Mild aortic insufficiency was seen. Myxomatous thickening of the mitral leaflets. Moderate mitral annular calcification is seen with a mean gradient of 4.7 mmHg. Mild mitral regurgitation was seen. Evidence of moderate diastolic dysfunction is seen. Mr. Michael Larsen is here today for a follow up. He continues to do well. He denied any ER visits, hospitalization or minor procedures.  He denies any chest pain, pressure or tightness. No orthopnea or PND. No lightheaded/dizziness or palpitations. No bleeding problems, bowel issues, problems with current medications or any other concerns at this time. No abdominal pain, nausea or vomiting. No cough, fever or chills. EKG done in office today 2022- Ventricular paced rhythm. Pacemaker interrogated in office today 2022-pacemaker dependent. No atrial fibrillation. Good battery life. PAST MEDICAL HISTORY:        Past Medical History:   Diagnosis Date    Anxiety     Aortic stenosis     Atrial fibrillation, new onset ([address] 2020    BPH (benign prostatic hyperplasia)     CHF (congestive heart failure) (HCC)     Diabetes mellitus (HCC)     borderline    Hyperlipidemia     Hypertension     Lymphedema     bilat legs    On home O2     Osteoarthritis     Septic shock due to urinary tract infection (HCC) /  Levophed and fluids    Nonischemic cardiomyopathy. Severe aortic stenosis. CURRENT ALLERGIES: Patient has no known allergies. REVIEW OF SYSTEMS: 14 systems were reviewed. Pertinent positives and negatives as above, all else negative.      Past Surgical History:   Procedure Laterality Date    CARDIAC CATHETERIZATION  2020    OTHER SURGICAL HISTORY  2020    TAVR/ Dr. Renetta Schwartz History:  Social History     Tobacco Use    Smoking status: Former Smoker     Packs/day: 1.00     Years: 10.00     Pack years: 10.00     Types: Cigarettes     Quit date:      Years since quittin.3    Smokeless tobacco: Never Used   Vaping Use    Vaping Use: Never used   Substance Use Topics    Alcohol use: Never    Drug use: No        CURRENT MEDICATIONS:        Outpatient Medications Marked as Taking for the 22 encounter (Office Visit) with Rama Ball MD   Medication Sig Dispense Refill    EQ ASPIRIN ADULT LOW DOSE 81 MG EC tablet Take 1 tablet by mouth once daily 90 tablet 3    amoxicillin (AMOXIL) 500 MG capsule Take 4 tabs 1 hr before dental procedure 4 capsule 0    furosemide (LASIX) 20 MG tablet TAKE 2 TABLETS BY MOUTH ON MONDAY, WEDNESDAY AND FRIDAY AND THEN 1 ONCE DAILY ON  ALL  OTHER  DAYS. 60 tablet 0    PARoxetine (PAXIL) 30 MG tablet TAKE 1 TABLET BY MOUTH ONCE DAILY IN THE MORNING 90 tablet 0    amiodarone (CORDARONE) 200 MG tablet Take 1 tablet by mouth daily 90 tablet 3    zinc gluconate 50 MG tablet Take 50 mg by mouth daily      losartan (COZAAR) 25 MG tablet Take 1 tablet by mouth once daily 90 tablet 3    atorvastatin (LIPITOR) 40 MG tablet Take 1 tablet by mouth once daily 90 tablet 3    potassium chloride (KLOR-CON M10) 10 MEQ extended release tablet Take 2 tablets by mouth daily 180 tablet 3    tamsulosin (FLOMAX) 0.4 MG capsule Take 1 capsule by mouth daily 30 capsule 11    simethicone (MYLICON) 80 MG chewable tablet Take 80 mg by mouth every 6 hours as needed for Flatulence (Gas pain)      Multiple Vitamins-Minerals (THERAPEUTIC MULTIVITAMIN-MINERALS) tablet Take 1 tablet by mouth daily       Cholecalciferol (VITAMIN D3) 50 MCG (2000 UT) CAPS Take 2,000 Units by mouth daily       acetaminophen (TYLENOL) 500 MG tablet Take 500 mg by mouth every 6 hours as needed for Pain          FAMILY HISTORY: family history includes Arthritis in his mother; Heart Disease in his mother; High Blood Pressure in his brother and mother; High Cholesterol in his mother; Stroke in his mother. PHYSICAL EXAMINATION:     /77 (Site: Left Upper Arm, Position: Sitting, Cuff Size: Large Adult)   Pulse 80   Resp 20   Ht 5' 8\" (1.727 m)   Wt (!) 363 lb (164.7 kg)   SpO2 97%   BMI 55.19 kg/m²  Body mass index is 55.19 kg/m². Constitutional: He is oriented to person, place, and time. He appears well-developed and well-nourished. In no acute distress. HEENT: Normocephalic and atraumatic. No JVD present. Carotid bruit is not present. No mass and no thyromegaly present.  No lymphadenopathy present. Cardiovascular: Normal rate, regular rhythm, normal heart sounds. Exam reveals no gallop and no friction rubs. 1/6 systolic murmur, 2nd intercostal space on the LEFT just lateral to the sternum  Pulmonary/Chest: Effort normal and breath sounds normal. No respiratory distress. He has no wheezes, rhonchi or rales. Abdominal: Soft, non-tender. Bowel sounds and aorta are normal. He exhibits no organomegaly, mass or bruit. Extremities: Chronic bilateral lower extremity edema with chronic vascular changes and chronic cellulitis. Unchanged from prior. No cyanosis or clubbing. 2+ radial and carotid pulses. Distal extremity pulses: 2+ bilaterally. Neurological: He is alert and oriented to person, place, and time. No evidence of gross cranial nerve deficit. Coordination appeared normal.   Skin: Skin is warm and dry. There is no rash or diaphoresis. Psychiatric: He has a normal mood and affect.  His speech is normal and behavior is normal.      MOST RECENT LABS ON RECORD:   Lab Results   Component Value Date    WBC 10.7 09/17/2021    HGB 14.2 09/17/2021    HCT 43.7 09/17/2021     09/17/2021    CHOL 127 01/21/2021    TRIG 136 01/21/2021    HDL 31 (L) 01/21/2021    ALT 19 02/02/2022    AST 15 02/02/2022     02/02/2022    K 4.3 02/02/2022    CL 97 (L) 02/02/2022    CREATININE 0.70 02/02/2022    BUN 22 02/02/2022    CO2 27 02/02/2022    TSH 2.32 02/02/2022    PSA 1.49 01/11/2019    INR 1.16 (H) 02/18/2021    GLUF 103 (H) 01/11/2019    LABA1C 5.9 03/02/2017       ASSESSMENT:       Patient Active Problem List    Diagnosis Date Noted    Aortic stenosis, severe     Dyslipidemia     Chronic combined systolic and diastolic congestive heart failure ([facility] [address] 04/18/2022    SARS-CoV-2 positive 01/01/2022    AV block, 3rd degree (HCC)     Carpal tunnel syndrome on left 01/08/2019    Obesity, morbid (more than 100 lbs over ideal weight or BMI > 40) (HCC) 12/15/2017    Osteoarthritis of both knees 06/13/2017  Essential hypertension 12/13/2016    Mixed hyperlipidemia 12/13/2016        Diagnosis Orders   1. Pacemaker  EKG 12 lead   2. Bradycardia     3. Chronic combined systolic and diastolic congestive heart failure (HCC)  EKG 12 lead   4. S/P TAVR (transcatheter aortic valve replacement)  EKG 12 lead   5. Essential hypertension  EKG 12 lead   6. Mixed hyperlipidemia  EKG 12 lead   7. MARIA ESTHER on CPAP  EKG 12 lead   8. Body mass index (BMI) 50.0-59.9, adult [facility])       PLAN:         Dual Chamber Pacemaker: Medtronic implanted on 2/18/2021 by Dr Sonia Gaffney - History of bradycardia:    Indication for Device Placement: Symptomatic Bradycardia, second degree AV block, complete heart block.  Interrogation Findings: Within normal limits and therefore no changes were made. No atrial fibrillation. No significant ventricular arrhythmia. Good batter life, 100% paced     Chronic combined heart failure: New York Heart Association Class: IIb (Marked symptoms during daily activities)   Beta Blocker: Currently off beta-blocker therapy because of sinus bradycardia and intermittent high-grade AV block.  ACE Inibitor/ARB: Continue losartan (Cozaar) 25 mg daily.  Diuretics: Continue lasix 40 mg on Monday, Wednesday and Friday and 20 mg on the reaming days.  Continue Potassium 20 MEQ daily   Heart failure counseling: I advised them to try and keep their legs up whenever possible and to limit salt in their diet. · S/P TAVR 9/14/2020: Last Echo done on 9/2021 showed an EF of 50-55%. · ACE Inibitor/ARB: Continue losartan (Cozaar) 25 mg daily. · Diuretics: Continue lasix 40 mg on Monday, Wednesday and Friday and 20 mg every other day. · Continue Potassium 20 MEQ daily     History of Atrial Fibrillation: Rhythm Control. No atrial fibrillation seen on his device check done today in office. Antiplatelet Agent: Continue Aspirin 81 mg daily.   Rhythm Control Agent: Continue Amiodarone (Cordarone) 200 mg once daily Monitoring: Amiodarone Since he is being maintained on Amiodarone, I told him that we will need to closely monitor him for potential side effects. These include monitoring LFTs and TSH at least every 6 months as well as chest x-rays, pulmonary function tests, and eye exams at least on a yearly basis. [id number] Score for Atrial Fibrillation Stroke Risk   Risk   Factors  Component Value   C CHF Yes 1   H HTN Yes 1   A2 Age >= 76 Yes,  (72 y.o.) 2   D DM No 0   S2 Prior Stroke/TIA No 0   V Vascular Disease No 0   A Age 74-69 No,  (72 y.o.) 0   Sc Sex male 0    [id number]  Score  4   Score last updated 4/60/61 2:40 AM EDT  Click here for a link to the UpToDate guideline \"Atrial Fibrillation: Anticoagulation therapy to prevent embolization  Disclaimer: Risk Score calculation is dependent on accuracy of patient problem list and past encounter diagnosis. Stroke Risk: His CHADS2-VASc score is 4/9 (4% stroke risk)   Anticoagulation: Off anticoagulation based on the patient request.  We are closely monitoring his atrial fibrillation via pacemaker. No atrial fibrillation recorded since last interrogation. Continue amiodarone. I reviewed his most recent blood work and it is fairly unremarkable.  Essential Hypertension: Controlled  · ACE Inibitor/ARB: Continue losartan (Cozaar) 25 mg daily. · Diuretics: Continue lasix 40 mg on Monday, Wednesday and Friday and 20 mg every other day. · Hyperlipidemia: Mixed - Last LDL on 1/21/2021 was 69 mg/dL   · Cholesterol Reduction Therapy: Continue Atorvastatin (Lipitor) 40 mg daily.  Obstructive Sleep Apnea:   · Continue use a CPAP. Morbid obesity: Body mass index is 55.19 kg/m². I also briefly discussed both diet and exercise strategies for him to continue to loses weight and he was very receptive to this. Finally, I recommended that he continue his other medications and follow up with you as previously scheduled. FOLLOW UP:   I told Mr. Manzo to call my office if he had any problems, but otherwise I asked him to Return in about 6 months (around 10/18/2022). However, I would be happy to see him sooner should the need arise. Sincerely,  Marissa Syed MD, F.A.C.C. [facility] Cardiology Specialist    [address] [facility] [place], [address] [place] [address]  Phone: [phone], Fax: [phone]     I believe that the risk of significant morbidity and mortality related to the patient's current medical conditions are: intermediate-high. Approximately 35 minutes were spent during prep work, discussion and exam of the patient, and follow up documentation and all of their questions were answered. The documentation recorded by the scribe, accurately and completely reflects the services I personally performed and the decisions made by me. Marissa Syed MD, F.A.C.C.  April 18, 2022

## 2022-04-28 ENCOUNTER — OFFICE VISIT (OUTPATIENT)
Dept: PULMONOLOGY | Age: 77
End: 2022-04-28
Payer: MEDICARE

## 2022-04-28 VITALS
DIASTOLIC BLOOD PRESSURE: 61 MMHG | TEMPERATURE: 97.1 F | OXYGEN SATURATION: 97 % | HEIGHT: 68 IN | HEART RATE: 68 BPM | RESPIRATION RATE: 16 BRPM | SYSTOLIC BLOOD PRESSURE: 120 MMHG | WEIGHT: 315 LBS | BODY MASS INDEX: 47.74 KG/M2

## 2022-04-28 DIAGNOSIS — Z86.16 HISTORY OF COVID-19: ICD-10-CM

## 2022-04-28 DIAGNOSIS — J96.11 CHRONIC RESPIRATORY FAILURE WITH HYPOXIA (HCC): ICD-10-CM

## 2022-04-28 DIAGNOSIS — I35.0 SEVERE AORTIC STENOSIS: ICD-10-CM

## 2022-04-28 DIAGNOSIS — Z87.891 STOPPED SMOKING WITH GREATER THAN 10 PACK YEAR HISTORY: ICD-10-CM

## 2022-04-28 DIAGNOSIS — E66.01 CLASS 3 SEVERE OBESITY DUE TO EXCESS CALORIES WITH SERIOUS COMORBIDITY AND BODY MASS INDEX (BMI) OF 50.0 TO 59.9 IN ADULT (HCC): ICD-10-CM

## 2022-04-28 DIAGNOSIS — G47.33 OSA (OBSTRUCTIVE SLEEP APNEA): Primary | ICD-10-CM

## 2022-04-28 PROCEDURE — 99214 OFFICE O/P EST MOD 30 MIN: CPT | Performed by: NURSE PRACTITIONER

## 2022-04-28 PROCEDURE — G8417 CALC BMI ABV UP PARAM F/U: HCPCS | Performed by: NURSE PRACTITIONER

## 2022-04-28 PROCEDURE — G8427 DOCREV CUR MEDS BY ELIG CLIN: HCPCS | Performed by: NURSE PRACTITIONER

## 2022-04-28 PROCEDURE — 4040F PNEUMOC VAC/ADMIN/RCVD: CPT | Performed by: NURSE PRACTITIONER

## 2022-04-28 PROCEDURE — 1036F TOBACCO NON-USER: CPT | Performed by: NURSE PRACTITIONER

## 2022-04-28 PROCEDURE — 1123F ACP DISCUSS/DSCN MKR DOCD: CPT | Performed by: NURSE PRACTITIONER

## 2022-04-28 ASSESSMENT — ENCOUNTER SYMPTOMS
EYES NEGATIVE: 1
ALLERGIC/IMMUNOLOGIC NEGATIVE: 1
GASTROINTESTINAL NEGATIVE: 1

## 2022-04-28 NOTE — PATIENT INSTRUCTIONS
SURVEY:    You may be receiving a survey from [employer] regarding your visit today. Please complete the survey to enable us to provide the highest quality of care to you and your family. If you cannot score us a very good on any question, please call the office to discuss how we could have made your experience a very good one. Thank you.

## 2022-04-28 NOTE — PROGRESS NOTES
Subjective:      Patient ID: Katey Campo is a 64 y.o. male. HPI  Patient here for follow-up for MARIA ESTHER, chronic hypoxemic respiratory failure on supplemental oxygen and history of COVID-19 in December 2021. Patient has a history of aortic valve disease status post TAVR. Patient was last seen in the office in July 2021 for me in April 2021 per Dr. Gracia Lim. Patient's DME company is [employer]. Unfortunately no compliance data available for my review. Patient states that he uses his machine every single night without fail. Feels like he is getting a good night sleep and benefit. Compliance data reviewed from 3/29/2022 through 4/27/2022 with 27 out of 30 days used, 14 days for more than 4 hours putting him at 47% compliant. Patient is on auto titrating CPAP 5-15 cm H2O with a median pressure of 7.4 cm H2O and a maximum setting of 10.0 cm H2O. Residual AHI of 1.2. Patient is using the machine and feels that he is benefiting from it. DME [facility]      Medications:   No pulmonary medications  O2 at 2 LPM nocturnally and PRN during the day       PRIOR WORKUP:  None on chart     CT Imaging:  CT chest 6/17/2020: Negative for pulmonary nodules or infiltrate. There is scarring/atelectasis of left lung base. Negative for pulmonary emboli. No mediastinal adenopathy.        Sleep Study:  Home sleep study 4/29/2021: Patient wore the device for approximately 7 hours and during this timeframe had a total of 4 apneas and 112 hypopneas with desaturation of at least 4%. Respiratory event index was 17.4 events per hour. SPO2 desaturation lowest was 75% with an average of 89%.   Moderate sleep apnea.     Laboratory Evaluation:      Immunizations:   Immunization History   Administered Date(s) Administered    COVID-19, Cherise Dues, Primary or Immunocompromised, PF, 100mcg/0.5mL 02/26/2021, 02/26/2021, 03/27/2021, 03/27/2021, 12/22/2021    Hepatitis B (Recombivax HB) 12/15/1998, 01/19/1999    Hepatitis B vaccine 12/15/1998, 1999    Influenza A (Y3W0-55) Vaccine PF IM 2009    Influenza Vaccine, unspecified formulation 10/15/2017    Influenza Virus Vaccine 2017, 2017    Influenza, High Dose (Fluzone 65 yrs and older) 10/03/2018, 10/07/2019    Influenza, High-dose, Quadv, 65 yrs +, IM (Fluzone) 10/21/2020, 2021    Pneumococcal Conjugate 13-valent (Rreqevs18) 2017    Pneumococcal Polysaccharide ([id number]) 2021        No flowsheet data found.     /61   Pulse 68   Temp 97.1 °F (36.2 °C)   Resp 16   Ht 5' 8\" (1.727 m)   Wt (!) 363 lb (164.7 kg)   SpO2 97%   BMI 55.19 kg/m²     Past Medical History:   Diagnosis Date    Anxiety     Aortic stenosis     Atrial fibrillation, new onset ([address] 2020    BPH (benign prostatic hyperplasia)     CHF (congestive heart failure) ([address]     Diabetes mellitus (HCC)     borderline    Hyperlipidemia     Hypertension     Lymphedema     bilat legs    On home O2     Osteoarthritis     Septic shock due to urinary tract infection ([address] /  Levophed and fluids      Past Surgical History:   Procedure Laterality Date    CARDIAC CATHETERIZATION  2020    OTHER SURGICAL HISTORY  2020    TAVR/ Dr. Mireya Barnett     Family History   Problem Relation Age of Onset    Arthritis Mother     Heart Disease Mother     High Cholesterol Mother     High Blood Pressure Mother     Stroke Mother     High Blood Pressure Brother        Social History     Socioeconomic History    Marital status:      Spouse name: Josie Juarez Number of children: Not on file    Years of education: Not on file    Highest education level: Not on file   Occupational History    Not on file   Tobacco Use    Smoking status: Former Smoker     Packs/day: 1.00     Years: 10.00     Pack years: 10.00     Types: Cigarettes     Quit date: 1973     Years since quittin.3    Smokeless tobacco: Never Used   Vaping Use    Vaping Use: Never used   Substance and Sexual Activity    Alcohol use: Never    Drug use: No    Sexual activity: Not on file   Other Topics Concern    Not on file   Social History Narrative    Not on file     Social Determinants of Health     Financial Resource Strain: Low Risk     Difficulty of Paying Living Expenses: Not hard at all   Food Insecurity: No Food Insecurity    Worried About Running Out of Food in the Last Year: Never true    Gus of Food in the Last Year: Never true   Transportation Needs: No Transportation Needs    Lack of Transportation (Medical): No    Lack of Transportation (Non-Medical): No   Physical Activity: Inactive    Days of Exercise per Week: 0 days    Minutes of Exercise per Session: 0 min   Stress: No Stress Concern Present    Feeling of Stress : Not at all   Social Connections: Moderately Isolated    Frequency of Communication with Friends and Family: Twice a week    Frequency of Social Gatherings with Friends and Family: Twice a week    Attends Episcopal Services: Never    Active Member of Clubs or Organizations: No    Attends Club or Organization Meetings: Never    Marital Status:    Intimate Partner Violence: Not At Risk    Fear of Current or Ex-Partner: No    Emotionally Abused: No    Physically Abused: No    Sexually Abused: No   Housing Stability:     Unable to Pay for Housing in the Last Year: Not on file    Number of [place] in the Last Year: Not on file    Unstable Housing in the Last Year: Not on file       Review of Systems   Constitutional:        Exertional dyspnea. Uses a motorized wheelchair for mobility. HENT: Negative. Eyes: Negative. Respiratory:        Exertional dyspnea. No significant cough or sputum production. Cardiovascular: Negative. Gastrointestinal: Negative. Endocrine: Negative. Genitourinary: Negative. Musculoskeletal: Negative. Skin: Negative. Allergic/Immunologic: Negative. Neurological: Negative. American >60 >60 mL/min    GFR Comment          GFR Staging         Brain Natriuretic Peptide   Result Value Ref Range    Pro-BNP 85 <300 pg/mL    BNP Interpretation NOT REPORTED        No results found. Assessment:      1. MARIA ESTHER (obstructive sleep apnea)    2. Chronic respiratory failure with hypoxia (HCC)    3. Class 3 severe obesity due to excess calories with serious comorbidity and body mass index (BMI) of 50.0 to 59.9 in adult ([address]    4. History of COVID-19    5. Stopped smoking with greater than 10 pack year history    6. Severe aortic stenosis /  TAVR 2020          Plan:      1. Patient is on no pulmonary medications. 2. Educated and clarified the medication use. 3. Recommend flu vaccination in the fall annually. 4. Patient is up-to-date with pneumococcal vaccinations from pulmonary perspective. 5. Patient has received COVID-vaccine and booster. Discussed strategies to protect against Covid 19.   6. Maintain an active lifestyle. 7. Patient's questions were answered to his satisfaction. 8. Supplemental oxygen continues. 9. Ongoing smoking cessation advised. 10. CT scan of the chest was reviewed  11. Compliance data not available for my review. Per patient report they use the machine faithfully every night, and report refreshing and restorative sleep without residual daytime fatigue  12.  We'll see the patient back in 6 months          Electronically signed by SYLVIA Blanton CNP on 4/28/2022 at 12:13 PM

## 2022-05-24 ENCOUNTER — NURSE ONLY (OUTPATIENT)
Dept: CARDIOLOGY | Age: 77
End: 2022-05-24
Payer: MEDICARE

## 2022-05-24 DIAGNOSIS — Z95.0 PACEMAKER: Primary | ICD-10-CM

## 2022-05-24 DIAGNOSIS — R00.1 BRADYCARDIA: ICD-10-CM

## 2022-05-26 PROCEDURE — 93294 REM INTERROG EVL PM/LDLS PM: CPT | Performed by: INTERNAL MEDICINE

## 2022-06-14 ENCOUNTER — TELEPHONE (OUTPATIENT)
Dept: CARDIOLOGY CLINIC | Age: 77
End: 2022-06-14

## 2022-06-15 ENCOUNTER — HOSPITAL ENCOUNTER (OUTPATIENT)
Dept: PHARMACY | Age: 77
Setting detail: THERAPIES SERIES
Discharge: HOME | End: 2022-06-15
Payer: MEDICARE

## 2022-06-15 VITALS
DIASTOLIC BLOOD PRESSURE: 59 MMHG | SYSTOLIC BLOOD PRESSURE: 128 MMHG | BODY MASS INDEX: 54.74 KG/M2 | OXYGEN SATURATION: 97 % | HEART RATE: 77 BPM | WEIGHT: 315 LBS

## 2022-06-15 DIAGNOSIS — I50.42 CHRONIC COMBINED SYSTOLIC AND DIASTOLIC CONGESTIVE HEART FAILURE (HCC): Primary | ICD-10-CM

## 2022-06-15 PROCEDURE — 99212 OFFICE O/P EST SF 10 MIN: CPT

## 2022-06-15 NOTE — PROGRESS NOTES
[facility] [place]  Medication Management  Pharmacist  Heart Failure    [address] [place] [address] [facility], [address]  Phone: [phone]  Fax: [phone]      NAME: Zay Mariscal  MEDICAL RECORD NUMBER:  [id number]  AGE: 64 y.o. GENDER: male  : 1945  EPISODE DATE:  6/15/2022      Heart Failure Management referral by Dr. Alesia Chandler    Dry weight: ?? pounds     Today's Wt: 360 lb (down 7 more pounds)  Wt Readings from Last 6 Encounters:   06/15/22 (!) 360 lb (163.3 kg)   22 (!) 357 lb (161.9 kg)   22 (!) 363 lb (164.7 kg)   22 (!) 363 lb (164.7 kg)   22 (!) 367 lb (166.5 kg)   22 (!) 383 lb 12.8 oz (174.1 kg)    and   Ht Readings from Last 1 Encounters:   22 5' 8\" (1.727 m)                 /59   HR: 77  O2Sat: 97     Extremities and pitting edema mild very slight  Skin red lower leg from venous stasis with warm dry skin. Encouraged him to start using lotion on his dry skin     Subjective   Mr. Omkar Lechuga is a 64 y.o. male here for the Heart Failure Services. They are here today for a comprehensive medication review including over the counter medications and herbal products, overall wellbeing assessment, transition of care and any needed adjustments with updates and recommendations communicated to the referring physician. Patient Symptoms:  Patient reports feeling really good today. Weight has decreased 7 pounds since last seen in clinic on 3/9/22. He started a keto diet (upon approval from his PCP) about 3 months ago. Patient has SOB with exertion. He is in a motorized wheelchair. He is using oxygen continuously at 2 L/min. Patient is using his CPAP machine again. He states he doesn't feel any different with using his CPAP every night again. Patient's son moved in with them and his sons is a good cook. Patient bought \"compression\" socks online and is wearing them. Leg swelling remains the same.   Patient uses a motorized wheelchair.   Patient states his knees are bone on bone and he has trouble getting around.      Shortness of Breath:   []   None   [x]   Dyspnea on exertion   []   Dyspnea with normal activities  []   Dyspnea at rest  []   Dyspnea while sleeping    Patient Findings:   []  Missed doses  [x]  Diet changes  [x]  Sodium intake changes   []  Night time cough   []  Other   []  Early saiety, abd fullness []  Chest pain   []  Constipation   []  Night time bathroom trips  []  Alcohol intake changes  []  Acute illness  [x]  Edema    [x]  Number of pillows or recliner  [x]  Activity changes   []  Fatigue that limits activity []  Heart racing or skipping beats  []  Light headedness  []  Dizziness    []      []  Problems sleeping    Functional Activity New York Heart Association Classification  []   Class I No limitation of physical activity. Ordinary physical activity does not cause undue fatigue, palpitation, dyspnea (shortness of breath). []   Class II Slight limitation of physical activity. Comfortable at rest. Ordinary physical activity results in fatigue, palpitation, dyspnea (shortness of breath). [x]   Class III  Fredrick limitation of physical activity. Comfortable at rest.  Less than ordinary activity causes fatigue, palpitation, dyspnea. []   Class IV Unable to carry on any physical activity without discomfort. Symptoms of heart failure at rest.  If any physical activity is undertaken, discomfort increases.       Last CHF Hospital Admission: 5-13-20  -  5-27-20    OUTPATIENT MEDICATIONS:  Outpatient Medications Marked as Taking for the 6/15/22 encounter [facility] Encounter) with [address]   Medication Sig Dispense Refill    furosemide (LASIX) 20 MG tablet TAKE 2 TABLETS BY MOUTH ONCE DAILY ON  MONDAY,WEDNESDAY  AND  FRIDAY  AND  THEN  1  ONCE  DAILY  ALL  OTHER  DAYS (Patient taking differently: TAKE 1 TABLET MWF AND  2 tablets ALL  OTHER  DAYS) 60 tablet 3    EQ ASPIRIN ADULT LOW DOSE 81 MG EC tablet Take 1 tablet by mouth once daily 90 tablet 3    PARoxetine (PAXIL) 30 MG tablet TAKE 1 TABLET BY MOUTH ONCE DAILY IN THE MORNING 90 tablet 0    amiodarone (CORDARONE) 200 MG tablet Take 1 tablet by mouth daily 90 tablet 3    zinc gluconate 50 MG tablet Take 50 mg by mouth daily      losartan (COZAAR) 25 MG tablet Take 1 tablet by mouth once daily 90 tablet 3    atorvastatin (LIPITOR) 40 MG tablet Take 1 tablet by mouth once daily 90 tablet 3    potassium chloride (KLOR-CON M10) 10 MEQ extended release tablet Take 2 tablets by mouth daily 180 tablet 3    tamsulosin (FLOMAX) 0.4 MG capsule Take 1 capsule by mouth daily 30 capsule 11    simethicone (MYLICON) 80 MG chewable tablet Take 80 mg by mouth every 6 hours as needed for Flatulence (Gas pain)      Multiple Vitamins-Minerals (THERAPEUTIC MULTIVITAMIN-MINERALS) tablet Take 1 tablet by mouth daily       Cholecalciferol (VITAMIN D3) 50 MCG (2000 UT) CAPS Take 2,000 Units by mouth daily       acetaminophen (TYLENOL) 500 MG tablet Take 500 mg by mouth every 6 hours as needed for Pain            Objective / Assessment     Patient Active Problem List   Diagnosis Code    Essential hypertension I10    Mixed hyperlipidemia E78.2    Osteoarthritis of both knees M17.0    Obesity, morbid (more than 100 lbs over ideal weight or BMI > 40) ([facility]) E66.01    Carpal tunnel syndrome on left G56.02    Aortic stenosis, severe I35.0    Dyslipidemia E78.5    AV block, 3rd degree ([facility]) I44.2    SARS-CoV-2 positive U07.1    Chronic combined systolic and diastolic congestive heart failure ([facility]) I50.42     Social History     Tobacco Use    Smoking status: Former Smoker     Packs/day: 1.00     Years: 10.00     Pack years: 10.00     Types: Cigarettes     Quit date:      Years since quittin.4    Smokeless tobacco: Never Used   Vaping Use    Vaping Use: Never used   Substance Use Topics    Alcohol use: Never    Drug use: No       Potassium (mmol/L)   Date Value   2022 4.3 09/17/2021 4.6   06/01/2021 4.0   04/20/2021 4.1   03/08/2021 4.2   01/21/2021 4.4     Potassium reflex Magnesium (meq/L)   Date Value   02/18/2021 4.6   09/15/2020 3.9     BUN (mg/dL)   Date Value   02/02/2022 22   09/17/2021 11   06/01/2021 15   04/20/2021 22   03/08/2021 15   02/18/2021 18     CREATININE (mg/dL)   Date Value   02/02/2022 0.70   09/17/2021 0.60 (L)   06/01/2021 0.59 (L)   04/20/2021 0.77   03/08/2021 0.66 (L)   02/18/2021 0.8     TSH (mIU/L)   Date Value   02/02/2022 2.32   09/17/2021 2.03   03/08/2021 2.37   05/15/2020 2.83   03/04/2020 3.43     No results found for: T4      Plan:      · Chronic Systolic and/or Diastolic Heart Failure (diagnosis): EF: 50-55% via echocardiogram on 1/21/21  · Beta Blocker for EF </= 40%: none - bradycardia  · Diuretics: Furosemide 20 mg po every MWF and 40 mg po all other days      Continue Potassium 20 mEq po daily  · ACE/ARB/ARNI for EF </= 40%: Losartan 25 mg po daily  · Nonpharmacologic management of Heart Failure: I told patient to continue wearing lower extremity compression stockings and I advised patient to try and keep legs up whenever possible and to limit salt in diet.   · Laboratory testing:   ? Complete metabolic panel (CMP) today to assess potassium, renal function and liver function due to amiodarone therapy  ? BNP to assess fluid today due to weight gain  ·  Paroxysmal Atrial Fibrillation: Rhythm Control   ·             Antiplatelet Agent: STOP clopidogrel (Plavix) and START Aspirin 81 mg daily. I also reminded them to watch for signs of bloody or black tarry stools and stop the medication immediately if this develops as this could be life threatening. · Rhythm Control Agent: Continue Amiodarone (Cordarone) 200 mg once daily    Monitoring: Amiodarone Since he is being maintained on Amiodarone, I told him that we will need to closely monitor him for potential side effects.  These include monitoring LFTs and TSH at least every 6 months as well as chest x-rays, pulmonary function tests, and eye exams at least on a yearly basis.   TSH 22 - 2.32 (wnl)-- Patient also reports he had an eye exam last month () - no problems reported    · Stroke Risk: His CHADS2-VASc score is greater than 2 (2.2% stroke risk)  Anticoagulation:  STOPPED Eliquis 3/16/21 per Dr. Pilar Van - due to patient concern for cost and patient refusal to switch to warfarin.  Pacemaker transmission q 2 weeks to monitor a-fib.  Patient to resume anticoagulation if  recurrent a-fib. · Atherosclerotic Heart Disease:    · Statin Therapy: Atorvastatin 40 mg po daily      Essential Hypertension:   ? Diuretics: Furosemide 40 mg po every MWF and 20 mg po all other days  ? Beta Blocker: none - bradycardia    Patient Education/Instructions: I did spend about 15 minutes with patient going over his heart failure book including dietary guidelines, the signs and symptoms to watch for including shortness of breath, weight gain, lightheadedness/dizziness. I asked patient to call the clinic if develops persistent or worsening shortness of breath or weight gain of more than 3 pounds in 1-7 days. Patient verbalized understanding.      Medication changes since last visit none    Getting labs in September and FU with pharmacist HF clinic  FU with Pilar Van in October      Thank you for the referral,    Chris Lice[facility] [address] Only     Intervention Detail: Adherence Monitorin   Total # of Interventions Recommended: 0   Total # of Interventions Accepted: 0   Time Spent (min): 60

## 2022-06-15 NOTE — PROGRESS NOTES
Per Ella [place] at coumadin clinic/ HF Clinic Mr. Manzo needed a new referral put in due to it being .

## 2022-07-22 ENCOUNTER — TELEPHONE (OUTPATIENT)
Dept: CARDIOLOGY CLINIC | Age: 77
End: 2022-07-22

## 2022-07-22 NOTE — TELEPHONE ENCOUNTER
Jennifer 21 ([id number])      The following questions refer to your heart failure and how it may affect your life. Please read and complete the following questions. There is no right or wrong answers. Please shila the answer that best applies to you. Heart failure affects different people in different ways. Some feel shortness of breath while others feel fatigue. Please indicate how much you are limited by heart failure (shortness of breath or fatigue) in your ability to do the following activities over the past 2 weeks. Activity Extremely Limited Quite a bit limited Moderatly Limited Slightly Limited Not at all Limited Limited for other reasons/ did not do the activity   Showering/ Bathing          x    Walking 1 block on Level ground      x   Hurrying or jogging (as if to catch a bus)      x        1         2       3  4       5   6     2. Over the past 2 weeks, how many times did you have swelling in your feet, ankles or legs when you woke up in the morning? Every morning 3 or more times per week, but not every day 1-2 times per week Less than once a week Never over the past 2 weeks         x   `        1          2   3       4                     5           3. Over the past 2 weeks, on average, how many times has fatigue limited your ability to do what you wanted? All of the time Several times per day At least once a day 3 or more times per week 1-2 times per week Less than once a week Never over the past 2 weeks           x            1        2     3  4        5       6  7        4. Over the past 2 weeks, on average, how many times has shortness of breath limited your ability to do what you    wanted? All of the time Several times per day At least once a day 3 or more times per week 1-2 times per week Less than once a week Never over the past 2 weeks           x            1        2     3  4         5       6  7     5.  Over the past 2 weeks, on average, how many

## 2022-09-02 ENCOUNTER — HOSPITAL ENCOUNTER (OUTPATIENT)
Age: 77
Discharge: HOME | End: 2022-09-02
Payer: MEDICARE

## 2022-09-02 DIAGNOSIS — I50.42 CHRONIC COMBINED SYSTOLIC AND DIASTOLIC CONGESTIVE HEART FAILURE (HCC): ICD-10-CM

## 2022-09-02 DIAGNOSIS — M19.90 ARTHRITIS: ICD-10-CM

## 2022-09-02 LAB
ALBUMIN SERPL-MCNC: 4.1 G/DL (ref 3.5–5.2)
ALBUMIN/GLOBULIN RATIO: 1.2 (ref 1–2.5)
ALP BLD-CCNC: 57 U/L (ref 40–129)
ALT SERPL-CCNC: 26 U/L (ref 5–41)
ANION GAP SERPL CALCULATED.3IONS-SCNC: 13 MMOL/L (ref 9–17)
AST SERPL-CCNC: 20 U/L
BILIRUB SERPL-MCNC: 0.5 MG/DL (ref 0.3–1.2)
BUN BLDV-MCNC: 26 MG/DL (ref 8–23)
BUN/CREAT BLD: 30 (ref 9–20)
CALCIUM SERPL-MCNC: 9 MG/DL (ref 8.6–10.4)
CHLORIDE BLD-SCNC: 99 MMOL/L (ref 98–107)
CO2: 26 MMOL/L (ref 20–31)
CREAT SERPL-MCNC: 0.86 MG/DL (ref 0.7–1.2)
GFR AFRICAN AMERICAN: >60 ML/MIN
GFR NON-AFRICAN AMERICAN: >60 ML/MIN
GFR SERPL CREATININE-BSD FRML MDRD: ABNORMAL ML/MIN/{1.73_M2}
GFR SERPL CREATININE-BSD FRML MDRD: ABNORMAL ML/MIN/{1.73_M2}
GLUCOSE BLD-MCNC: 100 MG/DL (ref 70–99)
POTASSIUM SERPL-SCNC: 4.1 MMOL/L (ref 3.7–5.3)
RHEUMATOID FACTOR: <10 IU/ML
SEDIMENTATION RATE, ERYTHROCYTE: 48 MM/HR (ref 0–20)
SODIUM BLD-SCNC: 138 MMOL/L (ref 135–144)
TOTAL PROTEIN: 7.6 G/DL (ref 6.4–8.3)
TSH SERPL DL<=0.05 MIU/L-ACNC: 2 UIU/ML (ref 0.3–5)

## 2022-09-02 PROCEDURE — 86038 ANTINUCLEAR ANTIBODIES: CPT

## 2022-09-02 PROCEDURE — 84443 ASSAY THYROID STIM HORMONE: CPT

## 2022-09-02 PROCEDURE — 86431 RHEUMATOID FACTOR QUANT: CPT

## 2022-09-02 PROCEDURE — 85652 RBC SED RATE AUTOMATED: CPT

## 2022-09-02 PROCEDURE — 36415 COLL VENOUS BLD VENIPUNCTURE: CPT

## 2022-09-02 PROCEDURE — 86225 DNA ANTIBODY NATIVE: CPT

## 2022-09-02 PROCEDURE — 86235 NUCLEAR ANTIGEN ANTIBODY: CPT

## 2022-09-02 PROCEDURE — 80053 COMPREHEN METABOLIC PANEL: CPT

## 2022-09-02 RX ORDER — LOSARTAN POTASSIUM 25 MG/1
TABLET ORAL
Qty: 90 TABLET | Refills: 3 | Status: SHIPPED | OUTPATIENT
Start: 2022-09-02

## 2022-09-03 LAB
ANTI DNA DOUBLE STRANDED: <0.5 IU/ML
ANTI-NUCLEAR ANTIBODY (ANA): POSITIVE
ENA ANTIBODIES SCREEN: 3.3 U/ML

## 2022-09-06 ENCOUNTER — NURSE ONLY (OUTPATIENT)
Dept: CARDIOLOGY | Age: 77
End: 2022-09-06
Payer: MEDICARE

## 2022-09-06 DIAGNOSIS — Z95.0 PACEMAKER: Primary | ICD-10-CM

## 2022-09-06 DIAGNOSIS — I44.2 AV BLOCK, 3RD DEGREE (HCC): ICD-10-CM

## 2022-09-06 LAB
ANTI JO-1 IGG: <0.4 U/ML
ANTI SSA: 76 U/ML
ANTI SSB: <0.3 U/ML
ANTI-CENTROMERE: <0.4 U/ML
ANTI-RNP70: 0.5 U/ML
ANTI-SCLERODERMA: 0.6 U/ML
ANTI-SMITH: 2.1 U/ML
ANTI-U1RNP: 1.1 U/ML

## 2022-09-06 PROCEDURE — 93294 REM INTERROG EVL PM/LDLS PM: CPT | Performed by: INTERNAL MEDICINE

## 2022-09-07 ENCOUNTER — HOSPITAL ENCOUNTER (OUTPATIENT)
Dept: PHARMACY | Age: 77
Setting detail: THERAPIES SERIES
Discharge: HOME | End: 2022-09-07
Payer: MEDICARE

## 2022-09-07 ENCOUNTER — TELEPHONE (OUTPATIENT)
Dept: CARDIOLOGY | Age: 77
End: 2022-09-07

## 2022-09-07 DIAGNOSIS — I50.42 CHRONIC COMBINED SYSTOLIC AND DIASTOLIC CONGESTIVE HEART FAILURE (HCC): Primary | ICD-10-CM

## 2022-09-07 PROCEDURE — 99212 OFFICE O/P EST SF 10 MIN: CPT

## 2022-09-07 NOTE — TELEPHONE ENCOUNTER
----- Message from Cristofer Lepe MD sent at 9/6/2022 10:55 PM EDT -----  Kidney function, liver function and thyroid function are stable.   Thank you

## 2022-09-07 NOTE — DISCHARGE INSTRUCTIONS
HEART FAILURE:    With heart failure you must follow up with your Cardiologist, your PCP and other physicians as appropriate - especially 7 days after a hospitalization and then as directed. Please call right away with any signs or symptoms of heart failure or other medical conditions that need attention or with any questions at all. Please call your Cardiologist or your PCP or the [facility] at [phone]. We can help manage these symptoms and often prevent a visit to the Emergency Room or hospitalization. * Know your dry weight (your weight without any fluid on board)    * Call any time you have questions about anything. Do not wait. * It is very important to take your medications as prescribed, do not miss any doses. Call for refills before you run out. Typically when you have one week left. If you have trouble getting your medications for any reason, call us at [phone]. * Avoid NSAIDs (non steroidal anti inflammatory drugs) like Aleve (naproxen), Advil and Motrin (ibuprofen), Mobic (diclofenac), Celebrex (celecoxib) and aspirin. A daily aspirin is okay if recommended by your physician. It is okay to take Tylenol (acetaminophen) unless your physician has told you otherwise. * Limit sodium intake. Typically this is no more than 2,000-2,400 milligrams (mg) per day. You will need to add up the sodium content found on the nutrition label for the foods you eat each day. * Weigh yourself every day. Weigh yourself before breakfast and after you go to the restroom. Wear the same thing each time you weigh yourself. Keep a log and bring it to each visit. Call if you gain 3 or more pounds in 1-7 days - [phone]                                      * If you have hypertension (high blood pressure), you may want to check your blood pressure daily. Your blood pressure goal is less than 130/80 unless instructed differently by your physician.  Keep a log and bring it to each visit. * Be sure to keep good control of your Diabetes     * Be sure to keep good control of your Cholesterol    * Eat a Heart healthy diet    * Be active. Follow an exercise plan that is reasonable for you. * If you smoke, work to stop smoking. Best to avoid alcohol.      Try Cerave for dry skin    Weigh yourself daily    Get lab work before next appt    Exercise as you can in your chair

## 2022-09-20 NOTE — PROGRESS NOTES
Subjective:      Patient ID: Rebeca Cooler is a 76 y.o. male. HPI  Patient here for follow-up for MARIA ESTHER, chronic hypoxemic respiratory failure on supplemental oxygen, COVID in December 2021 and aortic valve disease status post TAVR. Patient was last seen in the office in April 2022 per me and in April 2021 per Dr. Adelaide Antonio. No compliance data available, patient states that he has not been using it regularly due to helping with his wife's care. His wife unfortunately broke her ankle and is currently in a nursing facility and is having some difficulty with disorientation. He does not notice any improvement in his sleep quality or daytime fatigue when he uses the machine or does not use it. Discussed at length with patient the importance of treatment for MARIA ESTHER due to the risks with his cardiac history, patient states that he will work on getting back to using the machine regularly. He uses nasal pillows. Discussed influenza vaccine and omicron booster. Patient is not receptive to omicron booster. He will obtain his influenza vaccine. Patient continues on oxygen at 2 L/min nocturnally and as needed during the day, when he is out and about he does turn it down to 1 L due to the limitations of his tank content, patient would benefit from a portable O2 concentrator to improve his mobility, I will send in an order for DME today if we can get patient set up with one. Medications:   No pulmonary medications  O2 at 2 LPM nocturnally and PRN during the day        PRIOR WORKUP:  None on chart     CT Imaging:  CT chest 6/17/2020: Negative for pulmonary nodules or infiltrate. There is scarring/atelectasis of left lung base. Negative for pulmonary emboli. No mediastinal adenopathy. Sleep Study:  Home sleep study 4/29/2021: Patient wore the device for approximately 7 hours and during this timeframe had a total of 4 apneas and 112 hypopneas with desaturation of at least 4%.   Respiratory event index was 17.4 events per hour. SPO2 desaturation lowest was 75% with an average of 89%. Moderate sleep apnea. Laboratory Evaluation:    Immunizations:   Immunization History   Administered Date(s) Administered    COVID-19, MODERNA BLUE border, Primary or Immunocompromised, (age 12y+), IM, 100 mcg/0.5mL 02/26/2021, 02/26/2021, 03/27/2021, 03/27/2021, 12/22/2021    Hepatitis B (Recombivax HB) 12/15/1998, 01/19/1999    Hepatitis B vaccine 12/15/1998, 01/19/1999    Influenza A (L4L2-66) Vaccine PF IM 11/23/2009    Influenza Vaccine, unspecified formulation 10/15/2017    Influenza Virus Vaccine 09/26/2017, 09/26/2017    Influenza, FLUZONE (age 72 y+), High Dose, 0.7mL 10/21/2020, 11/16/2021    Influenza, High Dose (Fluzone 65 yrs and older) 10/03/2018, 10/07/2019    Pneumococcal Conjugate 13-valent ([id number]) 04/03/2017    Pneumococcal Polysaccharide ([id number]) 01/06/2021        No flowsheet data found.     /71   Pulse 68   Temp 98.6 °F (37 °C)   Resp 16   Ht 5' 9\" (1.753 m)   Wt (!) 365 lb (165.6 kg)   SpO2 97%   BMI 53.90 kg/m²     Past Medical History:   Diagnosis Date    Anxiety     Aortic stenosis     Atrial fibrillation, new onset ([facility] [address] 03/05/2020    BPH (benign prostatic hyperplasia)     CHF (congestive heart failure) (HCC)     Diabetes mellitus (HCC)     borderline    Hyperlipidemia     Hypertension     Lymphedema     bilat legs    On home O2     Osteoarthritis     Septic shock due to urinary tract infection ([address] /  Levophed and fluids 2020     Past Surgical History:   Procedure Laterality Date    CARDIAC CATHETERIZATION  03/2020    OTHER SURGICAL HISTORY  09/14/2020    TAVR/ Dr. Brown Medal     Family History   Problem Relation Age of Onset    Arthritis Mother     Heart Disease Mother     High Cholesterol Mother     High Blood Pressure Mother     Stroke Mother     High Blood Pressure Brother        Social History     Socioeconomic History    Marital status:      Spouse name: Evie Felton Number of children: Not on file    Years of education: Not on file    Highest education level: Not on file   Occupational History    Not on file   Tobacco Use    Smoking status: Former     Packs/day: 1.00     Years: 10.00     Pack years: 10.00     Types: Cigarettes     Quit date: 80     Years since quittin.8    Smokeless tobacco: Never   Vaping Use    Vaping Use: Never used   Substance and Sexual Activity    Alcohol use: Never    Drug use: No    Sexual activity: Not on file   Other Topics Concern    Not on file   Social History Narrative    Not on file     Social Determinants of Health     Financial Resource Strain: Low Risk     Difficulty of Paying Living Expenses: Not hard at all   Food Insecurity: No Food Insecurity    Worried About Running Out of Food in the Last Year: Never true    [address] in the Last Year: Never true   Transportation Needs: No Transportation Needs    Lack of Transportation (Medical): No    Lack of Transportation (Non-Medical): No   Physical Activity: Inactive    Days of Exercise per Week: 0 days    Minutes of Exercise per Session: 0 min   Stress: No Stress Concern Present    Feeling of Stress : Not at all   Social Connections: Moderately Isolated    Frequency of Communication with Friends and Family: Twice a week    Frequency of Social Gatherings with Friends and Family: Twice a week    Attends [employer] Services: Never    Active Member of Clubs or Organizations: No    Attends Club or Organization Meetings: Never    Marital Status:    Intimate Partner Violence: Not At Risk    Fear of Current or Ex-Partner: No    Emotionally Abused: No    Physically Abused: No    Sexually Abused: No   Housing Stability: Not on file       Review of Systems   Constitutional:         Decreased activity tolerance secondary to exertional dyspnea. HENT:          Denies sinus pain/pressure. Denies rhinorrhea. Occasional cough   Eyes: Negative. Respiratory:          Exertional dyspnea.   No significant cough. Denies sputum production-specifically purulent or hemoptysis   Cardiovascular: Negative. Gastrointestinal: Negative. Endocrine: Negative. Genitourinary: Negative. Musculoskeletal: Negative. Skin: Negative. Allergic/Immunologic: Negative. Neurological: Negative. Hematological: Negative. Psychiatric/Behavioral: Negative.        Objective:       Physical Exam  General appearance - alert, well appearing, and in no distress, oriented to person, place, and time, and overweight  Mental status - alert, oriented to person, place, and time, normal mood, behavior, speech, dress, motor activity, and thought processes  Eyes - pupils equal and reactive, extraocular eye movements intact  Ears -not examined  Nose - normal and patent, no erythema, discharge or polyps  Mouth - mucous membranes moist, pharynx normal without lesions, large thick tongue, decreased oropharyngeal orifice  Neck - supple, no significant adenopathy-Short thick neck  Chest - clear to auscultation, no wheezes, rales or rhonchi, symmetric air entry, diminished throughout  Heart -normal rate, regular rhythm, normal S1, S2, no murmurs, rubs, clicks or gallops  Abdomen - soft, nontender, nondistended, no masses or organomegaly  Neuro- alert, oriented, normal speech, no focal findings or movement disorder noted}  Extremities -pedal edema +1-2, no clubbing or cyanosis, uses a motorized wheelchair for mobility  Skin - normal coloration and turgor, no rashes, no suspicious skin lesions noted     Wt Readings from Last 3 Encounters:   10/19/22 (!) 365 lb (165.6 kg)   10/18/22 (!) 364 lb (165.1 kg)   06/28/22 (!) 357 lb (161.9 kg)       Results for orders placed or performed during the hospital encounter of 10/18/22   Rapid influenza A/B antigens    Specimen: Nasopharyngeal   Result Value Ref Range    Flu A Antigen NEGATIVE NEGATIVE    Flu B Antigen NEGATIVE NEGATIVE   COVID-19, Rapid    Specimen: Nasopharyngeal Swab   Result Value Ref Range    Specimen Description . NASOPHARYNGEAL SWAB     SARS-CoV-2, Rapid Not Detected Not Detected       No results found. Assessment:      1. MARIA ESTHER (obstructive sleep apnea)    2. Chronic respiratory failure with hypoxia (HCC)    3. Class 3 severe obesity due to excess calories with serious comorbidity and body mass index (BMI) of 50.0 to 59.9 in adult ([facility] [address]    4. History of COVID-19    5. Severe aortic stenosis /  TAVR 2020          Plan:      No pulmonary medications  Educated and clarified the medication use. Recommend flu vaccination in the fall annually. Patient is up-to-date with pneumococcal vaccinations from pulmonary perspective. Patient has received Covid vaccination. Recommended booster when eligible. Discussed strategies to protect against Covid 19. Maintain an active lifestyle. Patient's questions were answered to his satisfaction. Continues on supplemental oxygen, uses 2 L/min around-the-clock. Patient would benefit from portable O2 concentrator for mobility, he has portable O2 tanks but they frequently run out so he decreases his oxygen flow in order to make them last longer. Pulmonary function tests were reviewed   CT scan of the chest was reviewed   Compliance data not available for my review.  Per patient report they use the machine faithfully every night, and report refreshing and restorative sleep without residual daytime fatigue  We'll see the patient back in 12 months with Dr. Adelaide Antonio          Electronically signed by SYLVIA Batista CNP on 10/19/2022 at 10:36 AM

## 2022-10-18 ENCOUNTER — HOSPITAL ENCOUNTER (EMERGENCY)
Age: 77
Discharge: HOME | End: 2022-10-18
Attending: EMERGENCY MEDICINE
Payer: MEDICARE

## 2022-10-18 ENCOUNTER — OFFICE VISIT (OUTPATIENT)
Dept: CARDIOLOGY | Age: 77
End: 2022-10-18
Payer: MEDICARE

## 2022-10-18 ENCOUNTER — APPOINTMENT (OUTPATIENT)
Dept: GENERAL RADIOLOGY | Age: 77
End: 2022-10-18
Payer: MEDICARE

## 2022-10-18 VITALS
SYSTOLIC BLOOD PRESSURE: 111 MMHG | OXYGEN SATURATION: 93 % | HEIGHT: 68 IN | DIASTOLIC BLOOD PRESSURE: 71 MMHG | WEIGHT: 315 LBS | BODY MASS INDEX: 47.74 KG/M2 | RESPIRATION RATE: 18 BRPM | HEART RATE: 80 BPM

## 2022-10-18 VITALS
TEMPERATURE: 100.4 F | SYSTOLIC BLOOD PRESSURE: 144 MMHG | HEART RATE: 73 BPM | DIASTOLIC BLOOD PRESSURE: 60 MMHG | OXYGEN SATURATION: 96 % | RESPIRATION RATE: 20 BRPM

## 2022-10-18 DIAGNOSIS — G47.33 OSA ON CPAP: ICD-10-CM

## 2022-10-18 DIAGNOSIS — I48.0 PAF (PAROXYSMAL ATRIAL FIBRILLATION) (HCC): ICD-10-CM

## 2022-10-18 DIAGNOSIS — B34.9 VIRAL ILLNESS: Primary | ICD-10-CM

## 2022-10-18 DIAGNOSIS — I50.42 CHRONIC COMBINED SYSTOLIC AND DIASTOLIC CONGESTIVE HEART FAILURE (HCC): ICD-10-CM

## 2022-10-18 DIAGNOSIS — E78.2 MIXED HYPERLIPIDEMIA: ICD-10-CM

## 2022-10-18 DIAGNOSIS — Z99.89 OSA ON CPAP: ICD-10-CM

## 2022-10-18 DIAGNOSIS — I10 ESSENTIAL HYPERTENSION: ICD-10-CM

## 2022-10-18 DIAGNOSIS — Z95.0 PACEMAKER: Primary | ICD-10-CM

## 2022-10-18 DIAGNOSIS — Z95.2 S/P TAVR (TRANSCATHETER AORTIC VALVE REPLACEMENT): ICD-10-CM

## 2022-10-18 DIAGNOSIS — R11.0 NAUSEA: ICD-10-CM

## 2022-10-18 LAB
FLU A ANTIGEN: NEGATIVE
FLU B ANTIGEN: NEGATIVE
SARS-COV-2, RAPID: NOT DETECTED
SPECIMEN DESCRIPTION: NORMAL

## 2022-10-18 PROCEDURE — 87635 SARS-COV-2 COVID-19 AMP PRB: CPT

## 2022-10-18 PROCEDURE — G8484 FLU IMMUNIZE NO ADMIN: HCPCS | Performed by: INTERNAL MEDICINE

## 2022-10-18 PROCEDURE — 93005 ELECTROCARDIOGRAM TRACING: CPT | Performed by: INTERNAL MEDICINE

## 2022-10-18 PROCEDURE — 1036F TOBACCO NON-USER: CPT | Performed by: INTERNAL MEDICINE

## 2022-10-18 PROCEDURE — 99284 EMERGENCY DEPT VISIT MOD MDM: CPT

## 2022-10-18 PROCEDURE — 1123F ACP DISCUSS/DSCN MKR DOCD: CPT | Performed by: INTERNAL MEDICINE

## 2022-10-18 PROCEDURE — 99214 OFFICE O/P EST MOD 30 MIN: CPT | Performed by: INTERNAL MEDICINE

## 2022-10-18 PROCEDURE — 93010 ELECTROCARDIOGRAM REPORT: CPT | Performed by: INTERNAL MEDICINE

## 2022-10-18 PROCEDURE — G8417 CALC BMI ABV UP PARAM F/U: HCPCS | Performed by: INTERNAL MEDICINE

## 2022-10-18 PROCEDURE — 87804 INFLUENZA ASSAY W/OPTIC: CPT

## 2022-10-18 PROCEDURE — 71045 X-RAY EXAM CHEST 1 VIEW: CPT

## 2022-10-18 PROCEDURE — G8427 DOCREV CUR MEDS BY ELIG CLIN: HCPCS | Performed by: INTERNAL MEDICINE

## 2022-10-18 PROCEDURE — C9803 HOPD COVID-19 SPEC COLLECT: HCPCS

## 2022-10-18 PROCEDURE — 6370000000 HC RX 637 (ALT 250 FOR IP): Performed by: EMERGENCY MEDICINE

## 2022-10-18 RX ORDER — ONDANSETRON 4 MG/1
4 TABLET, ORALLY DISINTEGRATING ORAL ONCE
Status: DISCONTINUED | OUTPATIENT
Start: 2022-10-18 | End: 2022-10-18 | Stop reason: HOSPADM

## 2022-10-18 RX ORDER — ONDANSETRON 4 MG/1
4 TABLET, ORALLY DISINTEGRATING ORAL EVERY 8 HOURS PRN
Status: DISCONTINUED | OUTPATIENT
Start: 2022-10-18 | End: 2022-10-18 | Stop reason: HOSPADM

## 2022-10-18 RX ORDER — ONDANSETRON 4 MG/1
4 TABLET, ORALLY DISINTEGRATING ORAL EVERY 8 HOURS PRN
Qty: 10 TABLET | Refills: 0 | Status: SHIPPED | OUTPATIENT
Start: 2022-10-18

## 2022-10-18 RX ORDER — ONDANSETRON 4 MG/1
4 TABLET, ORALLY DISINTEGRATING ORAL ONCE
Status: COMPLETED | OUTPATIENT
Start: 2022-10-18 | End: 2022-10-18

## 2022-10-18 RX ADMIN — ONDANSETRON 4 MG: 4 TABLET, ORALLY DISINTEGRATING ORAL at 11:19

## 2022-10-18 NOTE — PROGRESS NOTES
Tammy Liu am scribing for and in the presence of Dayday Glover MD, F.A.C.C..    Patient: Morales Alejandro  : 1945  Date of Visit: 2022    History of Present Illness:        Dear Marc Cerda MD    I had the pleasure of seeing Morales Alejandro in my office today. Mr. Lorri Wilkins is a 76 y.o. male who presented for follow-up. 1-He has  a history of heart failure. He has a history of hypertension and hyperlipidemia for years, and has borderline diabetes. 2- ECG on 3/20: sinus tachycardia with first-degree AV block and nonspecific intraventricular conduction delay. 3-Echocardiogram done 3/4/20 which showed severely reduced left ventricular systolic function with ejection fraction of 35% and moderate to severe aortic stenosis. This can be an underestimation because of reduced left ventricular systolic function. Mild to moderate aortic regurgitation. Moderate mitral regurgitation and moderate diastolic dysfunction. 4-Heart Catheterization done on 3/12/20: LMCA: Normal 0% stenosis. LAD: Mild irregularities 20-30% LCx: Lesion on 2nd Ob Angie Ostial 50% stenosis. RCA: Mild irregularities 10-20%. EF:35%. Severe aortic stenosis by cardiac catheterization with peak to peak gradient of 67 mmHg and mean gradient of 48 mmHg across the aortic valve. 5-Admission to [facility] on 3/4/20-3/9/20 due to acute heart failure: Patient treated for acute on chronic combined CHF and aortic stenosis, later developed atrial fibrillation with rapid ventricular response. Moved to the ICU and started on amiodarone drip and then later converted back to normal sinus rhythm. Amiodarone oral tablet was started prior to discharge. 6-On 2020, he presented to the emergency room with epistaxis. Nasal packing done that removed after 3 days.     7-Transcatheter aortic valve replacement with a Medtronic 34 mm Evolut Pro Plus prosthesis 9/15/2020    8-Echocardiogram done on 10/13/2020 showed an EF of 55%. Mild to moderate LV hypertrophy. Status post TAVR with mean gradient of 9 mmHg, mild perivalvular regurgitation noted. Mild mitral regurgitation. Moderate diastolic dysfunction is seen. 9-Holter monitor done on 1/14/2021: Sinus rhythm with intraventricular conduction delay and occasional PACs and very short runs of atrial tachycardia. The longest was 3 beats at a heart rate of 100 bpm.  No significant ventricular arrhythmia. 10-Echo done on 1/21/2021- EF 50-55%, Mild to moderately increased left ventricular wall thickness with a mildly increased left ventricular cavity size. The left atrium is severely dilated (>40) with a left atrial volume index of 43 ml/m2. S/P TAVR with a mean gradient of 11 mmHg. Trivial aortic regurgitation noted. Evidence of moderate diastolic dysfunction is seen    11-CAM monitor worn on 1/21/2020- 6 days and 21 hours recorded. Average heart rate was 61 bpm, ranging from 46 to 90 bpm. First degree AV block, Mobitz type 1 second-degree AV block and occasional high grade/third degree AV block. Minimum HR occurred during 2nd degree AV block was 32 bpm.     12-Medtronic pacemaker insertion on 2/18/2021 by Dr Cleopatra Kuhn    13- Echo done on 9/17/2021: [place] left ventricular systolic function appears mildly reduced with an estimated ejection fraction of 50-55%. Moderately increased left ventricular wall thickness with a normal left ventricular cavity size. The left atrium is severely dilated (>40) with a left atrial volume index of 42 ml/m2. S/P TAVR with a mean gradient of 9 mmHg. Mild aortic insufficiency was seen. Myxomatous thickening of the mitral leaflets. Moderate mitral annular calcification is seen with a mean gradient of 4.7 mmHg. Mild mitral regurgitation was seen. Evidence of moderate diastolic dysfunction is seen. EKG done in office 4/18/2022- Ventricular paced rhythm. 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 No atrial fibrillation.   [employer] life. Mr. Amparo Ching is here today for a six month follow up. He says he has been doing well cardiac wise. He said he went to visit his wife this past week and she currently has C-diff. He said when he left from seeing her he felt nauseous and had the dry heaves. He said he also had a fever of 100. 7. he took some Rolaids for his stomach today and it has helped. He continues to complain of generalized body aches and chills as well as sweating. He denies any chest pain, pressure or tightness. No significant shortness of breath. He said he has chronic cough but nothing unusual. No palpitations. No orthopnea or PND. No lightheaded/dizziness or palpitations. No bleeding problems, bowel issues, problems with current medications or any other concerns at this time. PAST MEDICAL HISTORY:        Past Medical History:   Diagnosis Date    Anxiety     Aortic stenosis     Atrial fibrillation, new onset ([facility] 2020    BPH (benign prostatic hyperplasia)     CHF (congestive heart failure) ([facility])     Diabetes mellitus (HCC)     borderline    Hyperlipidemia     Hypertension     Lymphedema     bilat legs    On home O2     Osteoarthritis     Septic shock due to urinary tract infection ([facility] [address] /  Levophed and fluids    Nonischemic cardiomyopathy. Severe aortic stenosis. CURRENT ALLERGIES: Patient has no known allergies. REVIEW OF SYSTEMS: 14 systems were reviewed. Pertinent positives and negatives as above, all else negative.      Past Surgical History:   Procedure Laterality Date    CARDIAC CATHETERIZATION  2020    OTHER SURGICAL HISTORY  2020    TAVR/ Dr. Doc Estrada History:  Social History     Tobacco Use    Smoking status: Former     Packs/day: 1.00     Years: 10.00     Pack years: 10.00     Types: Cigarettes     Quit date:      Years since quittin.8    Smokeless tobacco: Never   Vaping Use    Vaping Use: Never used   Substance Use Topics    Alcohol use: Never Drug use: No        CURRENT MEDICATIONS:        Outpatient Medications Marked as Taking for the 10/18/22 encounter (Office Visit) with Roderick Vasquez MD   Medication Sig Dispense Refill    PARoxetine (PAXIL) 30 MG tablet Take 1 tablet by mouth every morning 90 tablet 3    losartan (COZAAR) 25 MG tablet Take 1 tablet by mouth once daily 90 tablet 3    furosemide (LASIX) 20 MG tablet TAKE 2 TABLETS BY MOUTH ON MONDAY, WEDNESDAY AND FRIDAY AND THEN 1 ONCE DAILY ON  ALL  OTHER  DAYS. (Patient taking differently: See Admin Instructions Take 2 tablets daily except on Friday and Monday take one tablet.) 60 tablet 3    potassium chloride (KLOR-CON M) 10 MEQ extended release tablet TAKE 2  BY MOUTH ONCE DAILY 180 tablet 2    tamsulosin (FLOMAX) 0.4 MG capsule Take 1 capsule by mouth daily 30 capsule 5    EQ ASPIRIN ADULT LOW DOSE 81 MG EC tablet Take 1 tablet by mouth once daily 90 tablet 3    amiodarone (CORDARONE) 200 MG tablet Take 1 tablet by mouth daily 90 tablet 3    atorvastatin (LIPITOR) 40 MG tablet Take 1 tablet by mouth once daily 90 tablet 3    simethicone (MYLICON) 80 MG chewable tablet Take 80 mg by mouth every 6 hours as needed for Flatulence (Gas pain)      Multiple Vitamins-Minerals (THERAPEUTIC MULTIVITAMIN-MINERALS) tablet Take 1 tablet by mouth daily       Cholecalciferol (VITAMIN D3) 50 MCG (2000 UT) CAPS Take 2,000 Units by mouth daily       acetaminophen (TYLENOL) 500 MG tablet Take 500 mg by mouth every 6 hours as needed for Pain          FAMILY HISTORY: family history includes Arthritis in his mother; Heart Disease in his mother; High Blood Pressure in his brother and mother; High Cholesterol in his mother; Stroke in his mother. PHYSICAL EXAMINATION:     /71 (Site: Left Upper Arm, Position: Sitting, Cuff Size: Large Adult)   Pulse 80   Resp 18   Ht 5' 8\" (1.727 m)   Wt (!) 364 lb (165.1 kg)   SpO2 93%   BMI 55.35 kg/m²  Body mass index is 55.35 kg/m².     Constitutional: He is oriented to person, place, and time. He appears well-developed and well-nourished. In no acute distress. HEENT: Normocephalic and atraumatic. No JVD present. Carotid bruit is not present. No mass and no thyromegaly present. No lymphadenopathy present. Cardiovascular: Normal rate, regular rhythm, normal heart sounds. Exam reveals no gallop and no friction rubs. 1/6 systolic murmur, 2nd intercostal space on the LEFT just lateral to the sternum  Pulmonary/Chest: Effort normal and breath sounds normal. No respiratory distress. He has no wheezes, rhonchi or rales. Abdominal: Soft, non-tender. Bowel sounds and aorta are normal. He exhibits no organomegaly, mass or bruit. Extremities: Chronic bilateral lower extremity edema with chronic vascular changes and chronic cellulitis. Unchanged from prior. No cyanosis or clubbing. 2+ radial and carotid pulses. Distal extremity pulses: 2+ bilaterally. Neurological: He is alert and oriented to person, place, and time. No evidence of gross cranial nerve deficit. Coordination appeared normal.   Skin: Skin is warm and dry. There is no rash or diaphoresis. Psychiatric: He has a normal mood and affect.  His speech is normal and behavior is normal.      MOST RECENT LABS ON RECORD:   Lab Results   Component Value Date    WBC 10.7 09/17/2021    HGB 14.2 09/17/2021    HCT 43.7 09/17/2021     09/17/2021    CHOL 127 01/21/2021    TRIG 136 01/21/2021    HDL 31 (L) 01/21/2021    ALT 26 09/02/2022    AST 20 09/02/2022     09/02/2022    K 4.1 09/02/2022    CL 99 09/02/2022    CREATININE 0.86 09/02/2022    BUN 26 (H) 09/02/2022    CO2 26 09/02/2022    TSH 2.00 09/02/2022    PSA 1.49 01/11/2019    INR 1.16 (H) 02/18/2021    GLUF 103 (H) 01/11/2019    LABA1C 5.9 03/02/2017       ASSESSMENT:       Patient Active Problem List    Diagnosis Date Noted    Aortic stenosis, severe     Dyslipidemia     Chronic combined systolic and diastolic congestive heart failure ([facility] 04/18/2022 SARS-CoV-2 positive 01/01/2022    AV block, 3rd degree (HCC)     Carpal tunnel syndrome on left 01/08/2019    Obesity, morbid (more than 100 lbs over ideal weight or BMI > 40) ([facility] 12/15/2017    Osteoarthritis of both knees 06/13/2017    Essential hypertension 12/13/2016    Mixed hyperlipidemia 12/13/2016        Diagnosis Orders   1. Pacemaker        2. Chronic combined systolic and diastolic congestive heart failure ([facility]        3. S/P TAVR (transcatheter aortic valve replacement)        4. PAF (paroxysmal atrial fibrillation) ([facility]        5. Essential hypertension        6. Mixed hyperlipidemia        7. MARIA ESTHER on CPAP        8. Body mass index (BMI) 50.0-59.9, adult [facility])            PLAN:        Dual Chamber Pacemaker: Medtronic implanted on 2/18/2021 by Dr Katie Franz - History of bradycardia:   Indication for Device Placement: Symptomatic Bradycardia, second degree AV block, complete heart block. Interrogation Findings: Within normal limits and therefore no changes were made. No atrial fibrillation. No significant ventricular arrhythmia. [employer], 100% paced    Chronic combined heart failure: New York Heart Association Class: IIb (Marked symptoms during daily activities)  Beta Blocker: He is paced heart rate is controlled. ACE Inibitor/ARB: Continue losartan (Cozaar) 25 mg daily. Diuretics: Continue lasix 40 mg on Monday, Wednesday and Friday and 20 mg on the reaming days. Continue Potassium 20 MEQ daily  Heart failure counseling: I advised them to try and keep their legs up whenever possible and to limit salt in their diet. S/P TAVR 9/14/2020: Last Echo done on 9/2021 showed an EF of 50-55%. ACE Inibitor/ARB: Continue losartan (Cozaar) 25 mg daily. Diuretics: Continue lasix 40 mg on Monday, Wednesday and Friday and 20 mg every other day. Continue Potassium 20 MEQ daily     History of Atrial Fibrillation: Rhythm Control. No atrial fibrillation seen on his device check done today in office. Antiplatelet Agent: Continue Aspirin 81 mg daily. Rhythm Control Agent: Continue Amiodarone (Cordarone) 200 mg once daily    Monitoring: Amiodarone Since he is being maintained on Amiodarone, I told him that we will need to closely monitor him for potential side effects. These include monitoring LFTs and TSH at least every 6 months as well as chest x-rays, pulmonary function tests, and eye exams at least on a yearly basis. [id number] Score for Atrial Fibrillation Stroke Risk   Risk   Factors  Component Value   C CHF Yes 1   H HTN Yes 1   A2 Age >= 76 Yes,  (73 y.o.) 2   D DM No 0   S2 Prior Stroke/TIA No 0   V Vascular Disease No 0   A Age 74-69 No,  (73 y.o.) 0   Sc Sex male 0    [id number]  Score  4   Score last updated 8/77/44 7:66 AM EDT  Click here for a link to the UpToDate guideline \"Atrial Fibrillation: Anticoagulation therapy to prevent embolization  Disclaimer: Risk Score calculation is dependent on accuracy of patient problem list and past encounter diagnosis. Stroke Risk: His CHADS2-VASc score is 4/9 (4% stroke risk)   Anticoagulation: Off anticoagulation based on the patient request.  We are closely monitoring his atrial fibrillation via pacemaker. No atrial fibrillation recorded since last interrogation. Continue amiodarone. Essential Hypertension: Controlled  ACE Inibitor/ARB: Continue losartan (Cozaar) 25 mg daily. Diuretics: Continue lasix 40 mg on Monday, Wednesday and Friday and 20 mg every other day. Hyperlipidemia: Mixed - Last LDL on 1/21/2021 was 69 mg/dL   Cholesterol Reduction Therapy: Continue Atorvastatin (Lipitor) 40 mg daily. Obstructive Sleep Apnea:   Continue use a CPAP. Morbid obesity: Body mass index is 55.35 kg/m². I also briefly discussed both diet and exercise strategies for him to continue to loses weight and he was very receptive to this. Fever, nausea, vomiting, generalized body aches and chills.   I took him to the emergency room for further evaluation. Finally, I recommended that he continue his other medications and follow up with you as previously scheduled. FOLLOW UP:   I told Mr. Manzo to call my office if he had any problems, but otherwise I asked him to Return in about 6 months (around 4/18/2023) for Follow up. However, I would be happy to see him sooner should the need arise. Sincerely,  Mayela Katz MD, F.A.C.C. [facility] Specialist    [address] [place] [address], [address] [place] [address]  Phone: [phone], Fax: [phone]     I believe that the risk of significant morbidity and mortality related to the patient's current medical conditions are: intermediate-high. Approximately 35 minutes were spent during prep work, discussion and exam of the patient, and follow up documentation and all of their questions were answered. The documentation recorded by the scribe, accurately and completely reflects the services I personally performed and the decisions made by me. Mayela Katz MD, F.A.C.C.  October 18, 2022

## 2022-10-18 NOTE — DISCHARGE INSTRUCTIONS
Zofran as needed for nausea and vomiting. Tylenol 500 mg every 6 hours as needed. Motrin every 800 mg every 8 hours as needed for fever or pain. Follow up with primary care provider for recheck.   Seek medical attention for any worsening symptoms or any other acute concerns

## 2022-10-18 NOTE — PATIENT INSTRUCTIONS
SURVEY:    You may be receiving a survey from [employer] regarding your visit today. Please complete the survey to enable us to provide the highest quality of care to you and your family. If you cannot score us a very good on any question, please call the office to discuss how we could have made your experience a very good one. Thank you.

## 2022-10-19 ENCOUNTER — OFFICE VISIT (OUTPATIENT)
Dept: PULMONOLOGY | Age: 77
End: 2022-10-19
Payer: MEDICARE

## 2022-10-19 VITALS
OXYGEN SATURATION: 97 % | TEMPERATURE: 98.6 F | WEIGHT: 315 LBS | BODY MASS INDEX: 46.65 KG/M2 | SYSTOLIC BLOOD PRESSURE: 122 MMHG | RESPIRATION RATE: 16 BRPM | HEIGHT: 69 IN | HEART RATE: 68 BPM | DIASTOLIC BLOOD PRESSURE: 71 MMHG

## 2022-10-19 DIAGNOSIS — G47.33 OSA (OBSTRUCTIVE SLEEP APNEA): Primary | ICD-10-CM

## 2022-10-19 DIAGNOSIS — I35.0 SEVERE AORTIC STENOSIS: ICD-10-CM

## 2022-10-19 DIAGNOSIS — E66.01 CLASS 3 SEVERE OBESITY DUE TO EXCESS CALORIES WITH SERIOUS COMORBIDITY AND BODY MASS INDEX (BMI) OF 50.0 TO 59.9 IN ADULT (HCC): ICD-10-CM

## 2022-10-19 DIAGNOSIS — Z86.16 HISTORY OF COVID-19: ICD-10-CM

## 2022-10-19 DIAGNOSIS — J96.11 CHRONIC RESPIRATORY FAILURE WITH HYPOXIA (HCC): ICD-10-CM

## 2022-10-19 PROCEDURE — 99214 OFFICE O/P EST MOD 30 MIN: CPT | Performed by: NURSE PRACTITIONER

## 2022-10-19 PROCEDURE — 1123F ACP DISCUSS/DSCN MKR DOCD: CPT | Performed by: NURSE PRACTITIONER

## 2022-10-19 PROCEDURE — 1036F TOBACCO NON-USER: CPT | Performed by: NURSE PRACTITIONER

## 2022-10-19 PROCEDURE — G8484 FLU IMMUNIZE NO ADMIN: HCPCS | Performed by: NURSE PRACTITIONER

## 2022-10-19 PROCEDURE — G8427 DOCREV CUR MEDS BY ELIG CLIN: HCPCS | Performed by: NURSE PRACTITIONER

## 2022-10-19 PROCEDURE — G8417 CALC BMI ABV UP PARAM F/U: HCPCS | Performed by: NURSE PRACTITIONER

## 2022-10-19 ASSESSMENT — ENCOUNTER SYMPTOMS
EYES NEGATIVE: 1
GASTROINTESTINAL NEGATIVE: 1
ALLERGIC/IMMUNOLOGIC NEGATIVE: 1

## 2022-10-19 NOTE — PATIENT INSTRUCTIONS
SURVEY:    You may be receiving a survey from [employer] regarding your visit today. Please complete the survey to enable us to provide the highest quality of care to you and your family. If you cannot score us a very good on any question, please call the office to discuss how we could have made your experience a very good one. Thank you.

## 2022-10-26 RX ORDER — ATORVASTATIN CALCIUM 40 MG/1
TABLET, FILM COATED ORAL
Qty: 90 TABLET | Refills: 3 | Status: SHIPPED | OUTPATIENT
Start: 2022-10-26

## 2022-11-27 ENCOUNTER — APPOINTMENT (OUTPATIENT)
Dept: GENERAL RADIOLOGY | Age: 77
End: 2022-11-27
Payer: MEDICARE

## 2022-11-27 ENCOUNTER — HOSPITAL ENCOUNTER (EMERGENCY)
Age: 77
Discharge: HOME | End: 2022-11-27
Attending: EMERGENCY MEDICINE
Payer: MEDICARE

## 2022-11-27 VITALS
DIASTOLIC BLOOD PRESSURE: 69 MMHG | OXYGEN SATURATION: 95 % | RESPIRATION RATE: 17 BRPM | HEART RATE: 82 BPM | SYSTOLIC BLOOD PRESSURE: 151 MMHG | TEMPERATURE: 98.7 F

## 2022-11-27 DIAGNOSIS — S81.811A LACERATION OF RIGHT LOWER EXTREMITY, INITIAL ENCOUNTER: Primary | ICD-10-CM

## 2022-11-27 PROCEDURE — 99282 EMERGENCY DEPT VISIT SF MDM: CPT

## 2022-11-27 PROCEDURE — 2500000003 HC RX 250 WO HCPCS: Performed by: EMERGENCY MEDICINE

## 2022-11-27 PROCEDURE — 12001 RPR S/N/AX/GEN/TRNK 2.5CM/<: CPT

## 2022-11-27 RX ORDER — LIDOCAINE HYDROCHLORIDE 10 MG/ML
20 INJECTION, SOLUTION INFILTRATION; PERINEURAL ONCE
Status: COMPLETED | OUTPATIENT
Start: 2022-11-27 | End: 2022-11-27

## 2022-11-27 RX ADMIN — LIDOCAINE HYDROCHLORIDE 20 ML: 10 INJECTION, SOLUTION INFILTRATION; PERINEURAL at 23:11

## 2022-11-28 ASSESSMENT — ENCOUNTER SYMPTOMS
VOMITING: 0
RHINORRHEA: 0
WHEEZING: 0
ABDOMINAL DISTENTION: 0
NAUSEA: 0
DIARRHEA: 0
CONSTIPATION: 0
SORE THROAT: 0
COUGH: 0
SHORTNESS OF BREATH: 0

## 2022-11-28 NOTE — DISCHARGE INSTRUCTIONS
Sutures will need to be removed in 7 days, okay to wash with soap and water daily, elevate when able. Return to ER sooner for any worsening redness swelling any drainage or discharge from the wound or increasing pain.

## 2022-11-28 NOTE — ED PROVIDER NOTES
[address] [place] [address] ED  Emergency Department        Pt Name: Jacinda Elder  MRN: [id number]  [place] 1945  Date of evaluation: 22    CHIEF COMPLAINT       Chief Complaint   Patient presents with    Laceration     Right ankle, cut on hospital bed at home PTA       HISTORY OF PRESENT ILLNESS  (Location/Symptom, Timing/Onset, Context/Setting, Quality, Duration, ModifyingFactors, Severity.)      Jacinda Elder is a 76 y.o. male who presents with dropping a hospital bed on his medial right ankle sustained a laceration, patient does not ambulate a baseline. He is able to stand. He denies any pain with standing on that leg has chronic lymphedema. Is on a 81 aspirin. Brought in by his brother. PAST MEDICAL / SURGICAL / SOCIAL / FAMILY HISTORY      has a past medical history of Anxiety, Aortic stenosis, Atrial fibrillation, new onset ([address] BPH (benign prostatic hyperplasia), CHF (congestive heart failure) ([address] Diabetes mellitus ([address] Hyperlipidemia, Hypertension, Lymphedema, On home O2, Osteoarthritis, and Septic shock due to urinary tract infection ([address] /  Levophed and fluids. has a past surgical history that includes Vasectomy (); Cardiac catheterization (2020); and other surgical history (2020).        Social History     Socioeconomic History    Marital status:      Spouse name: Evie Felton    Number of children: Not on file    Years of education: Not on file    Highest education level: Not on file   Occupational History    Not on file   Tobacco Use    Smoking status: Former     Packs/day: 1.00     Years: 10.00     Pack years: 10.00     Types: Cigarettes     Quit date: 80     Years since quittin.9    Smokeless tobacco: Never   Vaping Use    Vaping Use: Never used   Substance and Sexual Activity    Alcohol use: Never    Drug use: No    Sexual activity: Not on file   Other Topics Concern    Not on file   Social History Narrative    Not on file     Social Determinants of Health     Financial Resource Strain: Low Risk     Difficulty of Paying Living Expenses: Not hard at all   Food Insecurity: No Food Insecurity    Worried About [facility] in the Last Year: Never true    Ran Out of Food in the Last Year: Never true   Transportation Needs: No Transportation Needs    Lack of Transportation (Medical): No    Lack of Transportation (Non-Medical): No   Physical Activity: Inactive    Days of Exercise per Week: 0 days    Minutes of Exercise per Session: 0 min   Stress: No Stress Concern Present    Feeling of Stress : Not at all   Social Connections: Moderately Isolated    Frequency of Communication with Friends and Family: Twice a week    Frequency of Social Gatherings with Friends and Family: Twice a week    Attends [employer] Services: Never    Active Member of Clubs or Organizations: No    Attends Club or Organization Meetings: Never    Marital Status:    Intimate Partner Violence: Not At Risk    Fear of Current or Ex-Partner: No    Emotionally Abused: No    Physically Abused: No    Sexually Abused: No   Housing Stability: Not on file       Family History   Problem Relation Age of Onset    Arthritis Mother     Heart Disease Mother     High Cholesterol Mother     High Blood Pressure Mother     Stroke Mother     High Blood Pressure Brother        Allergies:  Patient has no known allergies. Home Medications:  Prior to Admission medications    Medication Sig Start Date End Date Taking?  Authorizing Provider   amiodarone (CORDARONE) 200 MG tablet Take 1 tablet by mouth daily 11/14/22   Kimberly Cooper MD   atorvastatin (LIPITOR) 40 MG tablet Take 1 tablet by mouth once daily 10/26/22   Cory Zeng MD   ondansetron (ZOFRAN ODT) 4 MG disintegrating tablet Take 1 tablet by mouth every 8 hours as needed for Nausea or Vomiting 10/18/22   Tena White MD   PARoxetine (PAXIL) 30 MG tablet Take 1 tablet by mouth every morning 9/26/22   Kimberly Cooper MD   losartan (COZAAR) 25 MG tablet Take 1 tablet by mouth once daily 9/2/22   Ashley Louis MD   furosemide (LASIX) 20 MG tablet TAKE 2 TABLETS BY MOUTH ON MONDAY, WEDNESDAY AND FRIDAY AND THEN 1 ONCE DAILY ON  ALL  OTHER  DAYS. Patient taking differently: See Admin Instructions Take 2 tablets daily except on Friday and Monday take one tablet. 9/2/22   Nasrin Anderson MD   potassium chloride (KLOR-CON M) 10 MEQ extended release tablet TAKE 2  BY MOUTH ONCE DAILY 8/12/22   Nasrin Anderson MD   tamsulosin [facility]) 0.4 MG capsule Take 1 capsule by mouth daily 7/12/22   Nasrin Anderson MD   EQ ASPIRIN ADULT LOW DOSE 81 MG EC tablet Take 1 tablet by mouth once daily 3/29/22   Ashley Louis MD   simethicone (MYLICON) 80 MG chewable tablet Take 80 mg by mouth every 6 hours as needed for Flatulence (Gas pain)    Historical Provider, MD   Multiple Vitamins-Minerals (THERAPEUTIC MULTIVITAMIN-MINERALS) tablet Take 1 tablet by mouth daily     Historical Provider, MD   Cholecalciferol (VITAMIN D3) 50 MCG (2000 UT) CAPS Take 2,000 Units by mouth daily     Historical Provider, MD   acetaminophen (TYLENOL) 500 MG tablet Take 500 mg by mouth every 6 hours as needed for Pain     Historical Provider, MD       REVIEW OF SYSTEMS    (2-9 systems for level 4, 10 or more for level 5)      Review of Systems   Constitutional:  Negative for activity change, appetite change, fatigue and fever. HENT:  Negative for congestion, rhinorrhea and sore throat. Respiratory:  Negative for cough, shortness of breath and wheezing. Cardiovascular:  Positive for leg swelling (chronic). Negative for chest pain and palpitations. Gastrointestinal:  Negative for abdominal distention, constipation, diarrhea, nausea and vomiting. Genitourinary:  Negative for decreased urine volume and dysuria. Skin:  Positive for wound. Negative for rash. Neurological:  Negative for dizziness, weakness, light-headedness, numbness and headaches.      PHYSICAL EXAM   (up to 7 for level 4, 8 or more for level 5)     INITIAL VITALS:   BP (!) 151/69   Pulse 82   Temp 98.7 °F (37.1 °C) (Tympanic)   Resp 17   SpO2 95%     Physical Exam  Constitutional:       General: He is not in acute distress. Appearance: Normal appearance. He is not ill-appearing. HENT:      Head: Normocephalic and atraumatic. Eyes:      General:         Right eye: No discharge. Left eye: No discharge. Extraocular Movements: Extraocular movements intact. Pupils: Pupils are equal, round, and reactive to light. Cardiovascular:      Rate and Rhythm: Normal rate. Pulmonary:      Effort: Pulmonary effort is normal. No respiratory distress. Musculoskeletal:      Right lower leg: Edema present. Left lower leg: Edema present. Comments: 2 Centimeter laceration noted to the superior aspect of the medial malleolus. The borders do appear dusky. It does gape. There is bilateral edema noted to the lower extremities which patient does report is chronic. Patient is able to plantarflex and dorsiflex slowly at his ankle without pain or decreased range of motion. Neurological:      General: No focal deficit present. Mental Status: He is alert and oriented to person, place, and time. DIFFERENTIAL  DIAGNOSIS     We will plan to update tetanus, and suture repair for laceration. PLAN (LABS / IMAGING / EKG):  Orders Placed This Encounter   Procedures    Provide suture tray to patient bedside       MEDICATIONS ORDERED:  Orders Placed This Encounter   Medications    lidocaine 1 % injection 20 mL       DIAGNOSTIC RESULTS / EMERGENCY DEPARTMENT COURSE / MDM     LABS:  No results found for this visit on 11/27/22. IMPRESSION: laceration        EMERGENCY DEPARTMENT COURSE:  Laceration repaired, and dressed, discussed wound care and suture removal in 2 days. PROCEDURES:  Laceration Repair Procedure Note    Indication: Laceration    Procedure:  The patient was placed in the appropriate position and anesthesia around the laceration was obtained by infiltration using 1% Lidocaine without epinephrine. The area was then irrigated with normal saline. The laceration was closed with 4-0 Ethilon using interrupted sutures. There were no additional lacerations requiring repair. The wound area was then dressed with a bandage. Total repaired wound length: 2 cm. Other Items: Suture count: 4    The patient tolerated the procedure well. Complications: None        CONSULTS:  None    CRITICAL CARE:      FINAL IMPRESSION      1.  Laceration of right lower extremity, initial encounter          DISPOSITION / PLAN     DISPOSITION Decision To Discharge 11/27/2022 11:04:56 PM      PATIENT REFERRED TO:  Marc Cerda MD  [place] [address], [address] [place] [address]  [phone]    In 1 week  For suture removal    DISCHARGE MEDICATIONS:  New Prescriptions    No medications on file       Gab Saini DO  11:05 PM    Attending Emergency Physician  [facility] FACILITY ED    (Please note that portions of this note were completed with a voice recognition program.  Effortswere made to edit the dictations but occasionally words are mis-transcribed.)              Ting Dial DO  11/28/22 2035

## 2022-12-04 ENCOUNTER — HOSPITAL ENCOUNTER (EMERGENCY)
Age: 77
Discharge: HOME | End: 2022-12-04
Attending: EMERGENCY MEDICINE
Payer: MEDICARE

## 2022-12-04 VITALS
OXYGEN SATURATION: 93 % | TEMPERATURE: 100.5 F | HEART RATE: 80 BPM | RESPIRATION RATE: 21 BRPM | DIASTOLIC BLOOD PRESSURE: 53 MMHG | SYSTOLIC BLOOD PRESSURE: 117 MMHG

## 2022-12-04 DIAGNOSIS — N39.0 ACUTE UTI (URINARY TRACT INFECTION): Primary | ICD-10-CM

## 2022-12-04 DIAGNOSIS — Z48.02 VISIT FOR SUTURE REMOVAL: ICD-10-CM

## 2022-12-04 LAB
ABSOLUTE EOS #: 0.03 K/UL (ref 0–0.44)
ABSOLUTE IMMATURE GRANULOCYTE: 0.03 K/UL (ref 0–0.3)
ABSOLUTE LYMPH #: 0.53 K/UL (ref 1.1–3.7)
ABSOLUTE MONO #: 0.96 K/UL (ref 0.1–1.2)
ANION GAP SERPL CALCULATED.3IONS-SCNC: 6 MMOL/L (ref 9–17)
BACTERIA: ABNORMAL
BASOPHILS # BLD: 1 % (ref 0–2)
BASOPHILS ABSOLUTE: 0.04 K/UL (ref 0–0.2)
BILIRUBIN URINE: NEGATIVE
BUN BLDV-MCNC: 14 MG/DL (ref 8–23)
BUN/CREAT BLD: 19 (ref 9–20)
CALCIUM SERPL-MCNC: 8.7 MG/DL (ref 8.6–10.4)
CHLORIDE BLD-SCNC: 95 MMOL/L (ref 98–107)
CO2: 29 MMOL/L (ref 20–31)
COLOR: YELLOW
CREAT SERPL-MCNC: 0.75 MG/DL (ref 0.7–1.2)
EOSINOPHILS RELATIVE PERCENT: 0 % (ref 1–4)
EPITHELIAL CELLS UA: ABNORMAL /HPF (ref 0–5)
GFR SERPL CREATININE-BSD FRML MDRD: >60 ML/MIN/1.73M2
GLUCOSE BLD-MCNC: 116 MG/DL (ref 70–99)
GLUCOSE URINE: NEGATIVE
HCT VFR BLD CALC: 35.7 % (ref 40.7–50.3)
HEMOGLOBIN: 11.6 G/DL (ref 13–17)
IMMATURE GRANULOCYTES: 0 %
KETONES, URINE: NEGATIVE
LEUKOCYTE ESTERASE, URINE: ABNORMAL
LYMPHOCYTES # BLD: 7 % (ref 24–43)
MCH RBC QN AUTO: 31 PG (ref 25.2–33.5)
MCHC RBC AUTO-ENTMCNC: 32.5 G/DL (ref 28.4–34.8)
MCV RBC AUTO: 95.5 FL (ref 82.6–102.9)
MONOCYTES # BLD: 12 % (ref 3–12)
NITRITE, URINE: POSITIVE
NRBC AUTOMATED: 0 PER 100 WBC
PDW BLD-RTO: 14.6 % (ref 11.8–14.4)
PH UA: 6.5 (ref 5–9)
PLATELET # BLD: 150 K/UL (ref 138–453)
PMV BLD AUTO: 11.9 FL (ref 8.1–13.5)
POTASSIUM SERPL-SCNC: 3.6 MMOL/L (ref 3.7–5.3)
PROTEIN UA: ABNORMAL
RBC # BLD: 3.74 M/UL (ref 4.21–5.77)
RBC UA: ABNORMAL /HPF (ref 0–2)
RENAL EPITHELIAL, UA: ABNORMAL /HPF
SEG NEUTROPHILS: 80 % (ref 36–65)
SEGMENTED NEUTROPHILS ABSOLUTE COUNT: 6.58 K/UL (ref 1.5–8.1)
SODIUM BLD-SCNC: 130 MMOL/L (ref 135–144)
SPECIFIC GRAVITY UA: 1.01 (ref 1.01–1.02)
TURBIDITY: ABNORMAL
URINE HGB: ABNORMAL
UROBILINOGEN, URINE: ABNORMAL
WBC # BLD: 8.2 K/UL (ref 3.5–11.3)
WBC UA: ABNORMAL /HPF (ref 0–5)

## 2022-12-04 PROCEDURE — 6370000000 HC RX 637 (ALT 250 FOR IP): Performed by: EMERGENCY MEDICINE

## 2022-12-04 PROCEDURE — 36415 COLL VENOUS BLD VENIPUNCTURE: CPT

## 2022-12-04 PROCEDURE — 85025 COMPLETE CBC W/AUTO DIFF WBC: CPT

## 2022-12-04 PROCEDURE — 99284 EMERGENCY DEPT VISIT MOD MDM: CPT

## 2022-12-04 PROCEDURE — 80048 BASIC METABOLIC PNL TOTAL CA: CPT

## 2022-12-04 PROCEDURE — 81001 URINALYSIS AUTO W/SCOPE: CPT

## 2022-12-04 PROCEDURE — 87186 SC STD MICRODIL/AGAR DIL: CPT

## 2022-12-04 PROCEDURE — 87077 CULTURE AEROBIC IDENTIFY: CPT

## 2022-12-04 PROCEDURE — 87086 URINE CULTURE/COLONY COUNT: CPT

## 2022-12-04 RX ORDER — CIPROFLOXACIN 500 MG/1
500 TABLET, FILM COATED ORAL 2 TIMES DAILY
Qty: 20 TABLET | Refills: 0 | Status: SHIPPED | OUTPATIENT
Start: 2022-12-04 | End: 2022-12-14

## 2022-12-04 RX ORDER — CIPROFLOXACIN 500 MG/1
500 TABLET, FILM COATED ORAL ONCE
Status: COMPLETED | OUTPATIENT
Start: 2022-12-04 | End: 2022-12-04

## 2022-12-04 RX ADMIN — CIPROFLOXACIN 500 MG: 500 TABLET, FILM COATED ORAL at 15:37

## 2022-12-04 ASSESSMENT — ENCOUNTER SYMPTOMS
ABDOMINAL DISTENTION: 0
SHORTNESS OF BREATH: 0
BACK PAIN: 0
SORE THROAT: 0

## 2022-12-04 NOTE — ED PROVIDER NOTES
[facility] ED  EMERGENCY DEPARTMENT ENCOUNTER      Pt Name: Sofia Watson  MRN: [id number]  [place] 1945  Date of evaluation: 12/4/2022  Provider: Lilian Mason DO    CHIEF COMPLAINT       Chief Complaint   Patient presents with    Urinary Tract Infection     Believes he has a UTI urinating chocolate red     Suture / Staple Removal         HISTORY OF PRESENT ILLNESS   (Location/Symptom, Timing/Onset, Context/Setting, Quality, Duration, Modifying Factors, Severity)  Note limiting factors. Sofia Watson is a 76 y.o. male who presents to the emergency department with complains of urinary frequency and urgency for the past 4 days. Patient believes that he has a urinary tract infection as he gets 1 every 1 to 2 years. Patient states that about 4 days ago his urine was a chocolate color and it lasted for approximately 2 days. Then it turned milky white and now it is orange. He denies any nausea but states that he has not had an appetite for the past 4 days but he still drinking water. He denies any fever but does complain of some chills. He denies any lower back pain. He denies any other symptoms at this time. Patient is also here to have his stitches removed from his medial right ankle. The wound seems to be healing well without any signs of infection. There is no appreciable drainage. REVIEW OF SYSTEMS    (2-9 systems for level 4, 10 or more for level 5)     Review of Systems   Constitutional:  Positive for chills. Negative for fatigue and fever. HENT:  Negative for congestion and sore throat. Eyes:  Negative for visual disturbance. Respiratory:  Negative for shortness of breath. Cardiovascular:  Negative for chest pain and palpitations. Gastrointestinal:  Negative for abdominal distention. Genitourinary:  Positive for dysuria and frequency. Musculoskeletal:  Negative for back pain. Skin:  Negative for rash. Psychiatric/Behavioral:  Negative for suicidal ideas. Except as noted above the remainder of the review of systems was reviewed and negative. PAST MEDICAL HISTORY     Past Medical History:   Diagnosis Date    Anxiety     Aortic stenosis     Atrial fibrillation, new onset ([facility] 03/05/2020    BPH (benign prostatic hyperplasia)     CHF (congestive heart failure) (HCC)     Diabetes mellitus (HCC)     borderline    Hyperlipidemia     Hypertension     Lymphedema     bilat legs    On home O2     Osteoarthritis     Septic shock due to urinary tract infection ([facility] /  Levophed and fluids 2020         SURGICAL HISTORY       Past Surgical History:   Procedure Laterality Date    CARDIAC CATHETERIZATION  03/2020    OTHER SURGICAL HISTORY  09/14/2020    TAVR/ Dr. Annamaria Spear       Previous Medications    ACETAMINOPHEN (TYLENOL) 500 MG TABLET    Take 500 mg by mouth every 6 hours as needed for Pain     AMIODARONE (CORDARONE) 200 MG TABLET    Take 1 tablet by mouth daily    ATORVASTATIN (LIPITOR) 40 MG TABLET    Take 1 tablet by mouth once daily    CHOLECALCIFEROL (VITAMIN D3) 50 MCG (2000 UT) CAPS    Take 2,000 Units by mouth daily     EQ ASPIRIN ADULT LOW DOSE 81 MG EC TABLET    Take 1 tablet by mouth once daily    FUROSEMIDE (LASIX) 20 MG TABLET    TAKE 2 TABLETS BY MOUTH ON MONDAY, WEDNESDAY AND FRIDAY AND THEN 1 ONCE DAILY ON  ALL  OTHER  DAYS.     LOSARTAN (COZAAR) 25 MG TABLET    Take 1 tablet by mouth once daily    MULTIPLE VITAMINS-MINERALS (THERAPEUTIC MULTIVITAMIN-MINERALS) TABLET    Take 1 tablet by mouth daily     ONDANSETRON (ZOFRAN ODT) 4 MG DISINTEGRATING TABLET    Take 1 tablet by mouth every 8 hours as needed for Nausea or Vomiting    PAROXETINE (PAXIL) 30 MG TABLET    Take 1 tablet by mouth every morning    POTASSIUM CHLORIDE (KLOR-CON M) 10 MEQ EXTENDED RELEASE TABLET    TAKE 2  BY MOUTH ONCE DAILY    SIMETHICONE (MYLICON) 80 MG CHEWABLE TABLET    Take 80 mg by mouth every 6 hours as needed for Flatulence (Gas pain) TAMSULOSIN (FLOMAX) 0.4 MG CAPSULE    Take 1 capsule by mouth daily       ALLERGIES     Patient has no known allergies. FAMILY HISTORY       Family History   Problem Relation Age of Onset    Arthritis Mother     Heart Disease Mother     High Cholesterol Mother     High Blood Pressure Mother     Stroke Mother     High Blood Pressure Brother           SOCIAL HISTORY       Social History     Socioeconomic History    Marital status:      Spouse name: Magi Erickson    Number of children: None    Years of education: None    Highest education level: None   Tobacco Use    Smoking status: Former     Packs/day: 1.00     Years: 10.00     Pack years: 10.00     Types: Cigarettes     Quit date:      Years since quittin.9    Smokeless tobacco: Never   Vaping Use    Vaping Use: Never used   Substance and Sexual Activity    Alcohol use: Never    Drug use: No     Social Determinants of Health     Financial Resource Strain: Low Risk     Difficulty of Paying Living Expenses: Not hard at all   Food Insecurity: No Food Insecurity    Worried About Running Out of Food in the Last Year: Never true    [address] in the Last Year: Never true   Transportation Needs: No Transportation Needs    Lack of Transportation (Medical): No    Lack of Transportation (Non-Medical): No   Physical Activity: Inactive    Days of Exercise per Week: 0 days    Minutes of Exercise per Session: 0 min   Stress: No Stress Concern Present    Feeling of Stress : Not at all   Social Connections:  Moderately Isolated    Frequency of Communication with Friends and Family: Twice a week    Frequency of Social Gatherings with Friends and Family: Twice a week    Attends Church Services: Never    Active Member of Clubs or Organizations: No    Attends Club or Organization Meetings: Never    Marital Status:    Intimate Partner Violence: Not At Risk    Fear of Current or Ex-Partner: No    Emotionally Abused: No    Physically Abused: No    Sexually Abused: No       SCREENINGS        Hebert Coma Scale  Eye Opening: Spontaneous  Best Verbal Response: Oriented  Best Motor Response: Obeys commands  Hebert Coma Scale Score: 15               PHYSICAL EXAM    (up to 7 for level 4, 8 or more for level 5)     ED Triage Vitals [12/04/22 1314]   BP Temp Temp Source Heart Rate Resp SpO2 Height Weight   (!) 117/53 (!) 100.5 °F (38.1 °C) Tympanic 90 -- (!) 89 % -- --       Physical Exam  Constitutional:       General: He is not in acute distress. Appearance: Normal appearance. He is not toxic-appearing. Comments: Patient is morbidly obese on exam.   HENT:      Head: Normocephalic and atraumatic. Mouth/Throat:      Mouth: Mucous membranes are moist.   Eyes:      Extraocular Movements: Extraocular movements intact. Pupils: Pupils are equal, round, and reactive to light. Cardiovascular:      Rate and Rhythm: Normal rate and regular rhythm. Pulses: Normal pulses. Heart sounds: Normal heart sounds. Pulmonary:      Effort: Pulmonary effort is normal.      Breath sounds: Normal breath sounds. Abdominal:      General: Abdomen is flat. Bowel sounds are normal.      Palpations: Abdomen is soft. Musculoskeletal:         General: Normal range of motion. Comments: The laceration on the right medial ankle seems to be healing appropriately. There is no abnormal drainage or erythema around the laceration site. Skin:     General: Skin is warm and dry. Capillary Refill: Capillary refill takes less than 2 seconds. Neurological:      General: No focal deficit present. Mental Status: He is alert and oriented to person, place, and time.    Psychiatric:         Mood and Affect: Mood normal.       DIAGNOSTIC RESULTS     EKG: All EKG's are interpreted by the Emergency Department Physician who either signs or Co-signs this chart in the absence of a cardiologist.        RADIOLOGY:   Non-plain film images such as CT, Ultrasound and MRI are read by the radiologist. [facility] images are visualized and preliminarily interpreted by the emergency physician with the below findings:      Interpretation per the Radiologist below, if available at the time of this note:    No orders to display         LABS:  Labs Reviewed   URINALYSIS WITH MICROSCOPIC - Abnormal; Notable for the following components:       Result Value    Turbidity UA SLIGHTLY CLOUDY (*)     Urine Hgb 1+ (*)     Protein, UA TRACE (*)     Urobilinogen, Urine ELEVATED (*)     Nitrite, Urine POSITIVE (*)     Leukocyte Esterase, Urine LARGE (*)     Bacteria, UA 3+ (*)     All other components within normal limits   BASIC METABOLIC PANEL - Abnormal; Notable for the following components:    Glucose 116 (*)     Sodium 130 (*)     Potassium 3.6 (*)     Chloride 95 (*)     Anion Gap 6 (*)     All other components within normal limits   CBC WITH AUTO DIFFERENTIAL - Abnormal; Notable for the following components:    RBC 3.74 (*)     Hemoglobin 11.6 (*)     Hematocrit 35.7 (*)     RDW 14.6 (*)     Seg Neutrophils 80 (*)     Lymphocytes 7 (*)     Eosinophils % 0 (*)     Absolute Lymph # 0.53 (*)     All other components within normal limits   CULTURE, URINE       All other labs were within normal range or not returned as of this dictation. EMERGENCY DEPARTMENT COURSE and DIFFERENTIAL DIAGNOSIS/MDM:   Vitals:    Vitals:    12/04/22 1314   BP: (!) 117/53   Pulse: 90   Temp: (!) 100.5 °F (38.1 °C)   TempSrc: Tympanic   SpO2: (!) 89%     MDM     Amount and/or Complexity of Data Reviewed  Decide to obtain previous medical records or to obtain history from someone other than the patient: yes      Discussed results with the patient. No acute finding on the blood work. Urinalysis does show evidence of UTI. I will start him on ciprofloxacin based on his previous culture results. The urine was sent for culture today as well. Advised him to return if he develops a fever or if his symptoms get worse.   We were also able to successfully remove his 4 sutures on his right medial ankle. Advised him to keep the area clean and dry. There are scabbed areas around laceration site which will eventually fall off. Patient understands and has no other questions or concerns. FINAL IMPRESSION      1. Acute UTI (urinary tract infection)    2.  Visit for suture removal          DISPOSITION/PLAN   DISPOSITION Decision To Discharge 12/04/2022 03:37:21 PM      PATIENT REFERRED TO:  Ashok Terrell MD  [place] [address], [address] [place] [address]  [phone]    In 3 days    Ciprofloxacin 500 mg twice a day x 10 days    (Please note that portions of this note were completed with a voice recognition program.  Efforts were made to edit the dictations but occasionally words are mis-transcribed.)    Jana Rachel DO (electronically signed)  Attending Emergency Physician            Jana Rachel DO  12/04/22 1804

## 2022-12-04 NOTE — DISCHARGE INSTRUCTIONS
Take the antibiotic as prescribed. We will call you if the antibiotic needs to be switched for any reason depending on what the urine culture shows. Return to the emergency department if you develop a fever or if your symptoms get worse.

## 2022-12-07 LAB
CULTURE: ABNORMAL
SPECIMEN DESCRIPTION: ABNORMAL

## 2022-12-14 ENCOUNTER — ANTI-COAG VISIT (OUTPATIENT)
Dept: PHARMACY | Age: 77
End: 2022-12-14

## 2022-12-14 DIAGNOSIS — I50.42 CHRONIC COMBINED SYSTOLIC AND DIASTOLIC CONGESTIVE HEART FAILURE (HCC): Primary | ICD-10-CM

## 2022-12-20 ENCOUNTER — NURSE ONLY (OUTPATIENT)
Dept: CARDIOLOGY | Age: 77
End: 2022-12-20
Payer: MEDICARE

## 2022-12-20 DIAGNOSIS — R00.1 BRADYCARDIA: ICD-10-CM

## 2022-12-20 DIAGNOSIS — Z95.0 PACEMAKER: Primary | ICD-10-CM

## 2022-12-20 PROCEDURE — 93294 REM INTERROG EVL PM/LDLS PM: CPT | Performed by: INTERNAL MEDICINE

## 2023-02-10 ENCOUNTER — HOSPITAL ENCOUNTER (OUTPATIENT)
Age: 78
Discharge: HOME | End: 2023-02-10
Payer: MEDICARE

## 2023-02-10 DIAGNOSIS — I50.42 CHRONIC COMBINED SYSTOLIC AND DIASTOLIC CONGESTIVE HEART FAILURE (HCC): ICD-10-CM

## 2023-02-10 DIAGNOSIS — I10 ESSENTIAL HYPERTENSION: ICD-10-CM

## 2023-02-10 DIAGNOSIS — E78.2 MIXED HYPERLIPIDEMIA: ICD-10-CM

## 2023-02-10 LAB
ALBUMIN SERPL-MCNC: 3.9 G/DL (ref 3.5–5.2)
ALBUMIN/GLOBULIN RATIO: 1.1 (ref 1–2.5)
ALP SERPL-CCNC: 56 U/L (ref 40–129)
ALT SERPL-CCNC: 17 U/L (ref 5–41)
ANION GAP SERPL CALCULATED.3IONS-SCNC: 13 MMOL/L (ref 9–17)
AST SERPL-CCNC: 18 U/L
BILIRUB SERPL-MCNC: 0.6 MG/DL (ref 0.3–1.2)
BUN SERPL-MCNC: 20 MG/DL (ref 8–23)
BUN/CREAT BLD: 30 (ref 9–20)
CALCIUM SERPL-MCNC: 9 MG/DL (ref 8.6–10.4)
CHLORIDE SERPL-SCNC: 101 MMOL/L (ref 98–107)
CHOLEST SERPL-MCNC: 145 MG/DL
CHOLESTEROL/HDL RATIO: 4.8
CO2 SERPL-SCNC: 23 MMOL/L (ref 20–31)
CREAT SERPL-MCNC: 0.67 MG/DL (ref 0.7–1.2)
GFR SERPL CREATININE-BSD FRML MDRD: >60 ML/MIN/1.73M2
GLUCOSE SERPL-MCNC: 103 MG/DL (ref 70–99)
HDLC SERPL-MCNC: 30 MG/DL
LDLC SERPL CALC-MCNC: 88 MG/DL (ref 0–130)
POTASSIUM SERPL-SCNC: 4 MMOL/L (ref 3.7–5.3)
PROT SERPL-MCNC: 7.4 G/DL (ref 6.4–8.3)
SODIUM SERPL-SCNC: 137 MMOL/L (ref 135–144)
TRIGL SERPL-MCNC: 134 MG/DL

## 2023-02-10 PROCEDURE — 36415 COLL VENOUS BLD VENIPUNCTURE: CPT

## 2023-02-10 PROCEDURE — 80061 LIPID PANEL: CPT

## 2023-02-10 PROCEDURE — 80053 COMPREHEN METABOLIC PANEL: CPT

## 2023-02-15 ENCOUNTER — HOSPITAL ENCOUNTER (OUTPATIENT)
Dept: PHARMACY | Age: 78
Setting detail: THERAPIES SERIES
Discharge: HOME | End: 2023-02-15
Payer: MEDICARE

## 2023-02-15 VITALS
HEART RATE: 72 BPM | WEIGHT: 315 LBS | OXYGEN SATURATION: 96 % | DIASTOLIC BLOOD PRESSURE: 46 MMHG | BODY MASS INDEX: 53.61 KG/M2 | SYSTOLIC BLOOD PRESSURE: 119 MMHG

## 2023-02-15 DIAGNOSIS — I50.42 CHRONIC COMBINED SYSTOLIC AND DIASTOLIC CONGESTIVE HEART FAILURE (HCC): Primary | ICD-10-CM

## 2023-02-15 PROCEDURE — 99212 OFFICE O/P EST SF 10 MIN: CPT

## 2023-02-15 NOTE — DISCHARGE INSTRUCTIONS
HEART FAILURE:    With heart failure you must follow up with your Cardiologist, your PCP and other physicians as appropriate - especially 7 days after a hospitalization and then as directed. Please call right away with any signs or symptoms of heart failure or other medical conditions that need attention or with any questions at all. Please call your Cardiologist or your PCP or the [facility] at [phone]. We can help manage these symptoms and often prevent a visit to the Emergency Room or hospitalization. * Know your dry weight (your weight without any fluid on board)    * Call any time you have questions about anything. Do not wait. * It is very important to take your medications as prescribed, do not miss any doses. Call for refills before you run out. Typically when you have one week left. If you have trouble getting your medications for any reason, call us at [phone]. * Avoid NSAIDs (non steroidal anti inflammatory drugs) like Aleve (naproxen), Advil and Motrin (ibuprofen), Mobic (diclofenac), Celebrex (celecoxib) and aspirin. A daily aspirin is okay if recommended by your physician. It is okay to take Tylenol (acetaminophen) unless your physician has told you otherwise. * Limit sodium intake. Typically this is no more than 2,000-2,400 milligrams (mg) per day. You will need to add up the sodium content found on the nutrition label for the foods you eat each day. * Weigh yourself every day. Weigh yourself before breakfast and after you go to the restroom. Wear the same thing each time you weigh yourself. Keep a log and bring it to each visit. Call if you gain 3 or more pounds in 1-7 days - [phone]                                      * If you have hypertension (high blood pressure), you may want to check your blood pressure daily. Your blood pressure goal is less than 130/80 unless instructed differently by your physician.  Keep a log and bring it to each visit. * Be sure to keep good control of your Diabetes     * Be sure to keep good control of your Cholesterol    * Eat a Heart healthy diet    * Be active. Follow an exercise plan that is reasonable for you. * If you smoke, work to stop smoking. Best to avoid alcohol.

## 2023-02-15 NOTE — PROGRESS NOTES
[facility]  [place]  Pharmacist  Heart Failure    [facility]  [address]  Phone: [phone]  Fax: [phone]      NAME: Domonique Lee  MEDICAL RECORD NUMBER:  [id number]  AGE: 76 y.o. GENDER: male  : 1945  EPISODE DATE:  2/15/2023      Heart Failure Management referral by Dr. Mary Manzo Wt: 363 lb (Up 4 lb since last visit 5 months ago)  Wt Readings from Last 6 Encounters:   02/15/23 (!) 363 lb (164.7 kg)   23 (!) 354 lb (160.6 kg)   10/19/22 (!) 365 lb (165.6 kg)   10/18/22 (!) 364 lb (165.1 kg)   22 (!) 357 lb (161.9 kg)   06/15/22 (!) 360 lb (163.3 kg)    and   Ht Readings from Last 1 Encounters:   23 5' 9\" (1.753 m)                 /46  HR:72  O2Sat: 96%     Extremities and pitting edema mild very slight  Skin red lower leg from venous stasis with warm dry skin. Encouraged him to start using lotion on his dry skin     Subjective   Mr. Ann Marie Yap is a 76 y.o. male here for the Heart Failure Services. They are here today for a comprehensive medication review including over the counter medications and herbal products, overall wellbeing assessment, transition of care and any needed adjustments with updates and recommendations communicated to the referring physician. Patient Symptoms:  Wife broke her ankle in September and has been in the nursing home since then. Patient is snacking too much without her home to yell at him. He is gaining weight. Patient has SOB with exertion. He is in a motorized wheelchair. He is using oxygen continuously at 2 L/min. Patient is using his CPAP machine again \"off and on but more on. \"   Patient bought \"compression\" socks online and is wearing them. Leg swelling remains the same. Patient uses a motorized wheelchair. Patient states his knees are bone on bone and he has trouble getting around. Patient has slept in recliner for years.   Patient SOB is about the same and feeling about the same. Shortness of Breath:   []   None   [x]   Dyspnea on exertion   []   Dyspnea with normal activities  []   Dyspnea at rest  []   Dyspnea while sleeping    Patient Findings:   []  Missed doses  [x]  Diet changes  []  Sodium intake changes   []  Night time cough   []  Other   [x]  Early saiety, abd fullness []  Chest pain   []  Constipation   [x]  Night time bathroom trips  []  Alcohol intake changes  []  Acute illness  [x]  Edema    [x]  Number of pillows or recliner  []  Activity changes   []  Fatigue that limits activity []  Heart racing or skipping beats  []  Light headedness  []  Dizziness    []      []  Problems sleeping    Functional Activity New York Heart Association Classification  []   Class I No limitation of physical activity. Ordinary physical activity does not cause undue fatigue, palpitation, dyspnea (shortness of breath). []   Class II Slight limitation of physical activity. Comfortable at rest. Ordinary physical activity results in fatigue, palpitation, dyspnea (shortness of breath). [x]   Class III  Fredrick limitation of physical activity. Comfortable at rest.  Less than ordinary activity causes fatigue, palpitation, dyspnea. []   Class IV Unable to carry on any physical activity without discomfort. Symptoms of heart failure at rest.  If any physical activity is undertaken, discomfort increases.     Last CHF Hospital Admission: 5-13-20  -  5-27-20    OUTPATIENT MEDICATIONS:  Outpatient Medications Marked as Taking for the 2/15/23 encounter [facility]counter[facility]   Medication Sig Dispense Refill    tamsulosin (FLOMAX) 0.4 MG capsule Take 1 capsule by mouth daily 30 capsule 5    amiodarone (CORDARONE) 200 MG tablet Take 1 tablet by mouth daily 90 tablet 3    atorvastatin (LIPITOR) 40 MG tablet Take 1 tablet by mouth once daily 90 tablet 3    ondansetron (ZOFRAN ODT) 4 MG disintegrating tablet Take 1 tablet by mouth every 8 hours as needed for Nausea or Vomiting 10 tablet 0    PARoxetine (PAXIL) 30 MG tablet Take 1 tablet by mouth every morning 90 tablet 3    losartan (COZAAR) 25 MG tablet Take 1 tablet by mouth once daily 90 tablet 3    furosemide (LASIX) 20 MG tablet TAKE 2 TABLETS BY MOUTH ON MONDAY, WEDNESDAY AND FRIDAY AND THEN 1 ONCE DAILY ON  ALL  OTHER  DAYS. (Patient taking differently: See Admin Instructions Take 2 tablets daily except on Friday and Monday take one tablet.) 60 tablet 3    potassium chloride (KLOR-CON M) 10 MEQ extended release tablet TAKE 2  BY MOUTH ONCE DAILY 180 tablet 2    EQ ASPIRIN ADULT LOW DOSE 81 MG EC tablet Take 1 tablet by mouth once daily 90 tablet 3    simethicone (MYLICON) 80 MG chewable tablet Take 80 mg by mouth every 6 hours as needed for Flatulence (Gas pain)      Multiple Vitamins-Minerals (THERAPEUTIC MULTIVITAMIN-MINERALS) tablet Take 1 tablet by mouth daily       Cholecalciferol (VITAMIN D3) 50 MCG (2000 UT) CAPS Take 2,000 Units by mouth daily       acetaminophen (TYLENOL) 500 MG tablet Take 500 mg by mouth every 6 hours as needed for Pain            Objective / Assessment     Patient Active Problem List   Diagnosis Code    Essential hypertension I10    Mixed hyperlipidemia E78.2    Osteoarthritis of both knees M17.0    Obesity, morbid (more than 100 lbs over ideal weight or BMI > 40) ([facility]) E66.01    Carpal tunnel syndrome on left G56.02    Aortic stenosis, severe / TAVR  I35.0    AV block, 3rd degree ([facility]) I44.2    SARS-CoV-2 positive U07.1    Chronic combined systolic and diastolic congestive heart failure ([facility]) I50.42     Social History     Tobacco Use    Smoking status: Former     Packs/day: 1.00     Years: 10.00     Pack years: 10.00     Types: Cigarettes     Quit date:      Years since quittin.1    Smokeless tobacco: Never   Vaping Use    Vaping Use: Never used   Substance Use Topics    Alcohol use: Never    Drug use: No       Potassium (mmol/L)   Date Value   02/10/2023 4.0 12/04/2022 3.6 (L)   09/02/2022 4.1   02/02/2022 4.3   09/17/2021 4.6   06/01/2021 4.0     Potassium reflex Magnesium (meq/L)   Date Value   02/18/2021 4.6   09/15/2020 3.9     BUN (mg/dL)   Date Value   02/10/2023 20   12/04/2022 14   09/02/2022 26 (H)   02/02/2022 22   09/17/2021 11   06/01/2021 15     Creatinine (mg/dL)   Date Value   02/10/2023 0.67 (L)   12/04/2022 0.75   09/02/2022 0.86   02/02/2022 0.70   09/17/2021 0.60 (L)   06/01/2021 0.59 (L)     TSH   Date Value   09/02/2022 2.00 uIU/mL   02/02/2022 2.32 mIU/L   09/17/2021 2.03 mIU/L   03/08/2021 2.37 mIU/L   05/15/2020 2.83 mIU/L   03/04/2020 3.43 mIU/L     No results found for: T4      Plan:      Chronic Systolic and/or Diastolic Heart Failure (diagnosis): EF: 50-55% via echocardiogram on 9/17/21  Beta Blocker for EF </= 40%: none - bradycardia  Diuretics: Furosemide 20 mg every Tue, Thu, Sat and 40 mg all other days. Continue Potassium 20 mEq (take 10 mEq x 2 for ease of swallowing) daily  ACE/ARB/ARNI for EF </= 40%: Losartan 25 mg po daily  Nonpharmacologic management of Heart Failure: I told patient to continue wearing lower extremity compression stockings and I advised patient to try and keep legs up whenever possible and to limit salt in diet. Laboratory testing:   Complete metabolic panel (CMP) today to assess potassium, renal function and liver function due to amiodarone therapy  BNP to assess fluid today due to weight gain   Paroxysmal Atrial Fibrillation: Rhythm Control               Antiplatelet Agent: Aspirin 81 mg daily. I also reminded them to watch for signs of bloody or black tarry stools and stop the medication immediately if this develops as this could be life threatening. Rhythm Control Agent: Continue Amiodarone (Cordarone) 200 mg once daily    Monitoring: Amiodarone Since he is being maintained on Amiodarone, I told him that we will need to closely monitor him for potential side effects.  These include monitoring LFTs and TSH at least every 6 months as well as chest x-rays, pulmonary function tests, and eye exams at least on a yearly basis. TSH 22 - 2.00 (wnl)-- Patient also reports he had an eye exam last month () - no problems reported  ALT/AST on 2/10/23 17/18  Stroke Risk: His CHADS2-VASc score is greater than 2 (2.2% stroke risk)  Anticoagulation:  STOPPED Eliquis 3/16/21 per Dr. Mariam Doe - due to patient concern for cost and patient refusal to switch to warfarin. Pacemaker transmission q 2 weeks to monitor a-fib. Patient to resume anticoagulation if  recurrent a-fib. Atherosclerotic Heart Disease:    Statin Therapy: Atorvastatin 40 mg po daily      Essential Hypertension:   Diuretics: Furosemide 20 mg every MF and 40 mg all other days  Beta Blocker: none - bradycardia    Patient Education/Instructions: I did spend about 15 minutes with patient going over his heart failure packet including dietary guidelines, the signs and symptoms to watch for including shortness of breath, weight gain, lightheadedness/dizziness. I asked patient to call the clinic if develops persistent or worsening shortness of breath or weight gain of more than 3 pounds in 1-7 days. Patient verbalized understanding. Patient has been in ER 3 times since last appt for a viral infection, laceration and UTI.     Medication changes since last visit none    Follow up cardiology Dr Mariam Doe:  23  Follow up [facility] HF clinic:    Thank you for the referral,    Robbie Hannon, [facility] PharmD    For Pharmacy Admin Tracking Only    Intervention Detail: Adherence Monitorin  Total # of Interventions Recommended: 0  Total # of Interventions Accepted: 0  Time Spent (min): 60

## 2023-03-10 DIAGNOSIS — I48.0 PAF (PAROXYSMAL ATRIAL FIBRILLATION) (HCC): ICD-10-CM

## 2023-03-11 RX ORDER — HYDROGEN PEROXIDE 2.65 ML/100ML
LIQUID ORAL; TOPICAL
Qty: 90 TABLET | Refills: 3 | Status: SHIPPED | OUTPATIENT
Start: 2023-03-11

## 2023-05-08 PROCEDURE — 93294 REM INTERROG EVL PM/LDLS PM: CPT | Performed by: INTERNAL MEDICINE

## 2023-05-09 ENCOUNTER — NURSE ONLY (OUTPATIENT)
Dept: CARDIOLOGY | Age: 78
End: 2023-05-09
Payer: MEDICARE

## 2023-05-09 DIAGNOSIS — Z95.0 PACEMAKER: Primary | ICD-10-CM

## 2023-05-09 DIAGNOSIS — R00.1 BRADYCARDIA: ICD-10-CM

## 2023-05-10 ENCOUNTER — OFFICE VISIT (OUTPATIENT)
Dept: CARDIOLOGY | Age: 78
End: 2023-05-10
Payer: MEDICARE

## 2023-05-10 VITALS
OXYGEN SATURATION: 97 % | HEIGHT: 69 IN | SYSTOLIC BLOOD PRESSURE: 114 MMHG | RESPIRATION RATE: 16 BRPM | BODY MASS INDEX: 46.65 KG/M2 | WEIGHT: 315 LBS | DIASTOLIC BLOOD PRESSURE: 59 MMHG | HEART RATE: 67 BPM

## 2023-05-10 DIAGNOSIS — I10 ESSENTIAL HYPERTENSION: ICD-10-CM

## 2023-05-10 DIAGNOSIS — R00.1 BRADYCARDIA: ICD-10-CM

## 2023-05-10 DIAGNOSIS — Z95.0 PACEMAKER: Primary | ICD-10-CM

## 2023-05-10 DIAGNOSIS — Z99.89 OSA ON CPAP: ICD-10-CM

## 2023-05-10 DIAGNOSIS — G47.33 OSA ON CPAP: ICD-10-CM

## 2023-05-10 DIAGNOSIS — I50.42 CHRONIC COMBINED SYSTOLIC AND DIASTOLIC CONGESTIVE HEART FAILURE (HCC): ICD-10-CM

## 2023-05-10 DIAGNOSIS — Z95.2 S/P TAVR (TRANSCATHETER AORTIC VALVE REPLACEMENT): ICD-10-CM

## 2023-05-10 DIAGNOSIS — Z79.899 ON AMIODARONE THERAPY: ICD-10-CM

## 2023-05-10 DIAGNOSIS — I44.2 AV BLOCK, 3RD DEGREE (HCC): ICD-10-CM

## 2023-05-10 DIAGNOSIS — E78.2 MIXED HYPERLIPIDEMIA: ICD-10-CM

## 2023-05-10 DIAGNOSIS — I48.0 PAF (PAROXYSMAL ATRIAL FIBRILLATION) (HCC): ICD-10-CM

## 2023-05-10 PROCEDURE — 3078F DIAST BP <80 MM HG: CPT | Performed by: PHYSICIAN ASSISTANT

## 2023-05-10 PROCEDURE — 1123F ACP DISCUSS/DSCN MKR DOCD: CPT | Performed by: PHYSICIAN ASSISTANT

## 2023-05-10 PROCEDURE — 1036F TOBACCO NON-USER: CPT | Performed by: PHYSICIAN ASSISTANT

## 2023-05-10 PROCEDURE — 99214 OFFICE O/P EST MOD 30 MIN: CPT | Performed by: PHYSICIAN ASSISTANT

## 2023-05-10 PROCEDURE — 99211 OFF/OP EST MAY X REQ PHY/QHP: CPT | Performed by: PHYSICIAN ASSISTANT

## 2023-05-10 PROCEDURE — 3074F SYST BP LT 130 MM HG: CPT | Performed by: PHYSICIAN ASSISTANT

## 2023-05-10 PROCEDURE — G8427 DOCREV CUR MEDS BY ELIG CLIN: HCPCS | Performed by: PHYSICIAN ASSISTANT

## 2023-05-10 PROCEDURE — G8417 CALC BMI ABV UP PARAM F/U: HCPCS | Performed by: PHYSICIAN ASSISTANT

## 2023-05-10 PROCEDURE — 93288 INTERROG EVL PM/LDLS PM IP: CPT | Performed by: PHYSICIAN ASSISTANT

## 2023-05-10 NOTE — PROGRESS NOTES
Patient: Cinthya Carrillo  : 1945  Date of Visit: May 10, 2023    History of Present Illness:        Dear Eugene Sood MD    I had the pleasure of seeing Cinthya Carrillo in my office today. Mr. Eva Perkins is a 76 y.o. male who presented for follow-up. 1-He has  a history of heart failure. He has a history of hypertension and hyperlipidemia for years, and has borderline diabetes. 2- ECG on 3/20: sinus tachycardia with first-degree AV block and nonspecific intraventricular conduction delay. 3-Echocardiogram done 3/4/20 which showed severely reduced left ventricular systolic function with ejection fraction of 35% and moderate to severe aortic stenosis. This can be an underestimation because of reduced left ventricular systolic function. Mild to moderate aortic regurgitation. Moderate mitral regurgitation and moderate diastolic dysfunction. 4-Heart Catheterization done on 3/12/20: LMCA: Normal 0% stenosis. LAD: Mild irregularities 20-30% LCx: Lesion on 2nd Ob Angie Ostial 50% stenosis. RCA: Mild irregularities 10-20%. EF:35%. Severe aortic stenosis by cardiac catheterization with peak to peak gradient of 67 mmHg and mean gradient of 48 mmHg across the aortic valve. 5-Admission to [facility] on 3/4/20-3/9/20 due to acute heart failure: Patient treated for acute on chronic combined CHF and aortic stenosis, later developed atrial fibrillation with rapid ventricular response. Moved to the ICU and started on amiodarone drip and then later converted back to normal sinus rhythm. Amiodarone oral tablet was started prior to discharge. 6-On 2020, he presented to the emergency room with epistaxis. Nasal packing done that removed after 3 days. 7-Transcatheter aortic valve replacement with a Medtronic 34 mm Evolut Pro Plus prosthesis 9/15/2020    8-Echocardiogram done on 10/13/2020 showed an EF of 55%. Mild to moderate LV hypertrophy.   Status post TAVR with mean gradient of 9

## 2023-05-10 NOTE — PATIENT INSTRUCTIONS
SURVEY:    You may be receiving a survey from [employer] regarding your visit today. Please complete the survey to enable us to provide the highest quality of care to you and your family. If you cannot score us a very good on any question, please call the office to discuss how we could have made your experience a very good one. Thank you.

## 2023-05-22 PROBLEM — I50.42 CHRONIC COMBINED SYSTOLIC AND DIASTOLIC CHF, NYHA CLASS 3 (HCC): Chronic | Status: ACTIVE | Noted: 2022-04-18

## 2023-06-20 ENCOUNTER — HOSPITAL ENCOUNTER (OUTPATIENT)
Dept: PULMONOLOGY | Age: 78
Discharge: HOME | End: 2023-06-20
Payer: MEDICARE

## 2023-06-20 VITALS — OXYGEN SATURATION: 93 % | RESPIRATION RATE: 20 BRPM | HEART RATE: 68 BPM

## 2023-06-20 DIAGNOSIS — Z79.899 ON AMIODARONE THERAPY: ICD-10-CM

## 2023-06-20 PROCEDURE — 6370000000 HC RX 637 (ALT 250 FOR IP): Performed by: INTERNAL MEDICINE

## 2023-06-20 PROCEDURE — 94726 PLETHYSMOGRAPHY LUNG VOLUMES: CPT

## 2023-06-20 PROCEDURE — 94060 EVALUATION OF WHEEZING: CPT

## 2023-06-20 PROCEDURE — 94729 DIFFUSING CAPACITY: CPT

## 2023-06-20 PROCEDURE — 94664 DEMO&/EVAL PT USE INHALER: CPT

## 2023-06-20 RX ORDER — ALBUTEROL SULFATE 90 UG/1
4 AEROSOL, METERED RESPIRATORY (INHALATION) ONCE
Status: COMPLETED | OUTPATIENT
Start: 2023-06-20 | End: 2023-06-20

## 2023-06-20 RX ADMIN — ALBUTEROL SULFATE 4 PUFF: 90 AEROSOL, METERED RESPIRATORY (INHALATION) at 09:22

## 2023-06-28 ENCOUNTER — TELEPHONE (OUTPATIENT)
Dept: CARDIOLOGY | Age: 78
End: 2023-06-28

## 2023-07-19 PROBLEM — U07.1 SARS-COV-2 POSITIVE: Status: RESOLVED | Noted: 2022-01-01 | Resolved: 2023-07-19

## 2023-07-19 PROBLEM — G56.02 CARPAL TUNNEL SYNDROME ON LEFT: Status: RESOLVED | Noted: 2019-01-08 | Resolved: 2023-07-19

## 2023-07-22 NOTE — TELEPHONE ENCOUNTER
Let us continue to monitor.   Thank you
Yudith Alexandra called with some concerns of Mr. Manzo weight gain and leg edema. She reports he has gained 10 pounds since stating cardiac rehab and his legs are big, tight, shiny and red. He has history of cellulitis. His lungs are clear he denies any SOB. I called Yudith Nasrin back and informed her per Dr Mercy Pearce we will watch him as long as he has no symptoms.  She verbalized understanding
No

## 2023-07-31 ENCOUNTER — HOSPITAL ENCOUNTER (OUTPATIENT)
Age: 78
Discharge: HOME | End: 2023-07-31
Payer: MEDICARE

## 2023-07-31 ENCOUNTER — TELEPHONE (OUTPATIENT)
Dept: CARDIOLOGY | Age: 78
End: 2023-07-31

## 2023-07-31 DIAGNOSIS — Z79.899 ON AMIODARONE THERAPY: ICD-10-CM

## 2023-07-31 DIAGNOSIS — G47.33 OSA ON CPAP: ICD-10-CM

## 2023-07-31 DIAGNOSIS — R00.1 BRADYCARDIA: ICD-10-CM

## 2023-07-31 DIAGNOSIS — I44.2 AV BLOCK, 3RD DEGREE (HCC): ICD-10-CM

## 2023-07-31 DIAGNOSIS — I48.0 PAF (PAROXYSMAL ATRIAL FIBRILLATION) (HCC): ICD-10-CM

## 2023-07-31 DIAGNOSIS — Z99.89 OSA ON CPAP: ICD-10-CM

## 2023-07-31 DIAGNOSIS — Z95.2 S/P TAVR (TRANSCATHETER AORTIC VALVE REPLACEMENT): ICD-10-CM

## 2023-07-31 DIAGNOSIS — E78.2 MIXED HYPERLIPIDEMIA: ICD-10-CM

## 2023-07-31 DIAGNOSIS — Z95.0 PACEMAKER: ICD-10-CM

## 2023-07-31 DIAGNOSIS — I50.42 CHRONIC COMBINED SYSTOLIC AND DIASTOLIC CONGESTIVE HEART FAILURE (HCC): ICD-10-CM

## 2023-07-31 DIAGNOSIS — I10 ESSENTIAL HYPERTENSION: ICD-10-CM

## 2023-07-31 LAB
ALBUMIN SERPL-MCNC: 3.9 G/DL (ref 3.5–5.2)
ALBUMIN/GLOB SERPL: 1.1 {RATIO} (ref 1–2.5)
ALP SERPL-CCNC: 56 U/L (ref 40–129)
ALT SERPL-CCNC: 19 U/L (ref 5–41)
ANION GAP SERPL CALCULATED.3IONS-SCNC: 10 MMOL/L (ref 9–17)
AST SERPL-CCNC: 18 U/L
BILIRUB DIRECT SERPL-MCNC: <0.1 MG/DL
BILIRUB SERPL-MCNC: 0.6 MG/DL (ref 0.3–1.2)
BUN SERPL-MCNC: 20 MG/DL (ref 8–23)
BUN/CREAT SERPL: 25 (ref 9–20)
CALCIUM SERPL-MCNC: 8.6 MG/DL (ref 8.6–10.4)
CHLORIDE SERPL-SCNC: 101 MMOL/L (ref 98–107)
CO2 SERPL-SCNC: 27 MMOL/L (ref 20–31)
CREAT SERPL-MCNC: 0.8 MG/DL (ref 0.7–1.2)
GFR SERPL CREATININE-BSD FRML MDRD: >60 ML/MIN/1.73M2
GLUCOSE SERPL-MCNC: 111 MG/DL (ref 70–99)
POTASSIUM SERPL-SCNC: 3.9 MMOL/L (ref 3.7–5.3)
PROT SERPL-MCNC: 7.6 G/DL (ref 6.4–8.3)
SODIUM SERPL-SCNC: 138 MMOL/L (ref 135–144)
TSH SERPL DL<=0.05 MIU/L-ACNC: 2.84 UIU/ML (ref 0.3–5)

## 2023-07-31 PROCEDURE — 82248 BILIRUBIN DIRECT: CPT

## 2023-07-31 PROCEDURE — 84443 ASSAY THYROID STIM HORMONE: CPT

## 2023-07-31 PROCEDURE — 80053 COMPREHEN METABOLIC PANEL: CPT

## 2023-07-31 PROCEDURE — 36415 COLL VENOUS BLD VENIPUNCTURE: CPT

## 2023-07-31 NOTE — TELEPHONE ENCOUNTER
----- Message from Larry Rahman PA-C sent at 7/31/2023 12:18 PM EDT -----  Please notify patient that their lab results are normal.   Please continue current treatment and follow up.

## 2023-08-01 ENCOUNTER — TELEPHONE (OUTPATIENT)
Dept: CARDIOLOGY | Age: 78
End: 2023-08-01

## 2023-08-01 DIAGNOSIS — I50.42 CHRONIC COMBINED SYSTOLIC AND DIASTOLIC CONGESTIVE HEART FAILURE (HCC): Primary | ICD-10-CM

## 2023-08-01 NOTE — TELEPHONE ENCOUNTER
----- Message from Pardeep Vega PA-C sent at 8/1/2023 11:36 AM EDT -----  Please notify patient that their lab results are normal.   Please continue current treatment and follow up.

## 2023-08-02 ENCOUNTER — HOSPITAL ENCOUNTER (OUTPATIENT)
Dept: PHARMACY | Age: 78
Setting detail: THERAPIES SERIES
Discharge: HOME | End: 2023-08-02
Payer: MEDICARE

## 2023-08-02 VITALS
BODY MASS INDEX: 54.64 KG/M2 | HEART RATE: 67 BPM | DIASTOLIC BLOOD PRESSURE: 57 MMHG | WEIGHT: 315 LBS | SYSTOLIC BLOOD PRESSURE: 123 MMHG

## 2023-08-02 PROCEDURE — 99212 OFFICE O/P EST SF 10 MIN: CPT

## 2023-08-02 NOTE — PROGRESS NOTES
[facility]  Medication Management  Pharmacist  Heart Failure    [address]  [place], [address] [facility] [place]  Phone: [phone]  Fax: [phone]      NAME: Gisele Resendiz  MEDICAL RECORD NUMBER:  [id number]  AGE: 78 y.o. GENDER: male  : 1945  EPISODE DATE:  2023      Heart Failure Management referral by Dr. Kim Maloney    Today's Wt: 370 lb  Wt Readings from Last 6 Encounters:   23 (!) 370 lb (167.8 kg)   23 (!) 358 lb (162.4 kg)   23 (!) 358 lb (162.4 kg)   05/10/23 (!) 358 lb (162.4 kg)   02/15/23 (!) 363 lb (164.7 kg)   23 (!) 354 lb (160.6 kg)    and   Ht Readings from Last 1 Encounters:   23 5' 9\" (1.753 m)                 /57  HR:  67      Extremities and pitting edema trace - lymphedema component present - mild compression socks in place  Skin warm and dry - red lower leg from venous stasis       Subjective   Mr. Roderick Thomas is a 78 y.o. male here for the Heart Failure Services. They are here today for a comprehensive medication review including over the counter medications and herbal products, overall wellbeing assessment, transition of care and any needed adjustments with updates and recommendations communicated to the referring physician. Patient Symptoms:  Patient is here today for 6 month follow up. He reports that he is feeling good. Weight is increased 7 pounds since last visit in February. Patient states that he has \"fallen off the wagon\" with his diet. His wife is residing in a nursing home and he has been eating meals at the facility when he visits - eating extra food daily/increased calorie consumption and higher sodium content foods. He admits to eating \"all the wrong things again. \". He plans to resume keto diet again and also decrease his sodium intake. Denies SOB, dizziness, lightheadedness. Oxygen continuous at 2.5 L per nasal cannula. Patient uses motorized wheelchair.  He has osteoarthritis bilateral knees and pain

## 2023-08-22 ENCOUNTER — NURSE ONLY (OUTPATIENT)
Dept: CARDIOLOGY | Age: 78
End: 2023-08-22
Payer: MEDICARE

## 2023-08-22 DIAGNOSIS — I44.2 AV BLOCK, 3RD DEGREE (HCC): ICD-10-CM

## 2023-08-22 DIAGNOSIS — Z95.0 PACEMAKER: Primary | ICD-10-CM

## 2023-08-22 PROCEDURE — 93294 REM INTERROG EVL PM/LDLS PM: CPT | Performed by: INTERNAL MEDICINE

## 2023-08-28 RX ORDER — LOSARTAN POTASSIUM 25 MG/1
TABLET ORAL
Qty: 90 TABLET | Refills: 3 | Status: SHIPPED | OUTPATIENT
Start: 2023-08-28

## 2023-10-18 ENCOUNTER — OFFICE VISIT (OUTPATIENT)
Dept: PULMONOLOGY | Age: 78
End: 2023-10-18
Payer: MEDICARE

## 2023-10-18 VITALS
BODY MASS INDEX: 46.65 KG/M2 | HEART RATE: 83 BPM | WEIGHT: 315 LBS | RESPIRATION RATE: 16 BRPM | HEIGHT: 69 IN | TEMPERATURE: 97.2 F | SYSTOLIC BLOOD PRESSURE: 134 MMHG | DIASTOLIC BLOOD PRESSURE: 70 MMHG | OXYGEN SATURATION: 95 %

## 2023-10-18 DIAGNOSIS — I35.0 SEVERE AORTIC STENOSIS: ICD-10-CM

## 2023-10-18 DIAGNOSIS — G47.33 OSA (OBSTRUCTIVE SLEEP APNEA): Primary | ICD-10-CM

## 2023-10-18 DIAGNOSIS — F33.1 MAJOR DEPRESSIVE DISORDER, RECURRENT, MODERATE (HCC): ICD-10-CM

## 2023-10-18 DIAGNOSIS — E66.01 CLASS 3 SEVERE OBESITY DUE TO EXCESS CALORIES WITH SERIOUS COMORBIDITY AND BODY MASS INDEX (BMI) OF 50.0 TO 59.9 IN ADULT (HCC): ICD-10-CM

## 2023-10-18 DIAGNOSIS — Z87.891 STOPPED SMOKING WITH GREATER THAN 10 PACK YEAR HISTORY: ICD-10-CM

## 2023-10-18 DIAGNOSIS — F33.0 MAJOR DEPRESSIVE DISORDER, RECURRENT, MILD (HCC): ICD-10-CM

## 2023-10-18 DIAGNOSIS — J96.11 CHRONIC RESPIRATORY FAILURE WITH HYPOXIA (HCC): ICD-10-CM

## 2023-10-18 PROBLEM — F33.9 MAJOR DEPRESSIVE DISORDER, RECURRENT, UNSPECIFIED (HCC): Status: ACTIVE | Noted: 2023-10-18

## 2023-10-18 PROCEDURE — 99214 OFFICE O/P EST MOD 30 MIN: CPT | Performed by: INTERNAL MEDICINE

## 2023-10-18 ASSESSMENT — ENCOUNTER SYMPTOMS
BACK PAIN: 1
SHORTNESS OF BREATH: 1
EYES NEGATIVE: 1

## 2023-10-18 NOTE — PATIENT INSTRUCTIONS
SURVEY:    You may be receiving a survey from [employer] regarding your visit today. Please complete the survey to enable us to provide the highest quality of care to you and your family. If you cannot score us a very good on any question, please call the office to discuss how we could have made your experience a very good one. Thank you.

## 2023-11-10 ENCOUNTER — OFFICE VISIT (OUTPATIENT)
Dept: CARDIOLOGY | Age: 78
End: 2023-11-10
Payer: MEDICARE

## 2023-11-10 VITALS
RESPIRATION RATE: 18 BRPM | HEIGHT: 69 IN | HEART RATE: 83 BPM | BODY MASS INDEX: 46.65 KG/M2 | DIASTOLIC BLOOD PRESSURE: 67 MMHG | WEIGHT: 315 LBS | SYSTOLIC BLOOD PRESSURE: 104 MMHG | OXYGEN SATURATION: 93 %

## 2023-11-10 DIAGNOSIS — I48.0 PAF (PAROXYSMAL ATRIAL FIBRILLATION) (HCC): ICD-10-CM

## 2023-11-10 DIAGNOSIS — I10 ESSENTIAL HYPERTENSION: ICD-10-CM

## 2023-11-10 DIAGNOSIS — D68.69 SECONDARY HYPERCOAGULABLE STATE (HCC): ICD-10-CM

## 2023-11-10 DIAGNOSIS — Z79.899 ON AMIODARONE THERAPY: ICD-10-CM

## 2023-11-10 DIAGNOSIS — E66.01 OBESITY, MORBID (MORE THAN 100 LBS OVER IDEAL WEIGHT OR BMI > 40) (HCC): Chronic | ICD-10-CM

## 2023-11-10 DIAGNOSIS — G47.33 OSA ON CPAP: ICD-10-CM

## 2023-11-10 DIAGNOSIS — Z95.0 CARDIAC PACEMAKER IN SITU: ICD-10-CM

## 2023-11-10 DIAGNOSIS — I44.2 AV BLOCK, 3RD DEGREE (HCC): ICD-10-CM

## 2023-11-10 DIAGNOSIS — E78.2 MIXED HYPERLIPIDEMIA: ICD-10-CM

## 2023-11-10 DIAGNOSIS — I50.42 CHRONIC COMBINED SYSTOLIC AND DIASTOLIC CONGESTIVE HEART FAILURE (HCC): ICD-10-CM

## 2023-11-10 DIAGNOSIS — Z95.2 S/P TAVR (TRANSCATHETER AORTIC VALVE REPLACEMENT): Primary | ICD-10-CM

## 2023-11-10 DIAGNOSIS — R00.1 BRADYCARDIA: ICD-10-CM

## 2023-11-10 PROCEDURE — 93005 ELECTROCARDIOGRAM TRACING: CPT | Performed by: INTERNAL MEDICINE

## 2023-11-10 PROCEDURE — 99211 OFF/OP EST MAY X REQ PHY/QHP: CPT | Performed by: INTERNAL MEDICINE

## 2023-11-10 NOTE — PROGRESS NOTES
Abel Blank am scribing for and in the presence of Ivan Brantley MD, F.A.C.C..    Patient: Daniel Taveras  : 1945  Date of Visit: November 10, 2023    History of Present Illness:        Dear Laura Pineda MD    I had the pleasure of seeing Daniel Taveras in my office today. Mr. Carline Brito is a 78 y.o. male who presented for follow-up. 1-He has  a history of heart failure. He has a history of hypertension and hyperlipidemia for years, and has borderline diabetes. 2- ECG on 3/20: sinus tachycardia with first-degree AV block and nonspecific intraventricular conduction delay. 3-Echocardiogram done 3/4/20 which showed severely reduced left ventricular systolic function with ejection fraction of 35% and moderate to severe aortic stenosis. This can be an underestimation because of reduced left ventricular systolic function. Mild to moderate aortic regurgitation. Moderate mitral regurgitation and moderate diastolic dysfunction. 4-Heart Catheterization done on 3/12/20: LMCA: Normal 0% stenosis. LAD: Mild irregularities 20-30% LCx: Lesion on 2nd Ob Angie Ostial 50% stenosis. RCA: Mild irregularities 10-20%. EF:35%. Severe aortic stenosis by cardiac catheterization with peak to peak gradient of 67 mmHg and mean gradient of 48 mmHg across the aortic valve. 5-Admission to [facility] on 3/4/20-3/9/20 due to acute heart failure: Patient treated for acute on chronic combined CHF and aortic stenosis, later developed atrial fibrillation with rapid ventricular response. Moved to the ICU and started on amiodarone drip and then later converted back to normal sinus rhythm. Amiodarone oral tablet was started prior to discharge. 6-On 2020, he presented to the emergency room with epistaxis. Nasal packing done that removed after 3 days.     7-Transcatheter aortic valve replacement with a Medtronic 34 mm Evolut Pro Plus prosthesis 9/15/2020    8-Echocardiogram done on 10/13/2020 showed an

## 2023-11-10 NOTE — PATIENT INSTRUCTIONS
SURVEY:    You may be receiving a survey from [employer] regarding your visit today. Please complete the survey to enable us to provide the highest quality of care to you and your family. If you cannot score us a very good on any question, please call the office to discuss how we could have made your experience a very good one. Thank you.

## 2023-12-06 ENCOUNTER — NURSE ONLY (OUTPATIENT)
Dept: CARDIOLOGY | Age: 78
End: 2023-12-06

## 2023-12-06 DIAGNOSIS — Z95.0 CARDIAC PACEMAKER IN SITU: ICD-10-CM

## 2023-12-06 DIAGNOSIS — I44.2 AV BLOCK, 3RD DEGREE (HCC): Primary | ICD-10-CM

## 2023-12-06 DIAGNOSIS — R00.1 BRADYCARDIA: ICD-10-CM

## 2023-12-13 ENCOUNTER — HOSPITAL ENCOUNTER (OUTPATIENT)
Age: 78
Discharge: HOME | End: 2023-12-15
Attending: INTERNAL MEDICINE
Payer: MEDICARE

## 2023-12-13 VITALS
HEIGHT: 69 IN | BODY MASS INDEX: 46.65 KG/M2 | DIASTOLIC BLOOD PRESSURE: 64 MMHG | WEIGHT: 315 LBS | SYSTOLIC BLOOD PRESSURE: 116 MMHG

## 2023-12-13 DIAGNOSIS — I44.2 AV BLOCK, 3RD DEGREE (HCC): ICD-10-CM

## 2023-12-13 DIAGNOSIS — Z95.0 CARDIAC PACEMAKER IN SITU: ICD-10-CM

## 2023-12-13 DIAGNOSIS — Z79.899 ON AMIODARONE THERAPY: ICD-10-CM

## 2023-12-13 DIAGNOSIS — Z95.2 S/P TAVR (TRANSCATHETER AORTIC VALVE REPLACEMENT): ICD-10-CM

## 2023-12-13 LAB
ECHO AO ROOT DIAM: 3.9 CM
ECHO AO ROOT INDEX: 1.48 CM/M2
ECHO AV ACCELERATION TIME: 92.7 MS
ECHO AV CUSP MM: 2 CM
ECHO AV MEAN GRADIENT: 7 MMHG
ECHO AV MEAN VELOCITY: 1.2 M/S
ECHO AV PEAK GRADIENT: 12 MMHG
ECHO AV PEAK VELOCITY: 1.7 M/S
ECHO AV VTI: 35 CM
ECHO BSA: 2.79 M2
ECHO LA DIAMETER INDEX: 1.98 CM/M2
ECHO LA DIAMETER: 5.2 CM
ECHO LA MAJOR AXIS: 7.2 CM
ECHO LA TO AORTIC ROOT RATIO: 1.33
ECHO LA VOL BP: 125 ML (ref 18–58)
ECHO LA VOL MOD A2C: 106 ML (ref 18–58)
ECHO LA VOL MOD A4C: 138 ML (ref 18–58)
ECHO LA VOL/BSA BIPLANE: 48 ML/M2 (ref 16–34)
ECHO LA VOLUME AREA LENGTH: 133 ML
ECHO LA VOLUME INDEX AREA LENGTH: 51 ML/M2 (ref 16–34)
ECHO LA VOLUME INDEX MOD A2C: 40 ML/M2 (ref 16–34)
ECHO LA VOLUME INDEX MOD A4C: 52 ML/M2 (ref 16–34)
ECHO LV E' LATERAL VELOCITY: 9 CM/S
ECHO LV EDV A2C: 210 ML
ECHO LV EDV A4C: 144 ML
ECHO LV EDV BP: 176 ML (ref 67–155)
ECHO LV EDV INDEX A4C: 55 ML/M2
ECHO LV EDV INDEX BP: 67 ML/M2
ECHO LV EDV NDEX A2C: 80 ML/M2
ECHO LV EJECTION FRACTION BIPLANE: 46 % (ref 55–100)
ECHO LV ESV A2C: 114 ML
ECHO LV ESV A4C: 77 ML
ECHO LV ESV BP: 94 ML (ref 22–58)
ECHO LV ESV INDEX A2C: 43 ML/M2
ECHO LV ESV INDEX A4C: 29 ML/M2
ECHO LV ESV INDEX BP: 36 ML/M2
ECHO LV FRACTIONAL SHORTENING: 27 % (ref 28–44)
ECHO LV INTERNAL DIMENSION DIASTOLE INDEX: 1.83 CM/M2
ECHO LV INTERNAL DIMENSION DIASTOLIC: 4.8 CM (ref 4.2–5.9)
ECHO LV INTERNAL DIMENSION SYSTOLIC INDEX: 1.33 CM/M2
ECHO LV INTERNAL DIMENSION SYSTOLIC: 3.5 CM
ECHO LV IVSD: 1.8 CM (ref 0.6–1)
ECHO LV IVSS: 1.9 CM
ECHO LV MASS 2D: 350.7 G (ref 88–224)
ECHO LV MASS INDEX 2D: 133.4 G/M2 (ref 49–115)
ECHO LV POSTERIOR WALL DIASTOLIC: 1.5 CM (ref 0.6–1)
ECHO LV POSTERIOR WALL SYSTOLIC: 1.9 CM
ECHO LV RELATIVE WALL THICKNESS RATIO: 0.63
ECHO LVOT AV VTI INDEX: 0.57
ECHO LVOT MEAN GRADIENT: 2 MMHG
ECHO LVOT VTI: 20.1 CM
ECHO MV A VELOCITY: 1.15 M/S
ECHO MV E DECELERATION TIME (DT): 276.3 MS
ECHO MV E VELOCITY: 1.3 M/S
ECHO MV E/A RATIO: 1.13
ECHO MV E/E' LATERAL: 14.44
ECHO MV MEAN GRADIENT: 4 MMHG
ECHO PV MAX VELOCITY: 0.6 M/S
ECHO PV PEAK GRADIENT: 1 MMHG

## 2023-12-13 PROCEDURE — 6360000004 HC RX CONTRAST MEDICATION: Performed by: INTERNAL MEDICINE

## 2023-12-13 PROCEDURE — 93306 TTE W/DOPPLER COMPLETE: CPT | Performed by: INTERNAL MEDICINE

## 2023-12-13 PROCEDURE — C8929 TTE W OR WO FOL WCON,DOPPLER: HCPCS

## 2023-12-13 RX ADMIN — PERFLUTREN 1.5 ML: 6.52 INJECTION, SUSPENSION INTRAVENOUS at 10:41

## 2023-12-14 ENCOUNTER — TELEPHONE (OUTPATIENT)
Dept: CARDIOLOGY | Age: 78
End: 2023-12-14

## 2023-12-14 NOTE — TELEPHONE ENCOUNTER
Yes he needs antibiotic prophylaxis before dental procedures because of artificial valve and the presence cardiac pacemaker.   Thank you

## 2023-12-14 NOTE — TELEPHONE ENCOUNTER
Dr Cordova Staff dentist office called and wants to know if patient needs any antibiotic prior to cleaning or fillings he is having?     [phone]

## 2023-12-14 NOTE — TELEPHONE ENCOUNTER
----- Message from Caroline Benjamin MD sent at 12/13/2023  8:12 PM EST -----  Echo echo was good. No significant change from prior study back in 2021. Continue current therapy and follow-up. Please call with questions and/or concerns.   Thank you

## 2024-02-06 ENCOUNTER — HOSPITAL ENCOUNTER (OUTPATIENT)
Age: 79
Discharge: HOME | End: 2024-02-06
Payer: MEDICARE

## 2024-02-06 DIAGNOSIS — Z79.899 ON AMIODARONE THERAPY: ICD-10-CM

## 2024-02-06 LAB
ALBUMIN SERPL-MCNC: 3.9 G/DL (ref 3.5–5.2)
ALBUMIN/GLOB SERPL: 1.1 {RATIO} (ref 1–2.5)
ALP SERPL-CCNC: 63 U/L (ref 40–129)
ALT SERPL-CCNC: 22 U/L (ref 5–41)
ANION GAP SERPL CALCULATED.3IONS-SCNC: 9 MMOL/L (ref 9–17)
AST SERPL-CCNC: 19 U/L
BILIRUB SERPL-MCNC: 0.7 MG/DL (ref 0.3–1.2)
BUN SERPL-MCNC: 15 MG/DL (ref 8–23)
BUN/CREAT SERPL: 19 (ref 9–20)
CALCIUM SERPL-MCNC: 9 MG/DL (ref 8.6–10.4)
CHLORIDE SERPL-SCNC: 99 MMOL/L (ref 98–107)
CO2 SERPL-SCNC: 31 MMOL/L (ref 20–31)
CREAT SERPL-MCNC: 0.8 MG/DL (ref 0.7–1.2)
GFR SERPL CREATININE-BSD FRML MDRD: >60 ML/MIN/1.73M2
GLUCOSE SERPL-MCNC: 121 MG/DL (ref 70–99)
POTASSIUM SERPL-SCNC: 4.2 MMOL/L (ref 3.7–5.3)
PROT SERPL-MCNC: 7.6 G/DL (ref 6.4–8.3)
SODIUM SERPL-SCNC: 139 MMOL/L (ref 135–144)
TSH SERPL DL<=0.05 MIU/L-ACNC: 0.76 UIU/ML (ref 0.3–5)

## 2024-02-06 PROCEDURE — 80053 COMPREHEN METABOLIC PANEL: CPT

## 2024-02-06 PROCEDURE — 36415 COLL VENOUS BLD VENIPUNCTURE: CPT

## 2024-02-06 PROCEDURE — 84443 ASSAY THYROID STIM HORMONE: CPT

## 2024-02-07 ENCOUNTER — TELEPHONE (OUTPATIENT)
Dept: CARDIOLOGY | Age: 79
End: 2024-02-07

## 2024-02-07 ENCOUNTER — HOSPITAL ENCOUNTER (OUTPATIENT)
Dept: PHARMACY | Age: 79
Setting detail: THERAPIES SERIES
Discharge: HOME | End: 2024-02-07
Payer: MEDICARE

## 2024-02-07 VITALS
WEIGHT: 315 LBS | BODY MASS INDEX: 53.46 KG/M2 | SYSTOLIC BLOOD PRESSURE: 124 MMHG | DIASTOLIC BLOOD PRESSURE: 47 MMHG | HEART RATE: 71 BPM

## 2024-02-07 DIAGNOSIS — I50.42 CHRONIC COMBINED SYSTOLIC AND DIASTOLIC CHF, NYHA CLASS 3 (HCC): Primary | Chronic | ICD-10-CM

## 2024-02-07 PROCEDURE — 99212 OFFICE O/P EST SF 10 MIN: CPT

## 2024-02-07 NOTE — DISCHARGE INSTRUCTIONS
HEART FAILURE:    With heart failure you must follow up with your Cardiologist, your PCP and other physicians as appropriate - especially 7 days after a hospitalization and then as directed.     Please call right away with any signs or symptoms of heart failure or other medical conditions that need attention or with any questions at all. Please call your Cardiologist or your PCP or the Outpatient Heart Failure Clinic at [phone].  We can help manage these symptoms and often prevent a visit to the Emergency Room or hospitalization.       * Know your dry weight (your weight without any fluid on board)    * Call any time you have questions about anything. Do not wait.    * It is very important to take your medications as prescribed, do not miss any doses.   Call for refills before you run out. Typically when you have one week left.   If you have trouble getting your medications for any reason, call us at [phone].    * Avoid NSAIDs (non steroidal anti inflammatory drugs) like Aleve (naproxen), Advil and Motrin (ibuprofen), Mobic (diclofenac), Celebrex (celecoxib) and aspirin. A daily aspirin is okay if recommended by your physician.      It is okay to take Tylenol (acetaminophen) unless your physician has told you otherwise.    * Limit fluid intake.    Typically this is no more than 1,500-2,000 milliliters (mL) per day.     This is a liter and a half to two liters.               This is equal to approximately 48-64 ounces (oz) per day    * Limit sodium intake.   Typically this is no more than 2,000-2,400 milligrams (mg) per day.   You will need to add up the sodium content found on the nutrition label for the foods you eat each day.    * Weigh yourself every day. Weigh yourself before breakfast and after you go to the restroom.  Wear the same thing each time you weigh yourself.   Keep a log and bring it to each visit.   Call if you gain 3 or more pounds in 1-7 days - [phone]

## 2024-02-07 NOTE — TELEPHONE ENCOUNTER
----- Message from Krzysztof Avalos MD sent at 2/7/2024  8:37 AM EST -----  Blood work is good.  Thank you

## 2024-02-07 NOTE — PROGRESS NOTES
[facility]  Medication Management  Pharmacist  Heart Failure    [address] [place] [address] [address] [place], [place] [address]  Phone: [phone]  Fax: [phone]      NAME: Marti Manzo  MEDICAL RECORD NUMBER:  [id number]  AGE: 78 y.o.   GENDER: male  : 1945  EPISODE DATE:  2024      Heart Failure Management referral by Dr. Avalos       Today's Wt: 362  Wt Readings from Last 6 Encounters:   24 (!) 164.2 kg (362 lb)   24 (!) 159.7 kg (352 lb)   24 (!) 159.7 kg (352 lb)   24 (!) 159.7 kg (352 lb)   23 (!) 160.1 kg (352 lb 15.3 oz)   23 (!) 160.1 kg (353 lb)    and   Ht Readings from Last 1 Encounters:   24 1.753 m (5' 9\")                 /47   HR: 71  O2Sat: 96%     Extremities and pitting edema: Chronic bilateral lower extremity edema with chronic vascular changes.   Skin warm dry intact     Subjective   Mr. Manzo is a 78 y.o. male here for the Heart Failure Services.    They are here today for a comprehensive medication review including over the counter medications and herbal products, overall wellbeing assessment, transition of care and any needed adjustments with updates and recommendations communicated to the referring physician.      Patient Symptoms: Patient is here today for 6 month follow up.  He reports that he is feeling good.  Weight is decreased 8 pounds since last visit in August.  Patient states that he has been eating less for the past 3-4 weeks.  He is trying to eat high protein foods.  His wife is residing in a nursing home and he eats some meals at the facility when he visits.  He is NO longer living with his son.  His son moved out in November.  Denies SOB, dizziness, lightheadedness.  Oxygen continuous at 2.5 L per nasal cannula.  Patient uses motorized wheelchair. He has osteoarthritis bilateral knees and pain in his back.   Patient had Covid a few weeks ago.   Patient use home lymphedema pump/boots daily in the evening.

## 2024-03-15 ENCOUNTER — HOSPITAL ENCOUNTER (INPATIENT)
Age: 79
LOS: 3 days | Discharge: HOME | DRG: 571 | End: 2024-03-18
Admitting: INTERNAL MEDICINE
Payer: MEDICARE

## 2024-03-15 DIAGNOSIS — N61.0 CELLULITIS OF RIGHT BREAST: ICD-10-CM

## 2024-03-15 DIAGNOSIS — N61.1 BREAST ABSCESS IN MALE: Primary | ICD-10-CM

## 2024-03-15 LAB
ALBUMIN SERPL-MCNC: 3.9 G/DL (ref 3.5–5.2)
ALBUMIN/GLOB SERPL: 1 {RATIO} (ref 1–2.5)
ALP SERPL-CCNC: 66 U/L (ref 40–129)
ALT SERPL-CCNC: 15 U/L (ref 5–41)
ANION GAP SERPL CALCULATED.3IONS-SCNC: 13 MMOL/L (ref 9–17)
AST SERPL-CCNC: 15 U/L
BASOPHILS # BLD: 0.06 K/UL (ref 0–0.2)
BASOPHILS NFR BLD: 1 % (ref 0–2)
BILIRUB SERPL-MCNC: 0.5 MG/DL (ref 0.3–1.2)
BUN SERPL-MCNC: 15 MG/DL (ref 8–23)
BUN/CREAT SERPL: 19 (ref 9–20)
CALCIUM SERPL-MCNC: 9.1 MG/DL (ref 8.6–10.4)
CHLORIDE SERPL-SCNC: 99 MMOL/L (ref 98–107)
CO2 SERPL-SCNC: 25 MMOL/L (ref 20–31)
CREAT SERPL-MCNC: 0.8 MG/DL (ref 0.7–1.2)
EOSINOPHIL # BLD: 0.12 K/UL (ref 0–0.44)
EOSINOPHILS RELATIVE PERCENT: 2 % (ref 1–4)
ERYTHROCYTE [DISTWIDTH] IN BLOOD BY AUTOMATED COUNT: 14.6 % (ref 11.8–14.4)
GFR SERPL CREATININE-BSD FRML MDRD: >60 ML/MIN/1.73M2
GLUCOSE SERPL-MCNC: 95 MG/DL (ref 70–99)
HCT VFR BLD AUTO: 39.4 % (ref 40.7–50.3)
HGB BLD-MCNC: 12.8 G/DL (ref 13–17)
IMM GRANULOCYTES # BLD AUTO: 0.07 K/UL (ref 0–0.3)
IMM GRANULOCYTES NFR BLD: 1 %
LYMPHOCYTES NFR BLD: 1.23 K/UL (ref 1.1–3.7)
LYMPHOCYTES RELATIVE PERCENT: 15 % (ref 24–43)
MCH RBC QN AUTO: 30.7 PG (ref 25.2–33.5)
MCHC RBC AUTO-ENTMCNC: 32.5 G/DL (ref 28.4–34.8)
MCV RBC AUTO: 94.5 FL (ref 82.6–102.9)
MONOCYTES NFR BLD: 0.86 K/UL (ref 0.1–1.2)
MONOCYTES NFR BLD: 11 % (ref 3–12)
NEUTROPHILS NFR BLD: 70 % (ref 36–65)
NEUTS SEG NFR BLD: 5.67 K/UL (ref 1.5–8.1)
NRBC BLD-RTO: 0 PER 100 WBC
PLATELET # BLD AUTO: 184 K/UL (ref 138–453)
PMV BLD AUTO: 12.6 FL (ref 8.1–13.5)
POTASSIUM SERPL-SCNC: 4.2 MMOL/L (ref 3.7–5.3)
PROT SERPL-MCNC: 8 G/DL (ref 6.4–8.3)
RBC # BLD AUTO: 4.17 M/UL (ref 4.21–5.77)
SODIUM SERPL-SCNC: 137 MMOL/L (ref 135–144)
WBC OTHER # BLD: 8 K/UL (ref 3.5–11.3)

## 2024-03-15 PROCEDURE — 6360000002 HC RX W HCPCS

## 2024-03-15 PROCEDURE — 87186 SC STD MICRODIL/AGAR DIL: CPT

## 2024-03-15 PROCEDURE — 6360000002 HC RX W HCPCS: Performed by: NURSE PRACTITIONER

## 2024-03-15 PROCEDURE — 96375 TX/PRO/DX INJ NEW DRUG ADDON: CPT

## 2024-03-15 PROCEDURE — 36415 COLL VENOUS BLD VENIPUNCTURE: CPT

## 2024-03-15 PROCEDURE — 2580000003 HC RX 258

## 2024-03-15 PROCEDURE — 2580000003 HC RX 258: Performed by: NURSE PRACTITIONER

## 2024-03-15 PROCEDURE — 85025 COMPLETE CBC W/AUTO DIFF WBC: CPT

## 2024-03-15 PROCEDURE — 80053 COMPREHEN METABOLIC PANEL: CPT

## 2024-03-15 PROCEDURE — 1200000000 HC SEMI PRIVATE

## 2024-03-15 PROCEDURE — 87040 BLOOD CULTURE FOR BACTERIA: CPT

## 2024-03-15 PROCEDURE — 96365 THER/PROPH/DIAG IV INF INIT: CPT

## 2024-03-15 PROCEDURE — 2500000003 HC RX 250 WO HCPCS

## 2024-03-15 PROCEDURE — 10060 I&D ABSCESS SIMPLE/SINGLE: CPT

## 2024-03-15 PROCEDURE — 87205 SMEAR GRAM STAIN: CPT

## 2024-03-15 PROCEDURE — 0H9T0ZZ DRAINAGE OF RIGHT BREAST, OPEN APPROACH: ICD-10-PCS

## 2024-03-15 PROCEDURE — 86403 PARTICLE AGGLUT ANTBDY SCRN: CPT

## 2024-03-15 PROCEDURE — 87070 CULTURE OTHR SPECIMN AEROBIC: CPT

## 2024-03-15 PROCEDURE — 99285 EMERGENCY DEPT VISIT HI MDM: CPT

## 2024-03-15 RX ORDER — AMIODARONE HYDROCHLORIDE 200 MG/1
200 TABLET ORAL DAILY
Status: DISCONTINUED | OUTPATIENT
Start: 2024-03-16 | End: 2024-03-18 | Stop reason: HOSPADM

## 2024-03-15 RX ORDER — VITAMIN B COMPLEX
2000 TABLET ORAL DAILY
Status: DISCONTINUED | OUTPATIENT
Start: 2024-03-16 | End: 2024-03-18 | Stop reason: HOSPADM

## 2024-03-15 RX ORDER — ENOXAPARIN SODIUM 100 MG/ML
40 INJECTION SUBCUTANEOUS 2 TIMES DAILY
Status: DISCONTINUED | OUTPATIENT
Start: 2024-03-15 | End: 2024-03-18 | Stop reason: HOSPADM

## 2024-03-15 RX ORDER — 0.9 % SODIUM CHLORIDE 0.9 %
250 INTRAVENOUS SOLUTION INTRAVENOUS ONCE
Status: DISCONTINUED | OUTPATIENT
Start: 2024-03-15 | End: 2024-03-18 | Stop reason: HOSPADM

## 2024-03-15 RX ORDER — TAMSULOSIN HYDROCHLORIDE 0.4 MG/1
0.4 CAPSULE ORAL DAILY
Status: DISCONTINUED | OUTPATIENT
Start: 2024-03-16 | End: 2024-03-18 | Stop reason: HOSPADM

## 2024-03-15 RX ORDER — SODIUM CHLORIDE 9 MG/ML
INJECTION, SOLUTION INTRAVENOUS PRN
Status: DISCONTINUED | OUTPATIENT
Start: 2024-03-15 | End: 2024-03-18 | Stop reason: HOSPADM

## 2024-03-15 RX ORDER — ONDANSETRON 4 MG/1
4 TABLET, ORALLY DISINTEGRATING ORAL EVERY 8 HOURS PRN
Status: DISCONTINUED | OUTPATIENT
Start: 2024-03-15 | End: 2024-03-18 | Stop reason: HOSPADM

## 2024-03-15 RX ORDER — ATORVASTATIN CALCIUM 40 MG/1
40 TABLET, FILM COATED ORAL DAILY
Status: DISCONTINUED | OUTPATIENT
Start: 2024-03-16 | End: 2024-03-18 | Stop reason: HOSPADM

## 2024-03-15 RX ORDER — LIDOCAINE HYDROCHLORIDE AND EPINEPHRINE 10; 10 MG/ML; UG/ML
10 INJECTION, SOLUTION INFILTRATION; PERINEURAL ONCE
Status: COMPLETED | OUTPATIENT
Start: 2024-03-15 | End: 2024-03-15

## 2024-03-15 RX ORDER — POTASSIUM CHLORIDE 20 MEQ/1
20 TABLET, EXTENDED RELEASE ORAL DAILY
Status: DISCONTINUED | OUTPATIENT
Start: 2024-03-16 | End: 2024-03-18 | Stop reason: HOSPADM

## 2024-03-15 RX ORDER — SODIUM CHLORIDE 0.9 % (FLUSH) 0.9 %
5-40 SYRINGE (ML) INJECTION EVERY 12 HOURS SCHEDULED
Status: DISCONTINUED | OUTPATIENT
Start: 2024-03-15 | End: 2024-03-18 | Stop reason: HOSPADM

## 2024-03-15 RX ORDER — FUROSEMIDE 20 MG/1
20 TABLET ORAL
Status: DISCONTINUED | OUTPATIENT
Start: 2024-03-16 | End: 2024-03-18 | Stop reason: HOSPADM

## 2024-03-15 RX ORDER — LOSARTAN POTASSIUM 25 MG/1
25 TABLET ORAL DAILY
Status: DISCONTINUED | OUTPATIENT
Start: 2024-03-16 | End: 2024-03-18 | Stop reason: HOSPADM

## 2024-03-15 RX ORDER — ACETAMINOPHEN 650 MG/1
650 SUPPOSITORY RECTAL EVERY 6 HOURS PRN
Status: DISCONTINUED | OUTPATIENT
Start: 2024-03-15 | End: 2024-03-18 | Stop reason: HOSPADM

## 2024-03-15 RX ORDER — FUROSEMIDE 40 MG/1
40 TABLET ORAL
Status: DISCONTINUED | OUTPATIENT
Start: 2024-03-18 | End: 2024-03-18 | Stop reason: HOSPADM

## 2024-03-15 RX ORDER — POLYETHYLENE GLYCOL 3350 17 G/17G
17 POWDER, FOR SOLUTION ORAL DAILY PRN
Status: DISCONTINUED | OUTPATIENT
Start: 2024-03-15 | End: 2024-03-18 | Stop reason: HOSPADM

## 2024-03-15 RX ORDER — ACETAMINOPHEN 325 MG/1
650 TABLET ORAL EVERY 6 HOURS PRN
Status: DISCONTINUED | OUTPATIENT
Start: 2024-03-15 | End: 2024-03-18 | Stop reason: HOSPADM

## 2024-03-15 RX ORDER — SODIUM CHLORIDE 0.9 % (FLUSH) 0.9 %
5-40 SYRINGE (ML) INJECTION PRN
Status: DISCONTINUED | OUTPATIENT
Start: 2024-03-15 | End: 2024-03-18 | Stop reason: HOSPADM

## 2024-03-15 RX ORDER — POTASSIUM CHLORIDE 20 MEQ/1
40 TABLET, EXTENDED RELEASE ORAL PRN
Status: DISCONTINUED | OUTPATIENT
Start: 2024-03-15 | End: 2024-03-18 | Stop reason: HOSPADM

## 2024-03-15 RX ORDER — ONDANSETRON 2 MG/ML
4 INJECTION INTRAMUSCULAR; INTRAVENOUS EVERY 6 HOURS PRN
Status: DISCONTINUED | OUTPATIENT
Start: 2024-03-15 | End: 2024-03-18 | Stop reason: HOSPADM

## 2024-03-15 RX ORDER — M-VIT,TX,IRON,MINS/CALC/FOLIC 27MG-0.4MG
1 TABLET ORAL DAILY
Status: DISCONTINUED | OUTPATIENT
Start: 2024-03-16 | End: 2024-03-18 | Stop reason: HOSPADM

## 2024-03-15 RX ORDER — POTASSIUM CHLORIDE 7.45 MG/ML
10 INJECTION INTRAVENOUS PRN
Status: DISCONTINUED | OUTPATIENT
Start: 2024-03-15 | End: 2024-03-18 | Stop reason: HOSPADM

## 2024-03-15 RX ADMIN — SODIUM CHLORIDE, PRESERVATIVE FREE 10 ML: 5 INJECTION INTRAVENOUS at 19:48

## 2024-03-15 RX ADMIN — LIDOCAINE HYDROCHLORIDE AND EPINEPHRINE 10 ML: 10; 10 INJECTION, SOLUTION INFILTRATION; PERINEURAL at 15:42

## 2024-03-15 RX ADMIN — ENOXAPARIN SODIUM 40 MG: 100 INJECTION SUBCUTANEOUS at 19:48

## 2024-03-15 RX ADMIN — VANCOMYCIN HYDROCHLORIDE 1000 MG: 1 INJECTION, POWDER, LYOPHILIZED, FOR SOLUTION INTRAVENOUS at 15:51

## 2024-03-15 RX ADMIN — CEFTRIAXONE SODIUM 2000 MG: 2 INJECTION, POWDER, FOR SOLUTION INTRAMUSCULAR; INTRAVENOUS at 15:13

## 2024-03-15 ASSESSMENT — PAIN SCALES - GENERAL
PAINLEVEL_OUTOF10: 3
PAINLEVEL_OUTOF10: 4

## 2024-03-15 ASSESSMENT — PAIN DESCRIPTION - ONSET: ONSET: ON-GOING

## 2024-03-15 ASSESSMENT — PAIN DESCRIPTION - ORIENTATION
ORIENTATION: RIGHT
ORIENTATION: RIGHT

## 2024-03-15 ASSESSMENT — PAIN DESCRIPTION - FREQUENCY: FREQUENCY: CONTINUOUS

## 2024-03-15 ASSESSMENT — PAIN DESCRIPTION - DESCRIPTORS: DESCRIPTORS: DISCOMFORT

## 2024-03-15 ASSESSMENT — PAIN - FUNCTIONAL ASSESSMENT: PAIN_FUNCTIONAL_ASSESSMENT: ACTIVITIES ARE NOT PREVENTED

## 2024-03-15 ASSESSMENT — PAIN DESCRIPTION - PAIN TYPE: TYPE: ACUTE PAIN

## 2024-03-15 ASSESSMENT — PAIN DESCRIPTION - LOCATION
LOCATION: BREAST
LOCATION: BREAST

## 2024-03-15 NOTE — ED NOTES
Report given to BALA Boggs from Zuleyma Britton RN.   Report method by phone   The following was reviewed with receiving RN:   Current vital signs:  BP (!) 114/37   Pulse 98   Temp 98.8 °F (37.1 °C) (Oral)   Resp 16   Ht 1.753 m (5' 9\")   Wt (!) 164.2 kg (362 lb)   SpO2 96%   BMI 53.46 kg/m²                MEWS Score: 1     Any medication or safety alerts were reviewed. Any pending diagnostics and notifications were also reviewed, as well as any safety concerns or issues, abnormal labs, abnormal imaging, and abnormal assessment findings. Questions were answered.

## 2024-03-15 NOTE — ED PROVIDER NOTES
[facility]  Emergency Department Encounter  Emergency Medicine Attending     Pt Name:Marti Manzo  MRN: [id number]  Birthdate 1945  Date of evaluation: 3/15/24  PCP:  Fredrick Mack MD  Note Started: 3:09 PM EDT      CHIEF COMPLAINT       Chief Complaint   Patient presents with    Abscess     Right breast, been ongoing but today used epson salt to area and caused a burn       HISTORY OF PRESENT ILLNESS  (Location/Symptom, Timing/Onset, Context/Setting, Quality, Duration, Modifying Factors, Severity.)      Marti Manzo is a 78 y.o. male who presents with history of hypertension, CHF, diabetes here for evaluation of right-sided chest abscess.  Patient has experiencing symptoms for approximately 1 week.  He was prescribed Augmentin initially with no improvement of his symptoms.  He is evaluated again today by his PCP who initiated him on Bactrim.  Patient states that he used Epson salt to the area and it caused irritation to his skin.  He denies any fevers or diaphoresis or other systemic symptoms.    PAST MEDICAL / SURGICAL / SOCIAL / FAMILY HISTORY      has a past medical history of Anxiety, Aortic stenosis, Atrial fibrillation, new onset (HCC), BPH (benign prostatic hyperplasia), CHF (congestive heart failure) (HCC), Diabetes mellitus (HCC), Hyperlipidemia, Hypertension, Lymphedema, On home O2, Osteoarthritis, and Septic shock due to urinary tract infection (HCC) /  Levophed and fluids.     has a past surgical history that includes Vasectomy (1971); Cardiac catheterization (2020); and Aortic valve repair (2020).    Social History     Socioeconomic History    Marital status:      Spouse name: Trish    Number of children: Not on file    Years of education: Not on file    Highest education level: Not on file   Occupational History    Not on file   Tobacco Use    Smoking status: Former     Current packs/day: 0.00     Average packs/day: 1 pack/day for 10.0 years (10.0 ttl pk-yrs)     Types:

## 2024-03-15 NOTE — ED NOTES
Called to give report.  Nurse unable to take report at this time.  Nurse currently in room discharging other pt.  Was told by supervisor to call ED when she is available. Pt updated

## 2024-03-16 PROBLEM — N61.1 BREAST ABSCESS IN MALE: Status: ACTIVE | Noted: 2024-03-16

## 2024-03-16 LAB
ANION GAP SERPL CALCULATED.3IONS-SCNC: 10 MMOL/L (ref 9–17)
BASOPHILS # BLD: 0.05 K/UL (ref 0–0.2)
BASOPHILS NFR BLD: 1 % (ref 0–2)
BUN SERPL-MCNC: 12 MG/DL (ref 8–23)
BUN/CREAT SERPL: 17 (ref 9–20)
CALCIUM SERPL-MCNC: 8.4 MG/DL (ref 8.6–10.4)
CHLORIDE SERPL-SCNC: 102 MMOL/L (ref 98–107)
CO2 SERPL-SCNC: 25 MMOL/L (ref 20–31)
CREAT SERPL-MCNC: 0.7 MG/DL (ref 0.7–1.2)
EOSINOPHIL # BLD: 0.22 K/UL (ref 0–0.44)
EOSINOPHILS RELATIVE PERCENT: 3 % (ref 1–4)
ERYTHROCYTE [DISTWIDTH] IN BLOOD BY AUTOMATED COUNT: 14.6 % (ref 11.8–14.4)
GFR SERPL CREATININE-BSD FRML MDRD: >60 ML/MIN/1.73M2
GLUCOSE SERPL-MCNC: 98 MG/DL (ref 70–99)
HCT VFR BLD AUTO: 35.6 % (ref 40.7–50.3)
HGB BLD-MCNC: 11.6 G/DL (ref 13–17)
IMM GRANULOCYTES # BLD AUTO: 0.05 K/UL (ref 0–0.3)
IMM GRANULOCYTES NFR BLD: 1 %
LACTATE BLDV-SCNC: 1.6 MMOL/L (ref 0.5–2.2)
LYMPHOCYTES NFR BLD: 1.44 K/UL (ref 1.1–3.7)
LYMPHOCYTES RELATIVE PERCENT: 19 % (ref 24–43)
MCH RBC QN AUTO: 31 PG (ref 25.2–33.5)
MCHC RBC AUTO-ENTMCNC: 32.6 G/DL (ref 28.4–34.8)
MCV RBC AUTO: 95.2 FL (ref 82.6–102.9)
MONOCYTES NFR BLD: 0.84 K/UL (ref 0.1–1.2)
MONOCYTES NFR BLD: 11 % (ref 3–12)
NEUTROPHILS NFR BLD: 65 % (ref 36–65)
NEUTS SEG NFR BLD: 4.88 K/UL (ref 1.5–8.1)
NRBC BLD-RTO: 0 PER 100 WBC
PLATELET # BLD AUTO: 170 K/UL (ref 138–453)
PMV BLD AUTO: 12.8 FL (ref 8.1–13.5)
POTASSIUM SERPL-SCNC: 4.2 MMOL/L (ref 3.7–5.3)
RBC # BLD AUTO: 3.74 M/UL (ref 4.21–5.77)
SODIUM SERPL-SCNC: 137 MMOL/L (ref 135–144)
WBC OTHER # BLD: 7.5 K/UL (ref 3.5–11.3)

## 2024-03-16 PROCEDURE — 36415 COLL VENOUS BLD VENIPUNCTURE: CPT

## 2024-03-16 PROCEDURE — 6370000000 HC RX 637 (ALT 250 FOR IP): Performed by: INTERNAL MEDICINE

## 2024-03-16 PROCEDURE — 6360000002 HC RX W HCPCS: Performed by: NURSE PRACTITIONER

## 2024-03-16 PROCEDURE — 99232 SBSQ HOSP IP/OBS MODERATE 35: CPT | Performed by: SURGERY

## 2024-03-16 PROCEDURE — 80048 BASIC METABOLIC PNL TOTAL CA: CPT

## 2024-03-16 PROCEDURE — 6360000002 HC RX W HCPCS: Performed by: SURGERY

## 2024-03-16 PROCEDURE — 83605 ASSAY OF LACTIC ACID: CPT

## 2024-03-16 PROCEDURE — 1200000000 HC SEMI PRIVATE

## 2024-03-16 PROCEDURE — 93005 ELECTROCARDIOGRAM TRACING: CPT | Performed by: INTERNAL MEDICINE

## 2024-03-16 PROCEDURE — 6370000000 HC RX 637 (ALT 250 FOR IP): Performed by: NURSE PRACTITIONER

## 2024-03-16 PROCEDURE — 2580000003 HC RX 258: Performed by: NURSE PRACTITIONER

## 2024-03-16 PROCEDURE — 85025 COMPLETE CBC W/AUTO DIFF WBC: CPT

## 2024-03-16 RX ORDER — OXYMETAZOLINE HYDROCHLORIDE 0.05 G/100ML
1 SPRAY NASAL NIGHTLY PRN
Status: DISCONTINUED | OUTPATIENT
Start: 2024-03-16 | End: 2024-03-18 | Stop reason: HOSPADM

## 2024-03-16 RX ADMIN — Medication 2000 UNITS: at 09:42

## 2024-03-16 RX ADMIN — Medication 1 TABLET: at 09:42

## 2024-03-16 RX ADMIN — ENOXAPARIN SODIUM 40 MG: 100 INJECTION SUBCUTANEOUS at 20:04

## 2024-03-16 RX ADMIN — VANCOMYCIN HYDROCHLORIDE 1000 MG: 1 INJECTION, POWDER, LYOPHILIZED, FOR SOLUTION INTRAVENOUS at 04:16

## 2024-03-16 RX ADMIN — POTASSIUM CHLORIDE 20 MEQ: 1500 TABLET, EXTENDED RELEASE ORAL at 09:42

## 2024-03-16 RX ADMIN — SODIUM CHLORIDE, PRESERVATIVE FREE 10 ML: 5 INJECTION INTRAVENOUS at 09:43

## 2024-03-16 RX ADMIN — OXYMETAZOLINE HCL 1 SPRAY: 0.05 SPRAY NASAL at 21:55

## 2024-03-16 RX ADMIN — VANCOMYCIN HYDROCHLORIDE 1000 MG: 1 INJECTION, POWDER, LYOPHILIZED, FOR SOLUTION INTRAVENOUS at 17:18

## 2024-03-16 RX ADMIN — SODIUM CHLORIDE, PRESERVATIVE FREE 10 ML: 5 INJECTION INTRAVENOUS at 20:05

## 2024-03-16 RX ADMIN — PAROXETINE 30 MG: 10 TABLET, FILM COATED ORAL at 09:43

## 2024-03-16 RX ADMIN — FUROSEMIDE 20 MG: 20 TABLET ORAL at 09:42

## 2024-03-16 RX ADMIN — ATORVASTATIN CALCIUM 40 MG: 40 TABLET, FILM COATED ORAL at 09:42

## 2024-03-16 RX ADMIN — AMIODARONE HYDROCHLORIDE 200 MG: 200 TABLET ORAL at 09:42

## 2024-03-16 RX ADMIN — LOSARTAN POTASSIUM 25 MG: 25 TABLET, FILM COATED ORAL at 09:42

## 2024-03-16 RX ADMIN — TAMSULOSIN HYDROCHLORIDE 0.4 MG: 0.4 CAPSULE ORAL at 09:42

## 2024-03-16 NOTE — PLAN OF CARE
Problem: Discharge Planning  Goal: Discharge to home or other facility with appropriate resources  Outcome: Progressing  Flowsheets (Taken 3/15/2024 2105)  Discharge to home or other facility with appropriate resources: Identify barriers to discharge with patient and caregiver     Problem: Pain  Goal: Verbalizes/displays adequate comfort level or baseline comfort level  Outcome: Progressing  Flowsheets (Taken 3/15/2024 2105)  Verbalizes/displays adequate comfort level or baseline comfort level:   Encourage patient to monitor pain and request assistance   Assess pain using appropriate pain scale     Problem: Safety - Adult  Goal: Free from fall injury  Outcome: Progressing  Flowsheets (Taken 3/15/2024 2105)  Free From Fall Injury: Instruct family/caregiver on patient safety

## 2024-03-16 NOTE — ANESTHESIA PRE-OP
Notified of surgical consult. Chart reviewed. Pt has expressed concerns about receiving anesthesia. States he does not want to be \"put under\". I have requested cardiac evaluation. Decision made to perform procedure under Local by surgeon.

## 2024-03-16 NOTE — H&P
03/04/2020 03:30 AM          Microbiology / Cultures:  Results       Procedure Component Value Units Date/Time    Culture, Wound [[id number]]  (Abnormal) Collected: 03/15/24 2257    Order Status: Completed Specimen: Breast Updated: 03/16/24 0203     Specimen Description .BREAST RIGHT BREAST WOUND     Direct Exam RARE NEUTROPHILS      MANY GRAM POSITIVE COCCI IN CLUSTERS AND GRAM POSITIVE COCCI IN PAIRS     Culture PENDING    Wound Gram stain [[id number]]     Order Status: Canceled Specimen: Wound     Culture, Anaerobic and Aerobic [[id number]] Collected: 03/15/24 1538    Order Status: Canceled Specimen: Abscess     Blood Culture 1 [[id number]] Collected: 03/15/24 1503    Order Status: Completed Specimen: Blood Updated: 03/16/24 0901     Specimen Description .BLOOD     Special Requests 20ML FTFA     Culture NO GROWTH 17 HOURS    Culture, Blood 2 [[id number]] Collected: 03/15/24 1500    Order Status: Completed Specimen: Blood Updated: 03/16/24 0901     Specimen Description .BLOOD     Special Requests  20ML RHAND     Culture NO GROWTH 17 HOURS              Imaging Data:   No results found.      MEDICAL DECISION MAKING:  Primary Problem(s): Abscess of right breast, failed outpatient tx with Augmentin  Differential diagnoses: cellulitis, abcess  Condition is an acute or chronic illness or injury that poses threat to life or bodily function  Treatment plan:   General Surgery consult requested  Status post I&D performed by Dr. Hanna in ER  Labs: CBC with diff, BMP, Lactic acid  Imaging:   no further imaging studies ordered today  Medications:   Continue IV Vancomycin  Medication Monitoring / High Risk Medications: Vancomycin      History of atrial fibrillation - rhythm controlled, with secondary hypercoagulable state (but pt off anticoagulation at his request per previous cardiology notes)  Condition is controlled with amiodarone  Treatment plan:   Telemetry monitoring  ECG ordered  Status post dual chamber pacemaker

## 2024-03-17 LAB
ANION GAP SERPL CALCULATED.3IONS-SCNC: 10 MMOL/L (ref 9–17)
BASOPHILS # BLD: 0.08 K/UL (ref 0–0.2)
BASOPHILS NFR BLD: 1 % (ref 0–2)
BUN SERPL-MCNC: 11 MG/DL (ref 8–23)
BUN/CREAT SERPL: 16 (ref 9–20)
CALCIUM SERPL-MCNC: 8.3 MG/DL (ref 8.6–10.4)
CHLORIDE SERPL-SCNC: 103 MMOL/L (ref 98–107)
CO2 SERPL-SCNC: 24 MMOL/L (ref 20–31)
CREAT SERPL-MCNC: 0.7 MG/DL (ref 0.7–1.2)
EKG ATRIAL RATE: 67 BPM
EKG ATRIAL RATE: 75 BPM
EKG P AXIS: 71 DEGREES
EKG P-R INTERVAL: 134 MS
EKG P-R INTERVAL: 178 MS
EKG Q-T INTERVAL: 404 MS
EKG Q-T INTERVAL: 518 MS
EKG QRS DURATION: 162 MS
EKG QRS DURATION: 76 MS
EKG QTC CALCULATION (BAZETT): 426 MS
EKG QTC CALCULATION (BAZETT): 578 MS
EKG R AXIS: 107 DEGREES
EKG R AXIS: 8 DEGREES
EKG T AXIS: -41 DEGREES
EKG T AXIS: -68 DEGREES
EKG VENTRICULAR RATE: 67 BPM
EKG VENTRICULAR RATE: 75 BPM
EOSINOPHIL # BLD: 0.33 K/UL (ref 0–0.44)
EOSINOPHILS RELATIVE PERCENT: 5 % (ref 1–4)
ERYTHROCYTE [DISTWIDTH] IN BLOOD BY AUTOMATED COUNT: 14.6 % (ref 11.8–14.4)
GFR SERPL CREATININE-BSD FRML MDRD: >60 ML/MIN/1.73M2
GLUCOSE SERPL-MCNC: 105 MG/DL (ref 70–99)
HCT VFR BLD AUTO: 36.8 % (ref 40.7–50.3)
HGB BLD-MCNC: 11.4 G/DL (ref 13–17)
IMM GRANULOCYTES # BLD AUTO: 0.06 K/UL (ref 0–0.3)
IMM GRANULOCYTES NFR BLD: 1 %
LYMPHOCYTES NFR BLD: 1.79 K/UL (ref 1.1–3.7)
LYMPHOCYTES RELATIVE PERCENT: 26 % (ref 24–43)
MCH RBC QN AUTO: 30.2 PG (ref 25.2–33.5)
MCHC RBC AUTO-ENTMCNC: 31 G/DL (ref 28.4–34.8)
MCV RBC AUTO: 97.4 FL (ref 82.6–102.9)
MONOCYTES NFR BLD: 0.74 K/UL (ref 0.1–1.2)
MONOCYTES NFR BLD: 11 % (ref 3–12)
NEUTROPHILS NFR BLD: 56 % (ref 36–65)
NEUTS SEG NFR BLD: 3.99 K/UL (ref 1.5–8.1)
NRBC BLD-RTO: 0 PER 100 WBC
PLATELET # BLD AUTO: 167 K/UL (ref 138–453)
PMV BLD AUTO: 12.5 FL (ref 8.1–13.5)
POTASSIUM SERPL-SCNC: 4.1 MMOL/L (ref 3.7–5.3)
RBC # BLD AUTO: 3.78 M/UL (ref 4.21–5.77)
SODIUM SERPL-SCNC: 137 MMOL/L (ref 135–144)
VANCOMYCIN SERPL-MCNC: 27.7 UG/ML
WBC OTHER # BLD: 7 K/UL (ref 3.5–11.3)

## 2024-03-17 PROCEDURE — 6370000000 HC RX 637 (ALT 250 FOR IP): Performed by: SURGERY

## 2024-03-17 PROCEDURE — 3600000002 HC SURGERY LEVEL 2 BASE: Performed by: SURGERY

## 2024-03-17 PROCEDURE — 6360000002 HC RX W HCPCS: Performed by: NURSE PRACTITIONER

## 2024-03-17 PROCEDURE — 93010 ELECTROCARDIOGRAM REPORT: CPT | Performed by: INTERNAL MEDICINE

## 2024-03-17 PROCEDURE — 1200000000 HC SEMI PRIVATE

## 2024-03-17 PROCEDURE — 93005 ELECTROCARDIOGRAM TRACING: CPT | Performed by: INTERNAL MEDICINE

## 2024-03-17 PROCEDURE — 2580000003 HC RX 258: Performed by: NURSE PRACTITIONER

## 2024-03-17 PROCEDURE — 6360000002 HC RX W HCPCS: Performed by: SURGERY

## 2024-03-17 PROCEDURE — 0JB60ZZ EXCISION OF CHEST SUBCUTANEOUS TISSUE AND FASCIA, OPEN APPROACH: ICD-10-PCS | Performed by: SURGERY

## 2024-03-17 PROCEDURE — 80048 BASIC METABOLIC PNL TOTAL CA: CPT

## 2024-03-17 PROCEDURE — 36415 COLL VENOUS BLD VENIPUNCTURE: CPT

## 2024-03-17 PROCEDURE — 85025 COMPLETE CBC W/AUTO DIFF WBC: CPT

## 2024-03-17 PROCEDURE — 3600000012 HC SURGERY LEVEL 2 ADDTL 15MIN: Performed by: SURGERY

## 2024-03-17 PROCEDURE — 2580000003 HC RX 258: Performed by: SURGERY

## 2024-03-17 PROCEDURE — 11042 DBRDMT SUBQ TIS 1ST 20SQCM/<: CPT | Performed by: SURGERY

## 2024-03-17 PROCEDURE — 80202 ASSAY OF VANCOMYCIN: CPT

## 2024-03-17 PROCEDURE — 2500000003 HC RX 250 WO HCPCS: Performed by: SURGERY

## 2024-03-17 PROCEDURE — 2709999900 HC NON-CHARGEABLE SUPPLY: Performed by: SURGERY

## 2024-03-17 RX ORDER — BUPIVACAINE HYDROCHLORIDE 5 MG/ML
INJECTION, SOLUTION EPIDURAL; INTRACAUDAL
Status: DISCONTINUED
Start: 2024-03-17 | End: 2024-03-17 | Stop reason: WASHOUT

## 2024-03-17 RX ORDER — LIDOCAINE HYDROCHLORIDE 10 MG/ML
INJECTION, SOLUTION INFILTRATION; PERINEURAL
Status: DISPENSED
Start: 2024-03-17 | End: 2024-03-17

## 2024-03-17 RX ORDER — FENTANYL CITRATE 50 UG/ML
50 INJECTION, SOLUTION INTRAMUSCULAR; INTRAVENOUS ONCE
Status: COMPLETED | OUTPATIENT
Start: 2024-03-17 | End: 2024-03-17

## 2024-03-17 RX ORDER — LIDOCAINE HYDROCHLORIDE AND EPINEPHRINE 10; 10 MG/ML; UG/ML
INJECTION, SOLUTION INFILTRATION; PERINEURAL
Status: DISCONTINUED
Start: 2024-03-17 | End: 2024-03-17 | Stop reason: WASHOUT

## 2024-03-17 RX ORDER — LIDOCAINE HYDROCHLORIDE AND EPINEPHRINE BITARTRATE 20; .01 MG/ML; MG/ML
INJECTION, SOLUTION SUBCUTANEOUS
Status: DISCONTINUED
Start: 2024-03-17 | End: 2024-03-17 | Stop reason: WASHOUT

## 2024-03-17 RX ORDER — LIDOCAINE HYDROCHLORIDE 10 MG/ML
INJECTION, SOLUTION EPIDURAL; INFILTRATION; INTRACAUDAL; PERINEURAL PRN
Status: DISCONTINUED | OUTPATIENT
Start: 2024-03-17 | End: 2024-03-17 | Stop reason: ALTCHOICE

## 2024-03-17 RX ORDER — BUPIVACAINE HYDROCHLORIDE AND EPINEPHRINE 2.5; 5 MG/ML; UG/ML
INJECTION, SOLUTION EPIDURAL; INFILTRATION; INTRACAUDAL; PERINEURAL
Status: DISCONTINUED
Start: 2024-03-17 | End: 2024-03-17 | Stop reason: WASHOUT

## 2024-03-17 RX ADMIN — SODIUM CHLORIDE, PRESERVATIVE FREE 10 ML: 5 INJECTION INTRAVENOUS at 11:16

## 2024-03-17 RX ADMIN — ACETAMINOPHEN 650 MG: 325 TABLET ORAL at 20:40

## 2024-03-17 RX ADMIN — VANCOMYCIN HYDROCHLORIDE 1000 MG: 1 INJECTION, POWDER, LYOPHILIZED, FOR SOLUTION INTRAVENOUS at 16:27

## 2024-03-17 RX ADMIN — Medication 2000 UNITS: at 11:15

## 2024-03-17 RX ADMIN — SODIUM CHLORIDE, PRESERVATIVE FREE 10 ML: 5 INJECTION INTRAVENOUS at 20:41

## 2024-03-17 RX ADMIN — TAMSULOSIN HYDROCHLORIDE 0.4 MG: 0.4 CAPSULE ORAL at 11:15

## 2024-03-17 RX ADMIN — Medication 50 MCG: at 10:03

## 2024-03-17 RX ADMIN — LOSARTAN POTASSIUM 25 MG: 25 TABLET, FILM COATED ORAL at 11:15

## 2024-03-17 RX ADMIN — FUROSEMIDE 20 MG: 20 TABLET ORAL at 11:15

## 2024-03-17 RX ADMIN — Medication 1 TABLET: at 11:14

## 2024-03-17 RX ADMIN — POTASSIUM CHLORIDE 20 MEQ: 1500 TABLET, EXTENDED RELEASE ORAL at 11:15

## 2024-03-17 RX ADMIN — PAROXETINE 30 MG: 10 TABLET, FILM COATED ORAL at 11:14

## 2024-03-17 RX ADMIN — AMIODARONE HYDROCHLORIDE 200 MG: 200 TABLET ORAL at 11:15

## 2024-03-17 RX ADMIN — ENOXAPARIN SODIUM 40 MG: 100 INJECTION SUBCUTANEOUS at 20:40

## 2024-03-17 RX ADMIN — ATORVASTATIN CALCIUM 40 MG: 40 TABLET, FILM COATED ORAL at 11:15

## 2024-03-17 RX ADMIN — VANCOMYCIN HYDROCHLORIDE 1000 MG: 1 INJECTION, POWDER, LYOPHILIZED, FOR SOLUTION INTRAVENOUS at 04:31

## 2024-03-17 RX ADMIN — ACETAMINOPHEN 650 MG: 325 TABLET ORAL at 13:01

## 2024-03-17 ASSESSMENT — PAIN SCALES - GENERAL
PAINLEVEL_OUTOF10: 2
PAINLEVEL_OUTOF10: 4
PAINLEVEL_OUTOF10: 4
PAINLEVEL_OUTOF10: 3
PAINLEVEL_OUTOF10: 4

## 2024-03-17 ASSESSMENT — PAIN DESCRIPTION - ORIENTATION
ORIENTATION: RIGHT

## 2024-03-17 ASSESSMENT — PAIN DESCRIPTION - DESCRIPTORS
DESCRIPTORS: DULL
DESCRIPTORS: DISCOMFORT
DESCRIPTORS: BURNING

## 2024-03-17 ASSESSMENT — PAIN DESCRIPTION - LOCATION
LOCATION: BREAST

## 2024-03-17 ASSESSMENT — PAIN - FUNCTIONAL ASSESSMENT
PAIN_FUNCTIONAL_ASSESSMENT: ACTIVITIES ARE NOT PREVENTED
PAIN_FUNCTIONAL_ASSESSMENT: ACTIVITIES ARE NOT PREVENTED

## 2024-03-17 NOTE — PLAN OF CARE
Problem: Discharge Planning  Goal: Discharge to home or other facility with appropriate resources  3/17/2024 1009 by Ariadna Shultz  Outcome: Progressing     Problem: Pain  Goal: Verbalizes/displays adequate comfort level or baseline comfort level  3/17/2024 1009 by Ariadna Shultz  Outcome: Progressing     Problem: Safety - Adult  Goal: Free from fall injury  3/17/2024 1009 by Ariadna Shultz  Outcome: Progressing      1.65

## 2024-03-17 NOTE — OP NOTE
Operative Note      Patient: Marti Manzo  YOB: 1945  MRN: [id number]    Date of Procedure: 3/17/2024    Pre-Op Diagnosis Codes:     * Abscess of right breast [N61.1]    Post-Op Diagnosis: Same       Procedure: Excisional Debridement Right Breast -  Roughly 15 cm skin and subcutaneous fat    Surgeon(s):  Yfn Marquez MD    Assistant:   * No surgical staff found *    Anesthesia: Local    Estimated Blood Loss (mL): less than 50     Complications: None    Specimens:   * No specimens in log *    Implants:  * No implants in log *      Drains: * No LDAs found *    Patient was brought to the procedure area.  He was given an preprocedural dose of 50 mcg of fentanyl for analgesia.  He was alert and talking throughout the procedure.  Nevertheless he was being monitored with EKG and pulse oximeter.  His right breast was carefully prepped and draped.  1% lidocaine was generously infiltrated around the wound.  Additional local anesthetic was infiltrated as we went.  Using a scalpel the necrotic skin and subcutaneous fat was completely excised.  Went back with a curette and removed more of the subcutaneous necrosis down to good actively bleeding fat.        The wound was dressed with gauze.  Tolerated well.  Case classification is septic or 4.    Post debridement measurements    Horizontal 2.7 cm  Vertical 2.2  Depth 1.4  Underminin - 0.6 cm   3  - 1.3 cm   6  - 0.4 cm   9  - 0.6 cm    Electronically signed by YFN MARQUEZ MD on 3/17/2024 at 10:25 AM

## 2024-03-17 NOTE — PLAN OF CARE
Problem: Discharge Planning  Goal: Discharge to home or other facility with appropriate resources  3/17/2024 0516 by Nuha Truong RN  Outcome: Progressing     Problem: Pain  Goal: Verbalizes/displays adequate comfort level or baseline comfort level  3/17/2024 0516 by Nuha Truong RN  Outcome: Progressing  Flowsheets (Taken 3/17/2024 0516)  Verbalizes/displays adequate comfort level or baseline comfort level:   Encourage patient to monitor pain and request assistance   Assess pain using appropriate pain scale   Implement non-pharmacological measures as appropriate and evaluate response   Administer analgesics based on type and severity of pain and evaluate response     Problem: Safety - Adult  Goal: Free from fall injury  3/17/2024 0516 by Nuha Truong RN  Outcome: Progressing  Flowsheets (Taken 3/17/2024 0516)  Free From Fall Injury: Instruct family/caregiver on patient safety     Problem: Skin/Tissue Integrity  Goal: Absence of new skin breakdown  Description: 1.  Monitor for areas of redness and/or skin breakdown  2.  Assess vascular access sites hourly  3.  Every 4-6 hours minimum:  Change oxygen saturation probe site  4.  Every 4-6 hours:  If on nasal continuous positive airway pressure, respiratory therapy assess nares and determine need for appliance change or resting period.  3/17/2024 0516 by Nuha Truong RN  Outcome: Progressing

## 2024-03-17 NOTE — PLAN OF CARE
Problem: Discharge Planning  Goal: Discharge to home or other facility with appropriate resources  3/17/2024 1940 by Tricia Duong RN  Outcome: Progressing     Problem: Pain  Goal: Verbalizes/displays adequate comfort level or baseline comfort level  3/17/2024 1940 by Tricia Duong RN  Outcome: Progressing     Problem: Safety - Adult  Goal: Free from fall injury  3/17/2024 1940 by Tricia Duong RN  Outcome: Progressing     Problem: Skin/Tissue Integrity  Goal: Absence of new skin breakdown  Description: 1.  Monitor for areas of redness and/or skin breakdown  2.  Assess vascular access sites hourly  3.  Every 4-6 hours minimum:  Change oxygen saturation probe site  4.  Every 4-6 hours:  If on nasal continuous positive airway pressure, respiratory therapy assess nares and determine need for appliance change or resting period.  Outcome: Progressing     Problem: Chronic Conditions and Co-morbidities  Goal: Patient's chronic conditions and co-morbidity symptoms are monitored and maintained or improved  Outcome: Progressing

## 2024-03-18 VITALS
WEIGHT: 315 LBS | TEMPERATURE: 97 F | BODY MASS INDEX: 46.65 KG/M2 | SYSTOLIC BLOOD PRESSURE: 159 MMHG | RESPIRATION RATE: 18 BRPM | OXYGEN SATURATION: 96 % | HEIGHT: 69 IN | HEART RATE: 62 BPM | DIASTOLIC BLOOD PRESSURE: 70 MMHG

## 2024-03-18 LAB
ANION GAP SERPL CALCULATED.3IONS-SCNC: 10 MMOL/L (ref 9–17)
BASOPHILS # BLD: 0.06 K/UL (ref 0–0.2)
BASOPHILS NFR BLD: 1 % (ref 0–2)
BUN SERPL-MCNC: 12 MG/DL (ref 8–23)
BUN/CREAT SERPL: 17 (ref 9–20)
CALCIUM SERPL-MCNC: 8.3 MG/DL (ref 8.6–10.4)
CHLORIDE SERPL-SCNC: 104 MMOL/L (ref 98–107)
CO2 SERPL-SCNC: 25 MMOL/L (ref 20–31)
CREAT SERPL-MCNC: 0.7 MG/DL (ref 0.7–1.2)
EOSINOPHIL # BLD: 0.39 K/UL (ref 0–0.44)
EOSINOPHILS RELATIVE PERCENT: 6 % (ref 1–4)
ERYTHROCYTE [DISTWIDTH] IN BLOOD BY AUTOMATED COUNT: 14.6 % (ref 11.8–14.4)
GFR SERPL CREATININE-BSD FRML MDRD: >60 ML/MIN/1.73M2
GLUCOSE SERPL-MCNC: 102 MG/DL (ref 70–99)
HCT VFR BLD AUTO: 36.7 % (ref 40.7–50.3)
HGB BLD-MCNC: 11.8 G/DL (ref 13–17)
IMM GRANULOCYTES # BLD AUTO: 0.08 K/UL (ref 0–0.3)
IMM GRANULOCYTES NFR BLD: 1 %
LYMPHOCYTES NFR BLD: 1.65 K/UL (ref 1.1–3.7)
LYMPHOCYTES RELATIVE PERCENT: 23 % (ref 24–43)
MCH RBC QN AUTO: 30.9 PG (ref 25.2–33.5)
MCHC RBC AUTO-ENTMCNC: 32.2 G/DL (ref 28.4–34.8)
MCV RBC AUTO: 96.1 FL (ref 82.6–102.9)
MICROORGANISM SPEC CULT: ABNORMAL
MICROORGANISM/AGENT SPEC: ABNORMAL
MICROORGANISM/AGENT SPEC: ABNORMAL
MONOCYTES NFR BLD: 0.7 K/UL (ref 0.1–1.2)
MONOCYTES NFR BLD: 10 % (ref 3–12)
NEUTROPHILS NFR BLD: 59 % (ref 36–65)
NEUTS SEG NFR BLD: 4.27 K/UL (ref 1.5–8.1)
NRBC BLD-RTO: 0 PER 100 WBC
PLATELET # BLD AUTO: 169 K/UL (ref 138–453)
PMV BLD AUTO: 12.4 FL (ref 8.1–13.5)
POTASSIUM SERPL-SCNC: 4.2 MMOL/L (ref 3.7–5.3)
RBC # BLD AUTO: 3.82 M/UL (ref 4.21–5.77)
SODIUM SERPL-SCNC: 139 MMOL/L (ref 135–144)
SPECIMEN DESCRIPTION: ABNORMAL
WBC OTHER # BLD: 7.2 K/UL (ref 3.5–11.3)

## 2024-03-18 PROCEDURE — 80048 BASIC METABOLIC PNL TOTAL CA: CPT

## 2024-03-18 PROCEDURE — 6370000000 HC RX 637 (ALT 250 FOR IP): Performed by: SURGERY

## 2024-03-18 PROCEDURE — 99222 1ST HOSP IP/OBS MODERATE 55: CPT | Performed by: INTERNAL MEDICINE

## 2024-03-18 PROCEDURE — 85025 COMPLETE CBC W/AUTO DIFF WBC: CPT

## 2024-03-18 PROCEDURE — 2580000003 HC RX 258: Performed by: SURGERY

## 2024-03-18 PROCEDURE — 97162 PT EVAL MOD COMPLEX 30 MIN: CPT

## 2024-03-18 PROCEDURE — 6370000000 HC RX 637 (ALT 250 FOR IP): Performed by: NURSE PRACTITIONER

## 2024-03-18 PROCEDURE — 6360000002 HC RX W HCPCS: Performed by: SURGERY

## 2024-03-18 PROCEDURE — 97166 OT EVAL MOD COMPLEX 45 MIN: CPT

## 2024-03-18 PROCEDURE — 36415 COLL VENOUS BLD VENIPUNCTURE: CPT

## 2024-03-18 PROCEDURE — 97535 SELF CARE MNGMENT TRAINING: CPT

## 2024-03-18 RX ORDER — CLINDAMYCIN HYDROCHLORIDE 150 MG/1
450 CAPSULE ORAL EVERY 8 HOURS SCHEDULED
Qty: 90 CAPSULE | Refills: 0 | Status: SHIPPED | OUTPATIENT
Start: 2024-03-18 | End: 2024-03-22

## 2024-03-18 RX ORDER — CLINDAMYCIN HYDROCHLORIDE 150 MG/1
450 CAPSULE ORAL EVERY 8 HOURS SCHEDULED
Status: DISCONTINUED | OUTPATIENT
Start: 2024-03-18 | End: 2024-03-18 | Stop reason: HOSPADM

## 2024-03-18 RX ADMIN — ACETAMINOPHEN 650 MG: 325 TABLET ORAL at 08:14

## 2024-03-18 RX ADMIN — Medication 1 TABLET: at 08:13

## 2024-03-18 RX ADMIN — AMIODARONE HYDROCHLORIDE 200 MG: 200 TABLET ORAL at 08:13

## 2024-03-18 RX ADMIN — ENOXAPARIN SODIUM 40 MG: 100 INJECTION SUBCUTANEOUS at 08:14

## 2024-03-18 RX ADMIN — CLINDAMYCIN HYDROCHLORIDE 450 MG: 150 CAPSULE ORAL at 13:38

## 2024-03-18 RX ADMIN — LOSARTAN POTASSIUM 25 MG: 25 TABLET, FILM COATED ORAL at 08:13

## 2024-03-18 RX ADMIN — PAROXETINE 30 MG: 10 TABLET, FILM COATED ORAL at 08:13

## 2024-03-18 RX ADMIN — POTASSIUM CHLORIDE 20 MEQ: 1500 TABLET, EXTENDED RELEASE ORAL at 08:14

## 2024-03-18 RX ADMIN — SODIUM CHLORIDE, PRESERVATIVE FREE 10 ML: 5 INJECTION INTRAVENOUS at 08:14

## 2024-03-18 RX ADMIN — FUROSEMIDE 40 MG: 40 TABLET ORAL at 08:14

## 2024-03-18 RX ADMIN — Medication 2000 UNITS: at 08:14

## 2024-03-18 RX ADMIN — ATORVASTATIN CALCIUM 40 MG: 40 TABLET, FILM COATED ORAL at 08:13

## 2024-03-18 RX ADMIN — VANCOMYCIN HYDROCHLORIDE 1000 MG: 1 INJECTION, POWDER, LYOPHILIZED, FOR SOLUTION INTRAVENOUS at 04:51

## 2024-03-18 RX ADMIN — TAMSULOSIN HYDROCHLORIDE 0.4 MG: 0.4 CAPSULE ORAL at 08:14

## 2024-03-18 ASSESSMENT — PAIN DESCRIPTION - LOCATION
LOCATION: BREAST
LOCATION: BREAST

## 2024-03-18 ASSESSMENT — PAIN SCALES - GENERAL
PAINLEVEL_OUTOF10: 5
PAINLEVEL_OUTOF10: 2

## 2024-03-18 ASSESSMENT — PAIN DESCRIPTION - FREQUENCY
FREQUENCY: CONTINUOUS
FREQUENCY: CONTINUOUS

## 2024-03-18 ASSESSMENT — PAIN DESCRIPTION - ORIENTATION
ORIENTATION: RIGHT
ORIENTATION: RIGHT

## 2024-03-18 ASSESSMENT — PAIN DESCRIPTION - DESCRIPTORS
DESCRIPTORS: DISCOMFORT
DESCRIPTORS: DISCOMFORT

## 2024-03-18 ASSESSMENT — PAIN DESCRIPTION - ONSET
ONSET: ON-GOING
ONSET: ON-GOING

## 2024-03-18 ASSESSMENT — PAIN DESCRIPTION - PAIN TYPE
TYPE: ACUTE PAIN
TYPE: ACUTE PAIN

## 2024-03-18 NOTE — PLAN OF CARE
Problem: Discharge Planning  Goal: Discharge to home or other facility with appropriate resources  3/18/2024 0918 by Flakita Tovar, RN  Outcome: Progressing     Problem: Pain  Goal: Verbalizes/displays adequate comfort level or baseline comfort level  3/18/2024 0918 by Flakita Tovar, RN  Outcome: Progressing     Problem: Safety - Adult  Goal: Free from fall injury  3/18/2024 0918 by Flakita Tovar, RN  Outcome: Progressing     Problem: Skin/Tissue Integrity  Goal: Absence of new skin breakdown  Description: 1.  Monitor for areas of redness and/or skin breakdown  2.  Assess vascular access sites hourly  3.  Every 4-6 hours minimum:  Change oxygen saturation probe site  4.  Every 4-6 hours:  If on nasal continuous positive airway pressure, respiratory therapy assess nares and determine need for appliance change or resting period.  3/18/2024 0918 by Flakita Tovar, RN  Outcome: Progressing     Problem: Chronic Conditions and Co-morbidities  Goal: Patient's chronic conditions and co-morbidity symptoms are monitored and maintained or improved  3/18/2024 0918 by Flakita Tovar, RN  Outcome: Progressing     Problem: ABCDS Injury Assessment  Goal: Absence of physical injury  Outcome: Progressing

## 2024-03-18 NOTE — DISCHARGE INSTR - DIET

## 2024-03-18 NOTE — DISCHARGE SUMMARY
tablet  Commonly known as: PAXIL  Take 1 tablet by mouth every morning     phenylephrine 1 % nasal spray  Commonly known as: TANK-SYNEPHRINE     potassium chloride 10 MEQ extended release tablet  Commonly known as: KLOR-CON M  Take 2 tablets by mouth daily     tamsulosin 0.4 MG capsule  Commonly known as: FLOMAX  Take 1 capsule by mouth daily     therapeutic multivitamin-minerals tablet     Vitamin D3 50 MCG (2000 UT) Caps capsule  Generic drug: vitamin D            STOP taking these medications      sulfamethoxazole-trimethoprim 800-160 MG per tablet  Commonly known as: BACTRIM DS;SEPTRA DS               Where to Get Your Medications        These medications were sent to [facility] [address] [place] [address] [address] [facility] [address] - P [phone] - F [phone]  [address] [facility] [address] [facility] [address]      Phone: [phone]   clindamycin 150 MG capsule         Patient Instructions:   Activity: activity as tolerated  Diet: regular diet  Wound Care: Apply dry dressing daily and change as needed  Other: None    Disposition:   Discharge to Home    Follow up:  Patient will be followed by Fredrick Mack MD in 1-2 weeks    CORE MEASURES on Discharge (if applicable)  ACE/ARB in CHF: NA  Statin in MI: NA  ASA in MI: NA  Statin in CVA: NA  Antiplatelet in CVA: NA    Total time spent on discharge services: 40 minutes    Including the following activities:  Evaluation and Management of patient  Discussion with patient and/or surrogate about current care plan  Coordination with Case Management and/or   Coordination of care with Consultants (if applicable)   Coordination of care with Receiving Facility Physician (if applicable)  Completion of DME forms (if applicable)  Preparation of Discharge Summary  Preparation of Medication Reconciliation  Preparation of Discharge Prescriptions    Signed:  SYLVIA Carter - CNP, APRN, NP-C  3/18/2024, 12:10 PM

## 2024-03-18 NOTE — CARE COORDINATION
Case Management Assessment  Initial Evaluation    Date/Time of Evaluation: 3/18/2024 3:15 PM  Assessment Completed by: DANIELLE Odom    If patient is discharged prior to next notation, then this note serves as note for discharge by case management.    Patient Name: Marti Manzo                   YOB: 1945  Diagnosis: Abscess of right breast [N61.1]  Breast abscess in male [N61.1]  Cellulitis of right breast [N61.0]                   Date / Time: 3/15/2024  2:48 PM    Patient Admission Status: Inpatient   Readmission Risk (Low < 19, Mod (19-27), High > 27): Readmission Risk Score: 14.1    Current PCP: Fredrick Mack MD  PCP verified by CM? Yes    Chart Reviewed: Yes      History Provided by: Patient, Medical Record  Patient Orientation: Alert and Oriented, Person, Place, Situation    Patient Cognition: Alert    Hospitalization in the last 30 days (Readmission):  No    If yes, Readmission Assessment in  Navigator will be completed.    Advance Directives:      Code Status: Full Code   Patient's Primary Decision Maker is: Legal Next of Kin    Primary Decision Maker: Richelle Manzo - Spouse - [phone]    Secondary Decision Maker: Monster Manzo - Brother/Sister - [phone]    Discharge Planning:    Patient lives with: Alone Type of Home: Apartment  Primary Care Giver: Self  Patient Support Systems include: Spouse/Significant Other, Family Members, Friends/Neighbors   Current Financial resources: Medicare  Current community resources: Transportation  Current services prior to admission: Transportation, Durable Medical Equipment            Current DME: Wheelchair, Shower Chair (grab bars/lift chair)            Type of Home Care services:  None    ADLS  Prior functional level: Assistance with the following:, Mobility  Current functional level: Assistance with the following:, Mobility    PT AM-PAC:   /24  OT AM-PAC: 16 /24    Family can provide assistance at DC: No  Would you like
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
## 2024-03-18 NOTE — PLAN OF CARE
Problem: Discharge Planning  Goal: Discharge to home or other facility with appropriate resources  3/18/2024 1325 by Karla Haynes RN  Outcome: Adequate for Discharge  3/18/2024 0918 by Flakita Tovar RN  Outcome: Progressing     Problem: Pain  Goal: Verbalizes/displays adequate comfort level or baseline comfort level  3/18/2024 1325 by Karla Haynes RN  Outcome: Adequate for Discharge  3/18/2024 0918 by Flakita Tovar RN  Outcome: Progressing     Problem: Safety - Adult  Goal: Free from fall injury  3/18/2024 1325 by Karla Haynes RN  Outcome: Adequate for Discharge  3/18/2024 0918 by Flakita Tovar RN  Outcome: Progressing     Problem: Skin/Tissue Integrity  Goal: Absence of new skin breakdown  Description: 1.  Monitor for areas of redness and/or skin breakdown  2.  Assess vascular access sites hourly  3.  Every 4-6 hours minimum:  Change oxygen saturation probe site  4.  Every 4-6 hours:  If on nasal continuous positive airway pressure, respiratory therapy assess nares and determine need for appliance change or resting period.  3/18/2024 1325 by Karla Haynes RN  Outcome: Adequate for Discharge  3/18/2024 0918 by Flakita Tovar RN  Outcome: Progressing     Problem: Chronic Conditions and Co-morbidities  Goal: Patient's chronic conditions and co-morbidity symptoms are monitored and maintained or improved  3/18/2024 1325 by Karla Haynes RN  Outcome: Adequate for Discharge  3/18/2024 0918 by Flakita Tovar, RN  Outcome: Progressing     Problem: ABCDS Injury Assessment  Goal: Absence of physical injury  3/18/2024 1325 by Karla Haynes RN  Outcome: Adequate for Discharge  3/18/2024 0918 by Flakita Tovar, RN  Outcome: Progressing

## 2024-03-18 NOTE — CONSULTS
Vancomycin Dosing by Pharmacy - Daily Note   Vancomycin Therapy Day:  Day 2  Indication: SSTI    Allergies:  Seasonal   Actual Weight:    Wt Readings from Last 1 Encounters:   03/16/24 (!) 163.3 kg (360 lb 0.2 oz)       Labs/Ancillary Data  Estimated Creatinine Clearance: 128 mL/min (based on SCr of 0.7 mg/dL).  Recent Labs     03/15/24  1504 03/16/24  0535   CREATININE 0.8 0.7   BUN 15 12   WBC 8.0 7.5     Procalcitonin   Date Value Ref Range Status   03/06/2020 0.12 (H) <0.09 ng/mL Final     Comment:           Suspected Sepsis:  0.09-0.49 ng/mL     Low likelihood of sepsis.  0.50-2.00 ng/mL     Increased likelihood of sepsis. Antibiotics encouraged.  >2.00 ng/mL     High risk of sepsis/shock. Antibiotics strongly encouraged.        Suspected Lower Resp Tract Infections:  0.09-0.24 ng/mL     Low likelihood of bacterial infection.  >0.24 ng/mL     Increased likelihood of bacterial infection. Antibiotics encouraged.        With successful antibiotic therapy, PCT levels should decrease rapidly. (Half-life of 24 to   36 hours.)        Procalcitonin values from samples collected within the first 6 hours of systemic infection   may still be low. Retesting may be indicated.  Values from day 1 and day 4 can be entered into the Change in Procalcitonin Calculator   (www.[employer]-pct-calculator.[employer]) to determine the patient's Mortality Risk Prognosis         Intake/Output Summary (Last 24 hours) at 3/16/2024 0927  Last data filed at 3/15/2024 2310  Gross per 24 hour   Intake 360 ml   Output 250 ml   Net 110 ml     Temp: 98.1    Culture Date / Source  /  Results  3/15/24             wound      direct exam-gram + cocci in clusters, gram + cocci in pairs  3/15/24             blood x2   no growth 17 hours      Recent vancomycin administrations                     vancomycin (VANCOCIN) 1,000 mg in sodium chloride 0.9 % 250 mL IVPB ([id number]) (mg) 1,000 mg New Bag 03/16/24 5594    vancomycin (VANCOCIN) 1,000 mg in sodium chloride 0.9 
Vancomycin Dosing by Pharmacy - Initial Note   TODAY'S DATE:  3/15/2024  Patient name, age:  Marti Manzo, 78 y.o.    Indication: SSTI, MRSA suspected.  Additional antimicrobials:  Rocephin    Allergies:  Seasonal   Actual Weight:    Wt Readings from Last 1 Encounters:   03/15/24 (!) 164.2 kg (362 lb)     Labs/Ancillary Data  CrCl cannot be calculated (Patient's most recent lab result is older than the maximum 30 days allowed.).  No results for input(s): \"CREATININE\", \"BUN\", \"WBC\" in the last 72 hours.    Invalid input(s): \"CRCL\"  Procalcitonin   Date Value Ref Range Status   03/06/2020 0.12 (H) <0.09 ng/mL Final     Comment:           Suspected Sepsis:  0.09-0.49 ng/mL     Low likelihood of sepsis.  0.50-2.00 ng/mL     Increased likelihood of sepsis. Antibiotics encouraged.  >2.00 ng/mL     High risk of sepsis/shock. Antibiotics strongly encouraged.        Suspected Lower Resp Tract Infections:  0.09-0.24 ng/mL     Low likelihood of bacterial infection.  >0.24 ng/mL     Increased likelihood of bacterial infection. Antibiotics encouraged.        With successful antibiotic therapy, PCT levels should decrease rapidly. (Half-life of 24 to   36 hours.)        Procalcitonin values from samples collected within the first 6 hours of systemic infection   may still be low. Retesting may be indicated.  Values from day 1 and day 4 can be entered into the Change in Procalcitonin Calculator   (www.[employer]-pct-calculator.[employer]) to determine the patient's Mortality Risk Prognosis       No intake or output data in the 24 hours ending 03/15/24 1515  Temp: 98.8 F    Recent vancomycin administrations        No vancomycin IV orders with administrations found.            Orders not given:            vancomycin (VANCOCIN) 1,000 mg in sodium chloride 0.9 % 250 mL IVPB ([id number])                 MRSA Nasal Swab: N/A. Non-respiratory infection..    PLAN   Vancomycin 1000 mg IVPB x 1 in ER.  No updated Scr available at time of 
Necrotic Skin Infection - likely started as superficial skin abscess.  - He is not diabetic.  - He is morbidly obese    I don't think this necrotizing fasciitis.  He does not look or act ill.  His WBC is largely normal.  His sodium is normal.  It does not have any significant odor.     I recommend debridement, which can be done under local in the OR, with or without sedation. Patient indicate he does not want to be fully anesthetized, so perhaps some analgesia and minimal anxiolytic sedation.  Anesthesia has requested a cardiology review of his EKG        
([facility]) 04/18/2022    Obesity, morbid (more than 100 lbs over ideal weight or BMI > 40) ([facility]) 12/15/2017    Primary osteoarthritis of knees, bilateral 06/13/2017    Essential hypertension 12/13/2016    Mixed hyperlipidemia 12/13/2016     Right Breast abscess.  S/P TAVR.  High-grade AV block.  Cardiac pacemaker in situ.  Paroxysmal atrial fibrillation.  On amiodarone therapy.  Chronic combined CHF.  Essential hypertension.  Morbid obesity.  Obstructive sleep apnea on CPAP therapy.      PLAN:      Right upper quadrant breast abscess  S/P I&D.  Currently receiving intravenous antibiotics.  No evidence of systemic inflammatory response.  Blood cultures are negative.  Patient does have pacemaker and bioprosthetic aortic valve but this subcutaneous infection without fever or bacteremia is not concerning for infective endocarditis.  Continue follow-up as per surgery and ID.    Dual Chamber Pacemaker: [employer]tronic implanted on 2/18/2021 by Dr Anders - History of bradycardia:   Indication for Device Placement: Symptomatic Bradycardia, second degree AV block, complete heart block.  Interrogation Findings: Reviewed with him his most recent pacemaker transmission.  Good battery life.  Normal dual-chamber pacemaker function.  Pacemaker pocket site looks clean with no signs of infection or hematoma.    Chronic combined heart failure: New York Heart Association Class: IIb (Marked symptoms during daily activities)  Beta Blocker: Ventricular pacing is 100%.  No need for beta-blocker therapy.  Continue amiodarone because of history of atrial fibrillation.  ACE Inibitor/ARB: Continue losartan (Cozaar) 25 mg daily.  Diuretics: Continue lasix 40 mg on Monday, Wednesday and Friday and 20 mg on the reaming days.   Continue Potassium 20 MEQ daily  Heart failure counseling: I advised them to try and keep their legs up whenever possible and to limit salt in their diet.   Additional Testing List: None    S/P TAVR 9/14/2020: Last Echo done on 9/2021

## 2024-03-18 NOTE — CARE COORDINATION
Follow up appointment scheduled with Dr. Martinez's office post discharge for wound check/post I&D of right breast. 3/26/24 @ 4:00.

## 2024-03-19 ENCOUNTER — CARE COORDINATION (OUTPATIENT)
Dept: CASE MANAGEMENT | Age: 79
End: 2024-03-19

## 2024-03-19 ENCOUNTER — NURSE ONLY (OUTPATIENT)
Dept: CARDIOLOGY | Age: 79
End: 2024-03-19
Payer: MEDICARE

## 2024-03-19 ENCOUNTER — HOSPITAL ENCOUNTER (OUTPATIENT)
Dept: INFUSION THERAPY | Age: 79
Discharge: HOME | End: 2024-03-19

## 2024-03-19 DIAGNOSIS — I44.2 AV BLOCK, 3RD DEGREE (HCC): Primary | ICD-10-CM

## 2024-03-19 DIAGNOSIS — Z95.0 CARDIAC PACEMAKER IN SITU: ICD-10-CM

## 2024-03-19 PROCEDURE — 93294 REM INTERROG EVL PM/LDLS PM: CPT | Performed by: INTERNAL MEDICINE

## 2024-03-19 NOTE — CARE COORDINATION
Care Transitions Outreach Attempt    Call within 2 business days of discharge: Yes   Attempted to reach patient for transitions of care follow up. Unable to reach patient.    Patient: Marti Manzo Patient : 1945 MRN: <[id number]>    Last Discharge Facility       Date Complaint Diagnosis Description Type Department Provider    3/15/24 Abscess Breast abscess in male ... ED to Hosp-Admission (Discharged) (ADMITTED) [facility] Fredrick Mack MD; [facility]          # 1 attempt-Attempted initial 24 hour hospital follow up call. Left a Hipaa compliant message with name and call back information. Requested return call to [phone].     Was this an external facility discharge? No Discharge Facility Name: [facility]    Noted following upcoming appointments from discharge chart review:   [facility] follow up appointment(s):   Future Appointments   Date Time Provider Department Center   3/20/2024  2:00 PM [facility] OP TREATMENT RM 01 [facility] SP[facility] [place]   3/22/2024  2:00 PM [facility] OP TREATMENT RM 01 [facility] [place]   3/25/2024 10:00 AM Nataliya Ku, APRN - CNP [facility] HOANG   3/25/2024  2:00 PM [facility] OP TREATMENT RM 01 [facility] [place]   3/26/2024  4:00 PM Gaston Martinez MD Tiff surg [facility]   2024  1:35 AM SCHEDULE, [facility] TIFFIN CAR [employer] TIFF CARD [facility]PP   5/15/2024 11:00 AM Krzysztof Avalos MD TIFF CARD [facility]   2024  9:00 AM [facility] HEARTFAILURE CLINIC [facility] MED MGMT [place]   2024  9:30 AM Fredrick Mack MD [facility] Mark Akers M   10/21/2024  9:30 AM Yobany Santo MD TIFF PULM [facility]   2025  9:30 AM Fredrick Mack MD [facility] M     Non-BS  follow up appointment(s):

## 2024-03-20 ENCOUNTER — HOSPITAL ENCOUNTER (OUTPATIENT)
Dept: INFUSION THERAPY | Age: 79
Discharge: HOME | End: 2024-03-20
Payer: MEDICARE

## 2024-03-20 ENCOUNTER — CARE COORDINATION (OUTPATIENT)
Dept: CASE MANAGEMENT | Age: 79
End: 2024-03-20

## 2024-03-20 VITALS
TEMPERATURE: 97.9 F | SYSTOLIC BLOOD PRESSURE: 148 MMHG | HEART RATE: 66 BPM | RESPIRATION RATE: 20 BRPM | DIASTOLIC BLOOD PRESSURE: 60 MMHG

## 2024-03-20 DIAGNOSIS — N61.1 BREAST ABSCESS IN MALE: Primary | ICD-10-CM

## 2024-03-20 LAB
MICROORGANISM SPEC CULT: NORMAL
MICROORGANISM SPEC CULT: NORMAL
SERVICE CMNT-IMP: NORMAL
SERVICE CMNT-IMP: NORMAL
SPECIMEN DESCRIPTION: NORMAL
SPECIMEN DESCRIPTION: NORMAL

## 2024-03-20 PROCEDURE — 1111F DSCHRG MED/CURRENT MED MERGE: CPT

## 2024-03-20 PROCEDURE — 99213 OFFICE O/P EST LOW 20 MIN: CPT

## 2024-03-20 NOTE — CARE COORDINATION
and  may require treatment.  FUTURE RESISTANT INFECTION  Another serious side effects the risk of getting an antibiotic-resistant infection later. This type      Assessment and support for treatment adherence and medication management-  A skin abscess is a bacterial infection that forms a pocket of pus. A boil is a kind of skin abscess. The doctor may have  cut an opening in the abscess so that the pus can drain out. You may have gauze in the cut so that the abscess will stay  open and keep draining. You may need antibiotics. You will need to follow up with your doctor to make sure the  infection has gone away.  The doctor has checked you carefully, but problems can develop later. If you notice any problems or new symptoms,  get medical treatment right away.  Follow-up care is a key part of your treatment and safety. Be sure to make and go to all appointments, and call  your doctor if you are having problems. It's also a good idea to know your test results and keep a list of the medicines  you take.  How can you care for yourself at home?  Apply warm and dry compresses, a heating pad set on low, or a hot water bottle 3 or 4 times a day for pain. Keep  a cloth between the heat source and your skin.  If your doctor prescribed antibiotics, take them as directed. Do not stop taking them just because you feel better.  You need to take the full course of antibiotics.  Take pain medicines exactly as directed.  If the doctor gave you a prescription medicine for pain, take it as prescribed.  If you are not taking a prescription pain medicine, ask your doctor if you can take an over-the-counter  medicine.  Keep your bandage clean and dry. Change the bandage whenever it gets wet or dirty, or at least one time a day.  If the abscess was packed with gauze:  Keep follow-up appointments to have the gauze changed or removed. If the doctor instructed you to remove  the gauze, follow the instructions you were given for how to remove

## 2024-03-22 ENCOUNTER — HOSPITAL ENCOUNTER (OUTPATIENT)
Dept: INFUSION THERAPY | Age: 79
Discharge: HOME | End: 2024-03-22
Payer: MEDICARE

## 2024-03-22 VITALS
RESPIRATION RATE: 20 BRPM | SYSTOLIC BLOOD PRESSURE: 136 MMHG | TEMPERATURE: 98.4 F | DIASTOLIC BLOOD PRESSURE: 68 MMHG | HEART RATE: 78 BPM

## 2024-03-22 PROCEDURE — 99213 OFFICE O/P EST LOW 20 MIN: CPT

## 2024-03-22 RX ORDER — CIPROFLOXACIN AND DEXAMETHASONE 3; 1 MG/ML; MG/ML
4 SUSPENSION/ DROPS AURICULAR (OTIC) 2 TIMES DAILY
COMMUNITY

## 2024-03-25 ENCOUNTER — HOSPITAL ENCOUNTER (OUTPATIENT)
Dept: INFUSION THERAPY | Age: 79
Discharge: HOME | End: 2024-03-25
Payer: MEDICARE

## 2024-03-25 VITALS
SYSTOLIC BLOOD PRESSURE: 114 MMHG | DIASTOLIC BLOOD PRESSURE: 48 MMHG | TEMPERATURE: 98.1 F | RESPIRATION RATE: 20 BRPM | HEART RATE: 66 BPM

## 2024-03-25 PROCEDURE — 99213 OFFICE O/P EST LOW 20 MIN: CPT

## 2024-03-27 ENCOUNTER — CARE COORDINATION (OUTPATIENT)
Dept: CASE MANAGEMENT | Age: 79
End: 2024-03-27

## 2024-03-27 ENCOUNTER — HOSPITAL ENCOUNTER (OUTPATIENT)
Dept: INFUSION THERAPY | Age: 79
Discharge: HOME | End: 2024-03-27
Payer: MEDICARE

## 2024-03-27 VITALS
RESPIRATION RATE: 20 BRPM | DIASTOLIC BLOOD PRESSURE: 56 MMHG | HEART RATE: 69 BPM | TEMPERATURE: 97.7 F | SYSTOLIC BLOOD PRESSURE: 156 MMHG

## 2024-03-27 PROCEDURE — 99213 OFFICE O/P EST LOW 20 MIN: CPT

## 2024-03-27 NOTE — CARE COORDINATION
Services  Care Transitions Interventions     Other Therapy Services: Completed      Transportation Support: Completed   Disease Specific Clinic: Completed        Specialty Service Referral: Declined    Other Interventions:             Care Transition Nurse provided contact information for future needs. Plan for follow-up call in 5-7 days based on severity of symptoms and risk factors.  Plan for next call: symptom management-follow up on s/s of infection, chf s/stx  community resources-follow up on wound/wound care    Vesta Marcano RN

## 2024-03-28 DIAGNOSIS — I48.0 PAF (PAROXYSMAL ATRIAL FIBRILLATION) (HCC): ICD-10-CM

## 2024-03-29 ENCOUNTER — HOSPITAL ENCOUNTER (OUTPATIENT)
Dept: INFUSION THERAPY | Age: 79
Discharge: HOME | End: 2024-03-29
Payer: MEDICARE

## 2024-03-29 VITALS
RESPIRATION RATE: 20 BRPM | DIASTOLIC BLOOD PRESSURE: 79 MMHG | TEMPERATURE: 98.4 F | SYSTOLIC BLOOD PRESSURE: 152 MMHG | HEART RATE: 78 BPM

## 2024-03-29 PROCEDURE — 99213 OFFICE O/P EST LOW 20 MIN: CPT

## 2024-03-29 RX ORDER — HYDROGEN PEROXIDE 2.65 ML/100ML
LIQUID ORAL; TOPICAL
Qty: 90 TABLET | Refills: 3 | Status: SHIPPED | OUTPATIENT
Start: 2024-03-29

## 2024-03-29 RX ORDER — ATORVASTATIN CALCIUM 40 MG/1
TABLET, FILM COATED ORAL
Qty: 90 TABLET | Refills: 0 | Status: SHIPPED | OUTPATIENT
Start: 2024-03-29

## 2024-04-01 ENCOUNTER — OFFICE VISIT (OUTPATIENT)
Dept: SURGERY | Age: 79
End: 2024-04-01
Payer: MEDICARE

## 2024-04-01 VITALS — DIASTOLIC BLOOD PRESSURE: 73 MMHG | TEMPERATURE: 98.2 F | SYSTOLIC BLOOD PRESSURE: 124 MMHG | HEART RATE: 71 BPM

## 2024-04-01 DIAGNOSIS — N61.1 ABSCESS OF RIGHT BREAST: Primary | ICD-10-CM

## 2024-04-01 DIAGNOSIS — L98.492 ULCER OF CHEST WALL WITH FAT LAYER EXPOSED (HCC): ICD-10-CM

## 2024-04-01 PROCEDURE — 11042 DBRDMT SUBQ TIS 1ST 20SQCM/<: CPT | Performed by: SURGERY

## 2024-04-01 NOTE — PROGRESS NOTES
Here for ongoing wound management.    Debridement for right breast abscess 3/17/24    Risk factor for poor healing:  - Hyperglycemia/Borderline Type II diabetes  - Morbid Obesity - BMI 54  - Chronic Hypoxia - home O2  - CHF    Alginate dressing  Has completed oral antibiotics.  No complaints otherwise.    /73 (Site: Left Upper Arm, Position: Sitting, Cuff Size: Large Adult)   Pulse 71   Temp 98.2 °F (36.8 °C)     He has a circular wound.  There is some yellow to brown exudative material on the surface.  The surrounding skin is minimally erythematous.    Procedure note:  Marti Manzo wound(s) debrided.  Devitalized, necrotic, fibrinous material and biofilm was removed.  Hemostasis by direct pressure as needed.  Instruments used:   Curette: Yes   Forceps and scissors:  No   Scalpel: No   Tissue nippers or rongeur: No  Additional injected local anesthetic: No  Tissue obtained for culture: No  Additional hemostatic agents used:   Silver Nitrate: No   Electrocautery: No   Sutures: No   Topical agents (gel foam, thrombin, Surgicel): No  Depth of Debridement - subcutaneous  Debridement Size:  Roughly 3.5 cm2    Horizontal - 2.2 cm  Vertical - 1.6 cm  Depth - 0.4 cm        IMP/PLAN  1) Wound looks good. Substantially smaller.  2) Looks a little dried out. Will switch to foam  3) Recheck in 2 weeks.

## 2024-04-03 ENCOUNTER — CARE COORDINATION (OUTPATIENT)
Dept: CASE MANAGEMENT | Age: 79
End: 2024-04-03

## 2024-04-03 ENCOUNTER — HOSPITAL ENCOUNTER (OUTPATIENT)
Dept: INFUSION THERAPY | Age: 79
Discharge: HOME | End: 2024-04-03
Payer: MEDICARE

## 2024-04-03 VITALS
RESPIRATION RATE: 20 BRPM | SYSTOLIC BLOOD PRESSURE: 140 MMHG | HEART RATE: 84 BPM | TEMPERATURE: 98.3 F | DIASTOLIC BLOOD PRESSURE: 68 MMHG

## 2024-04-03 PROCEDURE — 99213 OFFICE O/P EST LOW 20 MIN: CPT

## 2024-04-03 NOTE — CARE COORDINATION
Care Transitions Outreach Attempt # 1     Call within 2 business days of discharge: Yes   Attempted to reach patient for transitions of care follow up.Patient answered phone and hung up after writer asked if it was him. Will try outreach another day.     Patient: Marti Manzo Patient : 1945 MRN: <[id number]>    Last Discharge Facility       Date Complaint Diagnosis Description Type Department Provider    3/15/24 Abscess Breast abscess in male ... ED to Hosp-Admission (Discharged) (ADMITTED) [facility] Fredrick Mack MD; [facility]              Was this an external facility discharge? No Discharge Facility Name: [facility]    Future Appointments   Date Time Provider Department Center   2024  1:00 PM [facility] OP TREATMENT  01 [facility] SPPE [place]   2024  1:00 PM [facility] [place]   4/15/2024 10:30 AM Gaston Martinez MD Tiff surg [facility]   2024  1:35 AM DOC, [email or web address] TIFFIN [facility] TIFF CARD TP   5/15/2024 11:00 AM Krzysztof Avalos MD TIFF CARD TP   2024  9:00 AM [facility] HEARTFAILURE CLINIC [facility] MED MGMT [place]   2024  9:30 AM Fredrick Mack MD [facility]   10/21/2024  9:30 AM Yobany Santo MD TIFF PULM TP   2025  9:30 AM Fredrick Mack MD [facility] Akers M

## 2024-04-05 ENCOUNTER — HOSPITAL ENCOUNTER (OUTPATIENT)
Dept: INFUSION THERAPY | Age: 79
Discharge: HOME | End: 2024-04-05
Payer: MEDICARE

## 2024-04-05 VITALS
DIASTOLIC BLOOD PRESSURE: 51 MMHG | SYSTOLIC BLOOD PRESSURE: 149 MMHG | TEMPERATURE: 97.4 F | RESPIRATION RATE: 20 BRPM | HEART RATE: 77 BPM

## 2024-04-05 PROCEDURE — 99213 OFFICE O/P EST LOW 20 MIN: CPT

## 2024-04-08 ENCOUNTER — HOSPITAL ENCOUNTER (OUTPATIENT)
Dept: INFUSION THERAPY | Age: 79
Discharge: HOME | End: 2024-04-08

## 2024-04-09 ENCOUNTER — CARE COORDINATION (OUTPATIENT)
Dept: CASE MANAGEMENT | Age: 79
End: 2024-04-09

## 2024-04-09 NOTE — CARE COORDINATION
Care Transitions Follow Up Call    Patient Current Location:  [facility] Care Coordinator contacted the patient by telephone to follow up after admission on 3/15/24.  Verified name and  with patient as identifiers.    Patient: Marti Manzo  Patient : 1945   MRN: <[id number]>  Reason for Admission: Abscess of right breast   Discharge Date: 3/18/24 RARS: Readmission Risk Score: 13.8      Needs to be reviewed by the provider   Additional needs identified to be addressed with provider: No  none             Method of communication with provider: none.    Subsequent transitional call. Spoke to Marti today he reports he's doing well. Stated he did not got to wound care yesterday because office was closed for the eclipse. He has appointment tomorrow. Discussed appointment on 24 with Dr. Martinez. Dressing switched to foam and debrided. Next appointment. 4/15/24. He stated he has minimal drainage, no odor. Denies S/S of infection , fever/chills. Denies N/V/D chest pain, sob.  Appetite fine. No concerns or issues. He had to end call is at a nursing facility visiting family.    Addressed changes since last contact:   wound care appointment   Discussed follow-up appointments. If no appointment was previously scheduled, appointment scheduling offered: Yes.   Is follow up appointment scheduled within 7 days of discharge? Yes.    Follow Up  Future Appointments   Date Time Provider Department Center   4/10/2024  1:00 PM [facility] OP TREATMENT RM 01 M[facility]Z SPPE [place]   2024  1:00 PM [facility] OP TREATMENT RM 01 [facility] SPPE [place]   4/15/2024 10:30 AM Gaston Martinez MD Tiff surg TPP   2024  1:35 AM SCHEDULE, [facility] TIFFIN CAR [employer] TIFF CARD TPP   5/15/2024 11:00 AM Krzysztof Avalos MD TIFF CARD TPP   2024  9:00 AM [facility] HEARTFAILURE CLINIC [facility] MED MGMT [place]   2024  9:30 AM Fredrick Mack MD [facility] M   10/21/2024  9:30 AM Yobany Santo MD TIFF PULM TPP   2025  9:30 AM

## 2024-04-10 ENCOUNTER — HOSPITAL ENCOUNTER (OUTPATIENT)
Dept: INFUSION THERAPY | Age: 79
Discharge: HOME | End: 2024-04-10
Payer: MEDICARE

## 2024-04-10 VITALS
SYSTOLIC BLOOD PRESSURE: 122 MMHG | HEART RATE: 74 BPM | TEMPERATURE: 98.2 F | RESPIRATION RATE: 20 BRPM | DIASTOLIC BLOOD PRESSURE: 79 MMHG

## 2024-04-10 PROCEDURE — 99213 OFFICE O/P EST LOW 20 MIN: CPT

## 2024-04-12 ENCOUNTER — HOSPITAL ENCOUNTER (OUTPATIENT)
Dept: INFUSION THERAPY | Age: 79
Discharge: HOME | End: 2024-04-12
Payer: MEDICARE

## 2024-04-12 VITALS
DIASTOLIC BLOOD PRESSURE: 50 MMHG | TEMPERATURE: 98.4 F | RESPIRATION RATE: 18 BRPM | HEART RATE: 76 BPM | SYSTOLIC BLOOD PRESSURE: 138 MMHG

## 2024-04-12 PROCEDURE — 99213 OFFICE O/P EST LOW 20 MIN: CPT

## 2024-04-15 ENCOUNTER — OFFICE VISIT (OUTPATIENT)
Dept: SURGERY | Age: 79
End: 2024-04-15
Payer: MEDICARE

## 2024-04-15 VITALS — HEART RATE: 66 BPM | SYSTOLIC BLOOD PRESSURE: 118 MMHG | DIASTOLIC BLOOD PRESSURE: 70 MMHG

## 2024-04-15 DIAGNOSIS — L98.492 ULCER OF CHEST WALL WITH FAT LAYER EXPOSED (HCC): ICD-10-CM

## 2024-04-15 DIAGNOSIS — N61.1 ABSCESS OF RIGHT BREAST: Primary | ICD-10-CM

## 2024-04-15 PROCEDURE — 1123F ACP DISCUSS/DSCN MKR DOCD: CPT | Performed by: SURGERY

## 2024-04-15 PROCEDURE — 1036F TOBACCO NON-USER: CPT | Performed by: SURGERY

## 2024-04-15 PROCEDURE — 3074F SYST BP LT 130 MM HG: CPT | Performed by: SURGERY

## 2024-04-15 PROCEDURE — 3078F DIAST BP <80 MM HG: CPT | Performed by: SURGERY

## 2024-04-15 PROCEDURE — 1111F DSCHRG MED/CURRENT MED MERGE: CPT | Performed by: SURGERY

## 2024-04-15 PROCEDURE — G8417 CALC BMI ABV UP PARAM F/U: HCPCS | Performed by: SURGERY

## 2024-04-15 PROCEDURE — G8427 DOCREV CUR MEDS BY ELIG CLIN: HCPCS | Performed by: SURGERY

## 2024-04-15 PROCEDURE — 99212 OFFICE O/P EST SF 10 MIN: CPT | Performed by: SURGERY

## 2024-04-15 NOTE — PROGRESS NOTES
Here for ongoing wound management.     Debridement for right breast abscess 3/17/24     Risk factor for poor healing:  - Hyperglycemia/Borderline Type II diabetes  - Morbid Obesity - BMI 54  - Chronic Hypoxia - home O2  - CHF     Currently on foam dressing.    /70 (Site: Right Upper Arm, Position: Sitting)   Pulse 66         Horizontal 1.2  Vertical 0.8  Depth 0.1    IMP/PLAN  1) Wound continue to improve significant.  Less than 30% the size if was 2 weeks ago.  2) Continue Foam  3) Recheck in 2 weeks.

## 2024-04-16 ENCOUNTER — CARE COORDINATION (OUTPATIENT)
Dept: CASE MANAGEMENT | Age: 79
End: 2024-04-16

## 2024-04-16 NOTE — CARE COORDINATION
Care Transitions Note    Follow Up Call      Patient Current Location:  Home: [address]  [address]  [place] [address]    Care Transition Nurse contacted the patient by telephone. Verified name and  as identifiers.    Additional needs identified to be addressed with provider   No needs identified                 Method of communication with provider: none.    Care Summary Note: Writer spoke to patient, he is doing well, reviewed up coming wound care appointments, denied any c/o fever, chills, n/v/d, sob or chest pain, no new needs at this time, patient stated feeling pretty good, will follow//JU    Addressed changes since last contact:  Reviewed schedule for wound care       Advance Care Planning:   Does patient have an Advance Directive: reviewed during previous call, see note. .    Medication Review:  No changes since last call.     Remote Patient Monitoring:  Offered patient enrollment in the Remote Patient Monitoring (RPM) program for in-home monitoring: Patient declined.    Assessments:  No changes since last call    Follow Up Appointment:   Reviewed upcoming appointment(s).  Future Appointments         Provider Specialty Dept Phone    2024 1:00 PM [facility] OP TREATMENT RM 01 Infusion Therapy [phone]    2024 1:00 PM [facility] OP TREATMENT RM 01 Infusion Therapy [phone]    2024 1:35 AM SCHEDULE, [facility] TIFFIN CAR [facility] Cardiology [phone]    2024 1:00 PM [facility] OP TREATMENT RM 01 Infusion Therapy [phone]    2024 1:00 PM [facility] OP TREATMENT RM 01 Infusion Therapy [phone]    5/3/2024 1:00 PM [facility] OP TREATMENT RM 01 Infusion Therapy [phone]    2024 11:30 AM Gaston Martinez MD General Surgery [phone]    5/15/2024 11:00 AM Krzysztof Avalos MD Cardiology [phone]    2024 9:00 [facility] [phone]    2024 9:30 AM Fredrick Mack MD Internal Medicine [phone]    10/21/2024 9:30 AM Yobany Santo MD

## 2024-04-17 ENCOUNTER — HOSPITAL ENCOUNTER (OUTPATIENT)
Dept: INFUSION THERAPY | Age: 79
Discharge: HOME | End: 2024-04-17
Payer: MEDICARE

## 2024-04-17 VITALS
HEART RATE: 75 BPM | SYSTOLIC BLOOD PRESSURE: 135 MMHG | DIASTOLIC BLOOD PRESSURE: 61 MMHG | TEMPERATURE: 98.2 F | RESPIRATION RATE: 16 BRPM

## 2024-04-17 PROCEDURE — 99213 OFFICE O/P EST LOW 20 MIN: CPT

## 2024-04-19 ENCOUNTER — HOSPITAL ENCOUNTER (OUTPATIENT)
Dept: INFUSION THERAPY | Age: 79
Discharge: HOME | End: 2024-04-19
Payer: MEDICARE

## 2024-04-19 VITALS
SYSTOLIC BLOOD PRESSURE: 129 MMHG | DIASTOLIC BLOOD PRESSURE: 53 MMHG | TEMPERATURE: 97.8 F | HEART RATE: 72 BPM | RESPIRATION RATE: 20 BRPM

## 2024-04-19 PROCEDURE — 99212 OFFICE O/P EST SF 10 MIN: CPT

## 2024-04-19 PROCEDURE — 99213 OFFICE O/P EST LOW 20 MIN: CPT

## 2024-04-22 ENCOUNTER — HOSPITAL ENCOUNTER (OUTPATIENT)
Dept: INFUSION THERAPY | Age: 79
Discharge: HOME | End: 2024-04-22
Payer: MEDICARE

## 2024-04-22 ENCOUNTER — CARE COORDINATION (OUTPATIENT)
Dept: CASE MANAGEMENT | Age: 79
End: 2024-04-22

## 2024-04-22 VITALS
SYSTOLIC BLOOD PRESSURE: 113 MMHG | RESPIRATION RATE: 18 BRPM | TEMPERATURE: 98.4 F | DIASTOLIC BLOOD PRESSURE: 54 MMHG | HEART RATE: 79 BPM

## 2024-04-22 PROCEDURE — 99213 OFFICE O/P EST LOW 20 MIN: CPT

## 2024-04-22 NOTE — CARE COORDINATION
Today's care transition call deferred due to patient currently being at wound clinic. Will call another day.

## 2024-04-23 ENCOUNTER — CARE COORDINATION (OUTPATIENT)
Dept: CASE MANAGEMENT | Age: 79
End: 2024-04-23

## 2024-04-23 NOTE — CARE COORDINATION
Care Transitions Outreach Attempt #1    Attempted to reach patient for transitions of care follow up. Unable to reach patient. HIPAA compliant message left on  requesting a return call.    Patient: Marti Manzo Patient : 1945 MRN: <[id number]>    Last Discharge Facility       Date Complaint Diagnosis Description Type Department Provider    3/15/24 Abscess Breast abscess in male ... ED to Hosp-Admission (Discharged) (ADMITTED) [facility] Fredrick Mack MD; [facility]              Was this an external facility discharge? No Discharge Facility: [facility]    Noted following upcoming appointments from discharge chart review:   [facility] follow up appointment(s):   Future Appointments   Date Time Provider Department Center   2024  1:00 PM [facility] OP TREATMENT RM 01 M[facility]Z SPPE [place]   2024  1:00 PM [facility] OP TREATMENT RM 01 [facility] SPPE [place]   2024  1:00 PM [facility] OP TREATMENT RM 01 M[facility]Z SPPE [place]   2024  1:00 PM [facility] OP TREATMENT RM 01 M[facility]Z SPPE [place]   5/3/2024  1:00 PM [facility] OP TREATMENT RM 01 M[facility]Z SPPE [place]   2024 11:30 AM Gaston Martinez MD Tiff surg [facility]PP   5/15/2024 11:00 AM Krzysztof Avalos MD TIFF CARD [facility]   2024  2:35 AM SCHEDULE, [facility]IN [facility] [employer] TIFF CARD [facility]   [facility]  [facility] MED MGMT [place]   2024  9:30 AM Fredrick Mack MD [facility] M   10/21/2024  9:30 AM Yobany Santo MD TIFF PULM [facility]   2025  9:30 AM Frerdick Mack MD [facility] Mark Akers M Catrina Weickert LPJOHNATHON  Care Transition Nurse

## 2024-04-24 ENCOUNTER — HOSPITAL ENCOUNTER (OUTPATIENT)
Dept: INFUSION THERAPY | Age: 79
Discharge: HOME | End: 2024-04-24
Payer: MEDICARE

## 2024-04-24 VITALS
TEMPERATURE: 98.3 F | SYSTOLIC BLOOD PRESSURE: 118 MMHG | DIASTOLIC BLOOD PRESSURE: 46 MMHG | RESPIRATION RATE: 20 BRPM | HEART RATE: 71 BPM

## 2024-04-24 PROCEDURE — 99213 OFFICE O/P EST LOW 20 MIN: CPT

## 2024-04-25 ENCOUNTER — CARE COORDINATION (OUTPATIENT)
Dept: CASE MANAGEMENT | Age: 79
End: 2024-04-25

## 2024-04-25 NOTE — CARE COORDINATION
Care Transitions Outreach Attempt #2    Attempted to reach patient for transitions of care follow up. Unable to reach patient. HIPAA compliant message left on  requesting a return call. Will end care transitions if no return call received.     Patient: Marti Manzo Patient : 1945 MRN: <[id number]>    Last Discharge Facility       Date Complaint Diagnosis Description Type Department Provider    3/15/24 Abscess Breast abscess in male ... ED to Hosp-Admission (Discharged) (ADMITTED) [facility] Fredrick Mack MD; [facility]              Was this an external facility discharge? No Discharge Facility: [facility]    Noted following upcoming appointments from discharge chart review:   [facility] follow up appointment(s):   Future Appointments   Date Time Provider Department Center   2024  1:00 PM [facility] OP TREATMENT RM 01 M[facility]Z SPPE [place]   2024  1:00 PM [facility] OP TREATMENT RM 01 M[facility]Z SPPE [place]   2024  1:00 PM [facility] OP TREATMENT RM 01 M[facility]Z SPPE [place]   5/3/2024  1:00 PM [facility] OP TREATMENT RM 01 M[facility]Z SPPE [place]   2024 11:30 AM Gaston Martinez MD Tiff surg [facility]   5/15/2024 11:00 AM Krzysztof Avalos MD TIFF CARD [facility]   2024  2:35 AM SCHEDULE, [facility] TIFF CARD [facility]   [facility]  [facility] [place]   2024  9:30 AM Fredrick Mack MD [facility] M   10/21/2024  9:30 AM Yobany Santo MD TIFF PULM [facility]   2025  9:30 AM Fredrick Mack MD [facility] M Catrina Weickert LPJOHNATHON  Care Transition Nurse

## 2024-04-26 ENCOUNTER — HOSPITAL ENCOUNTER (OUTPATIENT)
Dept: INFUSION THERAPY | Age: 79
Discharge: HOME | End: 2024-04-26
Payer: MEDICARE

## 2024-04-26 VITALS
RESPIRATION RATE: 20 BRPM | DIASTOLIC BLOOD PRESSURE: 47 MMHG | SYSTOLIC BLOOD PRESSURE: 117 MMHG | TEMPERATURE: 98.4 F | HEART RATE: 76 BPM

## 2024-04-26 PROCEDURE — 99213 OFFICE O/P EST LOW 20 MIN: CPT

## 2024-04-29 ENCOUNTER — HOSPITAL ENCOUNTER (OUTPATIENT)
Dept: INFUSION THERAPY | Age: 79
Discharge: HOME | End: 2024-04-29
Payer: MEDICARE

## 2024-04-29 VITALS
SYSTOLIC BLOOD PRESSURE: 131 MMHG | TEMPERATURE: 98.7 F | HEART RATE: 69 BPM | DIASTOLIC BLOOD PRESSURE: 57 MMHG | RESPIRATION RATE: 20 BRPM

## 2024-04-29 PROCEDURE — 99213 OFFICE O/P EST LOW 20 MIN: CPT

## 2024-05-01 ENCOUNTER — HOSPITAL ENCOUNTER (OUTPATIENT)
Dept: INFUSION THERAPY | Age: 79
Discharge: HOME | End: 2024-05-01
Payer: MEDICARE

## 2024-05-01 PROCEDURE — 99211 OFF/OP EST MAY X REQ PHY/QHP: CPT

## 2024-05-01 NOTE — PROGRESS NOTES
Patient here for dressing change. Bandage removed and it is noted to not need anymore dressing changes at this time.

## 2024-05-03 ENCOUNTER — HOSPITAL ENCOUNTER (OUTPATIENT)
Dept: INFUSION THERAPY | Age: 79
Discharge: HOME | End: 2024-05-03

## 2024-05-06 ENCOUNTER — OFFICE VISIT (OUTPATIENT)
Dept: SURGERY | Age: 79
End: 2024-05-06
Payer: MEDICARE

## 2024-05-06 VITALS — HEART RATE: 77 BPM | DIASTOLIC BLOOD PRESSURE: 71 MMHG | SYSTOLIC BLOOD PRESSURE: 120 MMHG

## 2024-05-06 DIAGNOSIS — N61.1 ABSCESS OF RIGHT BREAST: Primary | ICD-10-CM

## 2024-05-06 DIAGNOSIS — L73.9 FOLLICULITIS: ICD-10-CM

## 2024-05-06 PROCEDURE — 3078F DIAST BP <80 MM HG: CPT | Performed by: SURGERY

## 2024-05-06 PROCEDURE — 3074F SYST BP LT 130 MM HG: CPT | Performed by: SURGERY

## 2024-05-06 PROCEDURE — G8427 DOCREV CUR MEDS BY ELIG CLIN: HCPCS | Performed by: SURGERY

## 2024-05-06 PROCEDURE — G8417 CALC BMI ABV UP PARAM F/U: HCPCS | Performed by: SURGERY

## 2024-05-06 PROCEDURE — 1123F ACP DISCUSS/DSCN MKR DOCD: CPT | Performed by: SURGERY

## 2024-05-06 PROCEDURE — 1036F TOBACCO NON-USER: CPT | Performed by: SURGERY

## 2024-05-06 PROCEDURE — 99212 OFFICE O/P EST SF 10 MIN: CPT | Performed by: SURGERY

## 2024-05-06 NOTE — PROGRESS NOTES
Here for follow up management of his post abscess breast wound.  It appear that it has almost healed.        He has developed a \"bite\" on his right forearm.        This looks like a superficial staph infection to me.      IMP/PLAN  1) Breast abscess is healed.  No additional follow up  needed.  2) Superficial cellulitis/folliculitis right forearm - Begin mupirocin topically TID.  If the redness gets worse, pain gets worse, he needs to be reevaluated sooner rather than later. I will be out of town next week, so either to ED or his PCP.

## 2024-05-12 ENCOUNTER — HOSPITAL ENCOUNTER (EMERGENCY)
Age: 79
Discharge: HOME | End: 2024-05-12
Payer: MEDICARE

## 2024-05-12 VITALS
OXYGEN SATURATION: 93 % | TEMPERATURE: 98.4 F | SYSTOLIC BLOOD PRESSURE: 150 MMHG | HEART RATE: 82 BPM | DIASTOLIC BLOOD PRESSURE: 63 MMHG | RESPIRATION RATE: 18 BRPM

## 2024-05-12 DIAGNOSIS — L02.413 CUTANEOUS ABSCESS OF RIGHT UPPER LIMB: Primary | ICD-10-CM

## 2024-05-12 PROCEDURE — 6370000000 HC RX 637 (ALT 250 FOR IP): Performed by: PHYSICIAN ASSISTANT

## 2024-05-12 PROCEDURE — 10060 I&D ABSCESS SIMPLE/SINGLE: CPT

## 2024-05-12 PROCEDURE — 99283 EMERGENCY DEPT VISIT LOW MDM: CPT

## 2024-05-12 PROCEDURE — 2500000003 HC RX 250 WO HCPCS: Performed by: PHYSICIAN ASSISTANT

## 2024-05-12 RX ORDER — CEPHALEXIN 500 MG/1
500 CAPSULE ORAL ONCE
Status: COMPLETED | OUTPATIENT
Start: 2024-05-12 | End: 2024-05-12

## 2024-05-12 RX ORDER — SULFAMETHOXAZOLE AND TRIMETHOPRIM 800; 160 MG/1; MG/1
1 TABLET ORAL 2 TIMES DAILY
Qty: 20 TABLET | Refills: 0 | Status: SHIPPED | OUTPATIENT
Start: 2024-05-12 | End: 2024-05-22

## 2024-05-12 RX ORDER — LIDOCAINE HYDROCHLORIDE AND EPINEPHRINE 10; 10 MG/ML; UG/ML
20 INJECTION, SOLUTION INFILTRATION; PERINEURAL ONCE
Status: COMPLETED | OUTPATIENT
Start: 2024-05-12 | End: 2024-05-12

## 2024-05-12 RX ORDER — CEPHALEXIN 250 MG/1
250 CAPSULE ORAL 4 TIMES DAILY
Qty: 40 CAPSULE | Refills: 0 | Status: SHIPPED | OUTPATIENT
Start: 2024-05-12 | End: 2024-05-22

## 2024-05-12 RX ORDER — SULFAMETHOXAZOLE AND TRIMETHOPRIM 800; 160 MG/1; MG/1
1 TABLET ORAL ONCE
Status: COMPLETED | OUTPATIENT
Start: 2024-05-12 | End: 2024-05-12

## 2024-05-12 RX ADMIN — SULFAMETHOXAZOLE AND TRIMETHOPRIM 1 TABLET: 800; 160 TABLET ORAL at 19:16

## 2024-05-12 RX ADMIN — LIDOCAINE HYDROCHLORIDE,EPINEPHRINE BITARTRATE 20 ML: 10; .01 INJECTION, SOLUTION INFILTRATION; PERINEURAL at 19:17

## 2024-05-12 RX ADMIN — CEPHALEXIN 500 MG: 500 CAPSULE ORAL at 19:16

## 2024-05-12 ASSESSMENT — LIFESTYLE VARIABLES
HOW OFTEN DO YOU HAVE A DRINK CONTAINING ALCOHOL: NEVER
HOW MANY STANDARD DRINKS CONTAINING ALCOHOL DO YOU HAVE ON A TYPICAL DAY: PATIENT DOES NOT DRINK

## 2024-05-12 NOTE — ED PROVIDER NOTES
[facility]  EMERGENCY DEPARTMENT ENCOUNTER      Pt Name: Marti Manzo  MRN: [id number]  Birthdate 1945  Date of evaluation: 5/12/2024  Provider: Hien Howard PA-C    CHIEF COMPLAINT       Chief Complaint   Patient presents with    Wound Check     Left arm x1 week, history of recent staph infection wound to neck         HISTORY OF PRESENT ILLNESS      Marti Manzo is a 78 y.o. male who presents to the emergency department due to abscess.  Patient presents emergency department with an infected abscess to his left forearm.  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  He had 1 on his chest wall which was complicated and required hospitalization.  He states he has now developed a tender infected area on his left forearm where it rests on his wheelchair armrest.  Patient does not feel sick.  He has pain with palpation.  There are no other complaints or concerns.        REVIEW OF SYSTEMS       Review of Systems   Constitutional:  Negative for chills and fever.   Respiratory:  Negative for cough and shortness of breath.    Cardiovascular:  Negative for chest pain.         PAST MEDICAL HISTORY     Past Medical History:   Diagnosis Date    Anxiety     Aortic stenosis     Atrial fibrillation, new onset (HCC) 03/05/2020    BPH (benign prostatic hyperplasia)     CHF (congestive heart failure) (HCC)     Diabetes mellitus (HCC)     borderline    Hyperlipidemia     Hypertension     Lymphedema     bilat legs    On home O2     Osteoarthritis     Septic shock due to urinary tract infection (HCC) /  Levophed and fluids 2020         SURGICAL HISTORY       Past Surgical History:   Procedure Laterality Date    AORTIC VALVE REPAIR  2020    TAVR    CARDIAC CATHETERIZATION  2020    HAND SURGERY N/A 3/17/2024    RIGHT BREAST ABSCESS DEBRIDEMENT INCISION AND DRAINAGE performed by Gaston Martinez MD at [facility] OR    VASECTOMY  1971         CURRENT MEDICATIONS       Previous Medications    AMIODARONE (CORDARONE) 200

## 2024-05-15 ENCOUNTER — OFFICE VISIT (OUTPATIENT)
Dept: CARDIOLOGY | Age: 79
End: 2024-05-15
Payer: MEDICARE

## 2024-05-15 VITALS
DIASTOLIC BLOOD PRESSURE: 63 MMHG | OXYGEN SATURATION: 98 % | HEART RATE: 75 BPM | RESPIRATION RATE: 18 BRPM | SYSTOLIC BLOOD PRESSURE: 114 MMHG

## 2024-05-15 DIAGNOSIS — Z95.0 PACEMAKER: Primary | ICD-10-CM

## 2024-05-15 DIAGNOSIS — R00.1 BRADYCARDIA: ICD-10-CM

## 2024-05-15 DIAGNOSIS — S41.101D ARM WOUND, RIGHT, SUBSEQUENT ENCOUNTER: ICD-10-CM

## 2024-05-15 DIAGNOSIS — Z86.79 HISTORY OF ATRIAL FIBRILLATION: ICD-10-CM

## 2024-05-15 DIAGNOSIS — I50.42 CHRONIC COMBINED SYSTOLIC AND DIASTOLIC CHF, NYHA CLASS 3 (HCC): Chronic | ICD-10-CM

## 2024-05-15 DIAGNOSIS — Z95.2 S/P TAVR (TRANSCATHETER AORTIC VALVE REPLACEMENT): ICD-10-CM

## 2024-05-15 DIAGNOSIS — E66.01 OBESITY, MORBID (MORE THAN 100 LBS OVER IDEAL WEIGHT OR BMI > 40) (HCC): Chronic | ICD-10-CM

## 2024-05-15 DIAGNOSIS — E78.2 MIXED HYPERLIPIDEMIA: ICD-10-CM

## 2024-05-15 DIAGNOSIS — B95.8 STAPH INFECTION: ICD-10-CM

## 2024-05-15 DIAGNOSIS — I10 ESSENTIAL HYPERTENSION: ICD-10-CM

## 2024-05-15 DIAGNOSIS — Z79.899 ON AMIODARONE THERAPY: ICD-10-CM

## 2024-05-15 DIAGNOSIS — R06.02 SOB (SHORTNESS OF BREATH): ICD-10-CM

## 2024-05-15 DIAGNOSIS — G47.33 OSA ON CPAP: ICD-10-CM

## 2024-05-15 PROCEDURE — G8417 CALC BMI ABV UP PARAM F/U: HCPCS | Performed by: INTERNAL MEDICINE

## 2024-05-15 PROCEDURE — 99211 OFF/OP EST MAY X REQ PHY/QHP: CPT | Performed by: INTERNAL MEDICINE

## 2024-05-15 PROCEDURE — 3078F DIAST BP <80 MM HG: CPT | Performed by: INTERNAL MEDICINE

## 2024-05-15 PROCEDURE — 3074F SYST BP LT 130 MM HG: CPT | Performed by: INTERNAL MEDICINE

## 2024-05-15 PROCEDURE — G8427 DOCREV CUR MEDS BY ELIG CLIN: HCPCS | Performed by: INTERNAL MEDICINE

## 2024-05-15 PROCEDURE — 1036F TOBACCO NON-USER: CPT | Performed by: INTERNAL MEDICINE

## 2024-05-15 PROCEDURE — 99214 OFFICE O/P EST MOD 30 MIN: CPT | Performed by: INTERNAL MEDICINE

## 2024-05-15 PROCEDURE — 1123F ACP DISCUSS/DSCN MKR DOCD: CPT | Performed by: INTERNAL MEDICINE

## 2024-05-15 PROCEDURE — 93288 INTERROG EVL PM/LDLS PM IP: CPT | Performed by: INTERNAL MEDICINE

## 2024-05-15 NOTE — PROGRESS NOTES
55%. Mild to moderate LV hypertrophy.  Status post TAVR with mean gradient of 9 mmHg, mild perivalvular regurgitation noted.  Mild mitral regurgitation.  Moderate diastolic dysfunction is seen.     9-Holter monitor done on 1/14/2021: Sinus rhythm with intraventricular conduction delay and occasional PACs and very short runs of atrial tachycardia.  The longest was 3 beats at a heart rate of 100 bpm.  No significant ventricular arrhythmia.    10-Echo done on 1/21/2021- EF 50-55%, Mild to moderately increased left ventricular wall thickness with a mildly increased left ventricular cavity size. The left atrium is severely dilated (>40) with a left atrial volume index of 43 ml/m2. S/P TAVR with a mean gradient of 11 mmHg. Trivial aortic regurgitation noted. Evidence of moderate diastolic dysfunction is seen    11-CAM monitor worn on 1/21/2020- 6 days and 21 hours recorded. Average heart rate was 61 bpm, ranging from 46 to 90 bpm. First degree AV block, Mobitz type 1 second-degree AV block and occasional high grade/third degree AV block. Minimum HR occurred during 2nd degree AV block was 32 bpm.     12-[employer]tr[employer] pacemaker insertion on 2/18/2021 by Dr Anders    13- Echo done on 9/17/2021: Global left ventricular systolic function appears mildly reduced with an estimated ejection fraction of 50-55%. Moderately increased left ventricular wall thickness with a normal left ventricular cavity size. The left atrium is severely dilated (>40) with a left atrial volume index of 42 ml/m2. S/P TAVR with a mean gradient of 9 mmHg. Mild aortic insufficiency was seen. Myxomatous thickening of the mitral leaflets. Moderate mitral annular calcification is seen with a mean gradient of 4.7 mmHg. Mild mitral regurgitation was seen. Evidence of moderate diastolic dysfunction is seen.    EKG done in office 4/18/2022- Ventricular paced rhythm.    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  No atrial fibrillation.  Good

## 2024-06-12 RX ORDER — ATORVASTATIN CALCIUM 40 MG/1
TABLET, FILM COATED ORAL
Qty: 90 TABLET | Refills: 3 | Status: SHIPPED | OUTPATIENT
Start: 2024-06-12

## 2024-06-12 RX ORDER — LOSARTAN POTASSIUM 25 MG/1
TABLET ORAL
Qty: 90 TABLET | Refills: 3 | Status: SHIPPED | OUTPATIENT
Start: 2024-06-12

## 2024-06-17 ENCOUNTER — HOSPITAL ENCOUNTER (OUTPATIENT)
Age: 79
Discharge: HOME | End: 2024-06-17
Payer: MEDICARE

## 2024-06-17 DIAGNOSIS — I50.42 CHRONIC COMBINED SYSTOLIC AND DIASTOLIC CHF, NYHA CLASS 3 (HCC): ICD-10-CM

## 2024-06-17 DIAGNOSIS — R30.0 DYSURIA: ICD-10-CM

## 2024-06-17 LAB
BILIRUB UR QL STRIP: NEGATIVE
BNP SERPL-MCNC: 157 PG/ML
CLARITY UR: CLEAR
COLOR UR: YELLOW
EPI CELLS #/AREA URNS HPF: ABNORMAL /HPF (ref 0–5)
ERYTHROCYTE [DISTWIDTH] IN BLOOD BY AUTOMATED COUNT: 14.4 % (ref 11.8–14.4)
GLUCOSE UR STRIP-MCNC: NEGATIVE MG/DL
HCT VFR BLD AUTO: 40.6 % (ref 40.7–50.3)
HGB BLD-MCNC: 13.4 G/DL (ref 13–17)
HGB UR QL STRIP.AUTO: NEGATIVE
KETONES UR STRIP-MCNC: NEGATIVE MG/DL
LEUKOCYTE ESTERASE UR QL STRIP: ABNORMAL
MCH RBC QN AUTO: 31 PG (ref 25.2–33.5)
MCHC RBC AUTO-ENTMCNC: 33 G/DL (ref 28.4–34.8)
MCV RBC AUTO: 94 FL (ref 82.6–102.9)
NITRITE UR QL STRIP: NEGATIVE
NRBC BLD-RTO: 0 PER 100 WBC
PH UR STRIP: 6 [PH] (ref 5–9)
PLATELET # BLD AUTO: 172 K/UL (ref 138–453)
PMV BLD AUTO: 13 FL (ref 8.1–13.5)
PROT UR STRIP-MCNC: NEGATIVE MG/DL
RBC # BLD AUTO: 4.32 M/UL (ref 4.21–5.77)
RBC #/AREA URNS HPF: ABNORMAL /HPF (ref 0–2)
SP GR UR STRIP: 1.02 (ref 1.01–1.02)
UROBILINOGEN UR STRIP-ACNC: NORMAL EU/DL (ref 0–1)
WBC #/AREA URNS HPF: ABNORMAL /HPF (ref 0–5)
WBC OTHER # BLD: 7.3 K/UL (ref 3.5–11.3)

## 2024-06-17 PROCEDURE — 36415 COLL VENOUS BLD VENIPUNCTURE: CPT

## 2024-06-17 PROCEDURE — 81001 URINALYSIS AUTO W/SCOPE: CPT

## 2024-06-17 PROCEDURE — 85027 COMPLETE CBC AUTOMATED: CPT

## 2024-06-17 PROCEDURE — 83880 ASSAY OF NATRIURETIC PEPTIDE: CPT

## 2024-06-17 PROCEDURE — 87086 URINE CULTURE/COLONY COUNT: CPT

## 2024-06-18 LAB
MICROORGANISM SPEC CULT: NORMAL
SERVICE CMNT-IMP: NORMAL
SPECIMEN DESCRIPTION: NORMAL

## 2024-07-02 ENCOUNTER — NURSE ONLY (OUTPATIENT)
Dept: CARDIOLOGY | Age: 79
End: 2024-07-02
Payer: MEDICARE

## 2024-07-02 DIAGNOSIS — Z95.0 PACEMAKER: Primary | ICD-10-CM

## 2024-07-02 DIAGNOSIS — I44.2 AV BLOCK, 3RD DEGREE (HCC): ICD-10-CM

## 2024-07-02 PROCEDURE — 93294 REM INTERROG EVL PM/LDLS PM: CPT | Performed by: INTERNAL MEDICINE

## 2024-07-24 DIAGNOSIS — Z86.79 HISTORY OF ATRIAL FIBRILLATION: ICD-10-CM

## 2024-07-24 DIAGNOSIS — R00.1 BRADYCARDIA: ICD-10-CM

## 2024-07-24 DIAGNOSIS — I50.42 CHRONIC COMBINED SYSTOLIC AND DIASTOLIC CHF, NYHA CLASS 3 (HCC): Primary | ICD-10-CM

## 2024-07-24 DIAGNOSIS — I44.2 AV BLOCK, 3RD DEGREE (HCC): ICD-10-CM

## 2024-07-24 DIAGNOSIS — Z95.2 S/P TAVR (TRANSCATHETER AORTIC VALVE REPLACEMENT): ICD-10-CM

## 2024-07-24 DIAGNOSIS — Z79.899 ON AMIODARONE THERAPY: ICD-10-CM

## 2024-07-24 DIAGNOSIS — Z95.0 PACEMAKER: ICD-10-CM

## 2024-07-29 ENCOUNTER — HOSPITAL ENCOUNTER (OUTPATIENT)
Age: 79
Discharge: HOME | End: 2024-07-29
Payer: MEDICARE

## 2024-07-29 DIAGNOSIS — I50.42 CHRONIC COMBINED SYSTOLIC AND DIASTOLIC CHF, NYHA CLASS 3 (HCC): Chronic | ICD-10-CM

## 2024-07-29 LAB
ANION GAP SERPL CALCULATED.3IONS-SCNC: 12 MMOL/L (ref 9–17)
BUN SERPL-MCNC: 21 MG/DL (ref 8–23)
BUN/CREAT SERPL: 35 (ref 9–20)
CALCIUM SERPL-MCNC: 8.8 MG/DL (ref 8.6–10.4)
CHLORIDE SERPL-SCNC: 101 MMOL/L (ref 98–107)
CO2 SERPL-SCNC: 27 MMOL/L (ref 20–31)
CREAT SERPL-MCNC: 0.6 MG/DL (ref 0.7–1.2)
ERYTHROCYTE [DISTWIDTH] IN BLOOD BY AUTOMATED COUNT: 14.5 % (ref 11.8–14.4)
GFR, ESTIMATED: >90 ML/MIN/1.73M2
GLUCOSE SERPL-MCNC: 109 MG/DL (ref 70–99)
HCT VFR BLD AUTO: 40.6 % (ref 40.7–50.3)
HGB BLD-MCNC: 13.5 G/DL (ref 13–17)
MCH RBC QN AUTO: 31.3 PG (ref 25.2–33.5)
MCHC RBC AUTO-ENTMCNC: 33.3 G/DL (ref 28.4–34.8)
MCV RBC AUTO: 94 FL (ref 82.6–102.9)
NRBC BLD-RTO: 0 PER 100 WBC
PLATELET # BLD AUTO: 147 K/UL (ref 138–453)
PMV BLD AUTO: 12.7 FL (ref 8.1–13.5)
POTASSIUM SERPL-SCNC: 3.9 MMOL/L (ref 3.7–5.3)
RBC # BLD AUTO: 4.32 M/UL (ref 4.21–5.77)
SODIUM SERPL-SCNC: 140 MMOL/L (ref 135–144)
WBC OTHER # BLD: 6.8 K/UL (ref 3.5–11.3)

## 2024-07-29 PROCEDURE — 80048 BASIC METABOLIC PNL TOTAL CA: CPT

## 2024-07-29 PROCEDURE — 36415 COLL VENOUS BLD VENIPUNCTURE: CPT

## 2024-07-29 PROCEDURE — 85027 COMPLETE CBC AUTOMATED: CPT

## 2024-07-30 ENCOUNTER — HOSPITAL ENCOUNTER (OUTPATIENT)
Dept: PHARMACY | Age: 79
Setting detail: THERAPIES SERIES
Discharge: HOME | End: 2024-07-30
Payer: MEDICARE

## 2024-07-30 ENCOUNTER — TELEPHONE (OUTPATIENT)
Dept: CARDIOLOGY | Age: 79
End: 2024-07-30

## 2024-07-30 VITALS
OXYGEN SATURATION: 96 % | SYSTOLIC BLOOD PRESSURE: 138 MMHG | BODY MASS INDEX: 54.2 KG/M2 | WEIGHT: 315 LBS | DIASTOLIC BLOOD PRESSURE: 57 MMHG | HEART RATE: 77 BPM

## 2024-07-30 DIAGNOSIS — I50.42 CHRONIC COMBINED SYSTOLIC AND DIASTOLIC CHF, NYHA CLASS 3 (HCC): Primary | Chronic | ICD-10-CM

## 2024-07-30 PROCEDURE — 99213 OFFICE O/P EST LOW 20 MIN: CPT

## 2024-07-30 NOTE — PROGRESS NOTES
atorvastatin (LIPITOR) 40 MG tablet Take 1 tablet by mouth once daily 90 tablet 3    losartan (COZAAR) 25 MG tablet Take 1 tablet by mouth once daily 90 tablet 3    potassium chloride (KLOR-CON M) 10 MEQ extended release tablet Take 2 tablets by mouth once daily 180 tablet 0    furosemide (LASIX) 20 MG tablet TAKE 2 TABLETS BY MOUTH ON MONDAY, WEDNESDAY AND FRIDAY AND  1  TABLET  BY  MOUTH  ON  ALL  OTHER  DAYS 90 tablet 1    aspirin (EQ ASPIRIN ADULT LOW DOSE) 81 MG EC tablet Take 1 tablet by mouth once daily 90 tablet 3    tamsulosin (FLOMAX) 0.4 MG capsule Take 1 capsule by mouth daily 90 capsule 3    PARoxetine (PAXIL) 30 MG tablet Take 1 tablet by mouth every morning 90 tablet 3    Multiple Vitamins-Minerals (THERAPEUTIC MULTIVITAMIN-MINERALS) tablet Take 1 tablet by mouth daily      Cholecalciferol (VITAMIN D3) 50 MCG (2000) CAPS Take 1 capsule by mouth daily           Objective / Assessment     Patient Active Problem List   Diagnosis Code    Essential hypertension I10    Mixed hyperlipidemia E78.2    Primary osteoarthritis of knees, bilateral M17.0    Obesity, morbid (more than 100 lbs over ideal weight or BMI > 40) ([facility]) E66.01    Aortic stenosis, severe / TAVR  I35.0    Chronic combined systolic and diastolic CHF, NYHA class 3 ([facility]) I50.42    Major depressive disorder, recurrent, moderate F33.1    Secondary hypercoagulable state ([facility]) D68.69    Abscess of right breast N61.1    Breast abscess in male N61.1    Ulcer of chest wall with fat layer exposed ([facility]) L98.492     Social History     Tobacco Use    Smoking status: Former     Current packs/day: 0.00     Average packs/day: 1 pack/day for 10.0 years (10.0 ttl pk-yrs)     Types: Cigarettes     Start date:      Quit date:      Years since quittin.6    Smokeless tobacco: Never   Vaping Use    Vaping Use: Never used   Substance Use Topics    Alcohol use: Never    Drug use: No       Potassium (mmol/L)   Date Value   2024 3.9

## 2024-07-30 NOTE — DISCHARGE INSTRUCTIONS
HEART FAILURE:    With heart failure you must follow up with your Cardiologist, your PCP and other physicians as appropriate - especially 7 days after a hospitalization and then as directed.     Please call right away with any signs or symptoms of heart failure or other medical conditions that need attention or with any questions at all. Please call your Cardiologist or your PCP or the Outpatient Heart Failure [facility] at [phone].  We can help manage these symptoms and often prevent a visit to the Emergency Room or hospitalization.       * Know your dry weight (your weight without any fluid on board)    * Call any time you have questions about anything. Do not wait.    * It is very important to take your medications as prescribed, do not miss any doses.   Call for refills before you run out. Typically when you have one week left.   If you have trouble getting your medications for any reason, call us at [phone].    * Avoid NSAIDs (non steroidal anti inflammatory drugs) like Aleve (naproxen), Advil and Motrin (ibuprofen), Mobic (diclofenac), Celebrex (celecoxib) and aspirin. A daily aspirin is okay if recommended by your physician.      It is okay to take Tylenol (acetaminophen) unless your physician has told you otherwise.      * Limit sodium intake.   Typically this is no more than 2,000-2,400 milligrams (mg) per day.   You will need to add up the sodium content found on the nutrition label for the foods you eat each day.    * Weigh yourself every day. Weigh yourself before breakfast and after you go to the restroom.  Wear the same thing each time you weigh yourself.   Keep a log and bring it to each visit.   Call if you gain 3 or more pounds in 1-7 days - [phone]                                      * If you have hypertension (high blood pressure), you may want to check your blood pressure daily. Your blood pressure goal is less than 130/80 unless instructed differently by your physician. Keep a log

## 2024-07-30 NOTE — TELEPHONE ENCOUNTER
----- Message from Krzysztof Avalos MD sent at 7/30/2024 12:02 AM EDT -----  Blood work is stable.  Continue current therapy and follow-up.  Please call with questions and/or concerns.  Thank you  
Pt aware  
never intubated

## 2024-07-31 PROBLEM — J96.11 CHRONIC RESPIRATORY FAILURE WITH HYPOXIA (HCC): Status: ACTIVE | Noted: 2024-07-31

## 2024-07-31 PROBLEM — I50.32 CHRONIC DIASTOLIC CHF (CONGESTIVE HEART FAILURE), NYHA CLASS 3 (HCC): Chronic | Status: ACTIVE | Noted: 2022-04-18

## 2024-07-31 PROBLEM — N61.1 BREAST ABSCESS IN MALE: Status: RESOLVED | Noted: 2024-03-16 | Resolved: 2024-07-31

## 2024-08-20 ENCOUNTER — HOSPITAL ENCOUNTER (OUTPATIENT)
Age: 79
Setting detail: SPECIMEN
Discharge: HOME | End: 2024-08-20
Payer: MEDICARE

## 2024-08-20 DIAGNOSIS — N39.0 URINARY TRACT INFECTION WITHOUT HEMATURIA, SITE UNSPECIFIED: ICD-10-CM

## 2024-08-20 LAB
BACTERIA URNS QL MICRO: ABNORMAL
EPI CELLS #/AREA URNS HPF: ABNORMAL /HPF (ref 0–5)
RBC #/AREA URNS HPF: ABNORMAL /HPF (ref 0–2)
RENAL EPITHELIAL, UA: ABNORMAL /HPF
WBC #/AREA URNS HPF: ABNORMAL /HPF (ref 0–5)

## 2024-08-20 PROCEDURE — 86403 PARTICLE AGGLUT ANTBDY SCRN: CPT

## 2024-08-20 PROCEDURE — 81015 MICROSCOPIC EXAM OF URINE: CPT

## 2024-08-20 PROCEDURE — 87086 URINE CULTURE/COLONY COUNT: CPT

## 2024-08-20 PROCEDURE — 87186 SC STD MICRODIL/AGAR DIL: CPT

## 2024-08-21 LAB
MICROORGANISM SPEC CULT: ABNORMAL
SERVICE CMNT-IMP: ABNORMAL
SPECIMEN DESCRIPTION: ABNORMAL

## 2024-08-23 NOTE — RESULT ENCOUNTER NOTE
Please call pt and inform them their labwork results are abnormal  MRSA UTI noted.  Start bactrim DS 1 tab 2x/day for 10 days.  Push fluids and also need probiotioc tablets for next 30 days.

## 2024-09-10 ENCOUNTER — HOSPITAL ENCOUNTER (OUTPATIENT)
Age: 79
Setting detail: SPECIMEN
Discharge: HOME | End: 2024-09-10
Payer: MEDICARE

## 2024-09-10 LAB
BACTERIA URNS QL MICRO: ABNORMAL
BILIRUB UR QL STRIP: NEGATIVE
CLARITY UR: CLEAR
COLOR UR: YELLOW
EPI CELLS #/AREA URNS HPF: ABNORMAL /HPF (ref 0–5)
GLUCOSE UR STRIP-MCNC: NEGATIVE MG/DL
HGB UR QL STRIP.AUTO: NEGATIVE
KETONES UR STRIP-MCNC: NEGATIVE MG/DL
LEUKOCYTE ESTERASE UR QL STRIP: ABNORMAL
NITRITE UR QL STRIP: NEGATIVE
PH UR STRIP: 6 [PH] (ref 5–9)
PROT UR STRIP-MCNC: NEGATIVE MG/DL
RBC #/AREA URNS HPF: ABNORMAL /HPF (ref 0–2)
SP GR UR STRIP: 1.02 (ref 1.01–1.02)
UROBILINOGEN UR STRIP-ACNC: NORMAL EU/DL (ref 0–1)
WBC #/AREA URNS HPF: ABNORMAL /HPF (ref 0–5)

## 2024-09-10 PROCEDURE — 81001 URINALYSIS AUTO W/SCOPE: CPT

## 2024-09-10 PROCEDURE — 87086 URINE CULTURE/COLONY COUNT: CPT

## 2024-09-12 LAB
MICROORGANISM SPEC CULT: ABNORMAL
SPECIMEN DESCRIPTION: ABNORMAL

## 2024-09-18 ENCOUNTER — TELEPHONE (OUTPATIENT)
Dept: UROLOGY | Age: 79
End: 2024-09-18

## 2024-09-18 LAB
MICROORGANISM SPEC CULT: ABNORMAL
MICROORGANISM SPEC CULT: ABNORMAL
SPECIMEN DESCRIPTION: ABNORMAL

## 2024-09-26 ENCOUNTER — OFFICE VISIT (OUTPATIENT)
Dept: PULMONOLOGY | Age: 79
End: 2024-09-26
Payer: MEDICARE

## 2024-09-26 VITALS
HEIGHT: 69 IN | OXYGEN SATURATION: 96 % | HEART RATE: 80 BPM | RESPIRATION RATE: 20 BRPM | BODY MASS INDEX: 46.65 KG/M2 | DIASTOLIC BLOOD PRESSURE: 60 MMHG | SYSTOLIC BLOOD PRESSURE: 127 MMHG | WEIGHT: 315 LBS | TEMPERATURE: 97.6 F

## 2024-09-26 DIAGNOSIS — J96.11 CHRONIC RESPIRATORY FAILURE WITH HYPOXIA: Primary | ICD-10-CM

## 2024-09-26 DIAGNOSIS — G47.33 OSA (OBSTRUCTIVE SLEEP APNEA): ICD-10-CM

## 2024-09-26 DIAGNOSIS — R53.81 PHYSICAL DECONDITIONING: ICD-10-CM

## 2024-09-26 DIAGNOSIS — E66.01 CLASS 3 SEVERE OBESITY DUE TO EXCESS CALORIES WITH SERIOUS COMORBIDITY AND BODY MASS INDEX (BMI) OF 50.0 TO 59.9 IN ADULT: ICD-10-CM

## 2024-09-26 DIAGNOSIS — J30.89 ALLERGIC RHINITIS DUE TO OTHER ALLERGIC TRIGGER, UNSPECIFIED SEASONALITY: ICD-10-CM

## 2024-09-26 PROCEDURE — 99213 OFFICE O/P EST LOW 20 MIN: CPT | Performed by: STUDENT IN AN ORGANIZED HEALTH CARE EDUCATION/TRAINING PROGRAM

## 2024-09-26 PROCEDURE — 1036F TOBACCO NON-USER: CPT | Performed by: STUDENT IN AN ORGANIZED HEALTH CARE EDUCATION/TRAINING PROGRAM

## 2024-09-26 PROCEDURE — G8427 DOCREV CUR MEDS BY ELIG CLIN: HCPCS | Performed by: STUDENT IN AN ORGANIZED HEALTH CARE EDUCATION/TRAINING PROGRAM

## 2024-09-26 PROCEDURE — 3074F SYST BP LT 130 MM HG: CPT | Performed by: STUDENT IN AN ORGANIZED HEALTH CARE EDUCATION/TRAINING PROGRAM

## 2024-09-26 PROCEDURE — G8417 CALC BMI ABV UP PARAM F/U: HCPCS | Performed by: STUDENT IN AN ORGANIZED HEALTH CARE EDUCATION/TRAINING PROGRAM

## 2024-09-26 PROCEDURE — 3078F DIAST BP <80 MM HG: CPT | Performed by: STUDENT IN AN ORGANIZED HEALTH CARE EDUCATION/TRAINING PROGRAM

## 2024-09-26 PROCEDURE — 1123F ACP DISCUSS/DSCN MKR DOCD: CPT | Performed by: STUDENT IN AN ORGANIZED HEALTH CARE EDUCATION/TRAINING PROGRAM

## 2024-09-26 RX ORDER — ALBUTEROL SULFATE 90 UG/1
2 INHALANT RESPIRATORY (INHALATION) 4 TIMES DAILY PRN
Qty: 54 G | Refills: 1 | Status: SHIPPED | OUTPATIENT
Start: 2024-09-26

## 2024-09-30 ENCOUNTER — OFFICE VISIT (OUTPATIENT)
Dept: UROLOGY | Age: 79
End: 2024-09-30
Payer: MEDICARE

## 2024-09-30 ENCOUNTER — HOSPITAL ENCOUNTER (OUTPATIENT)
Age: 79
Setting detail: SPECIMEN
Discharge: HOME | End: 2024-09-30
Payer: MEDICARE

## 2024-09-30 VITALS
WEIGHT: 315 LBS | BODY MASS INDEX: 51.98 KG/M2 | TEMPERATURE: 97.5 F | SYSTOLIC BLOOD PRESSURE: 122 MMHG | DIASTOLIC BLOOD PRESSURE: 66 MMHG

## 2024-09-30 DIAGNOSIS — N40.1 BPH WITH OBSTRUCTION/LOWER URINARY TRACT SYMPTOMS: ICD-10-CM

## 2024-09-30 DIAGNOSIS — N39.0 FREQUENT UTI: ICD-10-CM

## 2024-09-30 DIAGNOSIS — N20.0 RENAL CALCULUS: ICD-10-CM

## 2024-09-30 DIAGNOSIS — N13.8 BPH WITH OBSTRUCTION/LOWER URINARY TRACT SYMPTOMS: ICD-10-CM

## 2024-09-30 DIAGNOSIS — R31.29 MICROHEMATURIA: ICD-10-CM

## 2024-09-30 DIAGNOSIS — R31.29 MICROHEMATURIA: Primary | ICD-10-CM

## 2024-09-30 LAB
BACTERIA URNS QL MICRO: ABNORMAL
BILIRUB UR QL STRIP: NEGATIVE
CASTS #/AREA URNS LPF: ABNORMAL /LPF
CLARITY UR: CLEAR
COLOR UR: YELLOW
EPI CELLS #/AREA URNS HPF: ABNORMAL /HPF (ref 0–5)
GLUCOSE UR STRIP-MCNC: NEGATIVE MG/DL
HGB UR QL STRIP.AUTO: NEGATIVE
KETONES UR STRIP-MCNC: NEGATIVE MG/DL
LEUKOCYTE ESTERASE UR QL STRIP: ABNORMAL
MUCOUS THREADS URNS QL MICRO: ABNORMAL
NITRITE UR QL STRIP: NEGATIVE
PH UR STRIP: 6 [PH] (ref 5–9)
PROT UR STRIP-MCNC: NEGATIVE MG/DL
RBC #/AREA URNS HPF: ABNORMAL /HPF (ref 0–2)
SP GR UR STRIP: 1.02 (ref 1.01–1.02)
UROBILINOGEN UR STRIP-ACNC: NORMAL EU/DL (ref 0–1)
WBC #/AREA URNS HPF: ABNORMAL /HPF (ref 0–5)

## 2024-09-30 PROCEDURE — 81001 URINALYSIS AUTO W/SCOPE: CPT

## 2024-09-30 PROCEDURE — 3074F SYST BP LT 130 MM HG: CPT | Performed by: UROLOGY

## 2024-09-30 PROCEDURE — 99204 OFFICE O/P NEW MOD 45 MIN: CPT | Performed by: UROLOGY

## 2024-09-30 PROCEDURE — 86403 PARTICLE AGGLUT ANTBDY SCRN: CPT

## 2024-09-30 PROCEDURE — 87086 URINE CULTURE/COLONY COUNT: CPT

## 2024-09-30 PROCEDURE — 1123F ACP DISCUSS/DSCN MKR DOCD: CPT | Performed by: UROLOGY

## 2024-09-30 PROCEDURE — 1036F TOBACCO NON-USER: CPT | Performed by: UROLOGY

## 2024-09-30 PROCEDURE — 3078F DIAST BP <80 MM HG: CPT | Performed by: UROLOGY

## 2024-09-30 PROCEDURE — 51798 US URINE CAPACITY MEASURE: CPT | Performed by: UROLOGY

## 2024-09-30 PROCEDURE — 87186 SC STD MICRODIL/AGAR DIL: CPT

## 2024-09-30 PROCEDURE — PBSHW MEAS,POST-VOID RES,US,NON-IMAGING: Performed by: UROLOGY

## 2024-09-30 PROCEDURE — G8417 CALC BMI ABV UP PARAM F/U: HCPCS | Performed by: UROLOGY

## 2024-09-30 PROCEDURE — G8427 DOCREV CUR MEDS BY ELIG CLIN: HCPCS | Performed by: UROLOGY

## 2024-09-30 RX ORDER — SULFAMETHOXAZOLE/TRIMETHOPRIM 800-160 MG
1 TABLET ORAL 2 TIMES DAILY
Qty: 20 TABLET | Refills: 0 | Status: SHIPPED | OUTPATIENT
Start: 2024-09-30 | End: 2024-10-10

## 2024-09-30 RX ORDER — LIDOCAINE HCL 4 %
CREAM (GRAM) TOPICAL
COMMUNITY

## 2024-09-30 RX ORDER — GINSENG 100 MG
200 CAPSULE ORAL DAILY
COMMUNITY

## 2024-09-30 NOTE — PROGRESS NOTES
HPI:          Patient is a 79 y.o. male in no acute distress.  He is alert and oriented to person, place, and time.         Pt here today as an ew [atient. Pt referred here by primary care for frequent UTI.  Patient has had several positive urine cultures recently.  He was not offered antibiotics after his last culture.  This was positive for Providencia stuartii.  Patient has had kidney stones in the past.  He is always passed these.  He is never seen urology for this.  He has longstanding use of Flomax.  Patient has never seen urology in the past.  Patient is passing particulate in his urine often.  He has not experienced any pneumaturia.  No flank pain nausea vomiting.  It has been several years since he has had a CT scan.    Past Medical History:   Diagnosis Date    Anxiety     Aortic stenosis     Atrial fibrillation, new onset (HCC) 03/05/2020    BPH (benign prostatic hyperplasia)     Breast abscess in male 03/16/2024    CHF (congestive heart failure) (HCC)     Diabetes mellitus (HCC)     borderline    Hyperlipidemia     Hypertension     Lymphedema     bilat legs    On home O2     Osteoarthritis     Septic shock due to urinary tract infection (HCC) /  Levophed and fluids 2020     Past Surgical History:   Procedure Laterality Date    AORTIC VALVE REPAIR  2020    TAVR    CARDIAC CATHETERIZATION  2020    HAND SURGERY N/A 3/17/2024    RIGHT BREAST ABSCESS DEBRIDEMENT INCISION AND DRAINAGE performed by Gaston Martinez MD at [facility] OR    VASECTOMY  1971     Outpatient Encounter Medications as of 9/30/2024   Medication Sig Dispense Refill    Cranberry 200 MG CAPS Take 200 mg by mouth daily      Cranberry-Vitamin C (AZO CRANBERRY URINARY TRACT) 250-60 MG CAPS Take by mouth      APPLE CIDER VINEGAR PO Take 1 capsule by mouth daily      albuterol sulfate HFA (VENTOLIN HFA) 108 (90 Base) MCG/ACT inhaler Inhale 2 puffs into the lungs 4 times daily as needed for Wheezing 54 g 1    furosemide (LASIX) 20 MG tablet TAKE 2

## 2024-10-02 ENCOUNTER — TELEPHONE (OUTPATIENT)
Dept: UROLOGY | Age: 79
End: 2024-10-02

## 2024-10-02 LAB
MICROORGANISM SPEC CULT: ABNORMAL
SERVICE CMNT-IMP: ABNORMAL
SPECIMEN DESCRIPTION: ABNORMAL

## 2024-10-02 NOTE — TELEPHONE ENCOUNTER
----- Message from SYLVIA Leija CNP sent at 10/2/2024 12:01 PM EDT -----  Urine culture is positive for infection.  Continue culture specific Bactrim as prescribed

## 2024-10-07 ENCOUNTER — TELEPHONE (OUTPATIENT)
Dept: UROLOGY | Age: 79
End: 2024-10-07

## 2024-10-07 DIAGNOSIS — R31.29 MICROHEMATURIA: Primary | ICD-10-CM

## 2024-10-10 ENCOUNTER — HOSPITAL ENCOUNTER (OUTPATIENT)
Dept: CT IMAGING | Age: 79
Discharge: HOME | End: 2024-10-12
Attending: UROLOGY
Payer: MEDICARE

## 2024-10-10 ENCOUNTER — HOSPITAL ENCOUNTER (OUTPATIENT)
Age: 79
Discharge: HOME | End: 2024-10-10
Payer: MEDICARE

## 2024-10-10 DIAGNOSIS — R31.29 MICROHEMATURIA: ICD-10-CM

## 2024-10-10 DIAGNOSIS — N13.8 BPH WITH OBSTRUCTION/LOWER URINARY TRACT SYMPTOMS: ICD-10-CM

## 2024-10-10 DIAGNOSIS — N40.1 BPH WITH OBSTRUCTION/LOWER URINARY TRACT SYMPTOMS: ICD-10-CM

## 2024-10-10 DIAGNOSIS — N39.0 FREQUENT UTI: ICD-10-CM

## 2024-10-10 DIAGNOSIS — N20.0 RENAL CALCULUS: ICD-10-CM

## 2024-10-10 LAB
BUN SERPL-MCNC: 20 MG/DL (ref 8–23)
CREAT SERPL-MCNC: 0.9 MG/DL (ref 0.7–1.2)
GFR, ESTIMATED: 87 ML/MIN/1.73M2

## 2024-10-10 PROCEDURE — 82565 ASSAY OF CREATININE: CPT

## 2024-10-10 PROCEDURE — 74178 CT ABD&PLV WO CNTR FLWD CNTR: CPT | Performed by: UROLOGY

## 2024-10-10 PROCEDURE — 6360000004 HC RX CONTRAST MEDICATION: Performed by: UROLOGY

## 2024-10-10 PROCEDURE — 36415 COLL VENOUS BLD VENIPUNCTURE: CPT

## 2024-10-10 PROCEDURE — 84520 ASSAY OF UREA NITROGEN: CPT

## 2024-10-10 RX ORDER — IOPAMIDOL 755 MG/ML
120 INJECTION, SOLUTION INTRAVASCULAR
Status: COMPLETED | OUTPATIENT
Start: 2024-10-10 | End: 2024-10-10

## 2024-10-10 RX ADMIN — IOPAMIDOL 120 ML: 755 INJECTION, SOLUTION INTRAVENOUS at 10:19

## 2024-10-15 ENCOUNTER — NURSE ONLY (OUTPATIENT)
Dept: CARDIOLOGY | Age: 79
End: 2024-10-15

## 2024-10-15 DIAGNOSIS — Z95.0 PACEMAKER: Primary | ICD-10-CM

## 2024-10-15 DIAGNOSIS — I44.2 AV BLOCK, 3RD DEGREE (HCC): ICD-10-CM

## 2024-10-17 ENCOUNTER — TELEPHONE (OUTPATIENT)
Dept: UROLOGY | Age: 79
End: 2024-10-17

## 2024-10-17 ENCOUNTER — OFFICE VISIT (OUTPATIENT)
Dept: UROLOGY | Age: 79
End: 2024-10-17

## 2024-10-17 ENCOUNTER — TELEPHONE (OUTPATIENT)
Dept: PULMONOLOGY | Age: 79
End: 2024-10-17

## 2024-10-17 VITALS
SYSTOLIC BLOOD PRESSURE: 138 MMHG | WEIGHT: 315 LBS | HEIGHT: 69 IN | DIASTOLIC BLOOD PRESSURE: 68 MMHG | TEMPERATURE: 96.8 F | BODY MASS INDEX: 46.65 KG/M2

## 2024-10-17 DIAGNOSIS — E66.01 OBESITY, MORBID (MORE THAN 100 LBS OVER IDEAL WEIGHT OR BMI > 40): Chronic | ICD-10-CM

## 2024-10-17 DIAGNOSIS — I10 ESSENTIAL HYPERTENSION: ICD-10-CM

## 2024-10-17 DIAGNOSIS — Z01.818 PRE-OP TESTING: ICD-10-CM

## 2024-10-17 DIAGNOSIS — I50.32 CHRONIC DIASTOLIC CHF (CONGESTIVE HEART FAILURE), NYHA CLASS 3 (HCC): Chronic | ICD-10-CM

## 2024-10-17 DIAGNOSIS — N20.1 URETERAL STONE: ICD-10-CM

## 2024-10-17 DIAGNOSIS — N20.0 RENAL CALCULUS: ICD-10-CM

## 2024-10-17 DIAGNOSIS — J96.11 CHRONIC RESPIRATORY FAILURE WITH HYPOXIA: ICD-10-CM

## 2024-10-17 DIAGNOSIS — N39.0 FREQUENT UTI: Primary | ICD-10-CM

## 2024-10-17 NOTE — TELEPHONE ENCOUNTER
BMP,CBC,EKG ordered today. Cardiac clearance and medical clearance to Pulmonology. Patient states although he is in wheel chair he can stand to pivot to bed. He said he would not need wyatt lift.       Marti Manzo     1945        male    [address]  [address]  [facility] [address]                    Legal Guardian NO  If yes, Name:       Skilled Facility NO     If yes, Name:                                             Home Phone: [phone]         Cell Phone:    Telephone Information:   Mobile [phone]                                           Surgeon: Sachin Surgery Date: 11-                    Time: N/A    Procedure: LEFT Ureteroscopy Thulium Laser Lithotripsy with possible LEFT ureteral stent placement.  Duration:N/A    Diagnosis: Left Ureteral Calculus    CPT Codes:86564    Important Medical History:  In Epic    First Assistant NO  Special Inst/Equip/Implants: Regular    Nickel allergy  NO  Latex Allergy: NO      Cardiac Device:  Yes  If yes, need most recent pacemaker interrogation from Cardiologist:  Type of pacemaker: [place] XT  MRI [id number]    Anesthesia:    General                       Admission Type:  Same Day                        Admit Prior to Day of Surgery: No    Case Location:  Ambulatory            Preadmission Testing:  Phone Call             PAT Date and Time: N/A    Need Preop Cardiac Clearance: YES  Need Pre-op/Medical Clearance: YES     Does Patient have Cardiologist/physician? Name of Physician:    Cardiology Krzysztof Avalos MD  Pulmonology Ulises Hare MD     Special Needs Communication:  Wyatt Lift NO    needed NO

## 2024-10-17 NOTE — TELEPHONE ENCOUNTER
Received request for pulmonary clearance for left ureteroscopy/thulium laser lithotripsy with possible left ureteral stent placement. [facility] Please put letter in patient chart. Thank you.

## 2024-10-17 NOTE — PROGRESS NOTES
History  Referral for frequent urinary tract infections    Urine cultures  9/30/2024 - MRSA  9/10/2024 - Providencia stuartii  8/2024 - MRSA  6/2024 - no growth    10/2024 CT urogram negative for renal mass.  There is a 1.5 cm stone at the left UPJ.  There is no hydronephrosis.  There is additional renal stones bilaterally.    Today  Here today to follow-up for frequent urinary tract infections.  We did obtain a CT scan.  This film was independently reviewed and showed a 1.5 cm stone at the left UPJ.  There is no hydronephrosis.  There is additional renal stones bilaterally.  The 1.5 cm stone at the UPJ is likely the source of the infections.    Plan  Discussed risks and benefits of TLL and stent placement (including anesthesia, bleeding, infection, injury to  tract, need for multiple procedures, and the risk of major complications such as ureteral perforation). We discussed the details of the procedure. We did discuss what to expect post-operatively to include: hematuria for up to two weeks, stent pain, and pain after stent removal. Patient amenable to schedule LEFT TLL.     Preop testing ordered    He will need cardiac and pulmonary clearance

## 2024-10-17 NOTE — TELEPHONE ENCOUNTER
Patient is scheduled to have a LEFT Ureteroscopy Thulium Laser Lithotripsy with possible LEFT ureteral stent placement.  procedure scheduled for 11-.    Surgery requires the following medications Aspirin 81 mg to be stopped prior to surgery Please advise on how many days anticoagulation should be stopped.    Please indicate if patient is cleared for surgery     BMP,CBC,EKG ordered today. Patient's next appointment with cardiology scheduled 11-19-24.

## 2024-10-18 ENCOUNTER — TELEPHONE (OUTPATIENT)
Dept: PULMONOLOGY | Age: 79
End: 2024-10-18

## 2024-10-18 DIAGNOSIS — G47.33 OSA (OBSTRUCTIVE SLEEP APNEA): ICD-10-CM

## 2024-10-18 DIAGNOSIS — Z01.818 PRE-OP EVALUATION: Primary | ICD-10-CM

## 2024-10-18 DIAGNOSIS — J96.11 CHRONIC RESPIRATORY FAILURE WITH HYPOXIA: ICD-10-CM

## 2024-10-19 NOTE — TELEPHONE ENCOUNTER
A 79 y.o. male with PMHx of morbid obesity, severe sleep apnea complicated with chronic hypoxemic respiratory failure secondary to alveolar hypoventilation, significant physical deconditioning, allergic rhinitis.  The patient currently on 2 to 3 L/min nasal cannula O2 supplement.  I have received a request for preoperative surgical evaluation for left ureteroscopy/thulium laser lithotripsy with possible left ureteral stent placement.     The patient has intermediate/high risk for post operative pulmonary complication since he has chronic hypoxemic respiratory failure with significant underlying MARIA ESTHER.  No objection from the pulmonary standpoint for the urology surgery/procedure although postoperative the patient should be placed on BiPAP during the recovery time.  Also the patient will need to be encouraged to use the incentive spirometry postop.    JOSE DAVID ZAMARRIPA MD  Pulmonary and critical care attending physician   [facility] respiratory specialist    Office phone number [phone]     arm/neck

## 2024-10-22 ENCOUNTER — HOSPITAL ENCOUNTER (OUTPATIENT)
Age: 79
Discharge: HOME | End: 2024-10-22
Payer: MEDICARE

## 2024-10-22 DIAGNOSIS — N20.1 URETERAL STONE: ICD-10-CM

## 2024-10-22 DIAGNOSIS — N20.0 RENAL CALCULUS: ICD-10-CM

## 2024-10-22 DIAGNOSIS — Z01.818 PRE-OP TESTING: ICD-10-CM

## 2024-10-22 DIAGNOSIS — N39.0 FREQUENT UTI: ICD-10-CM

## 2024-10-22 LAB
ANION GAP SERPL CALCULATED.3IONS-SCNC: 10 MMOL/L (ref 9–16)
BASOPHILS # BLD: 0.04 K/UL (ref 0–0.2)
BASOPHILS NFR BLD: 1 % (ref 0–2)
BUN SERPL-MCNC: 13 MG/DL (ref 8–23)
BUN/CREAT SERPL: 16 (ref 9–20)
CALCIUM SERPL-MCNC: 8.6 MG/DL (ref 8.6–10.4)
CHLORIDE SERPL-SCNC: 100 MMOL/L (ref 98–107)
CO2 SERPL-SCNC: 27 MMOL/L (ref 20–31)
CREAT SERPL-MCNC: 0.8 MG/DL (ref 0.7–1.2)
EOSINOPHIL # BLD: 0.22 K/UL (ref 0–0.44)
EOSINOPHILS RELATIVE PERCENT: 3 % (ref 1–4)
ERYTHROCYTE [DISTWIDTH] IN BLOOD BY AUTOMATED COUNT: 13.9 % (ref 11.8–14.4)
GFR, ESTIMATED: >90 ML/MIN/1.73M2
GLUCOSE SERPL-MCNC: 112 MG/DL (ref 74–99)
HCT VFR BLD AUTO: 39.4 % (ref 40.7–50.3)
HGB BLD-MCNC: 12.7 G/DL (ref 13–17)
IMM GRANULOCYTES # BLD AUTO: 0.03 K/UL (ref 0–0.3)
IMM GRANULOCYTES NFR BLD: 0 %
LYMPHOCYTES NFR BLD: 1.15 K/UL (ref 1.1–3.7)
LYMPHOCYTES RELATIVE PERCENT: 17 % (ref 24–43)
MCH RBC QN AUTO: 31 PG (ref 25.2–33.5)
MCHC RBC AUTO-ENTMCNC: 32.2 G/DL (ref 28.4–34.8)
MCV RBC AUTO: 96.1 FL (ref 82.6–102.9)
MONOCYTES NFR BLD: 0.6 K/UL (ref 0.1–1.2)
MONOCYTES NFR BLD: 9 % (ref 3–12)
NEUTROPHILS NFR BLD: 70 % (ref 36–65)
NEUTS SEG NFR BLD: 4.66 K/UL (ref 1.5–8.1)
NRBC BLD-RTO: 0 PER 100 WBC
PLATELET # BLD AUTO: ABNORMAL K/UL (ref 138–453)
PLATELET, FLUORESCENCE: 132 K/UL (ref 138–453)
PLATELETS.RETICULATED NFR BLD AUTO: 12.7 % (ref 1.1–10.3)
POTASSIUM SERPL-SCNC: 3.7 MMOL/L (ref 3.7–5.3)
RBC # BLD AUTO: 4.1 M/UL (ref 4.21–5.77)
SODIUM SERPL-SCNC: 137 MMOL/L (ref 136–145)
WBC OTHER # BLD: 6.7 K/UL (ref 3.5–11.3)

## 2024-10-22 PROCEDURE — 80048 BASIC METABOLIC PNL TOTAL CA: CPT

## 2024-10-22 PROCEDURE — 85025 COMPLETE CBC W/AUTO DIFF WBC: CPT

## 2024-10-22 PROCEDURE — 36415 COLL VENOUS BLD VENIPUNCTURE: CPT

## 2024-11-11 ENCOUNTER — OFFICE VISIT (OUTPATIENT)
Dept: CARDIOLOGY | Age: 79
End: 2024-11-11
Payer: MEDICARE

## 2024-11-11 VITALS
HEIGHT: 69 IN | HEART RATE: 84 BPM | BODY MASS INDEX: 54.24 KG/M2 | SYSTOLIC BLOOD PRESSURE: 108 MMHG | RESPIRATION RATE: 18 BRPM | DIASTOLIC BLOOD PRESSURE: 71 MMHG

## 2024-11-11 DIAGNOSIS — R00.1 BRADYCARDIA: ICD-10-CM

## 2024-11-11 DIAGNOSIS — Z01.818 PRE-OP EVALUATION: Primary | ICD-10-CM

## 2024-11-11 DIAGNOSIS — I10 ESSENTIAL HYPERTENSION: ICD-10-CM

## 2024-11-11 DIAGNOSIS — R06.02 SOB (SHORTNESS OF BREATH): ICD-10-CM

## 2024-11-11 DIAGNOSIS — Z95.2 S/P TAVR (TRANSCATHETER AORTIC VALVE REPLACEMENT): ICD-10-CM

## 2024-11-11 DIAGNOSIS — E66.01 OBESITY, MORBID (MORE THAN 100 LBS OVER IDEAL WEIGHT OR BMI > 40): ICD-10-CM

## 2024-11-11 DIAGNOSIS — I50.42 CHRONIC COMBINED SYSTOLIC AND DIASTOLIC CHF, NYHA CLASS 3 (HCC): ICD-10-CM

## 2024-11-11 DIAGNOSIS — Z79.899 ON AMIODARONE THERAPY: ICD-10-CM

## 2024-11-11 DIAGNOSIS — G47.33 OSA ON CPAP: ICD-10-CM

## 2024-11-11 DIAGNOSIS — E78.2 MIXED HYPERLIPIDEMIA: ICD-10-CM

## 2024-11-11 DIAGNOSIS — Z86.79 HISTORY OF ATRIAL FIBRILLATION: ICD-10-CM

## 2024-11-11 DIAGNOSIS — Z95.0 PACEMAKER: ICD-10-CM

## 2024-11-11 PROCEDURE — 93005 ELECTROCARDIOGRAM TRACING: CPT | Performed by: INTERNAL MEDICINE

## 2024-11-11 NOTE — PROGRESS NOTES
I, Maame Ponce am scribing for and in the presence of Krzysztof Avaols MD, F.A.C.C..    Patient: Marti Manzo  : 1945  Date of Visit: 2024    History of Present Illness:        Dear Frederick, Fredrick Rincon MD    I had the pleasure of seeing Marti Manzo in my office today. Mr. Manzo is a 79 y.o. male who presented for follow-up.      1-He has  a history of heart failure. He has a history of hypertension and hyperlipidemia for years, and has borderline diabetes.     2- ECG on 3/20: sinus tachycardia with first-degree AV block and nonspecific intraventricular conduction delay.    3-Echocardiogram done 3/4/20 which showed severely reduced left ventricular systolic function with ejection fraction of 35% and moderate to severe aortic stenosis.  This can be an underestimation because of reduced left ventricular systolic function.  Mild to moderate aortic regurgitation.  Moderate mitral regurgitation and moderate diastolic dysfunction.    4-Heart Catheterization done on 3/12/20: LMCA: Normal 0% stenosis. LAD: Mild irregularities 20-30% LCx: Lesion on 2nd Ob Angie Ostial 50% stenosis. RCA: Mild irregularities 10-20%. EF:35%.  Severe aortic stenosis by cardiac catheterization with peak to peak gradient of 67 mmHg and mean gradient of 48 mmHg across the aortic valve.    5-Admission to [facility] on 3/4/20-3/9/20 due to acute heart failure: Patient treated for acute on chronic combined CHF and aortic stenosis, later developed atrial fibrillation with rapid ventricular response.  Moved to the ICU and started on amiodarone drip and then later converted back to normal sinus rhythm. Amiodarone oral tablet was started prior to discharge.    6-On 2020, he presented to the emergency room with epistaxis.  Nasal packing done that removed after 3 days.    7-Transcatheter aortic valve replacement with a Medtronic 34 mm Evolut Pro Plus prosthesis 9/15/2020    8-Echocardiogram done on 10/13/2020 showed an 
EF of 55%. Mild to moderate LV hypertrophy.  Status post TAVR with mean gradient of 9 mmHg, mild perivalvular regurgitation noted.  Mild mitral regurgitation.  Moderate diastolic dysfunction is seen.     9-Holter monitor done on 1/14/2021: Sinus rhythm with intraventricular conduction delay and occasional PACs and very short runs of atrial tachycardia.  The longest was 3 beats at a heart rate of 100 bpm.  No significant ventricular arrhythmia.    10-Echo done on 1/21/2021- EF 50-55%, Mild to moderately increased left ventricular wall thickness with a mildly increased left ventricular cavity size. The left atrium is severely dilated (>40) with a left atrial volume index of 43 ml/m2. S/P TAVR with a mean gradient of 11 mmHg. Trivial aortic regurgitation noted. Evidence of moderate diastolic dysfunction is seen    11-CAM monitor worn on 1/21/2020- 6 days and 21 hours recorded. Average heart rate was 61 bpm, ranging from 46 to 90 bpm. First degree AV block, Mobitz type 1 second-degree AV block and occasional high grade/third degree AV block. Minimum HR occurred during 2nd degree AV block was 32 bpm.     12-Medtronic pacemaker insertion on 2/18/2021 by Dr Anders    13- Echo done on 9/17/2021: Global left ventricular systolic function appears mildly reduced with an estimated ejection fraction of 50-55%. Moderately increased left ventricular wall thickness with a normal left ventricular cavity size. The left atrium is severely dilated (>40) with a left atrial volume index of 42 ml/m2. S/P TAVR with a mean gradient of 9 mmHg. Mild aortic insufficiency was seen. Myxomatous thickening of the mitral leaflets. Moderate mitral annular calcification is seen with a mean gradient of 4.7 mmHg. Mild mitral regurgitation was seen. Evidence of moderate diastolic dysfunction is seen.    EKG done in office 4/18/2022- Ventricular paced rhythm.    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  No atrial fibrillation.  Good

## 2024-11-15 ENCOUNTER — ANESTHESIA EVENT (OUTPATIENT)
Dept: OPERATING ROOM | Age: 79
End: 2024-11-15
Payer: MEDICARE

## 2024-11-15 ENCOUNTER — TELEPHONE (OUTPATIENT)
Dept: PREADMISSION TESTING | Age: 79
End: 2024-11-15

## 2024-11-15 ENCOUNTER — HOSPITAL ENCOUNTER (OUTPATIENT)
Age: 79
Discharge: HOME | End: 2024-11-17
Payer: MEDICARE

## 2024-11-15 ENCOUNTER — HOSPITAL ENCOUNTER (OUTPATIENT)
Dept: GENERAL RADIOLOGY | Age: 79
Discharge: HOME | End: 2024-11-17
Payer: MEDICARE

## 2024-11-15 ENCOUNTER — HOSPITAL ENCOUNTER (OUTPATIENT)
Age: 79
Discharge: HOME | End: 2024-11-15
Payer: MEDICARE

## 2024-11-15 DIAGNOSIS — Z01.818 PRE-OP EVALUATION: ICD-10-CM

## 2024-11-15 DIAGNOSIS — R06.02 SOB (SHORTNESS OF BREATH): ICD-10-CM

## 2024-11-15 DIAGNOSIS — Z79.899 ON AMIODARONE THERAPY: ICD-10-CM

## 2024-11-15 LAB
ALBUMIN SERPL-MCNC: 3.9 G/DL (ref 3.5–5.2)
ALBUMIN/GLOB SERPL: 1.2 {RATIO} (ref 1–2.5)
ALP SERPL-CCNC: 50 U/L (ref 40–129)
ALT SERPL-CCNC: 17 U/L (ref 10–50)
AST SERPL-CCNC: 16 U/L (ref 10–50)
BILIRUB DIRECT SERPL-MCNC: 0.3 MG/DL (ref 0–0.3)
BILIRUB INDIRECT SERPL-MCNC: 0.4 MG/DL (ref 0–1)
BILIRUB SERPL-MCNC: 0.7 MG/DL (ref 0–1.2)
BNP SERPL-MCNC: 232 PG/ML (ref 0–450)
PROT SERPL-MCNC: 7.1 G/DL (ref 6.6–8.7)
TSH SERPL DL<=0.05 MIU/L-ACNC: 2.12 UIU/ML (ref 0.27–4.2)

## 2024-11-15 PROCEDURE — 83880 ASSAY OF NATRIURETIC PEPTIDE: CPT

## 2024-11-15 PROCEDURE — 71046 X-RAY EXAM CHEST 2 VIEWS: CPT

## 2024-11-15 PROCEDURE — 84443 ASSAY THYROID STIM HORMONE: CPT

## 2024-11-15 PROCEDURE — 80076 HEPATIC FUNCTION PANEL: CPT

## 2024-11-15 PROCEDURE — 36415 COLL VENOUS BLD VENIPUNCTURE: CPT

## 2024-11-15 NOTE — TELEPHONE ENCOUNTER
Chart and clearances reviewed. Pulmonology indicated that patient is at intermediate/high risk for postoperative pulmonary complications. We will plan to proceed with a spinal. Should spinal fail, and we have to proceed with a general anesthetic, patient will likely require intubation d/t BMI. Patient may require postoperative admission for ventilator management if he is unable to be extubated postoperatively. Please make nursing supervisors aware of possible need for ICU bed. Pulmonologist also states that patient should be placed on BiPAP during recovery. Can arrangements be made for RRT to be available with BiPAP following surgery if needed? We also need to consider transport options d/t patient weight if extended postop ventilation is required. Can the patient be flown if needed?

## 2024-11-15 NOTE — TELEPHONE ENCOUNTER
FYI: Patient is scheduled on 11/19 with Dr Stephenson for a left HLL with stent placement. BMI is 54. Patient does have clearances through cardiology (Robbie-11/11) and pulmonology (Payam-10/18). He is on oxygen-patient states he keeps it around 2.5 l/min via nasal cannula. He also completed a chest xray order today (11/15) per Dr. Avalos.

## 2024-11-15 NOTE — PROGRESS NOTES
Patient instructed on the pre-operative, intra-operative, and post-operative process. Patient instructed on NPO status. Medication instructions and pre operative instruction sheet reviewed with the patient. G skin prep instructions reviewed with patient. NPO status (including no gum, hard candy, mints, water, coffee, or smoking) was reviewed and patient verbalizes understanding. Patient was instructed to stop OTC vitamins, he stopped his ASA 81 mg on 11/12 per Dr. Avalos, and he was instructed to take his tamsulosin, amiodarone, and paroxetine with a small sip of water on the morning of his procedure. Patient denies any fever related illnesses or antibiotic use in the last 4 weeks. Patient is on 2-3 L/min of oxygen via nasal cannula. He does have clearances through cardiology and pulmonology. Patient's chart was sent to anesthesia.

## 2024-11-18 ENCOUNTER — TELEPHONE (OUTPATIENT)
Dept: CARDIOLOGY | Age: 79
End: 2024-11-18

## 2024-11-18 NOTE — H&P
History and Physical    Patient:  Marti Manzo  MRN: [id number]    CHIEF COMPLAINT:  left flank pain    HISTORY OF PRESENT ILLNESS:   The patient is a 79 y.o. male who presents with left flank pain. Discussed risks and benefits of TLL and stent placement (including anesthesia, bleeding, infection, injury to  tract, need for multiple procedures, and the risk of major complications such as ureteral perforation). We discussed the details of the procedure. We did discuss what to expect post-operatively to include: hematuria for up to two weeks, stent pain, and pain after stent removal. Patient amenable to schedule LEFT TLL.     Past Medical History:    Past Medical History:   Diagnosis Date    Anxiety     Aortic stenosis     Atrial fibrillation, new onset (HCC) 03/05/2020    BPH (benign prostatic hyperplasia)     Breast abscess in male 03/16/2024    CHF (congestive heart failure) (HCC)     Diabetes mellitus (HCC)     borderline    Hyperlipidemia     Hypertension     Lymphedema     bilat legs    On home O2     Osteoarthritis     Septic shock due to urinary tract infection (HCC) /  Levophed and fluids 2020       Past Surgical History:    Past Surgical History:   Procedure Laterality Date    AORTIC VALVE REPAIR  2020    TAVR    CARDIAC CATHETERIZATION  2020    HAND SURGERY N/A 3/17/2024    RIGHT BREAST ABSCESS DEBRIDEMENT INCISION AND DRAINAGE performed by Gaston Martinez MD at [facility] OR    VASECTOMY  1971       Medications Prior to Admission:    Prior to Admission medications    Medication Sig Start Date End Date Taking? Authorizing Provider   potassium chloride (KLOR-CON M) 10 MEQ extended release tablet Take 2 tablets by mouth daily 10/8/24   Fredrick Mack MD   Cranberry 200 MG CAPS Take 200 mg by mouth daily    Ricardo Leyva MD   Cranberry-Vitamin C (AZO CRANBERRY URINARY TRACT) 250-60 MG CAPS Take by mouth    Ricardo Leyva MD   APPLE CIDER VINEGAR PO Take 1 capsule by mouth daily    Provider

## 2024-11-18 NOTE — TELEPHONE ENCOUNTER
Flight can fly up to 500 lbs-but that's more dependent on height and weight or also how they carry their weight/getting the patient correctly situated on the flight bed and in the helicopter. I will talk with Belle and get this patient moved up in the day tomorrow. Will also notify supervisor and respiratory.

## 2024-11-18 NOTE — TELEPHONE ENCOUNTER
----- Message from Dr. Krzysztof Avalos MD sent at 11/16/2024 10:00 PM EST -----  Chest X-ray and blood work are good. Thank you

## 2024-11-19 ENCOUNTER — ANESTHESIA (OUTPATIENT)
Dept: OPERATING ROOM | Age: 79
End: 2024-11-19
Payer: MEDICARE

## 2024-11-19 ENCOUNTER — HOSPITAL ENCOUNTER (OUTPATIENT)
Age: 79
Setting detail: OUTPATIENT SURGERY
Discharge: HOME | End: 2024-11-19
Attending: UROLOGY | Admitting: UROLOGY
Payer: MEDICARE

## 2024-11-19 ENCOUNTER — APPOINTMENT (OUTPATIENT)
Dept: GENERAL RADIOLOGY | Age: 79
End: 2024-11-19
Attending: UROLOGY
Payer: MEDICARE

## 2024-11-19 VITALS
SYSTOLIC BLOOD PRESSURE: 112 MMHG | BODY MASS INDEX: 46.65 KG/M2 | HEART RATE: 60 BPM | WEIGHT: 315 LBS | TEMPERATURE: 97 F | DIASTOLIC BLOOD PRESSURE: 60 MMHG | OXYGEN SATURATION: 97 % | RESPIRATION RATE: 18 BRPM | HEIGHT: 69 IN

## 2024-11-19 PROCEDURE — 6360000002 HC RX W HCPCS: Performed by: UROLOGY

## 2024-11-19 PROCEDURE — C2617 STENT, NON-COR, TEM W/O DEL: HCPCS | Performed by: UROLOGY

## 2024-11-19 PROCEDURE — 2580000003 HC RX 258

## 2024-11-19 PROCEDURE — C1747 HC ENDOSCOPE, SINGLE, URINARY TRACT: HCPCS | Performed by: UROLOGY

## 2024-11-19 PROCEDURE — C1769 GUIDE WIRE: HCPCS | Performed by: UROLOGY

## 2024-11-19 PROCEDURE — 6370000000 HC RX 637 (ALT 250 FOR IP)

## 2024-11-19 PROCEDURE — 2709999900 HC NON-CHARGEABLE SUPPLY: Performed by: UROLOGY

## 2024-11-19 PROCEDURE — C1758 CATHETER, URETERAL: HCPCS | Performed by: UROLOGY

## 2024-11-19 PROCEDURE — 7100000000 HC PACU RECOVERY - FIRST 15 MIN: Performed by: UROLOGY

## 2024-11-19 PROCEDURE — 3700000000 HC ANESTHESIA ATTENDED CARE: Performed by: UROLOGY

## 2024-11-19 PROCEDURE — 3700000001 HC ADD 15 MINUTES (ANESTHESIA): Performed by: UROLOGY

## 2024-11-19 PROCEDURE — 6360000002 HC RX W HCPCS

## 2024-11-19 PROCEDURE — 6370000000 HC RX 637 (ALT 250 FOR IP): Performed by: NURSE ANESTHETIST, CERTIFIED REGISTERED

## 2024-11-19 PROCEDURE — 7100000001 HC PACU RECOVERY - ADDTL 15 MIN: Performed by: UROLOGY

## 2024-11-19 PROCEDURE — 2500000003 HC RX 250 WO HCPCS

## 2024-11-19 PROCEDURE — 7100000011 HC PHASE II RECOVERY - ADDTL 15 MIN: Performed by: UROLOGY

## 2024-11-19 PROCEDURE — 3600000014 HC SURGERY LEVEL 4 ADDTL 15MIN: Performed by: UROLOGY

## 2024-11-19 PROCEDURE — 7100000010 HC PHASE II RECOVERY - FIRST 15 MIN: Performed by: UROLOGY

## 2024-11-19 PROCEDURE — 6370000000 HC RX 637 (ALT 250 FOR IP): Performed by: UROLOGY

## 2024-11-19 PROCEDURE — 3600000004 HC SURGERY LEVEL 4 BASE: Performed by: UROLOGY

## 2024-11-19 DEVICE — URETERAL STENT
Type: IMPLANTABLE DEVICE | Site: URETER | Status: FUNCTIONAL
Brand: PERCUFLEX™ PLUS

## 2024-11-19 RX ORDER — ACETAMINOPHEN 325 MG/1
650 TABLET ORAL ONCE
Status: COMPLETED | OUTPATIENT
Start: 2024-11-19 | End: 2024-11-19

## 2024-11-19 RX ORDER — LIDOCAINE HYDROCHLORIDE 20 MG/ML
JELLY TOPICAL PRN
Status: DISCONTINUED | OUTPATIENT
Start: 2024-11-19 | End: 2024-11-19 | Stop reason: ALTCHOICE

## 2024-11-19 RX ORDER — SODIUM CHLORIDE 0.9 % (FLUSH) 0.9 %
5-40 SYRINGE (ML) INJECTION EVERY 12 HOURS SCHEDULED
Status: CANCELLED | OUTPATIENT
Start: 2024-11-19

## 2024-11-19 RX ORDER — LABETALOL HYDROCHLORIDE 5 MG/ML
INJECTION, SOLUTION INTRAVENOUS
Status: DISCONTINUED | OUTPATIENT
Start: 2024-11-19 | End: 2024-11-19 | Stop reason: SDUPTHER

## 2024-11-19 RX ORDER — BUPIVACAINE HYDROCHLORIDE 7.5 MG/ML
INJECTION, SOLUTION INTRASPINAL
Status: DISCONTINUED | OUTPATIENT
Start: 2024-11-19 | End: 2024-11-19 | Stop reason: SDUPTHER

## 2024-11-19 RX ORDER — SODIUM CHLORIDE, SODIUM LACTATE, POTASSIUM CHLORIDE, CALCIUM CHLORIDE 600; 310; 30; 20 MG/100ML; MG/100ML; MG/100ML; MG/100ML
INJECTION, SOLUTION INTRAVENOUS
Status: DISCONTINUED | OUTPATIENT
Start: 2024-11-19 | End: 2024-11-19 | Stop reason: SDUPTHER

## 2024-11-19 RX ORDER — DEXMEDETOMIDINE HYDROCHLORIDE 4 UG/ML
INJECTION, SOLUTION INTRAVENOUS
Status: DISCONTINUED | OUTPATIENT
Start: 2024-11-19 | End: 2024-11-19 | Stop reason: SDUPTHER

## 2024-11-19 RX ORDER — OXYMETAZOLINE HYDROCHLORIDE 0.05 G/100ML
SPRAY NASAL
Status: DISCONTINUED | OUTPATIENT
Start: 2024-11-19 | End: 2024-11-19 | Stop reason: SDUPTHER

## 2024-11-19 RX ORDER — FAMOTIDINE 10 MG/ML
INJECTION, SOLUTION INTRAVENOUS
Status: DISCONTINUED | OUTPATIENT
Start: 2024-11-19 | End: 2024-11-19 | Stop reason: SDUPTHER

## 2024-11-19 RX ORDER — LEVOFLOXACIN 5 MG/ML
500 INJECTION, SOLUTION INTRAVENOUS
Status: COMPLETED | OUTPATIENT
Start: 2024-11-19 | End: 2024-11-19

## 2024-11-19 RX ORDER — METOCLOPRAMIDE HYDROCHLORIDE 5 MG/ML
INJECTION INTRAMUSCULAR; INTRAVENOUS
Status: DISCONTINUED | OUTPATIENT
Start: 2024-11-19 | End: 2024-11-19 | Stop reason: SDUPTHER

## 2024-11-19 RX ORDER — SODIUM CHLORIDE 9 MG/ML
INJECTION, SOLUTION INTRAVENOUS PRN
Status: CANCELLED | OUTPATIENT
Start: 2024-11-19

## 2024-11-19 RX ORDER — DIMENHYDRINATE 50 MG
50 TABLET ORAL ONCE
Status: COMPLETED | OUTPATIENT
Start: 2024-11-19 | End: 2024-11-19

## 2024-11-19 RX ORDER — NALOXONE HYDROCHLORIDE 0.4 MG/ML
INJECTION, SOLUTION INTRAMUSCULAR; INTRAVENOUS; SUBCUTANEOUS PRN
Status: CANCELLED | OUTPATIENT
Start: 2024-11-19

## 2024-11-19 RX ORDER — SODIUM CHLORIDE 0.9 % (FLUSH) 0.9 %
5-40 SYRINGE (ML) INJECTION PRN
Status: CANCELLED | OUTPATIENT
Start: 2024-11-19

## 2024-11-19 RX ADMIN — DEXMEDETOMIDINE HYDROCHLORIDE IN 0.9% SODIUM CHLORIDE 8 MCG: 4 INJECTION INTRAVENOUS at 09:30

## 2024-11-19 RX ADMIN — METOCLOPRAMIDE 10 MG: 5 INJECTION, SOLUTION INTRAMUSCULAR; INTRAVENOUS at 08:20

## 2024-11-19 RX ADMIN — DIMENHYDRINATE 50 MG: 50 TABLET ORAL at 07:17

## 2024-11-19 RX ADMIN — ACETAMINOPHEN 650 MG: 325 TABLET ORAL at 07:17

## 2024-11-19 RX ADMIN — DEXMEDETOMIDINE HYDROCHLORIDE IN 0.9% SODIUM CHLORIDE 8 MCG: 4 INJECTION INTRAVENOUS at 09:36

## 2024-11-19 RX ADMIN — SODIUM CHLORIDE, POTASSIUM CHLORIDE, SODIUM LACTATE AND CALCIUM CHLORIDE: 600; 310; 30; 20 INJECTION, SOLUTION INTRAVENOUS at 08:27

## 2024-11-19 RX ADMIN — BUPIVACAINE HYDROCHLORIDE IN DEXTROSE 1.8 ML: 7.5 INJECTION, SOLUTION SUBARACHNOID at 08:58

## 2024-11-19 RX ADMIN — LABETALOL HYDROCHLORIDE 5 MG: 5 INJECTION INTRAVENOUS at 09:27

## 2024-11-19 RX ADMIN — OXYMETAZOLINE HYDROCHLORIDE 1 SPRAY: 0.5 SPRAY NASAL at 08:25

## 2024-11-19 RX ADMIN — FAMOTIDINE 20 MG: 10 INJECTION, SOLUTION INTRAVENOUS at 08:20

## 2024-11-19 RX ADMIN — LEVOFLOXACIN 500 MG: 500 INJECTION, SOLUTION INTRAVENOUS at 08:24

## 2024-11-19 ASSESSMENT — PAIN SCALES - GENERAL
PAINLEVEL_OUTOF10: 0

## 2024-11-19 ASSESSMENT — LIFESTYLE VARIABLES: SMOKING_STATUS: 0

## 2024-11-19 ASSESSMENT — COPD QUESTIONNAIRES: CAT_SEVERITY: SEVERE

## 2024-11-19 ASSESSMENT — PAIN - FUNCTIONAL ASSESSMENT: PAIN_FUNCTIONAL_ASSESSMENT: 0-10

## 2024-11-19 ASSESSMENT — ENCOUNTER SYMPTOMS: SHORTNESS OF BREATH: 1

## 2024-11-19 NOTE — PROGRESS NOTES
After multiple attempts patient able to have feeling in legs at this time. Patient able to take a few steps using walker and transfers into wheelchair without difficulty. Patient states feels like his base line and ready to be discharge at this time. Discharge Criteria    Inpatients must meet Criteria 1 through 7. All other patients are either YES or N/A. If a NO is chosen then Anesthesia or Surgeon must be notified.      1.  Minimum 30 minutes after last dose of sedative medication.    Yes      2.  Systolic BP between 90 - 160. Diastolic BP between 60 - 90.    Yes      3.  Pulse between 60 - 120    Yes      4.  Respirations between 8 - 25.    Yes      5.  SpO2 92% - 100%.    Yes      6.  Able to cough and swallow or return to baseline function.    Yes      7.  Alert and oriented or return to baseline mental status.    Yes      8.  Demonstrates controlled, coordinated movements, ambulates with steady gait, or return to baseline activity function.    Yes      9.  Minimal or no pain or nausea, or at a level tolerable and acceptable to patient.    Yes      10. Takes and retains oral fluids as allowed.    Yes      11. Procedural / perioperative site stable.  Minimal or no bleeding.    Yes          12. If GI endoscopy procedure, minimal or no abdominal distention or passing flatus.    N/A    13. Written discharge instructions and emergency telephone number provided.    Yes      14. Accompanied by a responsible adult.    Yes

## 2024-11-19 NOTE — OP NOTE
[address] [address]                            OPERATIVE REPORT      PATIENT NAME: TONI BENÍTEZ               : 1945  MED REC NO: [id number]                          ROOM: [facility]  ACCOUNT NO: [id number]                       ADMIT DATE: 2024  PROVIDER: Bill Stephenson MD      DATE OF PROCEDURE:  2024    SURGEON:  Bill Stephenson MD    ASSISTANT:  None.    PREOPERATIVE DIAGNOSIS:  Left renal calculus.    POSTOPERATIVE DIAGNOSIS:  Left renal calculus.    PROCEDURES:  Cystoscopy, left ureteroscopy, left thulium laser lithotripsy, left ureteral stent placement.    ANESTHESIA:  General.    COMPLICATIONS:  None.    ESTIMATED BLOOD LOSS:  Minimal.    SPECIMENS:  None.    PROSTHESIS:  6-Indonesian x 26 cm double-J ureteral stent.    DISPOSITION:  Stable.    FINDINGS:  1.5 cm left renal stone.    INDICATIONS:  The patient is an 79-year-old male with known left-sided stones, here now for definitive therapy.    DESCRIPTION OF PROCEDURE:  The patient was taken back to the operating room.  Informed consent including all risks, benefits, and alternatives obtained.  The patient was transferred from [facility] onto the operative table where he was induced under general anesthesia, given IV Ancef for preoperative antibiotic prophylaxis.  To begin the case, prepped and draped in normal sterile fashion, placed in dorsal lithotomy.  He had 22-Indonesian sheath 30-degree lens passed through the urethra into the bladder.  Once in the bladder, identified the left ureteral orifice, 0.035-inch wire was used.  Dual-lumen catheter was used.  Additional Glidewire was passed up.  I then placed flexible ureteroscope over the Glidewire up into the kidney, identified a large stone.  We were able to take a 400 micron laser fiber and ablate the stone adequately.  Once this was done, we went to the lower pole and ablated smaller stones.  We then

## 2024-11-19 NOTE — INTERVAL H&P NOTE
History and Physical reviewed  I have examined the patient and no changes    Bill Stephenson MD

## 2024-11-19 NOTE — DISCHARGE INSTRUCTIONS
SAME DAY SURGERY DISCHARGE INSTRUCTIONS    1.  Do not drive or operate hazardous machinery for 24 hours.    2.  Do not make important personal or business decisions for 24 hours.    3.  Do not drink alcoholic beverages for 24 hours.    4.  Do not smoke tobacco products for 24 hours.    5.  Eat light foods (Jell-O, soups, etc....) and drink plenty of fluids (water, Sprite, etc...) up to 8 glasses per day, as you can tolerate.    6.  Limit your activities for 24 hours.  Do not engage in heavy work until your surgeon gives you permission.      7.  Patient should not be left alone for 12-24 hours following surgical procedure.    8.  Wash hands before and after incision care.  It is important to practice good personal hygiene during the post op period.    9.  Call your surgeon for any questions regarding your surgery.    CYSTOSCOPY DISCHARGE INSTRUCTIONS    Possible burning during urination and/or blood tinged urine.    Drink 6-8 glasses of water for the next day or so.  (This helps to flush the urinary tract.)    Call Dr. Stephenson ([phone]) if you develop:    Fever over 100 degrees  Prolonged soreness/pain  Unusual bleeding/bruising  Unable to urinate or if urine is bloody  You cannot pass urine 8 hours after the test.  You have pain in your belly or your back just below your rib cage.  (This is called flank pain.)  You have frequent urge to urinate but can pass only small amounts of urine.    Call Dr. Stephenson office for follow-up appointment ([phone]).

## 2024-11-19 NOTE — ANESTHESIA PROCEDURE NOTES
Spinal Block    Patient location during procedure: OR  End time: 11/19/2024 8:58 AM  Reason for block: primary anesthetic  Staffing  Performed: resident/CRNA   Resident/CRNA: Liliane Mckeon APRN - CRNA  Other anesthesia staff: Jodie Christensen APRN - CRNA  Performed by: Liliane Mckeon APRN - CRNA  Authorized by: Liliane Mckeon APRN - CRNA    Spinal Block  Patient position: sitting  Prep: ChloraPrep and site prepped and draped  Patient monitoring: continuous pulse ox and frequent blood pressure checks (cardiac monitor, capnometry, and oxgen readily available)  Approach: midline  Location: L4/L5  Guidance: anatomy guided  Provider prep: mask and sterile gloves  Local infiltration: lidocaine  Needle  Needle type: Quincke   Needle gauge: 25 G (20G)  Needle length: 3.5 in  Assessment  Sensory level: T6  Swirl obtained: Yes  CSF: clear  Attempts: 3+  Hemodynamics: stable  Additional Notes  1.8 mL 0.75% bupivacaine- 8.25% dextrose    4 attempts made with 25G sprotte and 22G Azalea @ L3-L4 and L4-L5 by VICKIE Mckeon CRNA unsuccessful  4 attempts made with 22G Azalea and 20G quincke @ L3-L4 and L4-L5 by FAMILIA Christensen CRNA successful with 20G quincke  Preanesthetic Checklist  Completed: patient identified, IV checked, site marked, risks and benefits discussed, surgical/procedural consents, equipment checked, pre-op evaluation, timeout performed, anesthesia consent given, oxygen available, monitors applied/VS acknowledged and blood product R/B/A discussed and consented

## 2024-11-19 NOTE — ANESTHESIA POSTPROCEDURE EVALUATION
Department of Anesthesiology  Postprocedure Note    Patient: Marti Manzo  MRN: [id number]  YOB: 1945  Date of evaluation: 11/19/2024    Procedure Summary       Date: 11/19/24 Room / Location: [facility]    Anesthesia Start: 0827 Anesthesia Stop: 0951    Procedures:       CYSTOSCOPY URETEROSCOPY LASER-thulium laser (Left)      CYSTOSCOPY URETERAL STENT INSERTION (Left) Diagnosis:       Left ureteral calculus      (Left ureteral calculus [N20.1])    Surgeons: Bill Stephenson MD Responsible Provider: Liliane Mckeon APRN - CRNA    Anesthesia Type: spinal ASA Status: 3            Anesthesia Type: No value filed.    Honey Phase I: Honey Score: 8    Honey Phase II: Honey Score: 8    Anesthesia Post Evaluation    Patient location during evaluation: PACU  Patient participation: complete - patient participated  Level of consciousness: awake and alert  Airway patency: patent  Nausea & Vomiting: no nausea and no vomiting  Cardiovascular status: blood pressure returned to baseline  Respiratory status: acceptable  Hydration status: euvolemic  Pain management: adequate and satisfactory to patient    No notable events documented.

## 2024-11-19 NOTE — ANESTHESIA PRE PROCEDURE
Department of Anesthesiology  Preprocedure Note       Name:  Marti Manzo   Age:  79 y.o.  :  1945                                          MRN:  [id number]         Date:  2024      Surgeon: Surgeon(s):  Bill Stephenson MD    Procedure: Procedure(s):  CYSTOSCOPY URETEROSCOPY LASER-thulium laser with possible ureteral stent    Medications prior to admission:   Prior to Admission medications    Medication Sig Start Date End Date Taking? Authorizing Provider   potassium chloride (KLOR-CON M) 10 MEQ extended release tablet Take 2 tablets by mouth daily 10/8/24  Yes Fredrick Mack MD   Cranberry 200 MG CAPS Take 200 mg by mouth daily   Yes Ricardo Leyva MD   Cranberry-Vitamin C (AZO CRANBERRY URINARY TRACT) 250-60 MG CAPS Take by mouth   Yes Ricardo Leyva MD   APPLE CIDER VINEGAR PO Take 1 capsule by mouth daily   Yes Ricardo Leyva MD   furosemide (LASIX) 20 MG tablet TAKE 2 TABLETS BY MOUTH ON MONDAY, WEDNESDAY AND FRIDAY AND  1  TABLET  BY  MOUTH  ON  ALL  OTHER  DAYS 24  Yes Fredrick Mack MD   PARoxetine (PAXIL) 30 MG tablet TAKE 1 TABLET BY MOUTH ONCE DAILY IN THE MORNING 24  Yes Fredrick Mack MD   amiodarone (CORDARONE) 200 MG tablet Take 1 tablet by mouth once daily 24  Yes Fredrick Mack MD   atorvastatin (LIPITOR) 40 MG tablet Take 1 tablet by mouth once daily 24  Yes Noa Reza PA-C   losartan (COZAAR) 25 MG tablet Take 1 tablet by mouth once daily 24  Yes Noa Reza PA-C   aspirin (EQ ASPIRIN ADULT LOW DOSE) 81 MG EC tablet Take 1 tablet by mouth once daily 3/29/24  Yes Noa Reza PA-C   tamsulosin (FLOMAX) 0.4 MG capsule Take 1 capsule by mouth daily 23  Yes Fredrick Mack MD   Multiple Vitamins-Minerals (THERAPEUTIC MULTIVITAMIN-MINERALS) tablet Take 1 tablet by mouth daily   Yes Ricardo Leyva MD   Cholecalciferol (VITAMIN D3) 50 MCG (2000) CAPS Take 1 capsule by mouth daily   Yes

## 2024-11-26 ENCOUNTER — OFFICE VISIT (OUTPATIENT)
Dept: UROLOGY | Age: 79
End: 2024-11-26

## 2024-11-26 ENCOUNTER — HOSPITAL ENCOUNTER (OUTPATIENT)
Age: 79
Setting detail: SPECIMEN
Discharge: HOME | End: 2024-11-26
Payer: MEDICARE

## 2024-11-26 VITALS
SYSTOLIC BLOOD PRESSURE: 99 MMHG | BODY MASS INDEX: 54.39 KG/M2 | TEMPERATURE: 98.4 F | HEART RATE: 83 BPM | DIASTOLIC BLOOD PRESSURE: 62 MMHG | WEIGHT: 315 LBS

## 2024-11-26 DIAGNOSIS — N39.0 FREQUENT UTI: ICD-10-CM

## 2024-11-26 DIAGNOSIS — N20.1 URETERAL STONE: Primary | ICD-10-CM

## 2024-11-26 DIAGNOSIS — N20.1 URETERAL STONE: ICD-10-CM

## 2024-11-26 PROCEDURE — 87086 URINE CULTURE/COLONY COUNT: CPT

## 2024-11-26 PROCEDURE — 87077 CULTURE AEROBIC IDENTIFY: CPT

## 2024-11-26 RX ORDER — SULFAMETHOXAZOLE AND TRIMETHOPRIM 800; 160 MG/1; MG/1
1 TABLET ORAL 2 TIMES DAILY
Qty: 14 TABLET | Refills: 0 | Status: SHIPPED | OUTPATIENT
Start: 2024-11-26 | End: 2024-12-03

## 2024-11-26 NOTE — PROGRESS NOTES
Patient had ureteral stent placed on 11-19-24 following left HLL.   Stent removed via string in office today without difficulty. Patient tolerated removal well.

## 2024-11-26 NOTE — PROGRESS NOTES
History  Referral for frequent urinary tract infections    Urine cultures  9/30/2024 - MRSA  9/10/2024 - Providencia stuartii  8/2024 - MRSA  6/2024 - no growth    10/2024 CT urogram negative for renal mass.  There is a 1.5 cm stone at the left UPJ.  There is no hydronephrosis.  There is additional renal stones bilaterally.    11/2024 left TLL    Today  Here today for stent removal s/p left TLL on 11/19/2024.  He tolerates removal with minimal complaints.  He does have burning with urination.  He denies measured fevers and he is afebrile in the office, but he states he has had chills.  He does have a history of recurrent urinary tract infections, but we did feel this was due to 1.5 cm stone burden.    Plan  Urine culture    Start empiric Bactrim (this was culture specific according to recent urine culture), unable to use fluoroquinolones due to amiodarone use    You may experience waves of pain and/or nausea for the next 24-72 hrs.  You may also experience burning with urination, frequency, urgency, bladder spasms, and blood in the urine. All of this should continue to improve over the next several days. The blood in the urine can last up to two weeks.      Take antibiotic with food and take until gone    Drink at least 80 oz fluid (water, juice, Gatorade - NOT tea, coffee, soda pop) daily    For pain use OTC acetaminophen as directed as needed.     Call our office [phone] or go to ER (if after normal office hours) if you develop fever, intractable vomiting, severe/intolerable pain.     Follow-up in 6 weeks with a CT prior or sooner if needed

## 2024-11-26 NOTE — PATIENT INSTRUCTIONS
You may experience waves of pain and/or nausea for the next 24-72 hrs.  You may also experience burning with urination, frequency, urgency, bladder spasms, and blood in the urine. All of this should continue to improve over the next several days. The blood in the urine can last up to two weeks.      Take antibiotic with food and take until gone    Drink at least 80 oz fluid (water, juice, Gatorade - NOT tea, coffee, soda pop) daily    For pain use OTC acetaminophen as directed as needed.     Call our office [phone] or go to ER (if after normal office hours) if you develop fever, intractable vomiting, severe/intolerable pain.         SURVEY:    You may be receiving a survey from [employer] regarding your visit today.    Please complete the survey to enable us to provide the highest quality of care to you and your family.    If you cannot score us a very good on any question, please call the office to discuss how we could have made your experience a very good one.    Thank you.

## 2024-11-28 LAB
MICROORGANISM SPEC CULT: NORMAL
SERVICE CMNT-IMP: NORMAL
SPECIMEN DESCRIPTION: NORMAL

## 2024-12-02 ENCOUNTER — TELEPHONE (OUTPATIENT)
Dept: UROLOGY | Age: 79
End: 2024-12-02

## 2024-12-02 NOTE — TELEPHONE ENCOUNTER
Phone call to patient, patient voiced understanding or providers comment and had no further questions.

## 2024-12-02 NOTE — TELEPHONE ENCOUNTER
----- Message from SYLVIA Leija - CNP sent at 12/2/2024  9:06 AM EST -----  Call pt - urine cx reviewed and negative for UTI, but take antibiotics until gone

## 2024-12-10 ENCOUNTER — TELEPHONE (OUTPATIENT)
Dept: UROLOGY | Age: 79
End: 2024-12-10

## 2024-12-10 DIAGNOSIS — R30.0 DYSURIA: Primary | ICD-10-CM

## 2024-12-10 RX ORDER — PHENAZOPYRIDINE HYDROCHLORIDE 200 MG/1
200 TABLET, FILM COATED ORAL 3 TIMES DAILY PRN
Qty: 21 TABLET | Refills: 0 | Status: SHIPPED | OUTPATIENT
Start: 2024-12-10 | End: 2024-12-17

## 2024-12-10 NOTE — TELEPHONE ENCOUNTER
Please have him drop off a urine culture    We are aware that he states that he is drinking plenty of water, is he drinking 80 ounces or more?    He is he avoiding caffeine and alcohol?    Is he having any flank pain?    I will send a prescription for Pyridium for his discomfort please let him know that it will make his urine orange    If he develops fever or chills he needs to go to the emergency room

## 2024-12-10 NOTE — TELEPHONE ENCOUNTER
Marti reports he drinks only 40 oz of water, enc to get 80oz of water in. He does not drink caffeine or alcohol.  He states he notices a \"tug\" in his left lower back every now and then.  He reports he cannot get in to drop off sample until tomorrow.  Told RX was sent in and to report to ER If he develops chills or fever.  Patient OTILIA.

## 2024-12-10 NOTE — TELEPHONE ENCOUNTER
Patient calls in stating he is still having a lot of burning with urination and cloudy urine. He denies fever states he feels like he is drinking plenty of water. Patient's next appt is not until 1/7/25. Please advise, thank you.

## 2024-12-11 ENCOUNTER — HOSPITAL ENCOUNTER (OUTPATIENT)
Age: 79
Setting detail: SPECIMEN
Discharge: HOME | End: 2024-12-11
Payer: MEDICARE

## 2024-12-11 DIAGNOSIS — R30.0 DYSURIA: ICD-10-CM

## 2024-12-11 PROCEDURE — 87086 URINE CULTURE/COLONY COUNT: CPT

## 2024-12-11 PROCEDURE — 87077 CULTURE AEROBIC IDENTIFY: CPT

## 2024-12-11 PROCEDURE — 87186 SC STD MICRODIL/AGAR DIL: CPT

## 2024-12-13 ENCOUNTER — TELEPHONE (OUTPATIENT)
Dept: UROLOGY | Age: 79
End: 2024-12-13

## 2024-12-13 LAB
MICROORGANISM SPEC CULT: ABNORMAL
SERVICE CMNT-IMP: ABNORMAL
SPECIMEN DESCRIPTION: ABNORMAL

## 2024-12-13 RX ORDER — CEFUROXIME AXETIL 500 MG/1
500 TABLET ORAL 2 TIMES DAILY
Qty: 20 TABLET | Refills: 0 | Status: SHIPPED | OUTPATIENT
Start: 2024-12-13 | End: 2024-12-23

## 2024-12-13 NOTE — TELEPHONE ENCOUNTER
Left a message for the patient to advise he does have a UTI and a new antibiotic was sent to his pharmacy.  Requested the patient call the office to confirm receipt of this information and to discuss the treatment.

## 2024-12-13 NOTE — TELEPHONE ENCOUNTER
Patient informed of the urine culture results, the new antibiotic and the provider's instructions.  The patient verbalizes understanding of all information.  The patient states his son already picked up the medication.

## 2025-01-08 ENCOUNTER — HOSPITAL ENCOUNTER (OUTPATIENT)
Dept: CT IMAGING | Age: 80
Discharge: HOME | End: 2025-01-10
Payer: MEDICARE

## 2025-01-08 ENCOUNTER — TELEPHONE (OUTPATIENT)
Dept: UROLOGY | Age: 80
End: 2025-01-08

## 2025-01-08 DIAGNOSIS — N39.0 FREQUENT UTI: ICD-10-CM

## 2025-01-08 DIAGNOSIS — N20.1 URETERAL STONE: ICD-10-CM

## 2025-01-08 DIAGNOSIS — R30.0 DYSURIA: Primary | ICD-10-CM

## 2025-01-08 PROCEDURE — 74176 CT ABD & PELVIS W/O CONTRAST: CPT

## 2025-01-08 NOTE — TELEPHONE ENCOUNTER
Agreed.  We will follow-up urine culture.  CT scan that was obtained does not show any obstructing stones

## 2025-01-08 NOTE — TELEPHONE ENCOUNTER
Patient just has CT.  He is in office and said ever since his stones, he has had burning and cloudy urine.  He has a restricted flow.  No fever.

## 2025-01-09 ENCOUNTER — HOSPITAL ENCOUNTER (OUTPATIENT)
Age: 80
Setting detail: SPECIMEN
Discharge: HOME | End: 2025-01-09
Payer: MEDICARE

## 2025-01-09 DIAGNOSIS — R30.0 DYSURIA: ICD-10-CM

## 2025-01-09 LAB
BACTERIA URNS QL MICRO: ABNORMAL
BILIRUB UR QL STRIP: NEGATIVE
CLARITY UR: CLEAR
COLOR UR: YELLOW
EPI CELLS #/AREA URNS HPF: ABNORMAL /HPF (ref 0–5)
GLUCOSE UR STRIP-MCNC: NEGATIVE MG/DL
HGB UR QL STRIP.AUTO: NEGATIVE
KETONES UR STRIP-MCNC: NEGATIVE MG/DL
LEUKOCYTE ESTERASE UR QL STRIP: ABNORMAL
NITRITE UR QL STRIP: POSITIVE
PH UR STRIP: 6 [PH] (ref 5–9)
PROT UR STRIP-MCNC: NEGATIVE MG/DL
RBC #/AREA URNS HPF: ABNORMAL /HPF (ref 0–2)
SP GR UR STRIP: 1.01 (ref 1.01–1.02)
UROBILINOGEN UR STRIP-ACNC: NORMAL EU/DL (ref 0–1)
WBC #/AREA URNS HPF: ABNORMAL /HPF (ref 0–5)

## 2025-01-09 PROCEDURE — 87086 URINE CULTURE/COLONY COUNT: CPT

## 2025-01-09 PROCEDURE — 87186 SC STD MICRODIL/AGAR DIL: CPT

## 2025-01-09 PROCEDURE — 87077 CULTURE AEROBIC IDENTIFY: CPT

## 2025-01-09 PROCEDURE — 81001 URINALYSIS AUTO W/SCOPE: CPT

## 2025-01-10 ENCOUNTER — TELEPHONE (OUTPATIENT)
Dept: UROLOGY | Age: 80
End: 2025-01-10

## 2025-01-10 NOTE — RESULT ENCOUNTER NOTE
Patient is having restrictive flow but could not make it to the office, urine culture was ordered.  Patient is growing gram-negative rods on preliminary.  See telephone call

## 2025-01-10 NOTE — TELEPHONE ENCOUNTER
Writer called and left a detailed message on his voice mail of the provider response.  We will call him Monday with final culture results.

## 2025-01-10 NOTE — TELEPHONE ENCOUNTER
Please call him and let him know he is growing bacteria, this will likely not be finalized until next week, as he is having difficulty urinating we will start him on Augmentin.  We will call him with on Monday with results for his culture.  If he develops any fever or chills or inability to urinate we do recommend he goes to the emergency room

## 2025-01-12 LAB
MICROORGANISM SPEC CULT: ABNORMAL
SERVICE CMNT-IMP: ABNORMAL
SPECIMEN DESCRIPTION: ABNORMAL

## 2025-01-13 ENCOUNTER — TELEPHONE (OUTPATIENT)
Dept: UROLOGY | Age: 80
End: 2025-01-13

## 2025-01-13 NOTE — TELEPHONE ENCOUNTER
----- Message from SYLVIA Leija CNP sent at 1/13/2025  8:55 AM EST -----  Urine culture is positive for infection.  Complete Augmentin as prescribed.

## 2025-01-17 ENCOUNTER — OFFICE VISIT (OUTPATIENT)
Dept: UROLOGY | Age: 80
End: 2025-01-17
Payer: MEDICARE

## 2025-01-17 VITALS — TEMPERATURE: 97.5 F | RESPIRATION RATE: 20 BRPM | SYSTOLIC BLOOD PRESSURE: 122 MMHG | DIASTOLIC BLOOD PRESSURE: 68 MMHG

## 2025-01-17 DIAGNOSIS — N39.0 FREQUENT UTI: ICD-10-CM

## 2025-01-17 DIAGNOSIS — N20.0 RENAL CALCULUS: ICD-10-CM

## 2025-01-17 DIAGNOSIS — N40.1 BPH WITH OBSTRUCTION/LOWER URINARY TRACT SYMPTOMS: ICD-10-CM

## 2025-01-17 DIAGNOSIS — N13.8 BPH WITH OBSTRUCTION/LOWER URINARY TRACT SYMPTOMS: ICD-10-CM

## 2025-01-17 DIAGNOSIS — N20.1 URETERAL STONE: Primary | ICD-10-CM

## 2025-01-17 PROCEDURE — 51798 US URINE CAPACITY MEASURE: CPT | Performed by: UROLOGY

## 2025-01-17 RX ORDER — TAMSULOSIN HYDROCHLORIDE 0.4 MG/1
0.4 CAPSULE ORAL 2 TIMES DAILY
Qty: 180 CAPSULE | Refills: 3 | Status: SHIPPED | OUTPATIENT
Start: 2025-01-17

## 2025-01-17 NOTE — PROGRESS NOTES
HPI:          Patient is a 79 y.o. male in no acute distress.  He is alert and oriented to person, place, and time.         Referral for frequent urinary tract infections     Urine cultures  9/30/2024 - MRSA  9/10/2024 - Providencia stuartii  8/2024 - MRSA  6/2024 - no growth     10/2024 CT urogram negative for renal mass.  There is a 1.5 cm stone at the left UPJ.  There is no hydronephrosis.  There is additional renal stones bilaterally.     11/2024 left TLL     Currently  Patient is here today for 6-week follow-up.  Patient had repeat CT scan.  Patient's large stone in the distal left ureter is gone.  Patient does have stones in the kidneys.  Largest is on the left side measuring 9 mm in the lower pole.  Patient is having no flank pain nausea vomiting.  He is taking Flomax.  He would like more symptom relief.  Patient is having no gross hematuria or dysuria.  He has no flank pain nausea vomiting.    Past Medical History:   Diagnosis Date    Anxiety     Aortic stenosis     Atrial fibrillation, new onset (HCC) 03/05/2020    BPH (benign prostatic hyperplasia)     Breast abscess in male 03/16/2024    CHF (congestive heart failure) (HCC)     Diabetes mellitus (HCC)     borderline    Hyperlipidemia     Hypertension     Lymphedema     bilat legs    On home O2     Osteoarthritis     Septic shock due to urinary tract infection (HCC) /  Levophed and fluids 2020     Past Surgical History:   Procedure Laterality Date    AORTIC VALVE REPAIR  2020    TAVR    CARDIAC CATHETERIZATION  2020    COLONOSCOPY      CYSTOSCOPY Left 11/19/2024    CYSTOSCOPY URETEROSCOPY LASER-thulium laser performed by Bill Stephenson MD at [facility] OR    CYSTOSCOPY Left 11/19/2024    CYSTOSCOPY URETERAL STENT INSERTION performed by Bill Stephenson MD at [facility] OR    HAND SURGERY N/A 03/17/2024    RIGHT BREAST ABSCESS DEBRIDEMENT INCISION AND DRAINAGE performed by Gaston Martinez MD at [facility] OR    VASECTOMY  1971     Outpatient Encounter

## 2025-02-11 ENCOUNTER — NURSE ONLY (OUTPATIENT)
Dept: CARDIOLOGY | Age: 80
End: 2025-02-11

## 2025-02-11 DIAGNOSIS — I44.2 COMPLETE HEART BLOCK (HCC): ICD-10-CM

## 2025-02-11 DIAGNOSIS — I44.2 CHB (COMPLETE HEART BLOCK) (HCC): ICD-10-CM

## 2025-02-11 DIAGNOSIS — Z95.0 PACEMAKER: Primary | ICD-10-CM

## 2025-02-17 ENCOUNTER — HOSPITAL ENCOUNTER (OUTPATIENT)
Age: 80
Discharge: HOME | End: 2025-02-17
Payer: MEDICARE

## 2025-02-17 DIAGNOSIS — I50.42 CHRONIC COMBINED SYSTOLIC AND DIASTOLIC CHF, NYHA CLASS 3 (HCC): Chronic | ICD-10-CM

## 2025-02-17 LAB
ALBUMIN SERPL-MCNC: 3.7 G/DL (ref 3.5–5.2)
ALBUMIN/GLOB SERPL: 1 {RATIO} (ref 1–2.5)
ALP SERPL-CCNC: 84 U/L (ref 40–129)
ALT SERPL-CCNC: 34 U/L (ref 10–50)
ANION GAP SERPL CALCULATED.3IONS-SCNC: 10 MMOL/L (ref 9–16)
AST SERPL-CCNC: 29 U/L (ref 10–50)
BILIRUB SERPL-MCNC: 0.7 MG/DL (ref 0–1.2)
BUN SERPL-MCNC: 18 MG/DL (ref 8–23)
BUN/CREAT SERPL: 23 (ref 9–20)
CALCIUM SERPL-MCNC: 8.8 MG/DL (ref 8.6–10.4)
CHLORIDE SERPL-SCNC: 96 MMOL/L (ref 98–107)
CO2 SERPL-SCNC: 28 MMOL/L (ref 20–31)
CREAT SERPL-MCNC: 0.8 MG/DL (ref 0.7–1.2)
GFR, ESTIMATED: >90 ML/MIN/1.73M2
GLUCOSE SERPL-MCNC: 107 MG/DL (ref 74–99)
POTASSIUM SERPL-SCNC: 3.9 MMOL/L (ref 3.7–5.3)
PROT SERPL-MCNC: 7.6 G/DL (ref 6.6–8.7)
SODIUM SERPL-SCNC: 134 MMOL/L (ref 136–145)

## 2025-02-17 PROCEDURE — 36415 COLL VENOUS BLD VENIPUNCTURE: CPT

## 2025-02-17 PROCEDURE — 80053 COMPREHEN METABOLIC PANEL: CPT

## 2025-02-17 PROCEDURE — 99213 OFFICE O/P EST LOW 20 MIN: CPT

## 2025-02-18 ENCOUNTER — HOSPITAL ENCOUNTER (INPATIENT)
Age: 80
LOS: 3 days | Discharge: HOME | End: 2025-02-21
Admitting: INTERNAL MEDICINE
Payer: MEDICARE

## 2025-02-18 ENCOUNTER — APPOINTMENT (OUTPATIENT)
Dept: GENERAL RADIOLOGY | Age: 80
End: 2025-02-18
Payer: MEDICARE

## 2025-02-18 ENCOUNTER — PHARMACY VISIT (OUTPATIENT)
Age: 80
End: 2025-02-18
Payer: MEDICARE

## 2025-02-18 DIAGNOSIS — I50.42 CHRONIC COMBINED SYSTOLIC AND DIASTOLIC CHF, NYHA CLASS 3 (HCC): Primary | ICD-10-CM

## 2025-02-18 DIAGNOSIS — J10.1 INFLUENZA A: Primary | ICD-10-CM

## 2025-02-18 DIAGNOSIS — J96.01 ACUTE RESPIRATORY FAILURE WITH HYPOXIA (HCC): ICD-10-CM

## 2025-02-18 PROBLEM — R79.89 ELEVATED TROPONIN: Status: ACTIVE | Noted: 2025-02-18

## 2025-02-18 PROBLEM — B96.20 BACTEREMIA DUE TO ESCHERICHIA COLI: Status: ACTIVE | Noted: 2025-02-18

## 2025-02-18 PROBLEM — R78.81 BACTEREMIA DUE TO ESCHERICHIA COLI: Status: ACTIVE | Noted: 2025-02-18

## 2025-02-18 PROBLEM — N39.0 COMPLICATED UTI (URINARY TRACT INFECTION): Status: ACTIVE | Noted: 2025-02-18

## 2025-02-18 LAB
ALBUMIN SERPL-MCNC: 3.6 G/DL (ref 3.5–5.2)
ALBUMIN SERPL-MCNC: 3.8 G/DL (ref 3.5–5.2)
ALBUMIN/GLOB SERPL: 1 {RATIO} (ref 1–2.5)
ALBUMIN/GLOB SERPL: 1.1 {RATIO} (ref 1–2.5)
ALP SERPL-CCNC: 77 U/L (ref 40–129)
ALP SERPL-CCNC: 86 U/L (ref 40–129)
ALT SERPL-CCNC: 32 U/L (ref 10–50)
ALT SERPL-CCNC: 35 U/L (ref 10–50)
ANION GAP SERPL CALCULATED.3IONS-SCNC: 11 MMOL/L (ref 9–16)
ANION GAP SERPL CALCULATED.3IONS-SCNC: 13 MMOL/L (ref 9–16)
ARTERIAL PATENCY WRIST A: ABNORMAL
AST SERPL-CCNC: 31 U/L (ref 10–50)
AST SERPL-CCNC: 32 U/L (ref 10–50)
BACTERIA URNS QL MICRO: ABNORMAL
BASOPHILS # BLD: 0 K/UL (ref 0–0.2)
BASOPHILS # BLD: <0.03 K/UL (ref 0–0.2)
BASOPHILS NFR BLD: 0 % (ref 0–2)
BASOPHILS NFR BLD: 0 % (ref 0–2)
BILIRUB SERPL-MCNC: 0.6 MG/DL (ref 0–1.2)
BILIRUB SERPL-MCNC: 0.7 MG/DL (ref 0–1.2)
BILIRUB UR QL STRIP: NEGATIVE
BNP SERPL-MCNC: 309 PG/ML (ref 0–450)
BODY TEMPERATURE: 37
BUN SERPL-MCNC: 23 MG/DL (ref 8–23)
BUN SERPL-MCNC: 24 MG/DL (ref 8–23)
BUN/CREAT SERPL: 22 (ref 9–20)
BUN/CREAT SERPL: 26 (ref 9–20)
CALCIUM SERPL-MCNC: 8.2 MG/DL (ref 8.6–10.4)
CALCIUM SERPL-MCNC: 8.9 MG/DL (ref 8.6–10.4)
CHARACTER UR: ABNORMAL
CHLORIDE SERPL-SCNC: 95 MMOL/L (ref 98–107)
CHLORIDE SERPL-SCNC: 96 MMOL/L (ref 98–107)
CLARITY UR: CLEAR
CO2 SERPL-SCNC: 28 MMOL/L (ref 20–31)
CO2 SERPL-SCNC: 28 MMOL/L (ref 20–31)
COLOR UR: YELLOW
CREAT SERPL-MCNC: 0.9 MG/DL (ref 0.7–1.2)
CREAT SERPL-MCNC: 1.1 MG/DL (ref 0.7–1.2)
EOSINOPHIL # BLD: 0 K/UL (ref 0–0.44)
EOSINOPHIL # BLD: 0.09 K/UL (ref 0–0.44)
EOSINOPHILS RELATIVE PERCENT: 0 % (ref 1–4)
EOSINOPHILS RELATIVE PERCENT: 1 % (ref 1–4)
EPI CELLS #/AREA URNS HPF: ABNORMAL /HPF (ref 0–5)
ERYTHROCYTE [DISTWIDTH] IN BLOOD BY AUTOMATED COUNT: 14.6 % (ref 11.8–14.4)
ERYTHROCYTE [DISTWIDTH] IN BLOOD BY AUTOMATED COUNT: 14.6 % (ref 11.8–14.4)
FIO2 ON VENT: 32 %
FLUAV AG SPEC QL: POSITIVE
FLUBV AG SPEC QL: NEGATIVE
GAS FLOW.O2 O2 DELIVERY SYS: ABNORMAL L/MIN
GFR, ESTIMATED: 71 ML/MIN/1.73M2
GFR, ESTIMATED: 84 ML/MIN/1.73M2
GLUCOSE SERPL-MCNC: 125 MG/DL (ref 74–99)
GLUCOSE SERPL-MCNC: 125 MG/DL (ref 74–99)
GLUCOSE UR STRIP-MCNC: NEGATIVE MG/DL
HCO3 VENOUS: 26 MMOL/L (ref 24–30)
HCT VFR BLD AUTO: 35.9 % (ref 40.7–50.3)
HCT VFR BLD AUTO: 40.1 % (ref 40.7–50.3)
HGB BLD-MCNC: 12.2 G/DL (ref 13–17)
HGB BLD-MCNC: 13.1 G/DL (ref 13–17)
HGB UR QL STRIP.AUTO: ABNORMAL
IMM GRANULOCYTES # BLD AUTO: 0 K/UL (ref 0–0.3)
IMM GRANULOCYTES # BLD AUTO: 0.07 K/UL (ref 0–0.3)
IMM GRANULOCYTES NFR BLD: 0 %
IMM GRANULOCYTES NFR BLD: 1 %
KETONES UR STRIP-MCNC: NEGATIVE MG/DL
LACTATE BLDV-SCNC: 1.6 MMOL/L (ref 0.5–2.2)
LACTATE BLDV-SCNC: 2.8 MMOL/L (ref 0.5–2.2)
LEUKOCYTE ESTERASE UR QL STRIP: NEGATIVE
LYMPHOCYTES NFR BLD: 0.21 K/UL (ref 1.1–3.7)
LYMPHOCYTES NFR BLD: 0.6 K/UL (ref 1.1–3.7)
LYMPHOCYTES RELATIVE PERCENT: 2 % (ref 24–43)
LYMPHOCYTES RELATIVE PERCENT: 9 % (ref 24–43)
MAGNESIUM SERPL-MCNC: 1.5 MG/DL (ref 1.6–2.4)
MCH RBC QN AUTO: 30.1 PG (ref 25.2–33.5)
MCH RBC QN AUTO: 31 PG (ref 25.2–33.5)
MCHC RBC AUTO-ENTMCNC: 32.7 G/DL (ref 28.4–34.8)
MCHC RBC AUTO-ENTMCNC: 34 G/DL (ref 28.4–34.8)
MCV RBC AUTO: 91.1 FL (ref 82.6–102.9)
MCV RBC AUTO: 92.2 FL (ref 82.6–102.9)
MONOCYTES NFR BLD: 0.07 K/UL (ref 0.1–1.2)
MONOCYTES NFR BLD: 0.21 K/UL (ref 0.1–1.2)
MONOCYTES NFR BLD: 1 % (ref 3–12)
MONOCYTES NFR BLD: 2 % (ref 3–12)
MORPHOLOGY: NORMAL
NEUTROPHILS NFR BLD: 88 % (ref 36–65)
NEUTROPHILS NFR BLD: 96 % (ref 36–65)
NEUTS SEG NFR BLD: 10.08 K/UL (ref 1.5–8.1)
NEUTS SEG NFR BLD: 5.5 K/UL (ref 1.5–8.1)
NITRITE UR QL STRIP: NEGATIVE
NRBC BLD-RTO: 0 PER 100 WBC
NRBC BLD-RTO: 0 PER 100 WBC
O2 SAT, VEN: 44.6 % (ref 60–85)
PCO2 VENOUS: 46.2 MM HG (ref 39–55)
PCO2, VEN, TEMP ADJ: 46.2 MMHG (ref 39–55)
PH UR STRIP: 6 [PH] (ref 5–9)
PH VENOUS: 7.37 (ref 7.32–7.42)
PH, VEN, TEMP ADJ: 7.37 (ref 7.32–7.42)
PLATELET # BLD AUTO: 151 K/UL (ref 138–453)
PLATELET # BLD AUTO: 163 K/UL (ref 138–453)
PMV BLD AUTO: 11.8 FL (ref 8.1–13.5)
PMV BLD AUTO: 11.9 FL (ref 8.1–13.5)
PO2 VENOUS: 25.6 MM HG (ref 30–50)
PO2, VEN, TEMP ADJ: 25.6 MMHG (ref 30–50)
POSITIVE BASE EXCESS, VEN: 0.3 MMOL/L (ref 0–2)
POTASSIUM SERPL-SCNC: 3.6 MMOL/L (ref 3.7–5.3)
POTASSIUM SERPL-SCNC: 3.6 MMOL/L (ref 3.7–5.3)
PROT SERPL-MCNC: 7 G/DL (ref 6.6–8.7)
PROT SERPL-MCNC: 7.5 G/DL (ref 6.6–8.7)
PROT UR STRIP-MCNC: ABNORMAL MG/DL
RBC # BLD AUTO: 3.94 M/UL (ref 4.21–5.77)
RBC # BLD AUTO: 4.35 M/UL (ref 4.21–5.77)
RBC #/AREA URNS HPF: ABNORMAL /HPF (ref 0–2)
SARS-COV-2 RDRP RESP QL NAA+PROBE: NOT DETECTED
SODIUM SERPL-SCNC: 135 MMOL/L (ref 136–145)
SODIUM SERPL-SCNC: 136 MMOL/L (ref 136–145)
SP GR UR STRIP: 1.02 (ref 1.01–1.02)
SPECIMEN DESCRIPTION: NORMAL
TROPONIN I SERPL HS-MCNC: 41 NG/L (ref 0–22)
TROPONIN I SERPL HS-MCNC: 55 NG/L (ref 0–22)
TROPONIN I SERPL HS-MCNC: 60 NG/L (ref 0–22)
TROPONIN I SERPL HS-MCNC: 61 NG/L (ref 0–22)
UROBILINOGEN UR STRIP-ACNC: NORMAL EU/DL (ref 0–1)
WBC #/AREA URNS HPF: ABNORMAL /HPF (ref 0–5)
WBC OTHER # BLD: 10.5 K/UL (ref 3.5–11.3)
WBC OTHER # BLD: 6.4 K/UL (ref 3.5–11.3)

## 2025-02-18 PROCEDURE — 2580000003 HC RX 258

## 2025-02-18 PROCEDURE — 80053 COMPREHEN METABOLIC PANEL: CPT

## 2025-02-18 PROCEDURE — 83735 ASSAY OF MAGNESIUM: CPT

## 2025-02-18 PROCEDURE — 81001 URINALYSIS AUTO W/SCOPE: CPT

## 2025-02-18 PROCEDURE — 97162 PT EVAL MOD COMPLEX 30 MIN: CPT

## 2025-02-18 PROCEDURE — 94150 VITAL CAPACITY TEST: CPT

## 2025-02-18 PROCEDURE — 93005 ELECTROCARDIOGRAM TRACING: CPT

## 2025-02-18 PROCEDURE — 6360000002 HC RX W HCPCS

## 2025-02-18 PROCEDURE — 6360000002 HC RX W HCPCS: Performed by: NURSE PRACTITIONER

## 2025-02-18 PROCEDURE — 87804 INFLUENZA ASSAY W/OPTIC: CPT

## 2025-02-18 PROCEDURE — 87154 CUL TYP ID BLD PTHGN 6+ TRGT: CPT

## 2025-02-18 PROCEDURE — 83880 ASSAY OF NATRIURETIC PEPTIDE: CPT

## 2025-02-18 PROCEDURE — 87086 URINE CULTURE/COLONY COUNT: CPT

## 2025-02-18 PROCEDURE — 96365 THER/PROPH/DIAG IV INF INIT: CPT

## 2025-02-18 PROCEDURE — 71045 X-RAY EXAM CHEST 1 VIEW: CPT

## 2025-02-18 PROCEDURE — 2500000003 HC RX 250 WO HCPCS

## 2025-02-18 PROCEDURE — 87186 SC STD MICRODIL/AGAR DIL: CPT

## 2025-02-18 PROCEDURE — 2700000000 HC OXYGEN THERAPY PER DAY

## 2025-02-18 PROCEDURE — 6370000000 HC RX 637 (ALT 250 FOR IP)

## 2025-02-18 PROCEDURE — 94761 N-INVAS EAR/PLS OXIMETRY MLT: CPT

## 2025-02-18 PROCEDURE — 94669 MECHANICAL CHEST WALL OSCILL: CPT

## 2025-02-18 PROCEDURE — 94664 DEMO&/EVAL PT USE INHALER: CPT

## 2025-02-18 PROCEDURE — 87205 SMEAR GRAM STAIN: CPT

## 2025-02-18 PROCEDURE — 2500000003 HC RX 250 WO HCPCS: Performed by: NURSE PRACTITIONER

## 2025-02-18 PROCEDURE — 82805 BLOOD GASES W/O2 SATURATION: CPT

## 2025-02-18 PROCEDURE — 85025 COMPLETE CBC W/AUTO DIFF WBC: CPT

## 2025-02-18 PROCEDURE — 6370000000 HC RX 637 (ALT 250 FOR IP): Performed by: NURSE PRACTITIONER

## 2025-02-18 PROCEDURE — 1200000000 HC SEMI PRIVATE

## 2025-02-18 PROCEDURE — 99285 EMERGENCY DEPT VISIT HI MDM: CPT

## 2025-02-18 PROCEDURE — 87077 CULTURE AEROBIC IDENTIFY: CPT

## 2025-02-18 PROCEDURE — 87635 SARS-COV-2 COVID-19 AMP PRB: CPT

## 2025-02-18 PROCEDURE — 87040 BLOOD CULTURE FOR BACTERIA: CPT

## 2025-02-18 PROCEDURE — 83605 ASSAY OF LACTIC ACID: CPT

## 2025-02-18 PROCEDURE — 97166 OT EVAL MOD COMPLEX 45 MIN: CPT

## 2025-02-18 PROCEDURE — 84484 ASSAY OF TROPONIN QUANT: CPT

## 2025-02-18 RX ORDER — SODIUM CHLORIDE 9 MG/ML
INJECTION, SOLUTION INTRAVENOUS PRN
Status: DISCONTINUED | OUTPATIENT
Start: 2025-02-18 | End: 2025-02-21 | Stop reason: HOSPADM

## 2025-02-18 RX ORDER — IPRATROPIUM BROMIDE AND ALBUTEROL SULFATE 2.5; .5 MG/3ML; MG/3ML
SOLUTION RESPIRATORY (INHALATION)
Status: COMPLETED
Start: 2025-02-18 | End: 2025-02-18

## 2025-02-18 RX ORDER — SODIUM CHLORIDE 0.9 % (FLUSH) 0.9 %
10 SYRINGE (ML) INJECTION EVERY 12 HOURS SCHEDULED
Status: DISCONTINUED | OUTPATIENT
Start: 2025-02-18 | End: 2025-02-21 | Stop reason: HOSPADM

## 2025-02-18 RX ORDER — POLYETHYLENE GLYCOL 3350 17 G/17G
17 POWDER, FOR SOLUTION ORAL DAILY PRN
Status: DISCONTINUED | OUTPATIENT
Start: 2025-02-18 | End: 2025-02-21 | Stop reason: HOSPADM

## 2025-02-18 RX ORDER — ONDANSETRON 4 MG/1
4 TABLET, ORALLY DISINTEGRATING ORAL EVERY 8 HOURS PRN
Status: DISCONTINUED | OUTPATIENT
Start: 2025-02-18 | End: 2025-02-21 | Stop reason: HOSPADM

## 2025-02-18 RX ORDER — VITAMIN B COMPLEX
2000 TABLET ORAL DAILY
Status: DISCONTINUED | OUTPATIENT
Start: 2025-02-18 | End: 2025-02-21 | Stop reason: HOSPADM

## 2025-02-18 RX ORDER — ACETAMINOPHEN 500 MG
1000 TABLET ORAL ONCE
Status: COMPLETED | OUTPATIENT
Start: 2025-02-18 | End: 2025-02-18

## 2025-02-18 RX ORDER — ACETAMINOPHEN 650 MG/1
650 SUPPOSITORY RECTAL EVERY 6 HOURS PRN
Status: DISCONTINUED | OUTPATIENT
Start: 2025-02-18 | End: 2025-02-21 | Stop reason: HOSPADM

## 2025-02-18 RX ORDER — LOSARTAN POTASSIUM 25 MG/1
25 TABLET ORAL DAILY
Status: DISCONTINUED | OUTPATIENT
Start: 2025-02-18 | End: 2025-02-21 | Stop reason: HOSPADM

## 2025-02-18 RX ORDER — MAGNESIUM SULFATE IN WATER 40 MG/ML
2000 INJECTION, SOLUTION INTRAVENOUS ONCE
Status: COMPLETED | OUTPATIENT
Start: 2025-02-18 | End: 2025-02-18

## 2025-02-18 RX ORDER — POTASSIUM CHLORIDE 7.45 MG/ML
10 INJECTION INTRAVENOUS PRN
Status: DISCONTINUED | OUTPATIENT
Start: 2025-02-18 | End: 2025-02-21 | Stop reason: HOSPADM

## 2025-02-18 RX ORDER — FUROSEMIDE 40 MG/1
40 TABLET ORAL DAILY
Status: DISCONTINUED | OUTPATIENT
Start: 2025-02-18 | End: 2025-02-21 | Stop reason: HOSPADM

## 2025-02-18 RX ORDER — POTASSIUM CHLORIDE 1500 MG/1
20 TABLET, EXTENDED RELEASE ORAL DAILY
Status: DISCONTINUED | OUTPATIENT
Start: 2025-02-18 | End: 2025-02-21 | Stop reason: HOSPADM

## 2025-02-18 RX ORDER — POTASSIUM CHLORIDE 1500 MG/1
40 TABLET, EXTENDED RELEASE ORAL PRN
Status: DISCONTINUED | OUTPATIENT
Start: 2025-02-18 | End: 2025-02-21 | Stop reason: HOSPADM

## 2025-02-18 RX ORDER — ASPIRIN 81 MG/1
324 TABLET, CHEWABLE ORAL ONCE
Status: COMPLETED | OUTPATIENT
Start: 2025-02-18 | End: 2025-02-18

## 2025-02-18 RX ORDER — ONDANSETRON 2 MG/ML
4 INJECTION INTRAMUSCULAR; INTRAVENOUS EVERY 6 HOURS PRN
Status: DISCONTINUED | OUTPATIENT
Start: 2025-02-18 | End: 2025-02-21 | Stop reason: HOSPADM

## 2025-02-18 RX ORDER — ENOXAPARIN SODIUM 100 MG/ML
40 INJECTION SUBCUTANEOUS 2 TIMES DAILY
Status: DISCONTINUED | OUTPATIENT
Start: 2025-02-18 | End: 2025-02-21 | Stop reason: HOSPADM

## 2025-02-18 RX ORDER — M-VIT,TX,IRON,MINS/CALC/FOLIC 27MG-0.4MG
1 TABLET ORAL DAILY
Status: DISCONTINUED | OUTPATIENT
Start: 2025-02-18 | End: 2025-02-21 | Stop reason: HOSPADM

## 2025-02-18 RX ORDER — 0.9 % SODIUM CHLORIDE 0.9 %
500 INTRAVENOUS SOLUTION INTRAVENOUS ONCE
Status: COMPLETED | OUTPATIENT
Start: 2025-02-18 | End: 2025-02-18

## 2025-02-18 RX ORDER — TAMSULOSIN HYDROCHLORIDE 0.4 MG/1
0.4 CAPSULE ORAL 2 TIMES DAILY
Status: DISCONTINUED | OUTPATIENT
Start: 2025-02-18 | End: 2025-02-21 | Stop reason: HOSPADM

## 2025-02-18 RX ORDER — ATORVASTATIN CALCIUM 40 MG/1
40 TABLET, FILM COATED ORAL DAILY
Status: DISCONTINUED | OUTPATIENT
Start: 2025-02-18 | End: 2025-02-21 | Stop reason: HOSPADM

## 2025-02-18 RX ORDER — SODIUM CHLORIDE 0.9 % (FLUSH) 0.9 %
10 SYRINGE (ML) INJECTION PRN
Status: DISCONTINUED | OUTPATIENT
Start: 2025-02-18 | End: 2025-02-21 | Stop reason: HOSPADM

## 2025-02-18 RX ORDER — FAMOTIDINE 20 MG/1
20 TABLET, FILM COATED ORAL DAILY
Status: DISCONTINUED | OUTPATIENT
Start: 2025-02-18 | End: 2025-02-21 | Stop reason: HOSPADM

## 2025-02-18 RX ORDER — ASPIRIN 81 MG/1
81 TABLET ORAL DAILY
Status: DISCONTINUED | OUTPATIENT
Start: 2025-02-19 | End: 2025-02-21 | Stop reason: HOSPADM

## 2025-02-18 RX ORDER — ACETAMINOPHEN 325 MG/1
650 TABLET ORAL EVERY 6 HOURS PRN
Status: DISCONTINUED | OUTPATIENT
Start: 2025-02-18 | End: 2025-02-21 | Stop reason: HOSPADM

## 2025-02-18 RX ORDER — OSELTAMIVIR PHOSPHATE 75 MG/1
75 CAPSULE ORAL 2 TIMES DAILY
Status: DISCONTINUED | OUTPATIENT
Start: 2025-02-18 | End: 2025-02-21 | Stop reason: HOSPADM

## 2025-02-18 RX ORDER — AMIODARONE HYDROCHLORIDE 200 MG/1
200 TABLET ORAL DAILY
Status: DISCONTINUED | OUTPATIENT
Start: 2025-02-18 | End: 2025-02-21 | Stop reason: HOSPADM

## 2025-02-18 RX ORDER — IPRATROPIUM BROMIDE AND ALBUTEROL SULFATE 2.5; .5 MG/3ML; MG/3ML
2 SOLUTION RESPIRATORY (INHALATION)
Status: COMPLETED | OUTPATIENT
Start: 2025-02-18 | End: 2025-02-18

## 2025-02-18 RX ADMIN — ASPIRIN 324 MG: 81 TABLET, CHEWABLE ORAL at 04:22

## 2025-02-18 RX ADMIN — FAMOTIDINE 20 MG: 20 TABLET, FILM COATED ORAL at 10:06

## 2025-02-18 RX ADMIN — AMIODARONE HYDROCHLORIDE 200 MG: 200 TABLET ORAL at 10:06

## 2025-02-18 RX ADMIN — OSELTAMIVIR PHOSPHATE 75 MG: 75 CAPSULE ORAL at 10:06

## 2025-02-18 RX ADMIN — SODIUM CHLORIDE, PRESERVATIVE FREE 10 ML: 5 INJECTION INTRAVENOUS at 10:07

## 2025-02-18 RX ADMIN — ATORVASTATIN CALCIUM 40 MG: 40 TABLET, FILM COATED ORAL at 10:06

## 2025-02-18 RX ADMIN — ENOXAPARIN SODIUM 40 MG: 100 INJECTION SUBCUTANEOUS at 10:07

## 2025-02-18 RX ADMIN — Medication 1 TABLET: at 10:06

## 2025-02-18 RX ADMIN — TAMSULOSIN HYDROCHLORIDE 0.4 MG: 0.4 CAPSULE ORAL at 20:53

## 2025-02-18 RX ADMIN — POTASSIUM CHLORIDE 20 MEQ: 1500 TABLET, EXTENDED RELEASE ORAL at 10:06

## 2025-02-18 RX ADMIN — WATER 40 MG: 1 INJECTION INTRAMUSCULAR; INTRAVENOUS; SUBCUTANEOUS at 10:07

## 2025-02-18 RX ADMIN — TAMSULOSIN HYDROCHLORIDE 0.4 MG: 0.4 CAPSULE ORAL at 10:06

## 2025-02-18 RX ADMIN — ACETAMINOPHEN 1000 MG: 500 TABLET ORAL at 04:30

## 2025-02-18 RX ADMIN — ENOXAPARIN SODIUM 40 MG: 100 INJECTION SUBCUTANEOUS at 20:54

## 2025-02-18 RX ADMIN — PAROXETINE 30 MG: 10 TABLET, FILM COATED ORAL at 10:06

## 2025-02-18 RX ADMIN — IPRATROPIUM BROMIDE AND ALBUTEROL SULFATE 2 DOSE: .5; 2.5 SOLUTION RESPIRATORY (INHALATION) at 01:30

## 2025-02-18 RX ADMIN — MAGNESIUM SULFATE HEPTAHYDRATE 2000 MG: 40 INJECTION, SOLUTION INTRAVENOUS at 02:44

## 2025-02-18 RX ADMIN — OSELTAMIVIR PHOSPHATE 75 MG: 75 CAPSULE ORAL at 20:54

## 2025-02-18 RX ADMIN — IPRATROPIUM BROMIDE AND ALBUTEROL SULFATE 2 DOSE: 2.5; .5 SOLUTION RESPIRATORY (INHALATION) at 01:30

## 2025-02-18 RX ADMIN — SODIUM CHLORIDE, PRESERVATIVE FREE 10 ML: 5 INJECTION INTRAVENOUS at 20:54

## 2025-02-18 RX ADMIN — SODIUM CHLORIDE 500 ML: 9 INJECTION, SOLUTION INTRAVENOUS at 02:37

## 2025-02-18 RX ADMIN — WATER 1000 MG: 1 INJECTION INTRAMUSCULAR; INTRAVENOUS; SUBCUTANEOUS at 16:24

## 2025-02-18 RX ADMIN — Medication 2000 UNITS: at 10:06

## 2025-02-18 ASSESSMENT — LIFESTYLE VARIABLES
HOW MANY STANDARD DRINKS CONTAINING ALCOHOL DO YOU HAVE ON A TYPICAL DAY: PATIENT DOES NOT DRINK
HOW OFTEN DO YOU HAVE A DRINK CONTAINING ALCOHOL: NEVER

## 2025-02-18 NOTE — ED PROVIDER NOTES
[facility] EM[facility]GENCY DEPARTMENT  EMERGENCY DEPARTMENT ENCOUNTER        Pt Name: Marti Manzo  MRN: [id number]  Birthdate 1945  Date of evaluation: 2/18/2025  Provider: Serenity Rodríguez MD  PCP: Fredrick Mack MD  Note Started: 1:27 AM EST 2/18/25    CHIEF COMPLAINT       Chief Complaint   Patient presents with    Tachycardia     Pt. Reports watching TV when he felt like his heart was racing.     Shortness of Breath     Pt. Wears 2.5L at home. Pt. Reports feeling SOB just PTA       HISTORY OF PRESENT ILLNESS: 1 or more Elements     Marti Manzo is a 79 y.o. male PMHx CHF on (2.5-3L home oxygen), afib, HTN,HLD, DM2 who presents shortness of breath and tachycardia and chills.  Patient states that he short of breath while laying down he noticed that his heart was racing and he stated having chills and shakes. States that he was watching TV laying flat when this happened.  Patient is normally on 2.5-3 L at home and he was giving himself a breathing treatment prior to arrival with some improvement.  States that he has been having some flulike symptoms over the past week.  Denies any  n/v, headache, dizziness, vision changes, neck tenderness or stiffness, weakness, cp, palpitations, leg swelling/tenderness, abd pain, dysuria, hematuria, diarrhea, constipation, bloody stools.    Nursing Notes were all reviewed and agreed with or any disagreements were addressed in the HPI.    ROS:   Pertinent positives and negatives are stated within HPI, all other systems reviewed and are negative.      --------------------------------------------- PAST HISTORY ---------------------------------------------  Past Medical History:  has a past medical history of Anxiety, Aortic stenosis, Atrial fibrillation, new onset (HCC), BPH (benign prostatic hyperplasia), Breast abscess in male, CHF (congestive heart failure) (HCC), Diabetes mellitus (HCC), Hyperlipidemia, Hypertension, Lymphedema, On home O2, Osteoarthritis, and Septic  Base Excess, Sebastian 0.3 0.0 - 2.0 mmol/L    O2 Sat, Sebastian 44.6 (L) 60.0 - 85.0 %    Pt Temp 37.0     pH, Sebastian, Temp Adj 7.369 7.320 - 7.420    pCO2, Sebastian, Temp Adj 46.2 39.0 - 55.0 mmHg    pO2, Sebastian, Temp Adj 25.6 (L) 30.0 - 50.0 mmHg    O2 Device/Flow/% Cannula     Elvis Test NOT APPLICABLE     FIO2 32    Troponin   Result Value Ref Range    Troponin, High Sensitivity 55 (HH) 0 - 22 ng/L   Urinalysis with Reflex to Culture    Specimen: Urine   Result Value Ref Range    Color, UA Yellow Yellow    Turbidity UA Clear Clear    Glucose, Ur NEGATIVE NEGATIVE mg/dL    Bilirubin, Urine NEGATIVE NEGATIVE    Ketones, Urine NEGATIVE NEGATIVE mg/dL    Specific Gravity, UA 1.020 1.010 - 1.020    Urine Hgb TRACE (A) NEGATIVE    pH, Urine 6.0 5.0 - 9.0    Protein, UA 2+ (A) NEGATIVE mg/dL    Urobilinogen, Urine Normal 0.0 - 1.0 EU/dL    Nitrite, Urine NEGATIVE NEGATIVE    Leukocyte Esterase, Urine NEGATIVE NEGATIVE   Microscopic Urinalysis   Result Value Ref Range    WBC, UA 20 TO 50 0 - 5 /HPF    RBC, UA 2 TO 5 0 - 2 /HPF    Epithelial Cells, UA None 0 - 5 /HPF    Bacteria, UA 4+ (A) None    Other Observations UA Urine Reflexed to Culture (A) NOT REQ.   Lactic Acid   Result Value Ref Range    Lactic Acid 1.6 0.5 - 2.2 mmol/L   EKG 12 Lead   Result Value Ref Range    Ventricular Rate 109 BPM    Atrial Rate 109 BPM    P-R Interval 216 ms    QRS Duration 176 ms    Q-T Interval 360 ms    QTc Calculation (Bazett) 484 ms    P Axis 90 degrees    R Axis 116 degrees    T Axis -38 degrees       RADIOLOGY:   Non-plain film images such as CT, Ultrasound and MRI are read by the radiologist. Plain radiographic images are visualized and preliminarily interpreted by the ED Provider with the below findings:    CHF     Interpretation per the Radiologist below, if available at the time of this note:    XR CHEST PORTABLE   Final Result   Cardiomegaly with pulmonary interstitial edema and trace pleural fluid.           No results found.    No results  found.    Procedures:      ------------------------- NURSING NOTES AND VITALS REVIEWED ---------------------------   The nursing notes within the ED encounter and vital signs as below have been reviewed by myself and ED attending.  /61   Pulse 96   Temp 99.5 °F (37.5 °C) (Oral)   Resp 28   Wt (!) 160.6 kg (354 lb)   SpO2 93%   BMI 53.04 kg/m²   Oxygen Saturation Interpretation: Abnormal    The patient’s available past medical records and past encounters were reviewed by myself Dr. Rodríguez.        ------------------------------ ED COURSE/MEDICAL DECISION MAKING----------------------    Medical Decision Making/Differential Diagnosis:    CC/HPI Summary, Pertinent Physical Exam Findings, Social Determinants of health, Records Reviewed, DDx, testing done/not done, ED Course, Reassessment, disposition considerations/shared decision making with patient, consults, disposition:      Medical Decision Making/ED COURSE:    I interpreted findings during patient's stay.   Records reviewed as detailed on the note.    History From: Patient    Limitations to history : None  Social Determinants : None    Chronic Conditions affecting care:    has a past medical history of Anxiety, Aortic stenosis, Atrial fibrillation, new onset (HCC) (03/05/2020), BPH (benign prostatic hyperplasia), Breast abscess in male (03/16/2024), CHF (congestive heart failure) (HCC), Diabetes mellitus (HCC), Hyperlipidemia, Hypertension, Lymphedema, On home O2, Osteoarthritis, and Septic shock due to urinary tract infection (HCC) /  Levophed and fluids (2020).     Marti Manzo is a 79 y.o. male     Vital signs /61   Pulse 96   Temp 99.5 °F (37.5 °C) (Oral)   Resp 28   Wt (!) 160.6 kg (354 lb)   SpO2 93%   BMI 53.04 kg/m²   While in the ED patient was afebrile, nontoxic-appearing, in no respiratory distress.   Physical exam remarkable for Diminished breath sounds throughout with inspiratory and expiratory wheezing.  Increased work of

## 2025-02-18 NOTE — PROGRESS NOTES
Pt arrived to room 323 via ER and slid over to bed in room by staff. Pt is alert and oriented x4. On 3L O2, states he wears 2-2.5L at home normally. Pt states he does not walk at home. He sleeps in a recliner and stands/pivots to power wheelchair. Pt states he lives with his son. Vitals and assessment completed as well as navigator. Pt able to answer questions appropriately. BLE edema noted, pt states he has lymphedema. POC reviewed with patient, call light explained and placed with patient, fall precautions in place.

## 2025-02-18 NOTE — PROGRESS NOTES
Comprehensive Nutrition Assessment    Type and Reason for Visit:  Initial    Nutrition Recommendations/Plan:   Encourage oral intakes   Trend weights, glucose.     Malnutrition Assessment:  Malnutrition Status:  At risk for malnutrition (02/18/25 1148)    Context:  Acute Illness     Findings of the 6 clinical characteristics of malnutrition:  Energy Intake:  Mild decrease in energy intake (acutely)  Weight Loss:  Mild weight loss (states intentional)     Body Fat Loss:  No body fat loss     Muscle Mass Loss:  No muscle mass loss    Fluid Accumulation:  Moderate to Severe Extremities   Strength:  Not Performed    Nutrition Assessment:    Predicted suboptimal nutrient intakes r/t altered respiratory status, AEB lower PO with influenza. Weight losses over time which are partially intentional (states his son has him on a keto diet at home). Has a stage I sacral wound which may be r/t wheelchair bound at home. Potential for glucose excursions on steroid currently with \"borderline\" diabetes in history, not on hypoglycemic agents. Not very forthcoming with any particulars when questions asked of him. History of low vitamin D, on supplement, as well as mvi w/minerals.    Nutrition Related Findings:    +2 BLE edema. active b/s. Wound Type: Stage I (sacral)       Current Nutrition Intake & Therapies:    Average Meal Intake: 1-25%  Average Supplements Intake: None Ordered  ADULT DIET; Regular; 4 carb choices (60 gm/meal); Low Sodium (2 gm)    Anthropometric Measures:  Height: 174 cm (5' 8.5\")  Ideal Body Weight (IBW): 157 lbs (71 kg)    Admission Body Weight: 160.6 kg (354 lb)  Current Body Weight: 153.7 kg (338 lb 12.8 oz), 215.8 % IBW. Weight Source: Bed scale  Current BMI (kg/m2): 50.8  Usual Body Weight: 157.4 kg (347 lb) (last month and 363# in November)     % Weight Change (Calculated): -2.4  Weight Adjustment For: No Adjustment                 BMI Categories: Obese Class 3 (BMI 40.0 or greater)    Estimated Daily

## 2025-02-18 NOTE — PROGRESS NOTES
Physical Therapy  Facility/Department: [facility] MED SURG  Physical Therapy Initial Assessment    Name: Marti Manzo  : 1945  MRN: [id number]  Date of Service: 2025    Discharge Recommendations:  Continue to assess pending progress, Home with Home health PT   PT Equipment Recommendations  Equipment Needed: No      Patient Diagnosis(es): The primary encounter diagnosis was Influenza A. A diagnosis of Acute respiratory failure with hypoxia was also pertinent to this visit.  Past Medical History:  has a past medical history of Anxiety, Aortic stenosis, Atrial fibrillation, new onset (HCC), BPH (benign prostatic hyperplasia), Breast abscess in male, CHF (congestive heart failure) (HCC), Diabetes mellitus (HCC), Hyperlipidemia, Hypertension, Lymphedema, On home O2, Osteoarthritis, and Septic shock due to urinary tract infection (HCC) /  Levophed and fluids.  Past Surgical History:  has a past surgical history that includes Vasectomy (); Cardiac catheterization (); Aortic valve repair (); Hand surgery (N/A, 2024); Colonoscopy; Cystoscopy (Left, 2024); and Cystoscopy (Left, 2024).    Assessment  Body Structures, Functions, Activity Limitations Requiring Skilled Therapeutic Intervention: Decreased functional mobility ;Decreased ADL status;Decreased strength;Decreased endurance;Decreased balance;Decreased posture;Decreased high-level IADLs  Assessment: The patient is an 79 y.o. male who was admitted due to influenza.  He demonstrates decreased LE strength, decreased activity endurance and impaired balance.  He would benefit from skilled PT to address his deficits to improve functional mobility.  He might benefit from home health PT following discharge depending on progress.  Treatment Diagnosis: Generalized weakness, decreased activity endurance  Therapy Prognosis: Good  Decision Making: Medium Complexity  Requires PT Follow-Up: Yes  Activity Tolerance  Activity Tolerance: Patient    Orientation  Overall Orientation Status: Within Functional Limits  Cognition  Overall Cognitive Status: WFL    Objective  Temp: 98.8 °F (37.1 °C)  Pulse: 74  Heart Rate Source: Apical;Monitor  Respirations: 22  SpO2: 94 %  O2 Device: Nasal cannula  BP: (!) 111/58  MAP (Calculated): 76  BP Location: Left lower arm  BP Method: Automatic  Patient Position: Sitting     Observation/Palpation  Posture: Fair  Observation: Wound near sacrum and lumbar spine.  Patient on 2L O2 via NC          AROM RLE (degrees)  RLE AROM: WFL  AROM LLE (degrees)  LLE AROM : WFL  Strength RLE  Comment: Grossly 4-/5  Strength LLE  Comment: Grossly 4-/5           Bed mobility  Supine to Sit: Partial/Moderate assistance  Sit to Supine: Partial/Moderate assistance  Scooting: Partial/Moderate assistance  Transfers  Sit to Stand: Contact guard assistance  Stand to Sit: Contact guard assistance  Ambulation  WB Status: FWB  Ambulation  Surface: Level tile  Device: No Device  Other Apparatus: O2  Assistance: Contact guard assistance  Quality of Gait: Patient was able to take 3 lateral steps to his L with FWW.  Gait Deviations: Decreased step length;Decreased step height  Distance: 3'     Balance  Posture: Poor  Sitting - Static: Good  Sitting - Dynamic: Good  Standing - Static: Fair  Standing - Dynamic: Fair;-      AM-PAC - Mobility    AM-PAC Mobility without Stair Climbing Inpatient   How much difficulty turning over in bed?: A Little  How much difficulty sitting down on / standing up from a chair with arms?: A Little  How much difficulty moving from lying on back to sitting on side of bed?: A Lot  How much help from another person moving to and from a bed to a chair?: A Lot  How much help from another person needed to walk in hospital room?: Total  AM-PAC Inpatient Mobility without Stair Climbing Raw Score : 11  AM-PAC Inpatient without Stair Climbing T-Scale Score : 35.66  Mobility Inpatient CMS 0-100% Score: 67.36  Mobility Inpatient without

## 2025-02-18 NOTE — ED NOTES
Pacemaker interrogated. Pt states on baseline 2.5L NC. O2 increased to 4L NC upon arrival to ED. SpO2 noted at 88% at 2.5L baseline. Pt states with increased cough, SOB and wheezing over last several days. Dyspnea noted at rest.

## 2025-02-18 NOTE — PROGRESS NOTES
Occupational Therapy  Facility/Department: [facility] MED SURG  Occupational Therapy Initial Assessment    Name: Marti Manzo  : 1945  MRN: [id number]  Date of Service: 2025    Discharge Recommendations:             Patient Diagnosis(es): The primary encounter diagnosis was Influenza A. A diagnosis of Acute respiratory failure with hypoxia was also pertinent to this visit.  Past Medical History:  has a past medical history of Anxiety, Aortic stenosis, Atrial fibrillation, new onset (HCC), BPH (benign prostatic hyperplasia), Breast abscess in male, CHF (congestive heart failure) (HCC), Diabetes mellitus (HCC), Hyperlipidemia, Hypertension, Lymphedema, On home O2, Osteoarthritis, and Septic shock due to urinary tract infection (HCC) /  Levophed and fluids.  Past Surgical History:  has a past surgical history that includes Vasectomy (); Cardiac catheterization (); Aortic valve repair (); Hand surgery (N/A, 2024); Colonoscopy; Cystoscopy (Left, 2024); and Cystoscopy (Left, 2024).    Treatment Diagnosis: Weakness      Assessment  Performance deficits / Impairments: Decreased functional mobility ;Decreased ADL status;Decreased balance;Decreased strength;Decreased endurance  Assessment: 78 y/o M admitted to [facility] for influenza A. Patient presents with generalized weakness and deconditioning, requiring increased need for assist during ADL. Patient would benefit from OT services to address self care and educate on d/c folder, AE/DME and home safety to ensure safe and indep return to [facility].  Treatment Diagnosis: Weakness  Prognosis: Good;Fair  REQUIRES OT FOLLOW-UP: Yes     Plan  Occupational Therapy Plan  Times Per Day: Once a day  Days Per Week: 7 Days  Current Treatment Recommendations: Strengthening, Balance training, Endurance training, Safety education & training, Patient/Caregiver education & training, Wheelchair mobility training, Self-Care / ADL, Equipment evaluation, education, &  procurement    Restrictions  Restrictions/Precautions  Restrictions/Precautions: Fall Risk, Isolation  Activity Level: Up with Assist  Required Braces or Orthoses?: No    Subjective  General  Patient assessed for rehabilitation services?: Yes  Subjective  Subjective: denies pain, agreeable to OT eval     Social/Functional History  Social/Functional History  Lives With: Son  Type of Home: Apartment  Home Layout: One level  Home Access: Elevator  Bathroom Shower/Tub: Walk-in shower  Bathroom Toilet: Handicap height  Bathroom Equipment: Shower chair  Home Equipment: Wheelchair - Electric, Walker - Rolling  Has the patient had two or more falls in the past year or any fall with injury in the past year?: No  Prior Level of Assist for ADLs: Independent  Prior Level of Assist for Homemaking: Needs assistance  Prior Level of Assist for Ambulation: Independent in home with wheelchair and able to pivot transfer  Prior Level of Assist for Transfers: Independent    Objective        Observation/Palpation  Posture: Fair  Observation: Wound near sacrum and lumbar spine.  Patient on 2L O2 via NC  Safety Devices  Type of Devices: All fall risk precautions in place  Restraints  Restraints Initially in Place: No        AROM: Within functional limits  PROM: Within functional limits  Strength: Generally decreased, functional  Coordination: Generally decreased, functional  Tone: Normal  Sensation: Intact  ADL  Feeding: Independent  Grooming: Stand by assistance  Grooming Skilled Clinical Factors: seated  UE Bathing: Minimal assistance  LE Bathing: Moderate assistance  UE Dressing: Minimal assistance  LE Dressing: Maximum assistance  Putting On/Taking Off Footwear: Maximum assistance  Toileting: Maximum assistance  Functional Mobility: Contact guard assistance  Functional Mobility Skilled Clinical Factors: SW  Additional Comments: CGA ADL transfers  Product Used : Bath wipes     Activity Tolerance  Activity Tolerance: Patient tolerated  evaluation without incident;Patient limited by endurance;Patient limited by fatigue        Vision  Vision: Within Functional Limits  Hearing  Hearing: Within functional limits  Cognition  Overall Cognitive Status: WFL  Orientation  Overall Orientation Status: Within Functional Limits                  Education Given To: Patient  Education Provided: Plan of Care;Role of Therapy  Education Method: Verbal  Barriers to Learning: None  Education Outcome: Verbalized understanding                     AM-PAC - ADL  AM-PAC Daily Activity - Inpatient   How much help is needed for putting on and taking off regular lower body clothing?: A Lot  How much help is needed for bathing (which includes washing, rinsing, drying)?: A Lot  How much help is needed for toileting (which includes using toilet, bedpan, or urinal)?: A Lot  How much help is needed for putting on and taking off regular upper body clothing?: A Little  How much help is needed for taking care of personal grooming?: A Little  How much help for eating meals?: None  AM-[facility] Inpatient Daily Activity Raw Score: 16  AM-PAC Inpatient ADL T-Scale Score : 35.96  ADL Inpatient CMS 0-100% Score: 53.32  ADL Inpatient CMS G-Code Modifier : CK      Goals  Short Term Goals  Time Frame for Short Term Goals: 21 visits  Short Term Goal 1: Patient to complete ADL routine c mod I to ensure safe return home.  Short Term Goal 2: Patient to complete ADLstand pivot transfers with mod I to ensure safe return home.  Short Term Goal 3: Patient to engage in 15 minutes of the jessie/ther act to improve strength and activity tolerance for self care.      Therapy Time   Individual Concurrent Group Co-treatment   Time In 1350         Time Out 1405         Minutes 15                 Sadaf Oconnor OTR/L

## 2025-02-18 NOTE — ED NOTES
O2 decreased to 3L NC   Primary Defect Width In Cm (Final Defect Size - Required For Flaps/Grafts): 2.1

## 2025-02-18 NOTE — PROGRESS NOTES
Progress Note    SUBJECTIVE:    Patient seen for f/u of Influenza A.  He resting in bed no distress. Appears frail and weak.  Currently on 3L of O2 and on 2.5-3L per NC at home.  Afebrile at this time     ROS:   Constitutional: negative  for fevers, and negative for chills.  Respiratory: positive for shortness of breath, positive for cough, and negative for wheezing  Cardiovascular: negative for chest pain, and negative for palpitations  Gastrointestinal: negative for abdominal pain, negative for nausea,negative for vomiting, negative for diarrhea, and negative for constipation     All other systems were reviewed with the patient and are negative unless otherwise stated in HPI      OBJECTIVE:      Vitals:   Vitals:    02/18/25 0800   BP: (!) 123/51   Pulse: 79   Resp: 22   Temp:    SpO2:      Weight - Scale: (!) 153.7 kg (338 lb 12.8 oz)         Weight  Wt Readings from Last 3 Encounters:   02/18/25 (!) 153.7 kg (338 lb 12.8 oz)   01/29/25 (!) 157.4 kg (347 lb)   11/26/24 (!) 164.7 kg (363 lb)     Body mass index is 50.77 kg/m².    24HR INTAKE/OUTPUT:      Intake/Output Summary (Last 24 hours) at 2/18/2025 0904  Last data filed at 2/18/2025 0507  Gross per 24 hour   Intake 550 ml   Output --   Net 550 ml     -----------------------------------------------------------------  Exam:    GEN:    Awake, alert and oriented x3.   EYES:  EOMI, pupils equal   NECK: Supple. No lymphadenopathy. Click  No carotid bruit  CVS:    regular rate and rhythm, no audible murmur  PULM:   fine rales left base and faint exp wheeze right mid , no acute respiratory distress  ABD:    Bowels sounds normal.  Abdomen is soft.  No distention.  no tenderness to palpation.   EXT:   no edema bilaterally .  No calf tenderness.   NEURO: Moves all extremities.  Motor and sensory are grossly intact  SKIN:  No rashes.  No skin lesions.    -----------------------------------------------------------------    Diagnostic Data:      Complete Blood Count:    Recent Labs     02/18/25 0125 02/18/25  0454   WBC 6.4 10.5   RBC 4.35 3.94*   HGB 13.1 12.2*   HCT 40.1* 35.9*   MCV 92.2 91.1   MCH 30.1 31.0   MCHC 32.7 34.0   RDW 14.6* 14.6*    151   MPV 11.9 11.8        Last 3 Blood Glucose:   Recent Labs     02/17/25  0905 02/18/25  0125 02/18/25  0454   GLUCOSE 107* 125* 125*        Comprehensive Metabolic Profile:   Recent Labs     02/17/25  0905 02/18/25 0125 02/18/25  0454   * 136 135*   K 3.9 3.6* 3.6*   CL 96* 95* 96*   CO2 28 28 28   BUN 18 24* 23   CREATININE 0.8 1.1 0.9   GLUCOSE 107* 125* 125*   CALCIUM 8.8 8.9 8.2*   BILITOT 0.7 0.6 0.7   ALKPHOS 84 86 77   AST 29 32 31   ALT 34 35 32        Urinalysis:   Lab Results   Component Value Date/Time    NITRU NEGATIVE 02/18/2025 02:30 AM    COLORU Yellow 02/18/2025 02:30 AM    PHUR 6.0 02/18/2025 02:30 AM    PHUR 5.5 12/08/2023 09:33 AM    PHUR 6.5 12/04/2022 01:00 PM    WBCUA 20 TO 50 02/18/2025 02:30 AM    RBCUA 2 TO 5 02/18/2025 02:30 AM    MUCUS 1+ 09/30/2024 03:13 PM    TRICHOMONAS NOT REPORTED 12/28/2021 12:42 PM    YEAST NOT REPORTED 12/28/2021 12:42 PM    BACTERIA 4+ 02/18/2025 02:30 AM    CLARITYU clear 12/08/2023 09:33 AM    SPECGRAV 1.020 12/08/2023 09:33 AM    LEUKOCYTESUR NEGATIVE 02/18/2025 02:30 AM    UROBILINOGEN Normal 02/18/2025 02:30 AM    BILIRUBINUR NEGATIVE 02/18/2025 02:30 AM    BLOODU trace-lysed 12/08/2023 09:33 AM    BLOODU  12/03/2016 11:45 AM      Comment:      trace    GLUCOSEU NEGATIVE 02/18/2025 02:30 AM    KETUA NEGATIVE 02/18/2025 02:30 AM    AMORPHOUS NOT REPORTED 12/28/2021 12:42 PM       HgBA1c:    Lab Results   Component Value Date/Time    LABA1C 5.9 03/02/2017 11:22 AM       Lactic Acid:   Lab Results   Component Value Date/Time    LACTA 1.6 02/18/2025 03:14 AM    LACTA 2.8 02/18/2025 01:25 AM    LACTA 1.6 03/16/2024 12:30 PM        Troponin: No results for input(s): \"TROPONINI\" in the last 72 hours.    CRP:  No results for input(s): \"CRP\" in the last 72  hours.      Radiology/Imaging:  XR CHEST PORTABLE   Final Result   Cardiomegaly with pulmonary interstitial edema and trace pleural fluid.               ASSESSMENT / PLAN:    MEDICAL DECISION MAKING:    Primary Problem(s): Influenza A  Differential diagnoses: Pneumonia, CHF, other viral illness  Condition is an undiagnosed new problem with uncertain prognosis  Condition is stable  Treatment plan:   Oxygen therapy-Baseline 2.5-3L per NC  Monitor labs and replace electrolytes  Blood cultures pending  PT OT  Telemetry monitoring  Acapella / IS  Imaging: no further imaging studies ordered today  Medications:   Continue Tamiflu  Steroids  Nebs  Medication Monitoring / High Risk Medications: none      UTI  Condition is a stable  Treatment plan:   UC-pending  BC x2 - #1-EColi, #2 no growth  Imaging: no further imaging studies ordered today  Medications:   Start IV Rocephin    Chronic CHF  Condition is a chronic stable condition  Treatment plan:   I&O, daily weights  Telemetry monitoring  Imaging: no further imaging studies ordered today  Medications:   Increase p.o. Lasix to 40 mg daily  Continue losartan     Elevated troponin-likely demand ischemia  Condition is stable  Treatment plan:  Monitor labs-trend troponin  Telemetry monitoring  EKG in a.m.  Medications:  Continue aspirin and atorvastatin    Nutrition status:   morbid obesity  Dietician consult initiated     Hospital Prophylaxis:   DVT: Lovenox   Stress Ulcer: H2 Blocker      Disposition:  Shared decision making: All test results, treatment options and disposition options were discussed with the patient today  Social determinants of health that may impact management: none  Code status: Full Code   Disposition: Discharge plan is pending    [facility] [place] Advanced Care Planning documentation:  [] I have confirmed that the patient's Advance Care Plan is present, Code Status is documented, or surrogate decision maker is listed in the patient's medical record  [If \"yes\", STOP

## 2025-02-18 NOTE — CARE COORDINATION
Case Management Assessment  Initial Evaluation    Date/Time of Evaluation: 2/18/2025 5:44 PM  Assessment Completed by: DANIELLE Feliciano    If patient is discharged prior to next notation, then this note serves as note for discharge by case management.    Patient Name: Marti Manzo                   YOB: 1945  Diagnosis: Influenza A [J10.1]  Acute respiratory failure with hypoxia [J96.01]                   Date / Time: 2/18/2025  1:15 AM    Patient Admission Status: Inpatient   Readmission Risk (Low < 19, Mod (19-27), High > 27): Readmission Risk Score: 14.2    Current PCP: Fredrick Mack MD  PCP verified by CM? Yes    Chart Reviewed: Yes      History Provided by: Patient  Patient Orientation: Alert and Oriented, Person, Place, Situation, Self    Patient Cognition: Alert    Hospitalization in the last 30 days (Readmission):  No    If yes, Readmission Assessment in  Navigator will be completed.    Advance Directives:      Code Status: Full Code   Patient's Primary Decision Maker is: Legal Next of Kin    Primary Decision Maker: Richelle Manzo - Spouse - [phone]    Secondary Decision Maker: Monster Manzo - Brother/Sister - [phone]    Discharge Planning:    Patient lives with: Children Type of Home: Apartment  Primary Care Giver: Self  Patient Support Systems include: Spouse/Significant Other, Children, Family Members   Current Financial resources: Medicare  Current community resources: None  Current services prior to admission: Durable Medical Equipment, Oxygen Therapy, Transportation            Current DME: Walker, Wheelchair, Oxygen Therapy (Comment)            Type of Home Care services:  None    ADLS  Prior functional level: Independent in ADLs/IADLs  Current functional level: Independent in ADLs/IADLs    PT AM-PAC:   /24  OT AM-PAC: 16 /24    Family can provide assistance at DC: Yes  Would you like Case Management to discuss the discharge plan with any other family  members/significant others, and if so, who? No  Plans to Return to Present Housing: Yes  Other Identified Issues/Barriers to RETURNING to current housing: none  Potential Assistance needed at discharge: Durable Medical Equipment, Transportation            Potential DME: Oxygen Therapy (Comment), Walker, Wheelchair  Patient expects to discharge to: Apartment  Plan for transportation at discharge: Wheelchair Van    Financial    Payor: MEDICARE / Plan: MEDICARE PART A AND B / Product Type: *No Product type* /     Does insurance require precert for SNF: No    Potential assistance Purchasing Medications: No  Meds-to-Beds request:        [id number] - [place], [place] - [address] - P [phone] - F [phone]  [address]  [place] [address]  Phone: [phone] Fax: [phone]      Notes:    Factors facilitating achievement of predicted outcomes: Family support, Cooperative, Pleasant, Has needed Durable Medical Equipment at home, and Home is wheelchair accessible    Barriers to discharge: Limited family support, Upper extremity weakness, Lower extremity weakness, and Long standing deficits    Additional Case Management Notes: Patient is  and lives in apartment with his son.  His wife is a resident of a [facility] long term.  He uses a walker and a electric scooter. Patient is independent with his household chores and ADL's.  He manages his own medications.  Patient uses the public transportation the [employer] service.  The discharge plan is home with no services at this time.    The Plan for Transition of Care is related to the following treatment goals of Influenza A [J10.1]  Acute respiratory failure with hypoxia [J96.01]    Transportation/Food Security/Housekeeping Addressed: No issues identified or concerns.    The Patient and/or Patient Representative Agree with the Discharge Plan?  yes    DANIELLE Feliciano  Case Management Department  Ph: [phone]

## 2025-02-18 NOTE — H&P
History and Physical    Patient:  Marti Manzo  MRN: [id number]    Chief Complaint:  Tachycardia (Pt. Reports watching TV when he felt like his heart was racing. ) and Shortness of Breath (Pt. Wears 2.5L at home. Pt. Reports feeling SOB just PTA)      History Obtained From:  patient, electronic medical record    PCP: Fredrick Mack MD    History of Present Illness:   The patient is a 79 y.o. male with a history of atrial fibrillation, aortic stenosis status post TAVR, CHF, diabetes, hypertension and lymphedema who presents with shortness of breath and tachycardia.  Patient was watching TV at home when all of a sudden he felt his heart start to race and he became short of breath.  On arrival to the ER today, he had a Tmax of 99.5 °F.  SpO2 was 90% on 2 L and he was increased to 3 L.  He was tachycardic with a rate of 10 5-1 11.  Patient has not felt well for the last week.  He states about 5 days ago he had an episode where he began shaking and had chills.  This started again today.  He denies fever.  He has had a productive cough.  He denies any chest pain.  Patient is chronically on 2-1/2 to 3 L of oxygen at home but states he has been keeping it around 3 over the last couple of days.  He states he has been exposed to several respiratory illnesses.  Influenza A positive today.  No leukocytosis present.  Magnesium 1.5.  Initial lactic 2.8 decreased to 1.6 on repeat.  Initial troponin 41 and increased to 55 and 60 on repeat.  EKG showed an atrial paced rhythm without any ST or T wave abnormalities.  Chest x-ray showed cardiomegaly with pulmonary interstitial edema and trace pleural fluid.  Patient does state some improvement of shortness of breath.    Past Medical History:        Diagnosis Date    Anxiety     Aortic stenosis     Atrial fibrillation, new onset (HCC) 03/05/2020    BPH (benign prostatic hyperplasia)     Breast abscess in male 03/16/2024    CHF (congestive heart failure) ([facility])     Diabetes mellitus  The patient was prescribed or is already taking and ACE or ARB  [] Reason why ACE or ARB was not prescirbed / taking: not applicable    BETA-BLOCKERS:  [] The patient was prescribed or is already taking a Beta-blocker  [] Reason why Beta-blocker not prescribed / taking: documented intollerance to Beta-blocker      CORE MEASURES  DVT prophylaxis: Lovenox  Decubitus ulcer present on admission: No  CODE STATUS: FULL CODE  Nutrition Status: good   Physical therapy: Yes   Old Charts reviewed: Yes  EKG Reviewed: Yes  Advance Directive Addressed: Yes    SYLVIA Sutton - CNP, APRN, NP-C  2/18/2025, 4:32 AM

## 2025-02-19 LAB
ALBUMIN SERPL-MCNC: 3.5 G/DL (ref 3.5–5.2)
ALBUMIN/GLOB SERPL: 1 {RATIO} (ref 1–2.5)
ALP SERPL-CCNC: 66 U/L (ref 40–129)
ALT SERPL-CCNC: 27 U/L (ref 10–50)
ANION GAP SERPL CALCULATED.3IONS-SCNC: 11 MMOL/L (ref 9–16)
AST SERPL-CCNC: 25 U/L (ref 10–50)
BASOPHILS # BLD: <0.03 K/UL (ref 0–0.2)
BASOPHILS NFR BLD: 0 % (ref 0–2)
BILIRUB SERPL-MCNC: 0.4 MG/DL (ref 0–1.2)
BUN SERPL-MCNC: 19 MG/DL (ref 8–23)
BUN/CREAT SERPL: 27 (ref 9–20)
CALCIUM SERPL-MCNC: 8.4 MG/DL (ref 8.6–10.4)
CHLORIDE SERPL-SCNC: 100 MMOL/L (ref 98–107)
CO2 SERPL-SCNC: 26 MMOL/L (ref 20–31)
CREAT SERPL-MCNC: 0.7 MG/DL (ref 0.7–1.2)
EKG ATRIAL RATE: 109 BPM
EKG ATRIAL RATE: 92 BPM
EKG P AXIS: 27 DEGREES
EKG P AXIS: 90 DEGREES
EKG P-R INTERVAL: 198 MS
EKG P-R INTERVAL: 216 MS
EKG Q-T INTERVAL: 360 MS
EKG Q-T INTERVAL: 446 MS
EKG QRS DURATION: 176 MS
EKG QRS DURATION: 182 MS
EKG QTC CALCULATION (BAZETT): 484 MS
EKG QTC CALCULATION (BAZETT): 551 MS
EKG R AXIS: 116 DEGREES
EKG R AXIS: 117 DEGREES
EKG T AXIS: -30 DEGREES
EKG T AXIS: -38 DEGREES
EKG VENTRICULAR RATE: 109 BPM
EKG VENTRICULAR RATE: 92 BPM
EOSINOPHIL # BLD: <0.03 K/UL (ref 0–0.44)
EOSINOPHILS RELATIVE PERCENT: 0 % (ref 1–4)
ERYTHROCYTE [DISTWIDTH] IN BLOOD BY AUTOMATED COUNT: 14.6 % (ref 11.8–14.4)
GFR, ESTIMATED: >90 ML/MIN/1.73M2
GLUCOSE BLD-MCNC: 145 MG/DL (ref 74–100)
GLUCOSE SERPL-MCNC: 118 MG/DL (ref 74–99)
HCT VFR BLD AUTO: 34.9 % (ref 40.7–50.3)
HGB BLD-MCNC: 11.4 G/DL (ref 13–17)
IMM GRANULOCYTES # BLD AUTO: 0.12 K/UL (ref 0–0.3)
IMM GRANULOCYTES NFR BLD: 1 %
LYMPHOCYTES NFR BLD: 0.98 K/UL (ref 1.1–3.7)
LYMPHOCYTES RELATIVE PERCENT: 9 % (ref 24–43)
MCH RBC QN AUTO: 30.5 PG (ref 25.2–33.5)
MCHC RBC AUTO-ENTMCNC: 32.7 G/DL (ref 28.4–34.8)
MCV RBC AUTO: 93.3 FL (ref 82.6–102.9)
MONOCYTES NFR BLD: 0.64 K/UL (ref 0.1–1.2)
MONOCYTES NFR BLD: 6 % (ref 3–12)
NEUTROPHILS NFR BLD: 84 % (ref 36–65)
NEUTS SEG NFR BLD: 9.01 K/UL (ref 1.5–8.1)
NRBC BLD-RTO: 0 PER 100 WBC
PLATELET # BLD AUTO: 162 K/UL (ref 138–453)
PMV BLD AUTO: 12.3 FL (ref 8.1–13.5)
POTASSIUM SERPL-SCNC: 4.1 MMOL/L (ref 3.7–5.3)
PROT SERPL-MCNC: 6.8 G/DL (ref 6.6–8.7)
RBC # BLD AUTO: 3.74 M/UL (ref 4.21–5.77)
SODIUM SERPL-SCNC: 137 MMOL/L (ref 136–145)
WBC OTHER # BLD: 10.8 K/UL (ref 3.5–11.3)

## 2025-02-19 PROCEDURE — 80053 COMPREHEN METABOLIC PANEL: CPT

## 2025-02-19 PROCEDURE — 85025 COMPLETE CBC W/AUTO DIFF WBC: CPT

## 2025-02-19 PROCEDURE — 97116 GAIT TRAINING THERAPY: CPT

## 2025-02-19 PROCEDURE — 6360000002 HC RX W HCPCS: Performed by: NURSE PRACTITIONER

## 2025-02-19 PROCEDURE — 6370000000 HC RX 637 (ALT 250 FOR IP)

## 2025-02-19 PROCEDURE — 1200000000 HC SEMI PRIVATE

## 2025-02-19 PROCEDURE — 6370000000 HC RX 637 (ALT 250 FOR IP): Performed by: NURSE PRACTITIONER

## 2025-02-19 PROCEDURE — 6360000002 HC RX W HCPCS

## 2025-02-19 PROCEDURE — 93010 ELECTROCARDIOGRAM REPORT: CPT | Performed by: FAMILY MEDICINE

## 2025-02-19 PROCEDURE — 97110 THERAPEUTIC EXERCISES: CPT

## 2025-02-19 PROCEDURE — 82947 ASSAY GLUCOSE BLOOD QUANT: CPT

## 2025-02-19 PROCEDURE — 2500000003 HC RX 250 WO HCPCS: Performed by: NURSE PRACTITIONER

## 2025-02-19 PROCEDURE — 2500000003 HC RX 250 WO HCPCS

## 2025-02-19 PROCEDURE — 94761 N-INVAS EAR/PLS OXIMETRY MLT: CPT

## 2025-02-19 PROCEDURE — 97535 SELF CARE MNGMENT TRAINING: CPT

## 2025-02-19 PROCEDURE — 2700000000 HC OXYGEN THERAPY PER DAY

## 2025-02-19 RX ADMIN — ATORVASTATIN CALCIUM 40 MG: 40 TABLET, FILM COATED ORAL at 07:37

## 2025-02-19 RX ADMIN — OSELTAMIVIR PHOSPHATE 75 MG: 75 CAPSULE ORAL at 21:20

## 2025-02-19 RX ADMIN — PAROXETINE 30 MG: 10 TABLET, FILM COATED ORAL at 07:36

## 2025-02-19 RX ADMIN — POTASSIUM CHLORIDE 20 MEQ: 1500 TABLET, EXTENDED RELEASE ORAL at 07:37

## 2025-02-19 RX ADMIN — WATER 1000 MG: 1 INJECTION INTRAMUSCULAR; INTRAVENOUS; SUBCUTANEOUS at 15:45

## 2025-02-19 RX ADMIN — TAMSULOSIN HYDROCHLORIDE 0.4 MG: 0.4 CAPSULE ORAL at 07:37

## 2025-02-19 RX ADMIN — ENOXAPARIN SODIUM 40 MG: 100 INJECTION SUBCUTANEOUS at 07:37

## 2025-02-19 RX ADMIN — OSELTAMIVIR PHOSPHATE 75 MG: 75 CAPSULE ORAL at 07:37

## 2025-02-19 RX ADMIN — Medication 1 TABLET: at 07:37

## 2025-02-19 RX ADMIN — WATER 40 MG: 1 INJECTION INTRAMUSCULAR; INTRAVENOUS; SUBCUTANEOUS at 07:37

## 2025-02-19 RX ADMIN — ENOXAPARIN SODIUM 40 MG: 100 INJECTION SUBCUTANEOUS at 21:20

## 2025-02-19 RX ADMIN — FAMOTIDINE 20 MG: 20 TABLET, FILM COATED ORAL at 07:36

## 2025-02-19 RX ADMIN — TAMSULOSIN HYDROCHLORIDE 0.4 MG: 0.4 CAPSULE ORAL at 21:20

## 2025-02-19 RX ADMIN — ASPIRIN 81 MG: 81 TABLET, COATED ORAL at 07:36

## 2025-02-19 RX ADMIN — Medication 2000 UNITS: at 07:36

## 2025-02-19 RX ADMIN — SODIUM CHLORIDE, PRESERVATIVE FREE 10 ML: 5 INJECTION INTRAVENOUS at 07:49

## 2025-02-19 RX ADMIN — AMIODARONE HYDROCHLORIDE 200 MG: 200 TABLET ORAL at 07:37

## 2025-02-19 RX ADMIN — SODIUM CHLORIDE, PRESERVATIVE FREE 10 ML: 5 INJECTION INTRAVENOUS at 21:00

## 2025-02-19 NOTE — PROGRESS NOTES
Progress Note    SUBJECTIVE:    Patient seen for f/u of Influenza A.  He resting in bed no distress.  Afebrile.  Tolerated diet well.    ROS:   Constitutional: negative  for fevers, and negative for chills.  Respiratory: positive for shortness of breath, positive for cough, and negative for wheezing  Cardiovascular: negative for chest pain, and negative for palpitations  Gastrointestinal: negative for abdominal pain, negative for nausea,negative for vomiting, negative for diarrhea, and negative for constipation     All other systems were reviewed with the patient and are negative unless otherwise stated in HPI      OBJECTIVE:      Vitals:   Vitals:    02/19/25 0322   BP: 131/70   Pulse: 82   Resp: 20   Temp:    SpO2: 95%     Weight - Scale: (!) 154.2 kg (340 lb)   Height: 174 cm (5' 8.5\")     Weight  Wt Readings from Last 3 Encounters:   02/19/25 (!) 154.2 kg (340 lb)   01/29/25 (!) 157.4 kg (347 lb)   11/26/24 (!) 164.7 kg (363 lb)     Body mass index is 50.94 kg/m².    24HR INTAKE/OUTPUT:      Intake/Output Summary (Last 24 hours) at 2/19/2025 0733  Last data filed at 2/19/2025 0324  Gross per 24 hour   Intake 1320 ml   Output 675 ml   Net 645 ml     -----------------------------------------------------------------  Exam:    GEN:    Awake, alert and oriented x3.   EYES:  EOMI, pupils equal   NECK: Supple. No lymphadenopathy. Click  No carotid bruit  CVS:    regular rate and rhythm, no audible murmur  PULM:   Few scattered rhonchi , no acute respiratory distress  ABD:    Bowels sounds normal.  Abdomen is soft.  No distention.  no tenderness to palpation.   EXT:   no edema bilaterally .  No calf tenderness.   NEURO: Moves all extremities.  Motor and sensory are grossly intact  SKIN:  No rashes.  No skin lesions.    -----------------------------------------------------------------    Diagnostic Data:      Complete Blood Count:   Recent Labs     02/18/25  0125 02/18/25  0454 02/19/25  0458   WBC 6.4 10.5 10.8   RBC  patient's Advance Care Plan is present, Code Status is documented, or surrogate decision maker is listed in the patient's medical record  [If \"yes\", STOP HERE]     [] The patient's Advance Care Plan is NOT present because:    []  I confirmed today that the patient does not wish or was not able to name a   surrogate decision maker or provide and advance care plan.    [] Hospice care is currently being provided or has been provided within the   calendar year.    []  I did NOT confirm today the presence of an Advance Care Plan or surrogate   decision maker documented within the patient's medical record.   [DOES NOT SATISFY MIPS PERFORMANCE]    Alicia Kaur, SYLVIA - CNP , SYLVIA, NP-C  [facility]        2/19/2025, 7:33 AM

## 2025-02-19 NOTE — PROGRESS NOTES
Physical Therapy  Facility/Department: [facility] MED SURG  Daily Treatment Note  NAME: Marti Manzo  : 1945  MRN: [id number]    Date of Service: 2025    Discharge Recommendations:  Continue to assess pending progress, Home with Home health PT     Patient Diagnosis(es): The primary encounter diagnosis was Influenza A. A diagnosis of Acute respiratory failure with hypoxia (HCC) was also pertinent to this visit.    Assessment  Assessment: Bed mobility: Min A. Transfers:CGA/Min A from elevated bed. Pt. ambulated 3ft with WW< CGA 2 for safety from bed to chair. Supine exercises B LE x10. Pt. limited by fatigue.  Activity Tolerance: Patient tolerated treatment well;Patient limited by fatigue    Plan  Physical Therapy Plan  General Plan: 2 times a day 7 days a week  Current Treatment Recommendations: Strengthening;ROM;Balance training;Transfer training;ADL/Self-care training;IADL training;Manual;Neuromuscular re-education;Gait training;Endurance training;Home exercise program;Safety education & training;Patient/Caregiver education & training;Therapeutic activities;Positioning    Restrictions  Restrictions/Precautions  Restrictions/Precautions: Fall Risk, Isolation  Activity Level: Up with Assist  Required Braces or Orthoses?: No     Subjective   Subjective  Subjective: Pt. in bed upon arrival, agreeable to therapy and up to chair at this time. Reports having increased fatigue  Pain: denies at rest    Objective  Bed Mobility Training  Bed Mobility Training: Yes  Overall Level of Assistance: Minimal assistance  Interventions: Verbal cues  Rolling: Minimal assistance  Supine to Sit: Minimal assistance  Scooting: Minimal assistance  Transfer Training  Transfer Training: Yes  Overall Level of Assistance: Contact guard assistance;Minimal assistance;2 Person assistance  Interventions: Verbal cues  Sit to Stand: Contact guard assistance;Minimal assistance;2 Person assistance  Stand to Sit: Contact guard

## 2025-02-19 NOTE — PLAN OF CARE
Problem: Chronic Conditions and Co-morbidities  Goal: Patient's chronic conditions and co-morbidity symptoms are monitored and maintained or improved  2/19/2025 0634 by Annemarie Means RN  Outcome: Progressing  Flowsheets (Taken 2/19/2025 0634)  Care Plan - Patient's Chronic Conditions and Co-Morbidity Symptoms are Monitored and Maintained or Improved:   Monitor and assess patient's chronic conditions and comorbid symptoms for stability, deterioration, or improvement   Collaborate with multidisciplinary team to address chronic and comorbid conditions and prevent exacerbation or deterioration   Update acute care plan with appropriate goals if chronic or comorbid symptoms are exacerbated and prevent overall improvement and discharge  2/18/2025 1939 by Rio Carrillo GN  Outcome: Progressing     Problem: Discharge Planning  Goal: Discharge to home or other facility with appropriate resources  2/19/2025 0634 by Annemarie Means RN  Outcome: Progressing  Flowsheets (Taken 2/19/2025 0634)  Discharge to home or other facility with appropriate resources:   Identify barriers to discharge with patient and caregiver   Identify discharge learning needs (meds, wound care, etc)   Refer to discharge planning if patient needs post-hospital services based on physician order or complex needs related to functional status, cognitive ability or social support system   Arrange for needed discharge resources and transportation as appropriate  2/18/2025 1939 by Rio Carrillo GN  Outcome: Progressing     Problem: Safety - Adult  Goal: Free from fall injury  2/19/2025 0634 by Annemarie Means RN  Outcome: Progressing  Flowsheets (Taken 2/19/2025 0634)  Free From Fall Injury: Instruct family/caregiver on patient safety  2/18/2025 1939 by Rio Carrillo GN  Outcome: Progressing     Problem: Skin/Tissue Integrity  Goal: Skin integrity remains intact  Description: 1.  Monitor for areas of redness and/or skin breakdown  2.  Assess

## 2025-02-19 NOTE — PROGRESS NOTES
Physical Therapy  Facility/Department: [facility] MED SURG  Daily Treatment Note  NAME: Marti Manzo  : 1945  MRN: [id number]    Date of Service: 2025    Discharge Recommendations:  Continue to assess pending progress, Home with Home health PT     Patient Diagnosis(es): The primary encounter diagnosis was Influenza A. A diagnosis of Acute respiratory failure with hypoxia (HCC) was also pertinent to this visit.    Assessment  Assessment: Pt requests to remain in the chair at this time, declining transfers. Pt completes reclined B LE exercises x15 reps. Pt questions if there are lymphedema pumps here as he usually wears them to sleep with therapist telling patient that his family is able to bring his in but we don't have them here. Report to pt that we are able to assist him in donning FABIENNE hose and pt declines at this time.  Activity Tolerance: Patient tolerated treatment well;Patient limited by fatigue    Plan  Physical Therapy Plan  General Plan: 2 times a day 7 days a week  Current Treatment Recommendations: Strengthening;ROM;Balance training;Transfer training;ADL/Self-care training;IADL training;Manual;Neuromuscular re-education;Gait training;Endurance training;Home exercise program;Safety education & training;Patient/Caregiver education & training;Therapeutic activities;Positioning    Restrictions  Restrictions/Precautions  Restrictions/Precautions: Fall Risk, Isolation  Activity Level: Up with Assist  Required Braces or Orthoses?: No     Subjective   Subjective  Subjective: Pt seated in chair upon arrival and is agreeable to therapy at this time. Pt reports feeling fatigued and weak. Pt declines transfers, requesting to stay sitting in the chair.  Pain: denies    Objective  Bed Mobility Training  Bed Mobility Training: No  Overall Level of Assistance: Minimal assistance  Interventions: Verbal cues  Rolling: Minimal assistance  Supine to Sit: Minimal assistance  Scooting: Minimal assistance  Transfer

## 2025-02-19 NOTE — PLAN OF CARE
Problem: Chronic Conditions and Co-morbidities  Goal: Patient's chronic conditions and co-morbidity symptoms are monitored and maintained or improved  2/19/2025 1856 by Rio Carrillo GN  Outcome: Progressing     Problem: Discharge Planning  Goal: Discharge to home or other facility with appropriate resources  2/19/2025 1856 by Rio Carrillo GN  Outcome: Progressing     Problem: Safety - Adult  Goal: Free from fall injury  2/19/2025 1856 by Rio Carrillo GN  Outcome: Progressing     Problem: Skin/Tissue Integrity  Goal: Skin integrity remains intact  Description: 1.  Monitor for areas of redness and/or skin breakdown  2.  Assess vascular access sites hourly  3.  Every 4-6 hours minimum:  Change oxygen saturation probe site  4.  Every 4-6 hours:  If on nasal continuous positive airway pressure, respiratory therapy assess nares and determine need for appliance change or resting period  2/19/2025 1856 by Rio Carrillo GN  Outcome: Progressing     Problem: Nutrition Deficit:  Goal: Optimize nutritional status  2/19/2025 1856 by Rio Carrillo GN  Outcome: Progressing

## 2025-02-19 NOTE — PROGRESS NOTES
Occupational Therapy  Facility/Department: [facility] MED SURG  Daily Treatment Note  NAME: Marti Manzo  : 1945  MRN: [id number]    Date of Service: 2025    Discharge Recommendations:            Patient Diagnosis(es): The primary encounter diagnosis was Influenza A. A diagnosis of Acute respiratory failure with hypoxia (HCC) was also pertinent to this visit.     Assessment   Activity Tolerance: Patient tolerated treatment well     Plan  Occupational Therapy Plan  Times Per Day: Once a day  Days Per Week: 7 Days  Current Treatment Recommendations: Strengthening;Balance training;Endurance training;Safety education & training;Patient/Caregiver education & training;Wheelchair mobility training;Self-Care / ADL;Equipment evaluation, education, & procurement    Restrictions   Contact, droplet, fall risk    Subjective  Subjective  Subjective: Pt in bed, agreed to washing up EOB then transfer to recliner.  Pain: denied          Objective  Vitals           ADL  Grooming: Setup;Stand by assistance  Grooming Skilled Clinical Factors: seated  UE Bathing: Setup;Stand by assistance  UE Bathing Skilled Clinical Factors: seated  LE Bathing: Moderate assistance  UE Dressing: Setup;Stand by assistance  UE Dressing Skilled Clinical Factors: seated  LE Dressing: Maximum assistance  Putting On/Taking Off Footwear: Maximum assistance  Toileting Skilled Clinical Factors: did not assess  Functional Mobility: Contact guard assistance  Additional Comments: CGA x 2 for safety with stand step transfer from EOB<> recliner using FWW. 0 LOB noted, VCs for tech/safety  Product Used : Soap and water        Safety Devices  Type of Devices: All fall risk precautions in place;Call light within reach;Chair alarm in place;Left in chair    Patient Education  Education Given To: Patient  Education Provided: Role of Therapy;Plan of Care;ADL Adaptive Strategies;Transfer Training;Fall Prevention Strategies  Education Method:  Demonstration;Verbal  Barriers to Learning: None  Education Outcome: Demonstrated understanding    Goals  Short Term Goals  Time Frame for Short Term Goals: 21 visits  Short Term Goal 1: Patient to complete ADL routine c mod I to ensure safe return home.  Short Term Goal 2: Patient to complete ADLstand pivot transfers with mod I to ensure safe return home.  Short Term Goal 3: Patient to engage in 15 minutes of the jessie/ther act to improve strength and activity tolerance for self care.    AM-PAC - ADL       Therapy Time   Individual Concurrent Group Co-treatment   Time In 1129         Time Out 1154         Minutes 25                 DARBY Romo

## 2025-02-19 NOTE — PROGRESS NOTES
Nurse at patient bedside for morning shift assessment.  Upon entering, patient resting in bed with eyes closed.  Vitals and assessment obtained and documented.  Patient currently on 2.5L oxygen, home dose.  Patient denies pain or SOB at this time.   White board updated.  Water refreshed.  Medications given.  Patient states all other needs met at this time. Call light and items in reach, side rails up x2, bed in lowest position. Care ongoing.

## 2025-02-19 NOTE — PROGRESS NOTES
This RN completed pt assessment at this time. Vital signs completed by tech. Pt in bed, respirations normal and unlabored, lung sounds diminished bilaterally. Pt has edema in both lower extremities, nonpitting. Pt rated pain 0/10 at this time. 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 Pt encouraged to voice any questions or concerns. Pt expressed no concerns at this time. Personal items within reach, call light within reach. Pt encouraged to use call light for any questions, concerns, or needs.

## 2025-02-19 NOTE — PLAN OF CARE
Problem: Chronic Conditions and Co-morbidities  Goal: Patient's chronic conditions and co-morbidity symptoms are monitored and maintained or improved  Outcome: Progressing     Problem: Discharge Planning  Goal: Discharge to home or other facility with appropriate resources  Outcome: Progressing     Problem: Safety - Adult  Goal: Free from fall injury  Outcome: Progressing     Problem: Skin/Tissue Integrity  Goal: Skin integrity remains intact  Description: 1.  Monitor for areas of redness and/or skin breakdown  2.  Assess vascular access sites hourly  3.  Every 4-6 hours minimum:  Change oxygen saturation probe site  4.  Every 4-6 hours:  If on nasal continuous positive airway pressure, respiratory therapy assess nares and determine need for appliance change or resting period  Outcome: Progressing     Problem: Nutrition Deficit:  Goal: Optimize nutritional status  2/18/2025 1939 by Rio Carrillo GN  Outcome: Progressing

## 2025-02-20 LAB
ALBUMIN SERPL-MCNC: 3.5 G/DL (ref 3.5–5.2)
ALBUMIN/GLOB SERPL: 1 {RATIO} (ref 1–2.5)
ALP SERPL-CCNC: 60 U/L (ref 40–129)
ALT SERPL-CCNC: 25 U/L (ref 10–50)
ANION GAP SERPL CALCULATED.3IONS-SCNC: 10 MMOL/L (ref 9–16)
AST SERPL-CCNC: 23 U/L (ref 10–50)
BASOPHILS # BLD: <0.03 K/UL (ref 0–0.2)
BASOPHILS NFR BLD: 0 % (ref 0–2)
BILIRUB SERPL-MCNC: 0.4 MG/DL (ref 0–1.2)
BUN SERPL-MCNC: 18 MG/DL (ref 8–23)
BUN/CREAT SERPL: 23 (ref 9–20)
CALCIUM SERPL-MCNC: 8.6 MG/DL (ref 8.6–10.4)
CHLORIDE SERPL-SCNC: 100 MMOL/L (ref 98–107)
CO2 SERPL-SCNC: 27 MMOL/L (ref 20–31)
CREAT SERPL-MCNC: 0.8 MG/DL (ref 0.7–1.2)
EOSINOPHIL # BLD: <0.03 K/UL (ref 0–0.44)
EOSINOPHILS RELATIVE PERCENT: 0 % (ref 1–4)
ERYTHROCYTE [DISTWIDTH] IN BLOOD BY AUTOMATED COUNT: 14.6 % (ref 11.8–14.4)
GFR, ESTIMATED: >90 ML/MIN/1.73M2
GLUCOSE SERPL-MCNC: 122 MG/DL (ref 74–99)
HCT VFR BLD AUTO: 35.5 % (ref 40.7–50.3)
HGB BLD-MCNC: 11.3 G/DL (ref 13–17)
IMM GRANULOCYTES # BLD AUTO: 0.09 K/UL (ref 0–0.3)
IMM GRANULOCYTES NFR BLD: 1 %
LYMPHOCYTES NFR BLD: 1.68 K/UL (ref 1.1–3.7)
LYMPHOCYTES RELATIVE PERCENT: 15 % (ref 24–43)
MCH RBC QN AUTO: 30.1 PG (ref 25.2–33.5)
MCHC RBC AUTO-ENTMCNC: 31.8 G/DL (ref 28.4–34.8)
MCV RBC AUTO: 94.4 FL (ref 82.6–102.9)
MICROORGANISM SPEC CULT: ABNORMAL
MONOCYTES NFR BLD: 0.56 K/UL (ref 0.1–1.2)
MONOCYTES NFR BLD: 5 % (ref 3–12)
NEUTROPHILS NFR BLD: 79 % (ref 36–65)
NEUTS SEG NFR BLD: 8.84 K/UL (ref 1.5–8.1)
NRBC BLD-RTO: 0 PER 100 WBC
PLATELET # BLD AUTO: 178 K/UL (ref 138–453)
PMV BLD AUTO: 11.8 FL (ref 8.1–13.5)
POTASSIUM SERPL-SCNC: 4 MMOL/L (ref 3.7–5.3)
PROT SERPL-MCNC: 6.9 G/DL (ref 6.6–8.7)
RBC # BLD AUTO: 3.76 M/UL (ref 4.21–5.77)
SERVICE CMNT-IMP: ABNORMAL
SODIUM SERPL-SCNC: 137 MMOL/L (ref 136–145)
SPECIMEN DESCRIPTION: ABNORMAL
SPECIMEN DESCRIPTION: ABNORMAL
WBC OTHER # BLD: 11.2 K/UL (ref 3.5–11.3)

## 2025-02-20 PROCEDURE — 94669 MECHANICAL CHEST WALL OSCILL: CPT

## 2025-02-20 PROCEDURE — 94761 N-INVAS EAR/PLS OXIMETRY MLT: CPT

## 2025-02-20 PROCEDURE — 6360000002 HC RX W HCPCS

## 2025-02-20 PROCEDURE — 85025 COMPLETE CBC W/AUTO DIFF WBC: CPT

## 2025-02-20 PROCEDURE — 6370000000 HC RX 637 (ALT 250 FOR IP): Performed by: NURSE PRACTITIONER

## 2025-02-20 PROCEDURE — 97116 GAIT TRAINING THERAPY: CPT

## 2025-02-20 PROCEDURE — 94640 AIRWAY INHALATION TREATMENT: CPT

## 2025-02-20 PROCEDURE — 2500000003 HC RX 250 WO HCPCS

## 2025-02-20 PROCEDURE — 6360000002 HC RX W HCPCS: Performed by: NURSE PRACTITIONER

## 2025-02-20 PROCEDURE — 36415 COLL VENOUS BLD VENIPUNCTURE: CPT

## 2025-02-20 PROCEDURE — 6370000000 HC RX 637 (ALT 250 FOR IP)

## 2025-02-20 PROCEDURE — 2700000000 HC OXYGEN THERAPY PER DAY

## 2025-02-20 PROCEDURE — 80053 COMPREHEN METABOLIC PANEL: CPT

## 2025-02-20 PROCEDURE — 94664 DEMO&/EVAL PT USE INHALER: CPT

## 2025-02-20 PROCEDURE — 97110 THERAPEUTIC EXERCISES: CPT

## 2025-02-20 PROCEDURE — 1200000000 HC SEMI PRIVATE

## 2025-02-20 PROCEDURE — 2500000003 HC RX 250 WO HCPCS: Performed by: NURSE PRACTITIONER

## 2025-02-20 RX ORDER — IPRATROPIUM BROMIDE AND ALBUTEROL SULFATE 2.5; .5 MG/3ML; MG/3ML
1 SOLUTION RESPIRATORY (INHALATION) EVERY 4 HOURS PRN
Status: DISCONTINUED | OUTPATIENT
Start: 2025-02-20 | End: 2025-02-20

## 2025-02-20 RX ORDER — ALBUTEROL SULFATE 0.83 MG/ML
2.5 SOLUTION RESPIRATORY (INHALATION) EVERY 4 HOURS PRN
Status: DISCONTINUED | OUTPATIENT
Start: 2025-02-20 | End: 2025-02-21 | Stop reason: HOSPADM

## 2025-02-20 RX ORDER — IPRATROPIUM BROMIDE AND ALBUTEROL SULFATE 2.5; .5 MG/3ML; MG/3ML
SOLUTION RESPIRATORY (INHALATION)
Status: COMPLETED
Start: 2025-02-20 | End: 2025-02-21

## 2025-02-20 RX ORDER — IPRATROPIUM BROMIDE AND ALBUTEROL SULFATE 2.5; .5 MG/3ML; MG/3ML
1 SOLUTION RESPIRATORY (INHALATION)
Status: DISCONTINUED | OUTPATIENT
Start: 2025-02-20 | End: 2025-02-21 | Stop reason: HOSPADM

## 2025-02-20 RX ADMIN — SODIUM CHLORIDE, PRESERVATIVE FREE 10 ML: 5 INJECTION INTRAVENOUS at 20:14

## 2025-02-20 RX ADMIN — WATER 40 MG: 1 INJECTION INTRAMUSCULAR; INTRAVENOUS; SUBCUTANEOUS at 09:23

## 2025-02-20 RX ADMIN — POTASSIUM CHLORIDE 20 MEQ: 1500 TABLET, EXTENDED RELEASE ORAL at 09:22

## 2025-02-20 RX ADMIN — IPRATROPIUM BROMIDE AND ALBUTEROL SULFATE 1 DOSE: .5; 2.5 SOLUTION RESPIRATORY (INHALATION) at 05:22

## 2025-02-20 RX ADMIN — ASPIRIN 81 MG: 81 TABLET, COATED ORAL at 09:23

## 2025-02-20 RX ADMIN — IPRATROPIUM BROMIDE AND ALBUTEROL SULFATE 1 DOSE: .5; 2.5 SOLUTION RESPIRATORY (INHALATION) at 15:20

## 2025-02-20 RX ADMIN — ATORVASTATIN CALCIUM 40 MG: 40 TABLET, FILM COATED ORAL at 09:22

## 2025-02-20 RX ADMIN — TAMSULOSIN HYDROCHLORIDE 0.4 MG: 0.4 CAPSULE ORAL at 20:14

## 2025-02-20 RX ADMIN — TAMSULOSIN HYDROCHLORIDE 0.4 MG: 0.4 CAPSULE ORAL at 09:22

## 2025-02-20 RX ADMIN — AMIODARONE HYDROCHLORIDE 200 MG: 200 TABLET ORAL at 09:23

## 2025-02-20 RX ADMIN — Medication 2000 UNITS: at 09:22

## 2025-02-20 RX ADMIN — OSELTAMIVIR PHOSPHATE 75 MG: 75 CAPSULE ORAL at 09:23

## 2025-02-20 RX ADMIN — SODIUM CHLORIDE, PRESERVATIVE FREE 10 ML: 5 INJECTION INTRAVENOUS at 09:28

## 2025-02-20 RX ADMIN — WATER 1000 MG: 1 INJECTION INTRAMUSCULAR; INTRAVENOUS; SUBCUTANEOUS at 16:17

## 2025-02-20 RX ADMIN — PAROXETINE 30 MG: 10 TABLET, FILM COATED ORAL at 09:32

## 2025-02-20 RX ADMIN — IPRATROPIUM BROMIDE AND ALBUTEROL SULFATE 1 DOSE: .5; 2.5 SOLUTION RESPIRATORY (INHALATION) at 20:22

## 2025-02-20 RX ADMIN — ENOXAPARIN SODIUM 40 MG: 100 INJECTION SUBCUTANEOUS at 20:14

## 2025-02-20 RX ADMIN — FAMOTIDINE 20 MG: 20 TABLET, FILM COATED ORAL at 09:22

## 2025-02-20 RX ADMIN — ENOXAPARIN SODIUM 40 MG: 100 INJECTION SUBCUTANEOUS at 09:23

## 2025-02-20 RX ADMIN — OSELTAMIVIR PHOSPHATE 75 MG: 75 CAPSULE ORAL at 20:14

## 2025-02-20 RX ADMIN — IPRATROPIUM BROMIDE AND ALBUTEROL SULFATE 1 DOSE: 2.5; .5 SOLUTION RESPIRATORY (INHALATION) at 00:18

## 2025-02-20 RX ADMIN — IPRATROPIUM BROMIDE AND ALBUTEROL SULFATE 1 DOSE: .5; 2.5 SOLUTION RESPIRATORY (INHALATION) at 09:57

## 2025-02-20 RX ADMIN — Medication 1 TABLET: at 09:22

## 2025-02-20 NOTE — PROGRESS NOTES
Progress Note    SUBJECTIVE:    Patient seen for f/u of Influenza A.  He resting in bed no distress.  Afebrile.  Tolerated diet well. No other complaint    ROS:   Constitutional: negative  for fevers, and negative for chills.  Respiratory: positive for shortness of breath, positive for cough, and negative for wheezing  Cardiovascular: negative for chest pain, and negative for palpitations  Gastrointestinal: negative for abdominal pain, negative for nausea,negative for vomiting, negative for diarrhea, and negative for constipation     All other systems were reviewed with the patient and are negative unless otherwise stated in HPI      OBJECTIVE:      Vitals:   Vitals:    02/20/25 0523   BP:    Pulse:    Resp:    Temp:    SpO2: 97%     Weight - Scale: (!) 152.4 kg (336 lb)   Height: 174 cm (5' 8.5\")     Weight  Wt Readings from Last 3 Encounters:   02/20/25 (!) 152.4 kg (336 lb)   01/29/25 (!) 157.4 kg (347 lb)   11/26/24 (!) 164.7 kg (363 lb)     Body mass index is 50.35 kg/m².    24HR INTAKE/OUTPUT:      Intake/Output Summary (Last 24 hours) at 2/20/2025 0843  Last data filed at 2/20/2025 0813  Gross per 24 hour   Intake 720 ml   Output 1550 ml   Net -830 ml     -----------------------------------------------------------------  Exam:    GEN:    Awake, alert and oriented x3.   EYES:  EOMI, pupils equal   NECK: Supple. No lymphadenopathy. Click  No carotid bruit  CVS:    regular rate and rhythm, no audible murmur  PULM:   clear , no acute respiratory distress  ABD:    Bowels sounds normal.  Abdomen is soft.  No distention.  no tenderness to palpation.   EXT:   no edema bilaterally .  No calf tenderness.   NEURO: Moves all extremities.  Motor and sensory are grossly intact  SKIN:  No rashes.  No skin lesions.    -----------------------------------------------------------------    Diagnostic Data:      Complete Blood Count:   Recent Labs     02/18/25  0454 02/19/25  0458 02/20/25  0535   WBC 10.5 10.8 11.2   RBC 3.94*  3.74* 3.76*   HGB 12.2* 11.4* 11.3*   HCT 35.9* 34.9* 35.5*   MCV 91.1 93.3 94.4   MCH 31.0 30.5 30.1   MCHC 34.0 32.7 31.8   RDW 14.6* 14.6* 14.6*    162 178   MPV 11.8 12.3 11.8        Last 3 Blood Glucose:   Recent Labs     02/17/25  0905 02/18/25  0125 02/18/25  0454 02/19/25  0458 02/20/25  0535   GLUCOSE 107* 125* 125* 118* 122*        Comprehensive Metabolic Profile:   Recent Labs     02/18/25 0454 02/19/25  0458 02/20/25  0535   * 137 137   K 3.6* 4.1 4.0   CL 96* 100 100   CO2 28 26 27   BUN 23 19 18   CREATININE 0.9 0.7 0.8   GLUCOSE 125* 118* 122*   CALCIUM 8.2* 8.4* 8.6   BILITOT 0.7 0.4 0.4   ALKPHOS 77 66 60   AST 31 25 23   ALT 32 27 25        Urinalysis:   Lab Results   Component Value Date/Time    NITRU NEGATIVE 02/18/2025 02:30 AM    COLORU Yellow 02/18/2025 02:30 AM    PHUR 6.0 02/18/2025 02:30 AM    PHUR 5.5 12/08/2023 09:33 AM    PHUR 6.5 12/04/2022 01:00 PM    WBCUA 20 TO 50 02/18/2025 02:30 AM    RBCUA 2 TO 5 02/18/2025 02:30 AM    MUCUS 1+ 09/30/2024 03:13 PM    TRICHOMONAS NOT REPORTED 12/28/2021 12:42 PM    YEAST NOT REPORTED 12/28/2021 12:42 PM    BACTERIA 4+ 02/18/2025 02:30 AM    CLARITYU clear 12/08/2023 09:33 AM    SPECGRAV 1.020 12/08/2023 09:33 AM    LEUKOCYTESUR NEGATIVE 02/18/2025 02:30 AM    UROBILINOGEN Normal 02/18/2025 02:30 AM    BILIRUBINUR NEGATIVE 02/18/2025 02:30 AM    BLOODU trace-lysed 12/08/2023 09:33 AM    BLOODU  12/03/2016 11:45 AM      Comment:      trace    GLUCOSEU NEGATIVE 02/18/2025 02:30 AM    KETUA NEGATIVE 02/18/2025 02:30 AM    AMORPHOUS NOT REPORTED 12/28/2021 12:42 PM       HgBA1c:    Lab Results   Component Value Date/Time    LABA1C 5.9 03/02/2017 11:22 AM       Lactic Acid:   Lab Results   Component Value Date/Time    LACTA 1.6 02/18/2025 03:14 AM    LACTA 2.8 02/18/2025 01:25 AM    LACTA 1.6 03/16/2024 12:30 PM        Troponin:    Latest Reference Range & Units 02/18/25 01:25 02/18/25 02:35 02/18/25 03:27 02/18/25 04:54   Troponin, High

## 2025-02-20 NOTE — PLAN OF CARE
Problem: Chronic Conditions and Co-morbidities  Goal: Patient's chronic conditions and co-morbidity symptoms are monitored and maintained or improved  Outcome: Progressing  Flowsheets (Taken 2/20/2025 1026)  Care Plan - Patient's Chronic Conditions and Co-Morbidity Symptoms are Monitored and Maintained or Improved:   Monitor and assess patient's chronic conditions and comorbid symptoms for stability, deterioration, or improvement   Collaborate with multidisciplinary team to address chronic and comorbid conditions and prevent exacerbation or deterioration   Update acute care plan with appropriate goals if chronic or comorbid symptoms are exacerbated and prevent overall improvement and discharge     Problem: Discharge Planning  Goal: Discharge to home or other facility with appropriate resources  Outcome: Progressing  Flowsheets (Taken 2/20/2025 1026)  Discharge to home or other facility with appropriate resources:   Identify barriers to discharge with patient and caregiver   Identify discharge learning needs (meds, wound care, etc)   Refer to discharge planning if patient needs post-hospital services based on physician order or complex needs related to functional status, cognitive ability or social support system   Arrange for needed discharge resources and transportation as appropriate     Problem: Safety - Adult  Goal: Free from fall injury  Outcome: Progressing  Flowsheets (Taken 2/20/2025 1026)  Free From Fall Injury: Instruct family/caregiver on patient safety     Problem: Nutrition Deficit:  Goal: Optimize nutritional status  Outcome: Progressing  Flowsheets (Taken 2/20/2025 1026)  Nutrient intake appropriate for improving, restoring, or maintaining nutritional needs:   Recommend appropriate diets, oral nutritional supplements, and vitamin/mineral supplements   Monitor oral intake, labs, and treatment plans

## 2025-02-20 NOTE — PROGRESS NOTES
Occupational Therapy  [facility]  Inpatient/Observation/Outpatient Rehabilitation    Date: 2025  Patient Name: Marti Manzo       [x] Inpatient Acute/Observation       []  Outpatient  : 1945         [x] Pt refused/declined therapy at this time due to:   fatigue           DARBY Romo Date: 2025

## 2025-02-20 NOTE — PROGRESS NOTES
Nurse at patient bedside for morning shift assessment.  Upon entering, patient sitting up resting in bed with eyes closed.  Vitals and assessment obtained and documented.  Patient currently on 2L  oxygen.  Patient denies pain at this time.   White board updated.  Water refreshed.  Patient states all other needs met at this time. Call light and items in reach, side rails up x2, bed in lowest position. Care ongoing.

## 2025-02-20 NOTE — RT PROTOCOL NOTE
RESPIRATORY ASSESSMENT PROTOCOL                                                                                              Patient Name: Marti Manzo Room#: 0323/0323-01 : 1945     Admitting diagnosis: Influenza A [J10.1]  Acute respiratory failure with hypoxia (HCC) [J96.01]       Medical History:   Past Medical History:   Diagnosis Date    Anxiety     Aortic stenosis     Atrial fibrillation, new onset (HCC) 2020    Bacteremia due to Escherichia coli 2025    BPH (benign prostatic hyperplasia)     Breast abscess in male 2024    CHF (congestive heart failure) (HCC)     Diabetes mellitus (HCC)     borderline    Hyperlipidemia     Hypertension     Lymphedema     bilat legs    On home O2     Osteoarthritis     Septic shock due to urinary tract infection (HCC) /  Levophed and fluids        PATIENT ASSESSMENT    LABORATORY DATA  Hematology:   Lab Results   Component Value Date/Time    WBC 10.8 2025 04:58 AM    RBC 3.74 2025 04:58 AM    HGB 11.4 2025 04:58 AM    HCT 34.9 2025 04:58 AM     2025 04:58 AM     Chemistry:    Lab Results   Component Value Date/Time    PHART 7.294 2020 02:50 PM    ICH6CHB 52.3 2020 02:50 PM    PO2ART 207.7 2020 02:50 PM    U6ENJHFH 99.3 2020 02:50 PM    IOJ4JCU 24.8 2020 02:50 PM    PBEA NOT REPORTED 2020 02:50 PM    NBEA 2.4 2020 02:50 PM       VITALS  Pulse: 76   Respirations: 18  BP: 138/61  SpO2: 97 % O2 Device: Nasal cannula 2 Lpm  Temp: 97.8 °F (36.6 °C)    SKIN COLOR  [x] Normal  [] Pale  [] Dusky  [] Cyanotic    RESPIRATORY PATTERN  [x] Normal  [] Dyspnea  [] Cheyne-Liao  [] Kussmaul  [] Biots    AMBULATORY  [] Yes  [] No  [x] With Assistance    PEAK FLOW  Predicted:     Personal Best:            Patient Acuity 0 1 2 3 4 Score   Level of Consciousness (LOC) [x]  Alert & Oriented or Pt normal LOC []  Confused;follows directions []  Confused & uncooper-ative []  Obtunded  quitting smoking to slow or stop the progression of lung disease.    [] Smoking Cessation Protocol    SMOKING CESSATION EDUCATION provided according to policy RT_201: (shila with an X)  ____Yes    ____ No     ____ NA    Smoking Cessation Booklet given:  ____Yes  ____No ____Patient Refused

## 2025-02-20 NOTE — PROGRESS NOTES
Attempted to encourage getting up to the chair.  Pt recently washed up for the day and participated in physical therapy.  Pt states he is too tired at this time and does not want to get up.  Nurse offered to come back and try again later.

## 2025-02-20 NOTE — PROGRESS NOTES
This RN performed an assessment and obtained pt Vital signs at this time. Pt up in chair, respirations regular and unlabored. Pt has nonpitting edema and ecchymosis in both lower extremities with present pedal pulses. Pt alert and oriented x4. This RN encouraged pt to voice any questions or concerns. Pt denies any questions or concerns at this time. Personal care items within reach, call light within reach. Pt encouraged to use call light for any questions, concerns or needs.

## 2025-02-20 NOTE — PROGRESS NOTES
Physical Therapy  Facility/Department: [facility] MED SURG  Daily Treatment Note  NAME: Marti Manzo  : 1945  MRN: [id number]    Date of Service: 2025    Discharge Recommendations:  Continue to assess pending progress, Home with Home health PT     Patient Diagnosis(es): The primary encounter diagnosis was Influenza A. A diagnosis of Acute respiratory failure with hypoxia (HCC) was also pertinent to this visit.    Assessment  Assessment: Pt rolling in bed with CGA to get brief changed and pericare performed. Sit to supine with CGA. Once sitting EOB, pt states he needs to sit for a minute d/t feeling dizzy. Sit to stand with CGA/ Min A x2. Pt walks 3 ft to chair with RW and CGA x2 with wide MIGDALIA and slow, shuffled gait.  Activity Tolerance: Patient tolerated treatment well    Plan  Physical Therapy Plan  General Plan: 2 times a day 7 days a week  Specific Instructions for Next Treatment: 1x/ day on weekends  Current Treatment Recommendations: Strengthening;ROM;Balance training;Transfer training;ADL/Self-care training;IADL training;Manual;Neuromuscular re-education;Gait training;Endurance training;Home exercise program;Safety education & training;Patient/Caregiver education & training;Therapeutic activities;Positioning    Restrictions  Restrictions/Precautions  Restrictions/Precautions: Fall Risk, Isolation  Activity Level: Up with Assist  Required Braces or Orthoses?: No     Subjective   Subjective  Subjective: Pt is in bed and is agreeable to get changed and transfer to the chair with therapist and RN.  Pain: denies    Objective  Bed Mobility Training  Bed Mobility Training: Yes  Overall Level of Assistance: Contact guard assistance  Interventions: Verbal cues  Rolling: Contact guard assistance  Supine to Sit: Contact guard assistance  Scooting: Contact guard assistance  Transfer Training  Transfer Training: Yes  Overall Level of Assistance: Contact guard assistance;Minimal assistance;2 Person  assistance  Interventions: Verbal cues  Sit to Stand: Contact guard assistance;Minimal assistance;2 Person assistance  Stand to Sit: Contact guard assistance;Minimal assistance;2 Person assistance  Stand Pivot Transfers: Contact guard assistance;Minimal assistance;2 Person assistance  Gait  Gait Training: Yes  Overall Level of Assistance: Contact guard assistance;2 Person assistance  Distance (ft): 3 Feet  Assistive Device: Walker, rollator;Gait belt  Interventions: Verbal cues  Base of Support: Widened  Speed/Berenice: Slow;Shuffled     PT Exercises  Exercise Treatment: Supine B LE exercises x15 reps with RB d/t fatigue     Safety Devices  Type of Devices: All fall risk precautions in place;Call light within reach;Nurse notified;Left in chair;Chair alarm in place     Goals  Short Term Goals  Time Frame for Short Term Goals: 10 days  Short Term Goal 1: Patient will ambulate 15' with FWW, CGA  Short Term Goal 2: Patient will perform bed mobility tasks and transfers with SBA.  Short Term Goal 3: Patient will tolerate 20-30 minutes of therex/act to improve endurance for ADLs.  Patient Goals   Patient Goals : Feel better    Education  Patient Education  Education Given To: Patient  Education Provided: Role of Therapy;Plan of Care;Transfer Training  Education Provided Comments: Educated on importance of getting up to the chair with pt reporting that he will get up to the chair later today.  Education Method: Verbal  Barriers to Learning: None  Education Outcome: Verbalized understanding      Therapy Time   Individual Concurrent Group Co-treatment   Time In 1354         Time Out 1411         Minutes 17                 Nat Martines PTA

## 2025-02-20 NOTE — PROGRESS NOTES
Physical Therapy  Facility/Department: [facility] MED SURG  Daily Treatment Note  NAME: Marti Manzo  : 1945  MRN: [id number]    Date of Service: 2025    Discharge Recommendations:  Continue to assess pending progress, Home with Home health PT     Patient Diagnosis(es): The primary encounter diagnosis was Influenza A. A diagnosis of Acute respiratory failure with hypoxia (HCC) was also pertinent to this visit.    Assessment  Assessment: Pt requests to stay in bed and declines transfers at this time d/t reporting fatigue. Educated pt on importance of sitting up in the chair with pt reporting that he will get up to the chair later today. Pt completes supine B LE exercises x15 reps with RB d/t fatigue.  Activity Tolerance: Patient limited by fatigue;Patient limited by endurance    Plan  Physical Therapy Plan  General Plan: 2 times a day 7 days a week  Specific Instructions for Next Treatment: 1x/ day on weekends  Current Treatment Recommendations: Strengthening;ROM;Balance training;Transfer training;ADL/Self-care training;IADL training;Manual;Neuromuscular re-education;Gait training;Endurance training;Home exercise program;Safety education & training;Patient/Caregiver education & training;Therapeutic activities;Positioning    Restrictions  Restrictions/Precautions  Restrictions/Precautions: Fall Risk, Isolation  Activity Level: Up with Assist  Required Braces or Orthoses?: No     Subjective   Subjective  Subjective: Pt is in bed upon arrival and is agreeable to bed exercises at this time and declines transferring into the chair d/t just getting cleaned up. Pt reports feeling fatigued.  Pain: denies    Objective  Bed Mobility Training  Bed Mobility Training: No  Transfer Training  Transfer Training: No  Gait  Gait Training: No     PT Exercises  Exercise Treatment: Supine B LE exercises x15 reps with RB d/t fatigue     Safety Devices  Type of Devices: All fall risk precautions in place;Call light within

## 2025-02-21 ENCOUNTER — TELEPHONE (OUTPATIENT)
Dept: CARDIOLOGY | Age: 80
End: 2025-02-21

## 2025-02-21 VITALS
HEIGHT: 69 IN | WEIGHT: 315 LBS | SYSTOLIC BLOOD PRESSURE: 146 MMHG | RESPIRATION RATE: 18 BRPM | DIASTOLIC BLOOD PRESSURE: 71 MMHG | OXYGEN SATURATION: 95 % | BODY MASS INDEX: 46.65 KG/M2 | HEART RATE: 67 BPM | TEMPERATURE: 97 F

## 2025-02-21 LAB
ALBUMIN SERPL-MCNC: 3.5 G/DL (ref 3.5–5.2)
ALBUMIN/GLOB SERPL: 1.1 {RATIO} (ref 1–2.5)
ALP SERPL-CCNC: 56 U/L (ref 40–129)
ALT SERPL-CCNC: 27 U/L (ref 10–50)
ANION GAP SERPL CALCULATED.3IONS-SCNC: 8 MMOL/L (ref 9–16)
AST SERPL-CCNC: 22 U/L (ref 10–50)
BASOPHILS # BLD: 0.04 K/UL (ref 0–0.2)
BASOPHILS NFR BLD: 0 % (ref 0–2)
BILIRUB SERPL-MCNC: 0.4 MG/DL (ref 0–1.2)
BUN SERPL-MCNC: 16 MG/DL (ref 8–23)
BUN/CREAT SERPL: 23 (ref 9–20)
CALCIUM SERPL-MCNC: 8.3 MG/DL (ref 8.6–10.4)
CHLORIDE SERPL-SCNC: 101 MMOL/L (ref 98–107)
CO2 SERPL-SCNC: 28 MMOL/L (ref 20–31)
CREAT SERPL-MCNC: 0.7 MG/DL (ref 0.7–1.2)
EOSINOPHIL # BLD: <0.03 K/UL (ref 0–0.44)
EOSINOPHILS RELATIVE PERCENT: 0 % (ref 1–4)
ERYTHROCYTE [DISTWIDTH] IN BLOOD BY AUTOMATED COUNT: 14.5 % (ref 11.8–14.4)
GFR, ESTIMATED: >90 ML/MIN/1.73M2
GLUCOSE SERPL-MCNC: 101 MG/DL (ref 74–99)
HCT VFR BLD AUTO: 35.3 % (ref 40.7–50.3)
HGB BLD-MCNC: 11.5 G/DL (ref 13–17)
IMM GRANULOCYTES # BLD AUTO: 0.18 K/UL (ref 0–0.3)
IMM GRANULOCYTES NFR BLD: 2 %
LYMPHOCYTES NFR BLD: 1.53 K/UL (ref 1.1–3.7)
LYMPHOCYTES RELATIVE PERCENT: 14 % (ref 24–43)
MCH RBC QN AUTO: 30.3 PG (ref 25.2–33.5)
MCHC RBC AUTO-ENTMCNC: 32.6 G/DL (ref 28.4–34.8)
MCV RBC AUTO: 93.1 FL (ref 82.6–102.9)
MICROORGANISM SPEC CULT: NORMAL
MONOCYTES NFR BLD: 0.66 K/UL (ref 0.1–1.2)
MONOCYTES NFR BLD: 6 % (ref 3–12)
NEUTROPHILS NFR BLD: 78 % (ref 36–65)
NEUTS SEG NFR BLD: 8.89 K/UL (ref 1.5–8.1)
NRBC BLD-RTO: 0 PER 100 WBC
PLATELET # BLD AUTO: 177 K/UL (ref 138–453)
PMV BLD AUTO: 12.3 FL (ref 8.1–13.5)
POTASSIUM SERPL-SCNC: 4.1 MMOL/L (ref 3.7–5.3)
PROT SERPL-MCNC: 6.7 G/DL (ref 6.6–8.7)
RBC # BLD AUTO: 3.79 M/UL (ref 4.21–5.77)
SERVICE CMNT-IMP: NORMAL
SODIUM SERPL-SCNC: 137 MMOL/L (ref 136–145)
SPECIMEN DESCRIPTION: NORMAL
WBC OTHER # BLD: 11.3 K/UL (ref 3.5–11.3)

## 2025-02-21 PROCEDURE — 85025 COMPLETE CBC W/AUTO DIFF WBC: CPT

## 2025-02-21 PROCEDURE — 6360000002 HC RX W HCPCS

## 2025-02-21 PROCEDURE — 94761 N-INVAS EAR/PLS OXIMETRY MLT: CPT

## 2025-02-21 PROCEDURE — 80053 COMPREHEN METABOLIC PANEL: CPT

## 2025-02-21 PROCEDURE — 2700000000 HC OXYGEN THERAPY PER DAY

## 2025-02-21 PROCEDURE — 6370000000 HC RX 637 (ALT 250 FOR IP): Performed by: NURSE PRACTITIONER

## 2025-02-21 PROCEDURE — 94669 MECHANICAL CHEST WALL OSCILL: CPT

## 2025-02-21 PROCEDURE — 2500000003 HC RX 250 WO HCPCS

## 2025-02-21 PROCEDURE — 6370000000 HC RX 637 (ALT 250 FOR IP)

## 2025-02-21 PROCEDURE — 94640 AIRWAY INHALATION TREATMENT: CPT

## 2025-02-21 PROCEDURE — 36415 COLL VENOUS BLD VENIPUNCTURE: CPT

## 2025-02-21 RX ORDER — CEPHALEXIN 500 MG/1
500 CAPSULE ORAL 3 TIMES DAILY
Qty: 42 CAPSULE | Refills: 0 | Status: SHIPPED | OUTPATIENT
Start: 2025-02-21 | End: 2025-03-07

## 2025-02-21 RX ORDER — OSELTAMIVIR PHOSPHATE 75 MG/1
75 CAPSULE ORAL 2 TIMES DAILY
Qty: 4 CAPSULE | Refills: 0 | Status: SHIPPED | OUTPATIENT
Start: 2025-02-21 | End: 2025-02-23

## 2025-02-21 RX ADMIN — Medication 2000 UNITS: at 10:19

## 2025-02-21 RX ADMIN — PAROXETINE 30 MG: 10 TABLET, FILM COATED ORAL at 10:20

## 2025-02-21 RX ADMIN — ASPIRIN 81 MG: 81 TABLET, COATED ORAL at 10:19

## 2025-02-21 RX ADMIN — POTASSIUM CHLORIDE 20 MEQ: 1500 TABLET, EXTENDED RELEASE ORAL at 10:20

## 2025-02-21 RX ADMIN — WATER 40 MG: 1 INJECTION INTRAMUSCULAR; INTRAVENOUS; SUBCUTANEOUS at 10:22

## 2025-02-21 RX ADMIN — AMIODARONE HYDROCHLORIDE 200 MG: 200 TABLET ORAL at 10:20

## 2025-02-21 RX ADMIN — OSELTAMIVIR PHOSPHATE 75 MG: 75 CAPSULE ORAL at 10:20

## 2025-02-21 RX ADMIN — IPRATROPIUM BROMIDE AND ALBUTEROL SULFATE 1 DOSE: .5; 2.5 SOLUTION RESPIRATORY (INHALATION) at 10:00

## 2025-02-21 RX ADMIN — IPRATROPIUM BROMIDE AND ALBUTEROL SULFATE 1 DOSE: .5; 2.5 SOLUTION RESPIRATORY (INHALATION) at 05:42

## 2025-02-21 RX ADMIN — TAMSULOSIN HYDROCHLORIDE 0.4 MG: 0.4 CAPSULE ORAL at 10:19

## 2025-02-21 RX ADMIN — SODIUM CHLORIDE, PRESERVATIVE FREE 10 ML: 5 INJECTION INTRAVENOUS at 10:21

## 2025-02-21 RX ADMIN — FAMOTIDINE 20 MG: 20 TABLET, FILM COATED ORAL at 10:19

## 2025-02-21 RX ADMIN — ENOXAPARIN SODIUM 40 MG: 100 INJECTION SUBCUTANEOUS at 10:20

## 2025-02-21 RX ADMIN — Medication 1 TABLET: at 10:20

## 2025-02-21 RX ADMIN — ATORVASTATIN CALCIUM 40 MG: 40 TABLET, FILM COATED ORAL at 10:21

## 2025-02-21 NOTE — PROGRESS NOTES
Pt sitting up in the chair watching TV when writer entered the room. Pt is A&O x4. Vitals and assessment as charted. Pt denies pain. Pt denies any further needs at this time. Call light within reach. Chair alarm on.

## 2025-02-21 NOTE — PROGRESS NOTES
Vitals and assessment complete, see flowsheets for details. Patient is resting in bed. Patient is A&O x4. Breathing is regular and unlabored. SPO2 is 97% on 2L Lung sounds are clear/diminished. Patient demonstrates use of incentive spirometer. Patient states they are feeling better and ready to go home today. Patient states their son will pick them up around noon. Ice water given to patient. Patient denies other needs. Call light is within reach. Care ongoing.

## 2025-02-21 NOTE — PROGRESS NOTES
[facility]  Inpatient/Observation/Outpatient Rehabilitation    Date: 2025  Patient Name: Marti Manzo       [x] Inpatient Acute/Observation       []  Outpatient  : 1945       [x] Pt refused/declined therapy at this time due to:  Patient wanted to rest before being discharged at noon.      [] Pt cancelled due to:  [] No Reason Given   [] Sick/ill   [] Other:      [] Evaluation held by RN/Provider due to:    [] High Heart Rate   [] High Blood Pressure   [] Orthopedic Consult   [] Hgb < 7   [] Other:    [] Pt ordered brace per physician request:  [] Proper fit will be completed and education for wearing/skin checks    [] Pt does not require skilled services due to:      Therapist/Assistant will attempt to see this patient, at our earliest opportunity.       Sam Barry, PTA Date: 2025

## 2025-02-21 NOTE — DISCHARGE INSTR - PHARMACY
Spoke with you today regarding your three new prescriptions for Cephalexin, Albuterol and Tamiflu.  As a reminder, if the medications cause upset stomach, try to eat something with them.  Make sure you finish the full 14 days of cephalexin even if you feel better.  Thanks! Maryann Mart, PharmD, 2/21/2025 9:22 AM

## 2025-02-21 NOTE — PLAN OF CARE
Problem: Chronic Conditions and Co-morbidities  Goal: Patient's chronic conditions and co-morbidity symptoms are monitored and maintained or improved  Outcome: Completed     Problem: Discharge Planning  Goal: Discharge to home or other facility with appropriate resources  Outcome: Completed     Problem: Safety - Adult  Goal: Free from fall injury  2/21/2025 1131 by Maria C Sin RN  Outcome: Completed  2/20/2025 2154 by Lakshmi Triana RN  Outcome: Progressing  Note: Bed in low position. Wheels locked. Bed alarm on. 2/4 side rails are up. Non-skid socks on. Fall band on. Call light within reach.     Problem: Skin/Tissue Integrity  Goal: Skin integrity remains intact  Description: 1.  Monitor for areas of redness and/or skin breakdown  2.  Assess vascular access sites hourly  3.  Every 4-6 hours minimum:  Change oxygen saturation probe site  4.  Every 4-6 hours:  If on nasal continuous positive airway pressure, respiratory therapy assess nares and determine need for appliance change or resting period  Outcome: Completed     Problem: Nutrition Deficit:  Goal: Optimize nutritional status  Outcome: Completed     Problem: Respiratory - Adult  Goal: Achieves optimal ventilation and oxygenation  2/21/2025 1131 by Maria C Sin RN  Outcome: Completed  2/20/2025 2154 by Lakshmi Triana RN  Outcome: Progressing  Note: Lungs are clear and diminished. SpO2 94% on 2L NC. Will continue to monitor.

## 2025-02-21 NOTE — DISCHARGE SUMMARY
Discharge Summary    Marti Manzo  :  1945  MRN:  [id number]    Admit date:  2025      Discharge date:   2025    Admitting Physician:  Fredrick Mack MD    Discharge Diagnoses:      Principal Problem:    Influenza A  Active Problems:    Essential hypertension    Obesity, morbid (more than 100 lbs over ideal weight or BMI > 40)    Chronic diastolic CHF (congestive heart failure), NYHA class 3 (HCC)    Chronic respiratory failure with hypoxia (HCC) / CHRONIC OXYGEN    Elevated troponin    Complicated UTI (urinary tract infection)    Bacteremia due to Escherichia coli  Resolved Problems:    * No resolved hospital problems. *      Active Hospital Problems    Diagnosis Date Noted    Influenza A [J10.1] 2025    Elevated troponin [R79.89] 2025    Complicated UTI (urinary tract infection) [N39.0] 2025    Bacteremia due to Escherichia coli [R78.81, B96.20] 2025    Chronic respiratory failure with hypoxia (HCC) / CHRONIC OXYGEN [J96.11] 2024    Chronic diastolic CHF (congestive heart failure), NYHA class 3 (HCC) [I50.32] 2022    Obesity, morbid (more than 100 lbs over ideal weight or BMI > 40) [E66.01] 12/15/2017    Essential hypertension [I10] 2016       Discharge Medications:         Medication List        START taking these medications      albuterol sulfate  (90 Base) MCG/ACT inhaler  Commonly known as: Ventolin HFA  Inhale 2 puffs into the lungs 4 times daily as needed for Wheezing     cephALEXin 500 MG capsule  Commonly known as: KEFLEX  Take 1 capsule by mouth 3 times daily for 14 days     oseltamivir 75 MG capsule  Commonly known as: TAMIFLU  Take 1 capsule by mouth 2 times daily for 4 doses            CONTINUE taking these medications      amiodarone 200 MG tablet  Commonly known as: CORDARONE  Take 1 tablet by mouth once daily     APPLE CIDER VINEGAR PO     atorvastatin 40 MG tablet  Commonly known as: LIPITOR  Take 1 tablet by mouth once daily      AZO Cranberry Urinary Tract 250-60 MG Caps  Generic drug: Cranberry-Vitamin C     Cranberry 200 MG Caps     EQ Aspirin Adult Low Dose 81 MG EC tablet  Generic drug: aspirin  Take 1 tablet by mouth once daily     furosemide 20 MG tablet  Commonly known as: LASIX  TAKE 2 TABLETS BY MOUTH ON MONDAY, WEDNESDAY AND FRIDAY AND  1  TABLET  BY  MOUTH  ON  ALL  OTHER  DAYS     losartan 25 MG tablet  Commonly known as: COZAAR  Take 1 tablet by mouth once daily     PARoxetine 30 MG tablet  Commonly known as: PAXIL  TAKE 1 TABLET BY MOUTH ONCE DAILY IN THE MORNING     potassium chloride 10 MEQ extended release tablet  Commonly known as: KLOR-CON M  Take 2 tablets by mouth daily     tamsulosin 0.4 MG capsule  Commonly known as: FLOMAX  Take 1 capsule by mouth in the morning and at bedtime     therapeutic multivitamin-minerals tablet     vitamin D 50 MCG (2000 UT) Caps capsule  Commonly known as: CHOLECALCIFEROL               Where to Get Your Medications        These medications were sent to [facility] [address] [place] [address] [address] [facility] - P [phone] - F [phone]  [address] [place] [address]      Phone: [phone]   cephALEXin 500 MG capsule  oseltamivir 75 MG capsule         Consultants: None    Hospital Course:   Marti Manzo is a 79 y.o. male admitted with febrile illness found to have influenza and also E. coli bacteremia.  Sensitivity took several days.  Discharged on Keflex for 14 days.  Patient had improvement in influenza symptoms.  Labs are normal at discharge.    Exam: Regular.  Clear.  Obese.  Chronic venous changes    Condition: Good condition    Disposition:   Home    DC summary time : 35 minutes    Patient will be followed by Fredrick Mack MD in 1-2 weeks    Signed:  Fredrick Mack MD  2/21/2025, 7:28 AM

## 2025-02-21 NOTE — PROGRESS NOTES
[facility]                       Inpatient Medication Education Note                                 Patient admitted for Influenza A, UTI    Medications reviewed with the patient include:  Albuterol, Tamiflu, Keflex    Patient education provided when necessary to include potential medication related side effects with acknowledgement of understanding.      Thank you,  Maryann Mart [facility], 2/21/2025, 9:17 AM

## 2025-02-21 NOTE — PROGRESS NOTES
Reviewed discharge instructions with patient.   Patient aware of need to  prescriptions from [facility].   Reviewed new medications and side effects to monitor for.   Reviewed home medications and when next dose is due.   Patient aware of date/time of follow up appointment.  Instructed to follow a diabetic, low salt diet and to continue to use acapella and/or incentive spirometer frequently during the day.  Educational handout given on Influenza and E. Coli infections.   Questions answered.   Verbalizes understanding.  Copy of discharge instructions given to patient.

## 2025-02-21 NOTE — DISCHARGE INSTR - DIET

## 2025-02-21 NOTE — TELEPHONE ENCOUNTER
----- Message from Noa Reza PA-C sent at 2/21/2025 12:59 PM EST -----  Please notify patient that their lab results are normal.   Please continue current treatment and follow up.

## 2025-02-21 NOTE — CARE COORDINATION
IMM letter provided to patient.  Patient offered four hours to make informed decision regarding appeal process; patient agreeable to discharge.     02/21/25 0955   IMM Letter   IMM Letter given to Patient/Family/Significant other/Guardian/POA/by: second-patient / SHAI SANTOS   IMM Letter date given: 02/21/25   IMM Letter time given: 0943     There are no changes to the discharge plan .  The plan is still home with no services.    DANIELLE Feliciano

## 2025-02-21 NOTE — PLAN OF CARE
Problem: Safety - Adult  Goal: Free from fall injury  2/20/2025 2154 by Lakshmi Triana RN  Outcome: Progressing  Note: Bed in low position. Wheels locked. Bed alarm on. 2/4 side rails are up. Non-skid socks on. Fall band on. Call light within reach.     Problem: Respiratory - Adult  Goal: Achieves optimal ventilation and oxygenation  Outcome: Progressing  Note: Lungs are clear and diminished. SpO2 94% on 2L NC. Will continue to monitor.

## 2025-02-21 NOTE — DISCHARGE INSTRUCTIONS
Continue to use incentive spirometer and/or Acapella frequently during the day    Complete the entire course of antibiotics    Tamiflu for 2 more days

## 2025-02-24 ENCOUNTER — CARE COORDINATION (OUTPATIENT)
Dept: CARE COORDINATION | Age: 80
End: 2025-02-24

## 2025-02-24 LAB
MICROORGANISM SPEC CULT: NORMAL
SERVICE CMNT-IMP: NORMAL
SPECIMEN DESCRIPTION: NORMAL

## 2025-02-24 NOTE — PROGRESS NOTES
Physician Progress Note      PATIENT:               TONI BENÍTEZ  CSN #:                  [id number]  :                       1945  ADMIT DATE:       2025 1:15 AM  DISCH DATE:        2025 12:10 PM  RESPONDING  PROVIDER #:        DARRYL Moore CNP          QUERY TEXT:    Pt admitted with Influenza A. Pt noted to have   T-101()HR-105,106,104,111,107,Rr-33,31,24,26,23,22Lactic acid-2.8().   If possible, please document in the progress notes and discharge summary if   you are evaluating and /or treating any of the following:  The medical record reflects the following:  Risk Factors: Influenza,UTI,HTN,CHF,Dm  Clinical Indicators:H&P on   Influenza A  IM PN on  Influenza A , UTI  Urine culture on  Escherichia coli >100,000 CFU/ML Identification by   MALDI-TOF Abnormal  T-101()  HR-105,106,104,111,107,()  Rr-33,31,24,26,23,22()  Lactic acid-2.8()  Treatment: IV Rocephin, IVF, Urine culture, vitals monitoring, lab values ,T   Tamiflu    Thank you,  Maura [facility] CDS  Options provided:  -- Sepsis due to Influenza A and UTI , present on admission  -- Influenza A and UTI  without Sepsis  -- Other - I will add my own diagnosis  -- Disagree - Not applicable / Not valid  -- Disagree - Clinically unable to determine / Unknown  -- Refer to Clinical Documentation Reviewer    PROVIDER RESPONSE TEXT:    This patient has sepsis due to Sepsis due to Influenza A and UTI which was   present on admission.    Query created by: Maura Cristina on 2025 6:45 AM      Electronically signed by:  DARRYL Moore CNP 2025 3:21 PM

## 2025-02-24 NOTE — CARE COORDINATION
Care Transitions Note    Initial Call - Call within 2 business days of discharge: Yes    Attempted to reach patient for transitions of care follow up. Unable to reach patient.    Outreach Attempts:   Unable to leave message.     Patient: Marti Manzo    Patient : 1945   MRN: [id number]    Reason for Admission: Influenza A  Discharge Date: 25  RURS: Readmission Risk Score: 15.4    Last Discharge Facility       Date Complaint Diagnosis Description Type Department Provider    25 Tachycardia; Shortness of Breath Influenza A ... ED to Hosp-Admission (Discharged) (ADMITTED) [facility] Fredrick Mack MD; NERI Rodríguez..            Was this an external facility discharge? No    Follow Up Appointment:   Patient has hospital follow up appointment scheduled within 14 days of discharge.    Future Appointments         Provider Specialty Dept Phone    3/4/2025 2:10 PM Fredrick Mack MD Internal Medicine [phone]    3/18/2025 3:05 AM SCHEDULE, [facility] Cardiology [phone]    3/18/2025 10:00 AM [facility] [phone]    2025 11:40 AM Krzysztof Avalos MD Cardiology [phone]    2025 9:30 AM Bill Stephenson MD Urology [phone]    2025 9:30 AM Fredrick Mack MD Internal Medicine [phone]    2025 1:00 PM Darrel Weinstein, APRN - CNP Pulmonology [phone]    2/3/2026 9:30 AM Fredrick Mack MD Internal Medicine [phone]            Plan for follow-up on next business day.      Paty Bashir LPN

## 2025-02-25 ENCOUNTER — CARE COORDINATION (OUTPATIENT)
Dept: CARE COORDINATION | Age: 80
End: 2025-02-25

## 2025-02-25 NOTE — CARE COORDINATION
Care Transitions Note    Initial Call - Call within 2 business days of discharge: Yes    Attempted to reach patient for transitions of care follow up. Unable to reach patient.    Outreach Attempts:#2 will resolve episode if no return call. Noted pt has hospital follow up 3/4/25.   HIPAA compliant voicemail left for patient.     Patient: Marti Manzo    Patient : 1945   MRN: [id number]    Reason for Admission: Influenza A ...  Discharge Date: 25  RURS: Readmission Risk Score: 15.4    Last Discharge Facility       Date Complaint Diagnosis Description Type Department Provider    25 Tachycardia; Shortness of Breath Influenza A ... ED to Hosp-Admission (Discharged) (ADMITTED) [facility] Fredrick Mack MD; BOL[facility] Rodríguez.            Was this an external facility discharge? No    Follow Up Appointment:   Patient has hospital follow up appointment scheduled within 14 days of discharge.    Future Appointments         Provider Specialty Dept Phone    3/4/2025 2:10 PM Fredrick Mack MD Internal Medicine [phone]    3/18/2025 3:05 AM SCHEDULE, [facility] Cardiology [phone]    3/18/2025 10:00 AM [facility] [phone]    2025 11:40 AM Krzysztof Avalos MD Cardiology [phone]    2025 9:30 AM Bill Stephenson MD Urology [phone]    2025 9:30 AM Fredrick Mack MD Internal Medicine [phone]    2025 1:00 PM Darrel Weinstein, APRN - [facility] Pulmonology [phone]    2/3/2026 9:30 AM Fredrick Mack MD Internal Medicine [phone]            No further follow-up call indicated     Paty Bashir LPN

## 2025-03-17 DIAGNOSIS — I50.42 CHRONIC COMBINED SYSTOLIC AND DIASTOLIC CHF, NYHA CLASS 3 (HCC): Primary | ICD-10-CM

## 2025-03-20 PROBLEM — J10.1 INFLUENZA A: Status: RESOLVED | Noted: 2025-02-18 | Resolved: 2025-03-20

## 2025-03-20 PROBLEM — R79.89 ELEVATED TROPONIN: Status: RESOLVED | Noted: 2025-02-18 | Resolved: 2025-03-20

## 2025-04-01 DIAGNOSIS — I48.0 PAF (PAROXYSMAL ATRIAL FIBRILLATION) (HCC): ICD-10-CM

## 2025-04-01 RX ORDER — ASPIRIN 81 MG/1
81 TABLET ORAL DAILY
Qty: 90 TABLET | Refills: 3 | Status: SHIPPED | OUTPATIENT
Start: 2025-04-01

## 2025-04-02 NOTE — PROGRESS NOTES
own diagnosis  -- Disagree - Not applicable / Not valid  -- Disagree - Clinically unable to determine / Unknown  -- Refer to Clinical Documentation Reviewer    PROVIDER RESPONSE TEXT:    This patient has Chronic respiratory failure with hypoxia confirmed and Acute   respiratory failure with hypoxia ruled out    Query created by: Maura Cristina on 3/14/2025 5:20 AM      Electronically signed by:  Fredrick Mack MD 4/2/2025 1:05 PM

## 2025-04-25 ENCOUNTER — HOSPITAL ENCOUNTER (OUTPATIENT)
Age: 80
Setting detail: SPECIMEN
Discharge: HOME | End: 2025-04-25
Payer: MEDICARE

## 2025-04-25 DIAGNOSIS — R35.0 URINARY FREQUENCY: ICD-10-CM

## 2025-04-25 LAB
BACTERIA URNS QL MICRO: ABNORMAL
EPI CELLS #/AREA URNS HPF: ABNORMAL /HPF (ref 0–5)
MUCOUS THREADS URNS QL MICRO: ABNORMAL
RBC #/AREA URNS HPF: ABNORMAL /HPF (ref 0–2)
WBC #/AREA URNS HPF: ABNORMAL /HPF (ref 0–5)

## 2025-04-25 PROCEDURE — 87086 URINE CULTURE/COLONY COUNT: CPT

## 2025-04-25 PROCEDURE — 87186 SC STD MICRODIL/AGAR DIL: CPT

## 2025-04-25 PROCEDURE — 81015 MICROSCOPIC EXAM OF URINE: CPT

## 2025-04-25 PROCEDURE — 87077 CULTURE AEROBIC IDENTIFY: CPT

## 2025-04-27 LAB
MICROORGANISM SPEC CULT: ABNORMAL
SERVICE CMNT-IMP: ABNORMAL
SPECIMEN DESCRIPTION: ABNORMAL

## 2025-04-29 ENCOUNTER — HOSPITAL ENCOUNTER (OUTPATIENT)
Age: 80
Discharge: HOME | End: 2025-04-29
Payer: MEDICARE

## 2025-04-29 ENCOUNTER — RESULTS FOLLOW-UP (OUTPATIENT)
Dept: CARDIOLOGY | Age: 80
End: 2025-04-29

## 2025-04-29 DIAGNOSIS — I50.42 CHRONIC COMBINED SYSTOLIC AND DIASTOLIC CHF, NYHA CLASS 3 (HCC): ICD-10-CM

## 2025-04-29 LAB
ANION GAP SERPL CALCULATED.3IONS-SCNC: 10 MMOL/L (ref 9–16)
BUN SERPL-MCNC: 13 MG/DL (ref 8–23)
BUN/CREAT SERPL: 19 (ref 9–20)
CALCIUM SERPL-MCNC: 8.7 MG/DL (ref 8.6–10.4)
CHLORIDE SERPL-SCNC: 100 MMOL/L (ref 98–107)
CO2 SERPL-SCNC: 28 MMOL/L (ref 20–31)
CREAT SERPL-MCNC: 0.7 MG/DL (ref 0.7–1.2)
GFR, ESTIMATED: >90 ML/MIN/1.73M2
GLUCOSE SERPL-MCNC: 96 MG/DL (ref 74–99)
POTASSIUM SERPL-SCNC: 3.9 MMOL/L (ref 3.7–5.3)
SODIUM SERPL-SCNC: 138 MMOL/L (ref 136–145)

## 2025-04-29 PROCEDURE — 80048 BASIC METABOLIC PNL TOTAL CA: CPT

## 2025-04-29 PROCEDURE — 36415 COLL VENOUS BLD VENIPUNCTURE: CPT

## 2025-04-30 ENCOUNTER — PHARMACY VISIT (OUTPATIENT)
Age: 80
End: 2025-04-30
Payer: MEDICARE

## 2025-04-30 VITALS
SYSTOLIC BLOOD PRESSURE: 124 MMHG | WEIGHT: 315 LBS | DIASTOLIC BLOOD PRESSURE: 60 MMHG | HEART RATE: 70 BPM | BODY MASS INDEX: 51.54 KG/M2

## 2025-04-30 DIAGNOSIS — I50.32 CHRONIC DIASTOLIC CHF (CONGESTIVE HEART FAILURE), NYHA CLASS 3 (HCC): Primary | Chronic | ICD-10-CM

## 2025-04-30 PROCEDURE — 99212 OFFICE O/P EST SF 10 MIN: CPT | Performed by: COUNSELOR

## 2025-04-30 NOTE — PATIENT INSTRUCTIONS
HEART FAILURE:    With heart failure you must follow up with your Cardiologist, your PCP and other physicians as appropriate - especially 7 days after a hospitalization and then as directed.     Please call right away with any signs or symptoms of heart failure or other medical conditions that need attention or with any questions at all. Please call your Cardiologist or your PCP or the Outpatient Heart Failure Clinic at [phone].  We can help manage these symptoms and often prevent a visit to the Emergency Room or hospitalization.       * Know your dry weight (your weight without any fluid on board)    * Call any time you have questions about anything. Do not wait.    * It is very important to take your medications as prescribed, do not miss any doses.   Call for refills before you run out. Typically when you have one week left.   If you have trouble getting your medications for any reason, call us at [phone].    * Avoid NSAIDs (non steroidal anti inflammatory drugs) like Aleve (naproxen), Advil and Motrin (ibuprofen), Mobic (diclofenac), Celebrex (celecoxib) and aspirin. A daily aspirin is okay if recommended by your physician.      It is okay to take Tylenol (acetaminophen) unless your physician has told you otherwise.    * Limit fluid intake.    Typically this is no more than 1,500-2,000 milliliters (mL) per day.     This is a liter and a half to two liters.               This is equal to approximately 48-64 ounces (oz) per day    * Limit sodium intake.   Typically this is no more than 2,000-2,400 milligrams (mg) per day.   You will need to add up the sodium content found on the nutrition label for the foods you eat each day.    * Weigh yourself every day. Weigh yourself before breakfast and after you go to the restroom.  Wear the same thing each time you weigh yourself.   Keep a log and bring it to each visit.   Call if you gain 3 or more pounds in 1-7 days - [phone]

## 2025-04-30 NOTE — PROGRESS NOTES
[facility]  Medication Management  Pharmacist  Heart Failure    [address] [place] [address] [address] [place], [place] [address]  Phone: [phone]  Fax: [phone]      NAME: Marti Manzo  MEDICAL RECORD NUMBER:  [id number]  AGE: 79 y.o.   GENDER: male  : 1945  EPISODE DATE:  2025      Heart Failure Management referral by Dr. Avalos     Today's Wt: 344 lb  Wt Readings from Last 6 Encounters:   25 (!) 156 kg (344 lb)   25 (!) 155.1 kg (342 lb)   25 (!) 155.1 kg (342 lb)   25 (!) 157.4 kg (347 lb)   24 (!) 164.7 kg (363 lb)   11/15/24 (!) 165.6 kg (365 lb)    and   Ht Readings from Last 1 Encounters:   25 1.74 m (5' 8.5\")                 /60  HR: 70  O2Sat: 92%    Extremities and pitting edema   Chronic bilateral lower extremity edema with chronic vascular changes. Using lymphedema pumps daily   Skin warm dry intact discolored     Subjective   Mr. Manzo is a 79 y.o. male here for the Heart Failure Services.    They are here today for a comprehensive medication review including over the counter medications and herbal products, overall wellbeing assessment, transition of care and any needed adjustments with updates and recommendations communicated to the referring physician.      Patient Symptoms:  Patient is here today for 6 month follow up.  He reports that he is feeling good.  Weight is decreased 23 pounds since last visit in July.  His wife recently passed.  His son is living with him and does the cooking and household chores.  Denies SOB, dizziness, lightheadedness.  Oxygen continuous at 2 L per nasal cannula.  Patient uses home lymphedema pump/boots daily in the evening.        Patient complains of frequent UTIs.  He is currently taking cephalexin for a UTI.     Exercise tolerance:  Patient uses motorized wheelchair. He has osteoarthritis bilateral knees and pain in his back.     Shortness of Breath:   []   None   []   Dyspnea on exertion   [x]

## 2025-04-30 NOTE — TELEPHONE ENCOUNTER
----- Message from Dr. Krzysztof Avalos MD sent at 4/29/2025  9:33 PM EDT -----  Blood work is stable.  Thank you

## 2025-05-14 ENCOUNTER — OFFICE VISIT (OUTPATIENT)
Dept: CARDIOLOGY | Age: 80
End: 2025-05-14

## 2025-05-14 VITALS
SYSTOLIC BLOOD PRESSURE: 98 MMHG | OXYGEN SATURATION: 92 % | BODY MASS INDEX: 51.54 KG/M2 | DIASTOLIC BLOOD PRESSURE: 61 MMHG | RESPIRATION RATE: 18 BRPM | HEART RATE: 71 BPM | HEIGHT: 69 IN

## 2025-05-14 DIAGNOSIS — E78.2 MIXED HYPERLIPIDEMIA: ICD-10-CM

## 2025-05-14 DIAGNOSIS — R00.1 BRADYCARDIA: ICD-10-CM

## 2025-05-14 DIAGNOSIS — Z79.899 ON AMIODARONE THERAPY: ICD-10-CM

## 2025-05-14 DIAGNOSIS — Z95.0 PACEMAKER: Primary | ICD-10-CM

## 2025-05-14 DIAGNOSIS — I50.42 CHRONIC COMBINED SYSTOLIC AND DIASTOLIC CHF, NYHA CLASS 3 (HCC): ICD-10-CM

## 2025-05-14 DIAGNOSIS — Z95.2 S/P TAVR (TRANSCATHETER AORTIC VALVE REPLACEMENT): ICD-10-CM

## 2025-05-14 DIAGNOSIS — E66.01 OBESITY, MORBID (MORE THAN 100 LBS OVER IDEAL WEIGHT OR BMI > 40) (HCC): ICD-10-CM

## 2025-05-14 DIAGNOSIS — I48.0 PAF (PAROXYSMAL ATRIAL FIBRILLATION) (HCC): ICD-10-CM

## 2025-05-14 DIAGNOSIS — I10 ESSENTIAL HYPERTENSION: ICD-10-CM

## 2025-05-14 DIAGNOSIS — I44.2 COMPLETE HEART BLOCK (HCC): ICD-10-CM

## 2025-05-14 NOTE — PROGRESS NOTES
I, Nataliia Toscano am scribing for and in the presence of Krzysztof Avalos MD, F.A.C.C..    Patient: Marti Manzo  : 1945  Date of Visit: May 14, 2025    History of Present Illness:        Dear Frederick, Fredrick Rincon MD    I had the pleasure of seeing Marti Manzo in my office today. Mr. Manzo is a 79 y.o. male who presented for follow-up.      1-He has  a history of heart failure. He has a history of hypertension and hyperlipidemia for years, and has borderline diabetes.     2- ECG on 3/20: sinus tachycardia with first-degree AV block and nonspecific intraventricular conduction delay.    3-Echocardiogram done 3/4/20 which showed severely reduced left ventricular systolic function with ejection fraction of 35% and moderate to severe aortic stenosis.  This can be an underestimation because of reduced left ventricular systolic function.  Mild to moderate aortic regurgitation.  Moderate mitral regurgitation and moderate diastolic dysfunction.    4-Heart Catheterization done on 3/12/20: LMCA: Normal 0% stenosis. LAD: Mild irregularities 20-30% LCx: Lesion on 2nd Ob Angie Ostial 50% stenosis. RCA: Mild irregularities 10-20%. EF:35%.  Severe aortic stenosis by cardiac catheterization with peak to peak gradient of 67 mmHg and mean gradient of 48 mmHg across the aortic valve.    5-Admission to [facility] on 3/4/20-3/9/20 due to acute heart failure: Patient treated for acute on chronic combined CHF and aortic stenosis, later developed atrial fibrillation with rapid ventricular response.  Moved to the ICU and started on amiodarone drip and then later converted back to normal sinus rhythm. Amiodarone oral tablet was started prior to discharge.    6-On 2020, he presented to the emergency room with epistaxis.  Nasal packing done that removed after 3 days.    7-Transcatheter aortic valve replacement with a Medtronic 34 mm Evolut Pro Plus prosthesis 9/15/2020    8-Echocardiogram done on 10/13/2020 showed an EF of

## 2025-06-02 ENCOUNTER — TELEPHONE (OUTPATIENT)
Dept: SURGERY | Age: 80
End: 2025-06-02

## 2025-06-02 NOTE — TELEPHONE ENCOUNTER
PT called asking if Dr. Martinez is able to send in a script for an antibiotic to [facility] pharmacy in [facility]. PT is scheduled to come in 6/16 to have an abscess looked at and PT reports Dr. Mack (PCP) said PT should ask Dr. Martinez for the antibiotic.

## 2025-06-03 ENCOUNTER — TELEPHONE (OUTPATIENT)
Dept: UROLOGY | Age: 80
End: 2025-06-03

## 2025-06-03 DIAGNOSIS — R30.0 DYSURIA: Primary | ICD-10-CM

## 2025-06-03 NOTE — TELEPHONE ENCOUNTER
Patient wants to have his urine checked. He has burning with urination, back pain, cloudy urine, no fever.

## 2025-06-03 NOTE — TELEPHONE ENCOUNTER
Writer called patient and offered to have patient come to the office today. Patient says he needs to schedule transportation with [facility] and needs a few days notice. Patient scheduled for 6/9/25. He will call [facility] to confirm that he can get a ride. Patient advised that he will need to speak to his PCP office regarding antibiotics since he has not been seen by [facility]. Patient voiced understanding

## 2025-06-09 ENCOUNTER — OFFICE VISIT (OUTPATIENT)
Dept: SURGERY | Age: 80
End: 2025-06-09
Payer: MEDICARE

## 2025-06-09 VITALS
RESPIRATION RATE: 18 BRPM | DIASTOLIC BLOOD PRESSURE: 68 MMHG | BODY MASS INDEX: 51.54 KG/M2 | SYSTOLIC BLOOD PRESSURE: 132 MMHG | OXYGEN SATURATION: 93 % | HEIGHT: 69 IN | HEART RATE: 70 BPM

## 2025-06-09 DIAGNOSIS — L02.11 ABSCESS, NECK: Primary | ICD-10-CM

## 2025-06-09 PROBLEM — I50.42 CHRONIC COMBINED SYSTOLIC AND DIASTOLIC CONGESTIVE HEART FAILURE (HCC): Status: ACTIVE | Noted: 2022-04-18

## 2025-06-09 PROBLEM — U07.1 SARS-COV-2 POSITIVE: Status: ACTIVE | Noted: 2022-01-01

## 2025-06-09 PROBLEM — M17.0 OSTEOARTHRITIS OF BOTH KNEES: Status: ACTIVE | Noted: 2017-06-13

## 2025-06-09 PROCEDURE — 10060 I&D ABSCESS SIMPLE/SINGLE: CPT | Performed by: SURGERY

## 2025-06-09 RX ORDER — AMOXICILLIN 500 MG/1
500 CAPSULE ORAL 2 TIMES DAILY
COMMUNITY
Start: 2025-05-29

## 2025-06-09 NOTE — PROGRESS NOTES
Patient comes in with an abscess on his neck/head posterior lateral right side.      He has been getting purulent drainage from his wound. He is on doxycycline.  The area is erythematous, tender and fluctuant There is a small amount of pale tan fluid dripping out.    We tried to have him come in last week (6/3) when we were notified about it, but he was unable to get transportation until today.    Local anesthesia was infiltrated over the affected area.  An incision was made over the lump, about 2 cm long.      Drainage: purulent slight    Wound packed open: Yes      Antibiotics: already on doxycycline see orders    Follow up: in 1 weeks    Keep wound covered.  May shower.  Call for increased pain, fever, intractable nausea and vomiting, increasing redness. If the packing has not come out on its own, her may remove it Thursday or Friday.

## 2025-06-10 ENCOUNTER — TELEPHONE (OUTPATIENT)
Dept: SURGERY | Age: 80
End: 2025-06-10

## 2025-06-10 NOTE — TELEPHONE ENCOUNTER
Patient called to ask questions about changing his wound dressing. Instructed Patient per 's notes.

## 2025-06-11 ENCOUNTER — TELEPHONE (OUTPATIENT)
Dept: UROLOGY | Age: 80
End: 2025-06-11

## 2025-06-11 NOTE — TELEPHONE ENCOUNTER
It appears he is on amoxicillin and doxycycline. Finish both as prescribed by PCP. Call if urinary symptoms return

## 2025-06-11 NOTE — TELEPHONE ENCOUNTER
Spoke with Marti to remind him to get his UA done.  He is currently on an ATB from his PCP for a wound on his neck.  This ATB has also been helping his UTI symptoms.    I advised him to not do his urine test at this time because the ATB will interfere with the results.

## 2025-06-12 ENCOUNTER — TELEPHONE (OUTPATIENT)
Dept: UROLOGY | Age: 80
End: 2025-06-12

## 2025-06-12 NOTE — TELEPHONE ENCOUNTER
Okay, we will wait for those results from the urine culture to see if he is cleared his Proteus infection.  
Patient finished the antibiotic today.  He has had the urinary symptoms all along so he will do the urine at the lab on Monday when he is out here.   
losartan (COZAAR) 50 MG tablet 90 tablet 3 5/20/2024 --    Sig: TAKE 1 TABLET EVERY EVENING      Last office visit date: 6/12/24  Next appointment scheduled?: Yes    eGFR resulted within last 12 months -- IF CRITERIA FAILED REFER TO PROTOCOL DETAILS    Seen by prescribing provider or same department within the last 12 months or has a future appt in 3 months - IF FAILED PLEASE LOOK AT CHART REVIEW FOR LAST VISIT AND PROCEED ACCORDINGLY    Last BP was under 140/90 or if patient has diabetes, CAD, or PVD, BP under 130/80 in past year -- IF CRITERIA FAILED REFER TO PROTOCOL DETAILS    Normal Potassium within last 12 months looking at last value -- IF CRITERIA FAILED REFER TO PROTOCOL DETAILS    Medication (including dose and sig) on current meds list    Current med list does not contain more than one ARB Angiotensin II Receptor Antagonists medication    eGFR greater than 10 resulted within last 12 months looking at last value -- IF CRITERIA FAILED REFER TO PROTOCOL DETAILS     Called patient, advised that she has RX at pharmacy.   
none/Denies family history of malignant hyperthermia

## 2025-06-16 ENCOUNTER — HOSPITAL ENCOUNTER (OUTPATIENT)
Age: 80
Setting detail: SPECIMEN
Discharge: HOME | End: 2025-06-16
Payer: MEDICARE

## 2025-06-16 ENCOUNTER — OFFICE VISIT (OUTPATIENT)
Dept: SURGERY | Age: 80
End: 2025-06-16

## 2025-06-16 VITALS — TEMPERATURE: 97.4 F | HEART RATE: 61 BPM | DIASTOLIC BLOOD PRESSURE: 64 MMHG | SYSTOLIC BLOOD PRESSURE: 122 MMHG

## 2025-06-16 DIAGNOSIS — R30.0 DYSURIA: ICD-10-CM

## 2025-06-16 DIAGNOSIS — L02.11 ABSCESS, NECK: Primary | ICD-10-CM

## 2025-06-16 PROCEDURE — 87086 URINE CULTURE/COLONY COUNT: CPT

## 2025-06-16 PROCEDURE — 87186 SC STD MICRODIL/AGAR DIL: CPT

## 2025-06-16 PROCEDURE — 99024 POSTOP FOLLOW-UP VISIT: CPT | Performed by: SURGERY

## 2025-06-16 PROCEDURE — 87077 CULTURE AEROBIC IDENTIFY: CPT

## 2025-06-16 PROCEDURE — 81001 URINALYSIS AUTO W/SCOPE: CPT

## 2025-06-16 NOTE — PATIENT INSTRUCTIONS
SURVEY:    You may be receiving a survey from [facility] regarding your visit today.    You may get this in the mail, through your MyChart, or in your email.     Please complete the survey to enable us to provide the highest quality of care to you and your family.    If you cannot score us a very good (5 Stars) on any question, please call the office to discuss how we could of made your experience exceptional.    Thank you!    General Surgery    MD Dr. Aline Estrella, DO  Dr. Misael Young, DO  Annalee Nicholson, SYLVIA-CNP    Pain Mgmt.  Dr. Homer Ring, DO  AYESHA Lunsford, LINDSEY Ferguson LPN Jena Adams, MA Emily Akers, MA    Phone: [phone]  Fax: [phone]    Office Hours:   M-TH 8-5, F: 8-12

## 2025-06-16 NOTE — PROGRESS NOTES
Post I&D    He denies pain.    He doesn't know if he has much drainage.    The bandage he had on the wound and his skin has some dry crusty bloody material.    It is not tender.  Still quite red.  Unable to express any pus today.        IMP/PLAN  1) Recheck in about a week.  Sooner if pain or swelling get worse.

## 2025-06-17 RX ORDER — ATORVASTATIN CALCIUM 40 MG/1
40 TABLET, FILM COATED ORAL DAILY
Qty: 90 TABLET | Refills: 3 | Status: SHIPPED | OUTPATIENT
Start: 2025-06-17

## 2025-06-18 ENCOUNTER — RESULTS FOLLOW-UP (OUTPATIENT)
Dept: UROLOGY | Age: 80
End: 2025-06-18

## 2025-06-18 DIAGNOSIS — B96.4 URINARY TRACT INFECTION DUE TO PROTEUS: Primary | ICD-10-CM

## 2025-06-18 DIAGNOSIS — N39.0 URINARY TRACT INFECTION DUE TO PROTEUS: Primary | ICD-10-CM

## 2025-06-18 LAB
MICROORGANISM SPEC CULT: ABNORMAL
SERVICE CMNT-IMP: ABNORMAL
SPECIMEN DESCRIPTION: ABNORMAL

## 2025-06-18 RX ORDER — SULFAMETHOXAZOLE AND TRIMETHOPRIM 800; 160 MG/1; MG/1
1 TABLET ORAL 2 TIMES DAILY
Qty: 14 TABLET | Refills: 0 | Status: SHIPPED | OUTPATIENT
Start: 2025-06-18 | End: 2025-06-25

## 2025-06-18 NOTE — TELEPHONE ENCOUNTER
Urine culture is positive for infection.  We sent in culture specific Bactrim. Take this antibiotic until gone. Take this with food and eat yogurt once per day to prevent GI upset. If you develop nausea, vomiting, or fevers call the office or go to the ER. If your urinary symptoms do not improve once completing the antibiotics call our office.     Due to proteus infection, repeat urine culture when antibiotics are completed     Due to recurrent Proteus infections plan for a CT abdomen and pelvis prior to appointment on 7/18/2025 instead of the KUB that was previously ordered

## 2025-06-23 ENCOUNTER — HOSPITAL ENCOUNTER (EMERGENCY)
Age: 80
Discharge: HOME | End: 2025-06-23
Payer: MEDICARE

## 2025-06-23 ENCOUNTER — OFFICE VISIT (OUTPATIENT)
Dept: SURGERY | Age: 80
End: 2025-06-23
Payer: MEDICARE

## 2025-06-23 VITALS
TEMPERATURE: 98.8 F | RESPIRATION RATE: 20 BRPM | OXYGEN SATURATION: 94 % | HEART RATE: 77 BPM | DIASTOLIC BLOOD PRESSURE: 49 MMHG | SYSTOLIC BLOOD PRESSURE: 103 MMHG

## 2025-06-23 VITALS — DIASTOLIC BLOOD PRESSURE: 65 MMHG | SYSTOLIC BLOOD PRESSURE: 102 MMHG | HEART RATE: 81 BPM

## 2025-06-23 DIAGNOSIS — M75.22 BICEPS TENDINITIS OF LEFT UPPER EXTREMITY: Primary | ICD-10-CM

## 2025-06-23 DIAGNOSIS — M75.22 BICEPS TENDINITIS OF LEFT UPPER EXTREMITY: ICD-10-CM

## 2025-06-23 DIAGNOSIS — L50.9 URTICARIA: ICD-10-CM

## 2025-06-23 DIAGNOSIS — L02.11 ABSCESS, NECK: Primary | ICD-10-CM

## 2025-06-23 PROCEDURE — 99283 EMERGENCY DEPT VISIT LOW MDM: CPT

## 2025-06-23 PROCEDURE — 3078F DIAST BP <80 MM HG: CPT | Performed by: SURGERY

## 2025-06-23 PROCEDURE — G8427 DOCREV CUR MEDS BY ELIG CLIN: HCPCS | Performed by: SURGERY

## 2025-06-23 PROCEDURE — 1159F MED LIST DOCD IN RCRD: CPT | Performed by: SURGERY

## 2025-06-23 PROCEDURE — 1123F ACP DISCUSS/DSCN MKR DOCD: CPT | Performed by: SURGERY

## 2025-06-23 PROCEDURE — 99212 OFFICE O/P EST SF 10 MIN: CPT | Performed by: SURGERY

## 2025-06-23 PROCEDURE — G8417 CALC BMI ABV UP PARAM F/U: HCPCS | Performed by: SURGERY

## 2025-06-23 PROCEDURE — 1036F TOBACCO NON-USER: CPT | Performed by: SURGERY

## 2025-06-23 PROCEDURE — 3074F SYST BP LT 130 MM HG: CPT | Performed by: SURGERY

## 2025-06-23 RX ORDER — IBUPROFEN 400 MG/1
400 TABLET, FILM COATED ORAL 2 TIMES DAILY PRN
Qty: 30 TABLET | Refills: 0 | Status: SHIPPED | OUTPATIENT
Start: 2025-06-23

## 2025-06-23 ASSESSMENT — ENCOUNTER SYMPTOMS
SHORTNESS OF BREATH: 0
BACK PAIN: 0
VOMITING: 0
ABDOMINAL PAIN: 0
WHEEZING: 0
NAUSEA: 0
CHEST TIGHTNESS: 0
COLOR CHANGE: 0

## 2025-06-23 ASSESSMENT — PAIN SCALES - GENERAL: PAINLEVEL_OUTOF10: 3

## 2025-06-23 ASSESSMENT — PAIN DESCRIPTION - ORIENTATION: ORIENTATION: LEFT

## 2025-06-23 ASSESSMENT — PAIN - FUNCTIONAL ASSESSMENT: PAIN_FUNCTIONAL_ASSESSMENT: 0-10

## 2025-06-23 ASSESSMENT — PAIN DESCRIPTION - LOCATION: LOCATION: ARM

## 2025-06-23 NOTE — PATIENT INSTRUCTIONS
SURVEY:    You may be receiving a survey from [facility] regarding your visit today.    You may get this in the mail, through your MyChart, or in your email.     Please complete the survey to enable us to provide the highest quality of care to you and your family.    If you cannot score us a very good (5 Stars) on any question, please call the office to discuss how we could of made your experience exceptional.    Thank you!    General Surgery    MD Dr. Aline Estrella, DO  Dr. Misael Young, DO  Annalee Nicholson, SYLVIA-CNP    Pain Mgmt.  Dr. Homer Ring, DO  AYESHA Lunsford, LINDSEY Ferguson LPN Jena Adams, MA Emily Akers, MA    Phone: [phone]  Fax: [phone]    Office Hours:   M-TH 8-5, F: 8-12

## 2025-06-23 NOTE — PROGRESS NOTES
No complaints about his wound. No pain or drainage.    He has developed some stiffness or spasm in his left arm preventing him form completing flexing his arm.  He cannot bring it up to his mouth, and if I try to do it passively it is very painful.    He has also develop some red circular patches on his arms, 2 on the right, one on the left and one on his chest. Looks like urticaria    He denies chest pain, shortness of breath (above baseline) or fever.    /65 (BP Site: Right Upper Arm, Patient Position: Sitting, BP Cuff Size: Large Adult)   Pulse 81         Has some residual erythema. Not tender, nor fluctuant. No drainage can be expressed.    IMP/PLAN  1) Abscess looks healed  - If he has increasing pain, redness or drainage, he should call and recheck him.  2)  Pain and stiffness left arm, perhaps tendonitis of his bicep prevent full flexing. Should see Dr. Ellen GUTIERREZ if this continues to bother him.  3) Urticaria - Should call Dr. Mack if this gets worse..

## 2025-06-23 NOTE — ED PROVIDER NOTES
[facility] EMERGENCY DEPARTMENT  Emergency Department Encounter  Emergency Medicine Attending     Pt Name:Marti Manzo  MRN: [id number]  Birthdate 1945  Date of evaluation: 6/23/25  PCP:  Fredrick Mack MD  Note Started: 10:02 AM EDT      CHIEF COMPLAINT       Chief Complaint   Patient presents with    Arm Pain     Left upper arm pain started 3 days ago.       HISTORY OF PRESENT ILLNESS  (Location/Symptom, Timing/Onset, Context/Setting, Quality, Duration, Modifying Factors, Severity.)      79-year-old male presenting for left upper arm pain.  States this started a few days after lifting heavy items in his house.  He has decreased strength with flexion.  Denies any radiation from neck or chest.  No chest pain or shortness of breath or back pain.  No numbness or weakness.  Patient was seen just prior to ER visit by general surgery who was concerned for biceps tendinitis.  No color change or wound.            PAST MEDICAL / SURGICAL / SOCIAL / FAMILY HISTORY      has a past medical history of Anxiety, Aortic stenosis, Atrial fibrillation, new onset (HCC), Bacteremia due to Escherichia coli, BPH (benign prostatic hyperplasia), Breast abscess in male, CHF (congestive heart failure) (HCC), Diabetes mellitus (HCC), Hyperlipidemia, Hypertension, Lymphedema, On home O2, Osteoarthritis, and Septic shock due to urinary tract infection (HCC) /  Levophed and fluids.     has a past surgical history that includes Vasectomy (1971); Cardiac catheterization (2020); Aortic valve repair (2020); Hand surgery (N/A, 03/17/2024); Colonoscopy; Cystoscopy (Left, 11/19/2024); and Cystoscopy (Left, 11/19/2024).    Social History     Socioeconomic History    Marital status:      Spouse name: Trish    Number of children: Not on file    Years of education: Not on file    Highest education level: Not on file   Occupational History    Not on file   Tobacco Use    Smoking status: Former     Current packs/day: 0.00     Average

## 2025-06-26 ENCOUNTER — HOSPITAL ENCOUNTER (OUTPATIENT)
Age: 80
Setting detail: SPECIMEN
Discharge: HOME | End: 2025-06-26
Payer: MEDICARE

## 2025-06-26 DIAGNOSIS — B96.4 URINARY TRACT INFECTION DUE TO PROTEUS: ICD-10-CM

## 2025-06-26 DIAGNOSIS — N39.0 URINARY TRACT INFECTION DUE TO PROTEUS: ICD-10-CM

## 2025-06-26 PROCEDURE — 87086 URINE CULTURE/COLONY COUNT: CPT

## 2025-06-27 LAB
MICROORGANISM SPEC CULT: NORMAL
SERVICE CMNT-IMP: NORMAL
SPECIMEN DESCRIPTION: NORMAL

## 2025-06-30 ENCOUNTER — RESULTS FOLLOW-UP (OUTPATIENT)
Dept: UROLOGY | Age: 80
End: 2025-06-30

## 2025-07-10 ENCOUNTER — HOSPITAL ENCOUNTER (OUTPATIENT)
Dept: CT IMAGING | Age: 80
Discharge: HOME | End: 2025-07-12
Payer: MEDICARE

## 2025-07-10 ENCOUNTER — TELEPHONE (OUTPATIENT)
Dept: UROLOGY | Age: 80
End: 2025-07-10

## 2025-07-10 DIAGNOSIS — N39.0 URINARY TRACT INFECTION DUE TO PROTEUS: ICD-10-CM

## 2025-07-10 DIAGNOSIS — B96.4 URINARY TRACT INFECTION DUE TO PROTEUS: ICD-10-CM

## 2025-07-10 PROCEDURE — 74176 CT ABD & PELVIS W/O CONTRAST: CPT

## 2025-07-17 ENCOUNTER — HOSPITAL ENCOUNTER (OUTPATIENT)
Dept: GENERAL RADIOLOGY | Age: 80
Discharge: HOME | End: 2025-07-19
Payer: MEDICARE

## 2025-07-17 DIAGNOSIS — N20.1 URETERAL STONE: ICD-10-CM

## 2025-07-17 DIAGNOSIS — N20.0 RENAL CALCULUS: ICD-10-CM

## 2025-07-17 PROCEDURE — 74018 RADEX ABDOMEN 1 VIEW: CPT

## 2025-07-18 ENCOUNTER — OFFICE VISIT (OUTPATIENT)
Dept: UROLOGY | Age: 80
End: 2025-07-18

## 2025-07-18 VITALS
WEIGHT: 315 LBS | HEART RATE: 65 BPM | BODY MASS INDEX: 51.69 KG/M2 | DIASTOLIC BLOOD PRESSURE: 70 MMHG | SYSTOLIC BLOOD PRESSURE: 107 MMHG

## 2025-07-18 DIAGNOSIS — N40.1 BPH WITH OBSTRUCTION/LOWER URINARY TRACT SYMPTOMS: ICD-10-CM

## 2025-07-18 DIAGNOSIS — N20.0 RENAL CALCULUS: Primary | ICD-10-CM

## 2025-07-18 DIAGNOSIS — N13.8 BPH WITH OBSTRUCTION/LOWER URINARY TRACT SYMPTOMS: ICD-10-CM

## 2025-07-18 DIAGNOSIS — N39.0 FREQUENT UTI: ICD-10-CM

## 2025-07-18 NOTE — PROGRESS NOTES
History  9/2024 - PCP referral for frequent urinary tract infections     Urine cultures  9/30/2024 - MRSA  9/10/2024 - Providencia stuartii  8/2024 - MRSA  6/2024 - no growth  6/16/2025 -  proteus mirabilis  6/26/2025 - no growth     10/2024 CT urogram negative for renal mass.  There is a 1.5 cm stone at the left UPJ.  There is no hydronephrosis.  There is additional renal stones bilaterally.     11/2024 left TLL    1/2025 CT urogram previous left kidney stone is no longer seen with bilateral nonobstructing kidney stones largest 9 mm lower pole left kidney no hydronephrosis    PSA  1/2019 - 1.49  10/2014 - 1.27  4/2023 - 0.90     Today  Patient being seen here today in follow-up.  This is 6-month follow-up.  We see the patient for BPH and stone disease.  Patient also has issues with frequent urinary tract infections.  Patient had a recent CT was completed prior to visit and independently reviewed showing renal stones on the right with renal atrophy and fat stranding.  No renal stones on the left.  KUB completed prior to visit and independently reviewed, difficult to appreciate the right sided stones due to patient body habitus.  Patient denies any dysuria, gross hematuria, abdominal pain or flank pain.  Here patient is taking Flomax twice daily.  He is having no issues or side effects with the medication.    Plan  Continue Flomax twice daily.  Patient will follow-up with us in 1 year with a repeat CT scan for stones.  He will call sooner with issues.

## 2025-07-29 ENCOUNTER — CLINICAL SUPPORT (OUTPATIENT)
Dept: CARDIOLOGY | Age: 80
End: 2025-07-29
Payer: MEDICARE

## 2025-07-29 DIAGNOSIS — I44.2 COMPLETE HEART BLOCK (HCC): ICD-10-CM

## 2025-07-29 DIAGNOSIS — Z95.0 PACEMAKER: Primary | ICD-10-CM

## 2025-07-29 PROCEDURE — 93296 REM INTERROG EVL PM/IDS: CPT | Performed by: INTERNAL MEDICINE

## 2025-08-26 ENCOUNTER — HOSPITAL ENCOUNTER (OUTPATIENT)
Dept: GENERAL RADIOLOGY | Age: 80
Discharge: HOME | End: 2025-08-28
Payer: MEDICARE

## 2025-08-26 DIAGNOSIS — M79.642 LEFT HAND PAIN: ICD-10-CM

## 2025-08-26 PROCEDURE — 73120 X-RAY EXAM OF HAND: CPT

## (undated) DEVICE — SINGLE ACTION PUMPING SYSTEM

## (undated) DEVICE — FIBER THULIO PERFORMANCE 400 MICRON

## (undated) DEVICE — SOLUTION IRRIG 1000ML 0.9% SOD CHL USP POUR PLAS BTL

## (undated) DEVICE — STRIP,CLOSURE,WOUND,MEDI-STRIP,1/2X4: Brand: MEDLINE

## (undated) DEVICE — MASTISOL ADHESIVE LIQ 2/3ML

## (undated) DEVICE — PREMIUM DRY TRAY LF: Brand: MEDLINE INDUSTRIES, INC.

## (undated) DEVICE — ADAPTER URO SCP UROLOK LL

## (undated) DEVICE — URETEROSCOPE (SEE ITEM COMMENTS) DIGITAL URS FLX SU MODEL E NORM DEFL AXIS II

## (undated) DEVICE — DUAL LUMEN URETERAL CATHETER

## (undated) DEVICE — SYRINGE IRRIG 60ML SFT PLIABLE BLB EZ TO GRP 1 HND USE W/

## (undated) DEVICE — SPONGE GZ W4XL4IN COT 12 PLY TYP VII WVN C FLD DSGN STERILE

## (undated) DEVICE — MERCY TIFFIN CYSTO-LF: Brand: MEDLINE INDUSTRIES, INC.

## (undated) DEVICE — SOLUTION IRRIG 3000ML 0.9% SOD CHL USP UROMATIC PLAS CONT

## (undated) DEVICE — SOLUTION SCRB 4OZ 4% CHG H2O AIDED FOR PREOPERATIVE SKIN

## (undated) DEVICE — YANKAUER,BULB TIP,W/O VENT,RIGID,STERILE: Brand: MEDLINE

## (undated) DEVICE — MERCY TIFFIN BASIC-LF: Brand: MEDLINE INDUSTRIES, INC.

## (undated) DEVICE — GUIDEWIRE VASC NITINOL HYDROPHIL STR 3 CM 0.035 INX150 CM STD NIT ZIPWIRE